# Patient Record
Sex: MALE | Race: WHITE | NOT HISPANIC OR LATINO | Employment: OTHER | URBAN - METROPOLITAN AREA
[De-identification: names, ages, dates, MRNs, and addresses within clinical notes are randomized per-mention and may not be internally consistent; named-entity substitution may affect disease eponyms.]

---

## 2017-01-20 ENCOUNTER — OFFICE VISIT (OUTPATIENT)
Dept: OPHTHALMOLOGY | Facility: CLINIC | Age: 77
End: 2017-01-20
Payer: MEDICARE

## 2017-01-20 ENCOUNTER — CLINICAL SUPPORT (OUTPATIENT)
Dept: OPHTHALMOLOGY | Facility: CLINIC | Age: 77
End: 2017-01-20
Payer: MEDICARE

## 2017-01-20 DIAGNOSIS — H57.03 SMALL PUPIL: ICD-10-CM

## 2017-01-20 DIAGNOSIS — Z86.69 H/O BELL'S PALSY: ICD-10-CM

## 2017-01-20 DIAGNOSIS — H40.013 OPEN ANGLE WITH BORDERLINE FINDINGS, LOW RISK, BILATERAL: Primary | ICD-10-CM

## 2017-01-20 DIAGNOSIS — H25.13 NUCLEAR SCLEROSIS, BILATERAL: ICD-10-CM

## 2017-01-20 DIAGNOSIS — Z83.518 FAMILY HISTORY OF MACULAR DEGENERATION: ICD-10-CM

## 2017-01-20 DIAGNOSIS — H52.203 HYPEROPIA WITH ASTIGMATISM AND PRESBYOPIA, BILATERAL: ICD-10-CM

## 2017-01-20 DIAGNOSIS — H57.819 BROW PTOSIS: ICD-10-CM

## 2017-01-20 DIAGNOSIS — H52.4 HYPEROPIA WITH ASTIGMATISM AND PRESBYOPIA, BILATERAL: ICD-10-CM

## 2017-01-20 DIAGNOSIS — H52.03 HYPEROPIA WITH ASTIGMATISM AND PRESBYOPIA, BILATERAL: ICD-10-CM

## 2017-01-20 PROCEDURE — 92250 FUNDUS PHOTOGRAPHY W/I&R: CPT | Mod: PBBFAC | Performed by: OPHTHALMOLOGY

## 2017-01-20 PROCEDURE — 99212 OFFICE O/P EST SF 10 MIN: CPT | Mod: PBBFAC | Performed by: OPHTHALMOLOGY

## 2017-01-20 PROCEDURE — 92083 EXTENDED VISUAL FIELD XM: CPT | Mod: PBBFAC | Performed by: OPHTHALMOLOGY

## 2017-01-20 PROCEDURE — 99999 PR PBB SHADOW E&M-EST. PATIENT-LVL II: CPT | Mod: PBBFAC,,, | Performed by: OPHTHALMOLOGY

## 2017-01-20 PROCEDURE — 92014 COMPRE OPH EXAM EST PT 1/>: CPT | Mod: S$PBB,,, | Performed by: OPHTHALMOLOGY

## 2017-01-20 NOTE — PROGRESS NOTES
Rel/fix good OD Poor OS coop. Good-smh    Assessment /Plan     For exam results, see Encounter Report.    There are no diagnoses linked to this encounter.

## 2017-01-20 NOTE — PROGRESS NOTES
HPI     Glaucoma    Additional comments: HVF review today           Comments   DLS: 9/25/15    1) LTG suspect  2) NS OU  3) Ptosis  4) FmHx ARMD    Pt had an episode of left sided Bell's Palsy in 8/2016. Took 3 weeks to   resolve. Pt notes that at night his right eye will water some.            Last edited by Kiana Martínez MD on 1/20/2017  8:50 AM. (History)              Assessment /Plan     For exam results, see Encounter Report.    Open angle with borderline findings, low risk, bilateral  -     Roblero Visual Field - OU - Extended - Both Eyes  -     Color Fundus Photography - OU - Both Eyes    Family history of macular degeneration    Nuclear sclerosis, bilateral    Brow ptosis    Hyperopia with astigmatism and presbyopia, bilateral    Small pupil    H/O Bell's palsy        1. Open angle with borderline glaucoma findings - Both Eyes   Glaucoma (type and duration)   LTG suspect  First HVF 2011  First photos 2011     Family history   = father-also my patient, + mom- she has passed  Glaucoma meds   none  H/O adverse rxn to glaucoma drops  none  LASERS   none  GLAUCOMA SURGERIES   none  OTHER EYE SURGERIES   none  CDR  0.7 // 0/75  Tbase  16-24    Tmax  21/24      Ttarget   ?       HVF  4 test 2011 to 2017 - full od // full os- ? Rim artifact  Gono  +3 ou  CCT  567/566  OCT  1 test 2012 to 2012 - RNFL - nl od // nl os  HRT   3 test 2011 to 2015 - MR -  Dec. IT od // dec. IN os /// CDR 0.69 od // 0.70 os  Disc photos  2011, 2012 - OIS    Ttoday  14/15  Test done today - HVF/DFE/Disc Photos    2. Nuclear sclerosis - Both Eyes   - mild BCVA 20/20 ou  BATh 20/60 od // 20/70 os (10/2013)   3. Ptosis   -patient would like to monitor for now as he is not having any trouble    4. Family history of macular degeneration   -father has adv wet ARMD - gets injections with arend   5. Hyperopia, astigmatism , presbyopia  PC +0.75+1.00x177        +1.00+1.50x180   ADD +2.50  glasses Teto   6. I use to see his father,  and also saw his mother -  - his dad  2015 @ age 89   - his mom passed at age 98  - both had glaucoma, his dad also had  wet ARMD - saw Ying     Plan    RTC 9 months- HRT / gonio   -monitor the cataracts - not visi sign yet - dilates medium only - smallish pupils   Keep F/U with Teto prn -

## 2017-02-24 ENCOUNTER — OFFICE VISIT (OUTPATIENT)
Dept: TRANSPLANT | Facility: CLINIC | Age: 77
End: 2017-02-24
Attending: INTERNAL MEDICINE
Payer: MEDICARE

## 2017-02-24 VITALS
HEIGHT: 72 IN | DIASTOLIC BLOOD PRESSURE: 79 MMHG | HEART RATE: 59 BPM | WEIGHT: 219.56 LBS | SYSTOLIC BLOOD PRESSURE: 132 MMHG | BODY MASS INDEX: 29.74 KG/M2

## 2017-02-24 DIAGNOSIS — I10 ESSENTIAL HYPERTENSION: ICD-10-CM

## 2017-02-24 DIAGNOSIS — Z79.01 CURRENT USE OF LONG TERM ANTICOAGULATION: ICD-10-CM

## 2017-02-24 DIAGNOSIS — I48.0 PAF (PAROXYSMAL ATRIAL FIBRILLATION): ICD-10-CM

## 2017-02-24 DIAGNOSIS — E78.00 PURE HYPERCHOLESTEROLEMIA: ICD-10-CM

## 2017-02-24 DIAGNOSIS — Z98.890 S/P ABLATION OF ATRIAL FLUTTER: Primary | ICD-10-CM

## 2017-02-24 DIAGNOSIS — Z86.79 S/P ABLATION OF ATRIAL FLUTTER: Primary | ICD-10-CM

## 2017-02-24 PROCEDURE — 99999 PR PBB SHADOW E&M-EST. PATIENT-LVL III: CPT | Mod: PBBFAC,,, | Performed by: INTERNAL MEDICINE

## 2017-02-24 PROCEDURE — 99213 OFFICE O/P EST LOW 20 MIN: CPT | Mod: S$PBB,,, | Performed by: INTERNAL MEDICINE

## 2017-02-24 PROCEDURE — 99213 OFFICE O/P EST LOW 20 MIN: CPT | Mod: PBBFAC | Performed by: INTERNAL MEDICINE

## 2017-02-24 NOTE — PROGRESS NOTES
Subjective:     Patient ID:  Danial Meza is a 76 y.o. male who presents for follow-up of Atrial Fibrillation (7mo visit)    HPI:  75 yo WM with AF on pradaxA HERE FOR ROUTIJE VISIT. No active CV symptomsl.  Walks couple miles a day ovef 30 min.    Review of Systems   Constitution: Negative for chills, fever, weakness, malaise/fatigue, night sweats, weight gain and weight loss.   Cardiovascular: Negative for chest pain, dyspnea on exertion, irregular heartbeat, leg swelling, near-syncope, orthopnea, palpitations, paroxysmal nocturnal dyspnea and syncope.   Respiratory: Negative for cough, sputum production and wheezing.    Hematologic/Lymphatic: Does not bruise/bleed easily.   Musculoskeletal: Negative for arthritis, joint pain and stiffness.   Gastrointestinal: Negative for hematochezia and melena.        Colonoscopy 2 weeks ago negative--done at 5 yrs due to polyps by Dr. Rodriguez at [facility]   Genitourinary: Negative for hematuria.   Neurological: Negative for brief paralysis, focal weakness and seizures.        Objective:   Physical Exam   Constitutional: He is oriented to person, place, and time. He appears well-developed and well-nourished. No distress.   /79  Pulse (!) 59  Ht 6' (1.829 m)  Wt 99.6 kg (219 lb 9.3 oz)  BMI 29.78 kg/m2   HENT:   Head: Normocephalic and atraumatic.   Mouth/Throat: No oropharyngeal exudate.   Eyes: Conjunctivae are normal. No scleral icterus.   Neck: No JVD present. No tracheal deviation present. No thyromegaly present.   Cardiovascular: Normal rate, regular rhythm and normal heart sounds.  Exam reveals no gallop.    No murmur heard.  Pulmonary/Chest: Effort normal and breath sounds normal.   Abdominal: Soft. Bowel sounds are normal. He exhibits no distension. There is no tenderness. There is no rebound and no guarding.   Musculoskeletal: He exhibits no edema or tenderness.   Neurological: He is alert and oriented to person, place, and time.   Skin: Skin is warm and dry. He is  not diaphoretic.   Psychiatric: He has a normal mood and affect. His behavior is normal. Judgment and thought content normal.      Assessment:     1. S/P ablation of atrial flutter 11/4/15    2. PAF (paroxysmal atrial fibrillation)    3. Current use of long term anticoagulation    4. Essential hypertension    5. Pure hypercholesterolemia      Plan:   BMP on pradaxa today  In 6 months obtain CBC, CMP and lipids

## 2017-02-24 NOTE — MR AVS SNAPSHOT
[facility]  [address]  [facility] [address]  Phone: [phone]                  Danial Meza   2017 4:30 PM   Office Visit    Description:  Male : 1940   Provider:  Ru Thomason Jr., MD   Department:  [facility]           Reason for Visit     Atrial Fibrillation           Diagnoses this Visit        Comments    S/P ablation of atrial flutter    -  Primary     PAF (paroxysmal atrial fibrillation)         Current use of long term anticoagulation         Essential hypertension         Pure hypercholesterolemia                To Do List           Goals (5 Years of Data)     None      Follow-Up and Disposition     Return in about 6 months (around 2017).      [facility]     [facility] -  Assistance  Registered nurses in the [facility] provide clinical advisement, health education, appointment booking, and other advisory services.  Call for this free service at [phone].             Medications           Message regarding Medications     Verify the changes and/or additions to your medication regime listed below are the same as discussed with your clinician today.  If any of these changes or additions are incorrect, please notify your healthcare provider.             Verify that the below list of medications is an accurate representation of the medications you are currently taking.  If none reported, the list may be blank. If incorrect, please contact your healthcare provider. Carry this list with you in case of emergency.           Current Medications     dabigatran etexilate (PRADAXA) 150 mg Cap Take 1 capsule (150 mg total) by mouth 2 (two) times daily.    lisinopril 10 MG tablet Take 1 tablet (10 mg total) by mouth once daily.    pindolol (VISKEN) 5 MG Tab Take 1 tablet (5 mg total) by mouth 2 (two) times daily.    VIT C/VIT E/LUTEIN/MIN/OMEGA-3 (OCUVITE ORAL) Take by mouth.    multivitamin (MULTIVITAMIN)  per tablet Take 1 tablet by mouth once daily.      valacyclovir (VALTREX) 1000 MG tablet Take 1 tablet (1,000 mg total) by mouth 3 (three) times daily.           Clinical Reference Information           Your Vitals Were     BP                   132/79           Blood Pressure          Most Recent Value    BP  132/79      Allergies as of 2/24/2017     No Known Allergies      Immunizations Administered on Date of Encounter - 2/24/2017     None      Orders Placed During Today's Visit     Future Labs/Procedures Expected by Expires    Basic metabolic panel  2/24/2017 (Approximate) 2/24/2018    CBC auto differential  8/26/2017 4/25/2018    Comprehensive metabolic panel  8/26/2017 (Approximate) 2/24/2018    Lipid panel  8/26/2017 (Approximate) 2/24/2018      [facility] Sign-Up     Activating your [facility] account is as easy as 1-2-3!     1) Visit [email or web address], select Sign Up Now, enter this activation code and your date of birth, then select Next.  [email or web address]  Expires: 4/10/2017  4:41 PM      2) Create a username and password to use when you visit [facility] in the future and select a security question in case you lose your password and select Next.    3) Enter your e-mail address and click Sign Up!    Additional Information  If you have questions, please e-mail [email or web address] or call [phone] to talk to our [facility] staff. Remember, [facility] is NOT to be used for urgent needs. For medical emergencies, dial 911.         Instructions    [employer] sells celtic sea salt and use on vegetables or salad one portion daily       Language Assistance Services     ATTENTION: Language assistance services are available, free of charge. Please call [phone].      ATENCIÓN: Si habla español, tiene a moreno disposición servicios gratuitos de asistencia lingüística. Llame al [phone].     CHÚ Ý: N?u b?n nói Ti?ng Vi?t, có các d?ch v? h? tr? ngôn ng? mi?n phí dành cho b?n. G?i s? [phone].          [facility] complies with applicable Federal civil rights laws and does not discriminate on the basis of race, color, national origin, age, disability, or sex.

## 2017-03-23 ENCOUNTER — OFFICE VISIT (OUTPATIENT)
Dept: INTERNAL MEDICINE | Facility: CLINIC | Age: 77
End: 2017-03-23
Payer: MEDICARE

## 2017-03-23 ENCOUNTER — HOSPITAL ENCOUNTER (OUTPATIENT)
Facility: HOSPITAL | Age: 77
Discharge: HOME | End: 2017-03-24
Attending: EMERGENCY MEDICINE | Admitting: EMERGENCY MEDICINE
Payer: MEDICARE

## 2017-03-23 ENCOUNTER — HOSPITAL ENCOUNTER (OUTPATIENT)
Dept: RADIOLOGY | Facility: HOSPITAL | Age: 77
Discharge: HOME | End: 2017-03-23
Attending: INTERNAL MEDICINE
Payer: MEDICARE

## 2017-03-23 VITALS
BODY MASS INDEX: 29.23 KG/M2 | TEMPERATURE: 100 F | DIASTOLIC BLOOD PRESSURE: 70 MMHG | HEIGHT: 72 IN | OXYGEN SATURATION: 98 % | SYSTOLIC BLOOD PRESSURE: 108 MMHG | WEIGHT: 215.81 LBS | HEART RATE: 76 BPM

## 2017-03-23 DIAGNOSIS — R53.1 WEAKNESS: ICD-10-CM

## 2017-03-23 DIAGNOSIS — R05.9 COUGH: ICD-10-CM

## 2017-03-23 DIAGNOSIS — R68.89 FLU-LIKE SYMPTOMS: ICD-10-CM

## 2017-03-23 DIAGNOSIS — J18.9 PNEUMONIA OF RIGHT UPPER LOBE DUE TO INFECTIOUS ORGANISM: Primary | ICD-10-CM

## 2017-03-23 DIAGNOSIS — R79.89 ELEVATED SERUM CREATININE: ICD-10-CM

## 2017-03-23 PROBLEM — N17.9 AKI (ACUTE KIDNEY INJURY): Status: ACTIVE | Noted: 2017-03-23

## 2017-03-23 LAB
FLUAV AG SPEC QL IA: NEGATIVE
FLUBV AG SPEC QL IA: NEGATIVE
LACTATE SERPL-SCNC: 1.3 MMOL/L
PROCALCITONIN SERPL IA-MCNC: 0.09 NG/ML
SPECIMEN SOURCE: NORMAL

## 2017-03-23 PROCEDURE — 99219 PR INITIAL OBSERVATION CARE,LEVL II: CPT | Mod: ,,, | Performed by: HOSPITALIST

## 2017-03-23 PROCEDURE — 99284 EMERGENCY DEPT VISIT MOD MDM: CPT | Mod: 25,27

## 2017-03-23 PROCEDURE — 83605 ASSAY OF LACTIC ACID: CPT

## 2017-03-23 PROCEDURE — 71020 XR CHEST PA AND LATERAL: CPT | Mod: 26,,, | Performed by: RADIOLOGY

## 2017-03-23 PROCEDURE — G0378 HOSPITAL OBSERVATION PER HR: HCPCS

## 2017-03-23 PROCEDURE — 99284 EMERGENCY DEPT VISIT MOD MDM: CPT | Mod: ,,, | Performed by: EMERGENCY MEDICINE

## 2017-03-23 PROCEDURE — 87040 BLOOD CULTURE FOR BACTERIA: CPT | Mod: 59

## 2017-03-23 PROCEDURE — 84145 PROCALCITONIN (PCT): CPT

## 2017-03-23 PROCEDURE — 99999 PR PBB SHADOW E&M-EST. PATIENT-LVL IV: CPT | Mod: PBBFAC,,, | Performed by: PHYSICIAN ASSISTANT

## 2017-03-23 PROCEDURE — 99213 OFFICE O/P EST LOW 20 MIN: CPT | Mod: S$PBB,,, | Performed by: PHYSICIAN ASSISTANT

## 2017-03-23 PROCEDURE — 96365 THER/PROPH/DIAG IV INF INIT: CPT

## 2017-03-23 PROCEDURE — 25000003 PHARM REV CODE 250: Performed by: HOSPITALIST

## 2017-03-23 PROCEDURE — 36415 COLL VENOUS BLD VENIPUNCTURE: CPT

## 2017-03-23 PROCEDURE — 25000003 PHARM REV CODE 250: Performed by: NURSE PRACTITIONER

## 2017-03-23 PROCEDURE — 63600175 PHARM REV CODE 636 W HCPCS: Performed by: NURSE PRACTITIONER

## 2017-03-23 RX ORDER — LISINOPRIL 10 MG/1
10 TABLET ORAL DAILY
Status: DISCONTINUED | OUTPATIENT
Start: 2017-03-24 | End: 2017-03-24 | Stop reason: HOSPADM

## 2017-03-23 RX ORDER — IBUPROFEN 200 MG
16 TABLET ORAL
Status: DISCONTINUED | OUTPATIENT
Start: 2017-03-23 | End: 2017-03-24 | Stop reason: HOSPADM

## 2017-03-23 RX ORDER — GLUCAGON 1 MG
1 KIT INJECTION
Status: DISCONTINUED | OUTPATIENT
Start: 2017-03-23 | End: 2017-03-24 | Stop reason: HOSPADM

## 2017-03-23 RX ORDER — PINDOLOL 5 MG/1
5 TABLET ORAL 2 TIMES DAILY
Status: DISCONTINUED | OUTPATIENT
Start: 2017-03-23 | End: 2017-03-24 | Stop reason: HOSPADM

## 2017-03-23 RX ORDER — BENZONATATE 100 MG/1
100 CAPSULE ORAL 3 TIMES DAILY PRN
Status: DISCONTINUED | OUTPATIENT
Start: 2017-03-23 | End: 2017-03-24 | Stop reason: HOSPADM

## 2017-03-23 RX ORDER — HEPARIN SODIUM 5000 [USP'U]/ML
5000 INJECTION, SOLUTION INTRAVENOUS; SUBCUTANEOUS EVERY 8 HOURS
Status: DISCONTINUED | OUTPATIENT
Start: 2017-03-23 | End: 2017-03-23

## 2017-03-23 RX ORDER — AZITHROMYCIN 250 MG/1
500 TABLET, FILM COATED ORAL
Status: COMPLETED | OUTPATIENT
Start: 2017-03-23 | End: 2017-03-23

## 2017-03-23 RX ORDER — IBUPROFEN 200 MG
24 TABLET ORAL
Status: DISCONTINUED | OUTPATIENT
Start: 2017-03-23 | End: 2017-03-24 | Stop reason: HOSPADM

## 2017-03-23 RX ORDER — SODIUM CHLORIDE 9 MG/ML
INJECTION, SOLUTION INTRAVENOUS CONTINUOUS
Status: DISCONTINUED | OUTPATIENT
Start: 2017-03-23 | End: 2017-03-24 | Stop reason: HOSPADM

## 2017-03-23 RX ORDER — DABIGATRAN ETEXILATE 150 MG/1
150 CAPSULE ORAL 2 TIMES DAILY
Status: DISCONTINUED | OUTPATIENT
Start: 2017-03-23 | End: 2017-03-24 | Stop reason: HOSPADM

## 2017-03-23 RX ORDER — AZITHROMYCIN 250 MG/1
500 TABLET, FILM COATED ORAL DAILY
Status: DISCONTINUED | OUTPATIENT
Start: 2017-03-24 | End: 2017-03-24 | Stop reason: HOSPADM

## 2017-03-23 RX ADMIN — PINDOLOL 5 MG: 5 TABLET ORAL at 09:03

## 2017-03-23 RX ADMIN — BENZONATATE 100 MG: 100 CAPSULE ORAL at 05:03

## 2017-03-23 RX ADMIN — AZITHROMYCIN 500 MG: 250 TABLET, FILM COATED ORAL at 02:03

## 2017-03-23 RX ADMIN — CEFTRIAXONE 1 G: 1 INJECTION, SOLUTION INTRAVENOUS at 02:03

## 2017-03-23 RX ADMIN — SODIUM CHLORIDE 1000 ML: 0.9 INJECTION, SOLUTION INTRAVENOUS at 02:03

## 2017-03-23 RX ADMIN — SODIUM CHLORIDE: 0.9 INJECTION, SOLUTION INTRAVENOUS at 09:03

## 2017-03-23 RX ADMIN — DABIGATRAN ETEXILATE MESYLATE 150 MG: 150 CAPSULE ORAL at 09:03

## 2017-03-23 NOTE — ED NOTES
Pt presented to the ED c/o fever, cough, sob, and congestion since Monday. Pt was sent from the clinic for further evaluation of possible pneumonia. Pt alert. Wife at the bedside.

## 2017-03-23 NOTE — ED PROVIDER NOTES
Encounter Date: 3/23/2017    SCRIBE #1 NOTE: I, Caio Romo, am scribing for, and in the presence of,  Leatha Sharp MD. I have scribed the following portions of the note - the APC attestation.       History     Chief Complaint   Patient presents with    Pneumonia     was told to come to ED for pneumonia by PCP     Review of patient's allergies indicates:  No Known Allergies  HPI Comments: This is a 76 y.o. Male with a past medical history significant for SVT, paroxysmal atrial fibrillation, hypertension, hyperlipidemia, glaucoma, cataracts and gout who presents to the ED today for evaluation and treatment of pneumonia and elevated creatinine.  He states that his symptoms began on Monday.  He reports a productive cough, subjective fever and chills.  Denies hemoptysis.  He was unable to take his temperature because his thermometer is broken.  He denies chest pain or shortness of breath.  Denies headache or neck stiffness.  States his urine has been dark since yesterday evening but denies hematuria or dysuria.  He denies abdominal pain, nausea, vomiting or diarrhea.  He was seen in primary care today where he had lab work and a chest x-ray done and was diagnosed with pneumonia and an elevated creatinine.  He was instructed to come to the emergency department for further evaluation and treatment.  He denies any other problems or concerns at this time.      The history is provided by the patient.     Past Medical History:   Diagnosis Date    Basal cell carcinoma 1/5/12    right nose    Cataract     Glaucoma     Gout 7/18/2014    Hyperlipemia     Hypertension     PAF (paroxysmal atrial fibrillation) 2012    SVT (supraventricular tachycardia) 1/14/2005    ablation by Marixa of left lateral free wall accessory bypass tract     Past Surgical History:   Procedure Laterality Date    skin cancer nose and neck      SVT accessory pathway ablation  1/14/2005    Dr. Calvin     Family History   Problem Relation Age of  Onset    Glaucoma Mother     Cancer Mother     Glaucoma Father     Macular degeneration Father     Stroke Father     Melanoma Father     Cancer Brother     No Known Problems Sister     No Known Problems Maternal Aunt     No Known Problems Maternal Uncle     No Known Problems Paternal Aunt     No Known Problems Paternal Uncle     No Known Problems Maternal Grandmother     No Known Problems Maternal Grandfather     No Known Problems Paternal Grandmother     No Known Problems Paternal Grandfather     Diabetes Neg Hx     Psoriasis Neg Hx     Lupus Neg Hx     Eczema Neg Hx     Acne Neg Hx     Amblyopia Neg Hx     Blindness Neg Hx     Cataracts Neg Hx     Hypertension Neg Hx     Retinal detachment Neg Hx     Strabismus Neg Hx     Thyroid disease Neg Hx      Social History   Substance Use Topics    Smoking status: Former Smoker    Smokeless tobacco: None    Alcohol use Yes      Comment: 1 time biweekly     Review of Systems   Constitutional: Positive for fever and chills.  HENT: Negative for congestion and sinus pressure.    Eyes: Negative for visual disturbance.   Respiratory: Positive for cough.  Positive for sputum.  Negative for hemoptysis.  Negative for shortness of breath.    Cardiovascular: Negative for chest pain.  Negative for edema.  Gastrointestinal: Negative for abdominal pain, diarrhea, nausea and vomiting.   Genitourinary: Negative for flank pain. Negative for dysuria. positive for dark urine.  Musculoskeletal: Negative for arthralgias and myalgias.   Skin: Negative for rash and wound.   Neurological: Negative for dizziness. Negative for weakness and numbness.  negative for headache.  Psychiatric/Behavioral: Negative for confusion.       Physical Exam   Initial Vitals   BP Pulse Resp Temp SpO2   03/23/17 0955 03/23/17 0955 03/23/17 0955 03/23/17 0955 03/23/17 0955   101/59 78 18 99.8 °F (37.7 °C) 96 %     Physical Exam   Nursing note and vitals reviewed.  Constitutional: Patient  appears well-developed and well-nourished. Not diaphoretic. No distress.   HENT:   Head: Normocephalic and atraumatic.   Eyes: Conjunctivae are normal. No scleral icterus.   Neck: Normal range of motion. Neck supple.   Cardiovascular: Normal rate, regular rhythm and normal heart sounds.   Pulmonary/Chest: Diminished breath sounds noted to the right upper lobe.  Nonproductive cough on exam.  No respiratory distress.  Abdominal: Soft. There is no tenderness.   Musculoskeletal: Normal range of motion. No edema or tenderness.   Neurological: Alert and oriented to person, place, and time. Normal strength. No sensory deficit.   Skin: Skin is warm and dry. No rash noted. No erythema. No pallor.   Psychiatric: Normal mood and affect. Thought content normal.     ED Course   Procedures  Labs Reviewed   CULTURE, BLOOD   CULTURE, BLOOD   LACTIC ACID, PLASMA             Medical Decision Making:   History:   Old Medical Records: I decided to obtain old medical records.  Clinical Tests:   Lab Tests: Reviewed and Ordered            Scribe Attestation:   Scribe #1: I performed the above scribed service and the documentation accurately describes the services I performed. I attest to the accuracy of the note.    Attending Attestation:     Physician Attestation Statement for NP/PA:   I have conducted a face to face encounter with this patient in addition to the NP/PA, due to Medical Complexity    Other NP/PA Attestation Additions:    History of Present Illness: 76 y.o. male complains of cough. He was sent from his PCP's office for suspected PNA.    Physical Exam: He is nontoxic appearing.    Medical Decision Making: I have reviewed his CXR from earlier to day which shows a RUL infiltrate. Labs are remarkable for creatinine of 1.7 whereas his creatinine 3 weeks ago was 1.3. We will send blood cultures and a lactic acid. His BP is running in the low 100s. We will give bolus of IV fluids. We will treat with Rocephin and Azithromycin and  place in observation overnight.        Physician Attestation for Scribe:  Physician Attestation Statement for Scribe #1: I, Leatha Sharp MD, reviewed documentation, as scribed by Caio Romo in my presence, and it is both accurate and complete.                 ED Course     Clinical Impression:   The primary encounter diagnosis was Pneumonia of right upper lobe due to infectious organism. A diagnosis of Elevated serum creatinine was also pertinent to this visit.          Leatha Vallecillo MD  03/26/17 0050

## 2017-03-23 NOTE — IP AVS SNAPSHOT
[facility]  [address]  [facility] [address]  Phone: [phone]           Patient Discharge Instructions     Our goal is to set you up for success. This packet includes information on your condition, medications, and your home care. It will help you to care for yourself so you don't get sicker and need to go back to the hospital.     Please ask your nurse if you have any questions.        There are many details to remember when preparing to leave the hospital. Here is what you will need to do:    1. Take your medicine. If you are prescribed medications, review your Medication List in the following pages. You may have new medications to  at the pharmacy and others that you'll need to stop taking. Review the instructions for how and when to take your medications. Talk with your doctor or nurses if you are unsure of what to do.     2. Go to your follow-up appointments. Specific follow-up information is listed in the following pages. Your may be contacted by a transition nurse or clinical provider about future appointments. Be sure we have all of the phone numbers to reach you, if needed. Please contact your provider's office if you are unable to make an appointment.     3. Watch for warning signs. Your doctor or nurse will give you detailed warning signs to watch for and when to call for assistance. These instructions may also include educational information about your condition. If you experience any of warning signs to your health, call your doctor.               [facility]  Unless otherwise directed by your provider, please contact [facility], our nurse care line that is available for 24/7 assistance.     [phone] (toll-free)    Registered nurses in the [facility] provide clinical advisement, health education, appointment booking, and other advisory services.                    ** Verify the list of medication(s) below is accurate and up  to date. Carry this with you in case of emergency. If your medications have changed, please notify your healthcare provider.             Medication List      START taking these medications        Additional Info                      benzonatate 200 MG capsule   Commonly known as:  TESSALON   Quantity:  30 capsule   Refills:  1   Dose:  200 mg    Last time this was given:  100 mg on 3/23/2017  5:03 PM   Instructions:  Take 1 capsule (200 mg total) by mouth 3 (three) times daily as needed for Cough.     Begin Date    AM    Noon    PM    Bedtime       moxifloxacin 400 mg tablet   Commonly known as:  AVELOX   Quantity:  3 tablet   Refills:  0   Dose:  400 mg    Instructions:  Take 1 tablet (400 mg total) by mouth once daily.     Begin Date    AM    Noon    PM    Bedtime         CHANGE how you take these medications        Additional Info                      valacyclovir 1000 MG tablet   Commonly known as:  VALTREX   Quantity:  10 tablet   Refills:  0   Dose:  1000 mg   What changed:    - when to take this  - reasons to take this    Instructions:  Take 1 tablet (1,000 mg total) by mouth every 12 (twelve) hours as needed (take 2 tablets every twelve hours x 1 day for cold sores as needed).     Begin Date    AM    Noon    PM    Bedtime         CONTINUE taking these medications        Additional Info                      dabigatran etexilate 150 mg Cap   Commonly known as:  PRADAXA   Quantity:  180 capsule   Refills:  3   Dose:  150 mg    Last time this was given:  150 mg on 3/24/2017  8:55 AM   Instructions:  Take 1 capsule (150 mg total) by mouth 2 (two) times daily.     Begin Date    AM    Noon    PM    Bedtime       lisinopril 10 MG tablet   Quantity:  90 tablet   Refills:  3   Dose:  10 mg    Last time this was given:  10 mg on 3/24/2017  8:55 AM   Instructions:  Take 1 tablet (10 mg total) by mouth once daily.     Begin Date    AM    Noon    PM    Bedtime       OCUVITE ORAL   Refills:  0    Instructions:  Take by  mouth.     Begin Date    AM    Noon    PM    Bedtime       ONE DAILY MULTIVITAMIN per tablet   Refills:  0   Dose:  1 tablet   Generic drug:  multivitamin    Instructions:  Take 1 tablet by mouth once daily.     Begin Date    AM    Noon    PM    Bedtime       pindolol 5 MG Tab   Commonly known as:  VISKEN   Quantity:  180 tablet   Refills:  3   Dose:  5 mg    Last time this was given:  5 mg on 3/24/2017  8:54 AM   Instructions:  Take 1 tablet (5 mg total) by mouth 2 (two) times daily.     Begin Date    AM    Noon    PM    Bedtime            Where to Get Your Medications      You can get these medications from any pharmacy     Bring a paper prescription for each of these medications     benzonatate 200 MG capsule    moxifloxacin 400 mg tablet    valacyclovir 1000 MG tablet                  Please bring to all follow up appointments:    1. A copy of your discharge instructions.  2. All medicines you are currently taking in their original bottles.  3. Identification and insurance card.    Please arrive 15 minutes ahead of scheduled appointment time.    Please call 24 hours in advance if you must reschedule your appointment and/or time.        Your Scheduled Appointments     Jun 27, 2017  9:40 AM CDT   Physical with Louie Durant MD   [facility] - Internal Medicine ([facility])    [address]  [place] [id number] [phone]              Follow-up Information     Follow up with Louie Durant MD On 6/27/2017.    Specialty:  Internal Medicine    Why:  to establish care. On wait list. Clinic will call with earlier appointment    Contact information:    [address]  [place] [address]  [phone]          Discharge Instructions     Future Orders    Activity as tolerated     Call MD for:  difficulty breathing or increased cough     Call MD for:  persistent nausea and vomiting or diarrhea     Call MD for:  temperature >100.4     Diet general     Questions:    Total calories:       Fat restriction, if any:      Protein restriction, if any:      Na restriction, if any:      Fluid restriction:      Additional restrictions:          Primary Diagnosis     Your primary diagnosis was:  Pneumonia Of Right Upper Lobe Due To Infectious Organism      Admission Information     Date & Time Provider Department CSN    3/23/2017 12:51 PM Livia Monte MD [facility] [id number]      Care Providers     Provider Role Specialty Primary office phone    Livia Monte MD Attending Provider Hospitalist [phone]    Miguel Sylvester MD Team Attending  Hospitalist [phone]    Livia Monte MD Team Attending  Hospitalist [phone]      Your Vitals Were     BP Pulse Temp Resp Height Weight    138/76 (BP Location: Right arm, Patient Position: Sitting, BP Method: Automatic) 76 97.9 °F (36.6 °C) (Oral) 16 6' (1.829 m) 96.6 kg (213 lb)    SpO2 BMI             95% 28.89 kg/m2         Recent Lab Values     No lab values to display.      Pending Labs     Order Current Status    Blood Culture #1 **CANNOT BE ORDERED STAT** Preliminary result    Blood Culture #2 **CANNOT BE ORDERED STAT** Preliminary result      Allergies as of 3/24/2017     No Known Allergies      Advance Directives     An advance directive is a document which, in the event you are no longer able to make decisions for yourself, tells your healthcare team what kind of treatment you do or do not want to receive, or who you would like to make those decisions for you.  If you do not currently have an advance directive, [facility] encourages you to create one.  For more information call:  [phone]), [phone] ([phone]),  or log on to [email or web address].        Language Assistance Services     ATTENTION: Language assistance services are available, free of charge. Please call [phone].      ATENCIÓN: Si habla español, tiene a moreno disposición servicios gratuitos de asistencia lingüística. Llame  al [phone].     [facility] Ý: N?u b?n nói Ti?ng Vi?t, có các d?ch v? h? tr? ngôn ng? mi?n phí dành cho b?n. G?i s? [phone].        Pneumonmia Discharge Instructions                Pradaxa Information           [facility] Sign-Up     Activating your [facility] account is as easy as 1-2-3!     1) Visit [email or web address], select Sign Up Now, enter this activation code and your date of birth, then select Next.  [email or web address]  Expires: 4/10/2017  5:41 PM      2) Create a username and password to use when you visit [facility] in the future and select a security question in case you lose your password and select Next.    3) Enter your e-mail address and click Sign Up!    Additional Information  If you have questions, please e-mail [email or web address] or call [phone] to talk to our [facility] staff. Remember, [facility] is NOT to be used for urgent needs. For medical emergencies, dial 911.          [facility] complies with applicable Federal civil rights laws and does not discriminate on the basis of race, color, national origin, age, disability, or sex.

## 2017-03-23 NOTE — ED NOTES
Pt identifiers Danial Meza were checked and correct  LOC: The patient is awake, alert, aware of environment with an appropriate affect. Oriented x3, speaking appropriately  APPEARANCE: Pt resting comfortably, in no acute distress, pt is clean and well groomed, clothing properly fastened  SKIN: Skin warm, dry and intact, normal skin turgor, moist mucus membranes  RESPIRATORY: Airway is open and patent, respirations are spontaneous, even and unlabored, normal effort and rate  CARDIAC: Normal rate and rhythm, no peripheral edema noted, capillary refill < 3 seconds, bilateral radial pulses 2+  ABDOMEN: Soft, non tender, non distended. Bowel sounds present x 4 quadrants.    NEUROLOGIC: PERRLA, facial expression is symmetrical, patient moving all extremities spontaneously, normal sensation in all extremities when touched with a finger.  Follows all commands appropriately  MUSCULOSKELETAL: No obvious deformities.

## 2017-03-23 NOTE — SUBJECTIVE & OBJECTIVE
Past Medical History:   Diagnosis Date    Basal cell carcinoma 1/5/12    right nose    Cataract     Glaucoma     Gout 7/18/2014    Hyperlipemia     Hypertension     PAF (paroxysmal atrial fibrillation) 2012    SVT (supraventricular tachycardia) 1/14/2005    ablation by Marixa of left lateral free wall accessory bypass tract       Past Surgical History:   Procedure Laterality Date    skin cancer nose and neck      SVT accessory pathway ablation  1/14/2005    Dr. Calvin       Review of patient's allergies indicates:  No Known Allergies    No current facility-administered medications on file prior to encounter.      Current Outpatient Prescriptions on File Prior to Encounter   Medication Sig    dabigatran etexilate (PRADAXA) 150 mg Cap Take 1 capsule (150 mg total) by mouth 2 (two) times daily.    lisinopril 10 MG tablet Take 1 tablet (10 mg total) by mouth once daily.    multivitamin (MULTIVITAMIN) per tablet Take 1 tablet by mouth once daily.      pindolol (VISKEN) 5 MG Tab Take 1 tablet (5 mg total) by mouth 2 (two) times daily.    VIT C/VIT E/LUTEIN/MIN/OMEGA-3 (OCUVITE ORAL) Take by mouth.    valacyclovir (VALTREX) 1000 MG tablet Take 1 tablet (1,000 mg total) by mouth 3 (three) times daily.     Family History     Problem Relation (Age of Onset)    Cancer Mother, Brother    Glaucoma Mother, Father    Macular degeneration Father    Melanoma Father    No Known Problems Sister, Maternal Aunt, Maternal Uncle, Paternal Aunt, Paternal Uncle, Maternal Grandmother, Maternal Grandfather, Paternal Grandmother, Paternal Grandfather    Stroke Father        Social History Main Topics    Smoking status: Never Smoker    Smokeless tobacco: Not on file    Alcohol use Yes      Comment: 1 time biweekly    Drug use: No    Sexual activity: Yes     Partners: Female     Review of Systems   Constitutional: Positive for chills and fever. Negative for fatigue.   Respiratory: Positive for cough. Negative for chest  tightness, shortness of breath and wheezing.    Cardiovascular: Negative.  Negative for chest pain, palpitations and leg swelling.   Gastrointestinal: Negative for abdominal distention, abdominal pain, constipation, diarrhea, nausea and vomiting.   Genitourinary: Negative for decreased urine volume, dysuria, flank pain, frequency and urgency.   Skin: Negative for rash.   Neurological: Negative for dizziness, weakness, light-headedness and headaches.   Psychiatric/Behavioral: Negative for agitation, behavioral problems and confusion.     Objective:     Vital Signs (Most Recent):  Temp: 98.1 °F (36.7 °C) (03/23/17 1612)  Pulse: 83 (03/23/17 1612)  Resp: 16 (03/23/17 1612)  BP: (!) 145/69 (03/23/17 1612)  SpO2: 99 % (03/23/17 1612) Vital Signs (24h Range):  Temp:  [98.1 °F (36.7 °C)-99.8 °F (37.7 °C)] 98.1 °F (36.7 °C)  Pulse:  [76-83] 83  Resp:  [16-18] 16  SpO2:  [96 %-99 %] 99 %  BP: (101-145)/(59-70) 145/69     Weight: 96.6 kg (213 lb)  Body mass index is 28.89 kg/(m^2).    Physical Exam   Constitutional: He is oriented to person, place, and time. He appears well-developed and well-nourished.   Eyes: EOM are normal. Pupils are equal, round, and reactive to light.   Cardiovascular: Normal rate, regular rhythm and normal heart sounds.  Exam reveals no gallop and no friction rub.    No murmur heard.  Pulmonary/Chest: Effort normal and breath sounds normal. No respiratory distress. He has no wheezes. He has no rales. He exhibits no tenderness.   Abdominal: Soft. Bowel sounds are normal. He exhibits no distension and no mass. There is no tenderness. There is no rebound and no guarding.   Musculoskeletal: Normal range of motion.   Neurological: He is alert and oriented to person, place, and time. He displays normal reflexes. No cranial nerve deficit. He exhibits normal muscle tone. Coordination normal.   Skin: Skin is warm and dry.        Significant Labs:   CBC:   Recent Labs  Lab 03/23/17  0832   WBC 8.40   HGB 15.7    HCT 43.5   *     CMP:   Recent Labs  Lab 03/23/17  0832      K 4.5      CO2 23   *   BUN 34*   CREATININE 1.7*   CALCIUM 9.5   PROT 7.0   ALBUMIN 4.1   BILITOT 1.2*   ALKPHOS 67   AST 39   ALT 56*   ANIONGAP 11   EGFRNONAA 38*       Significant Imaging: CXR: I have reviewed all pertinent results/findings within the past 24 hours and my personal findings are:   early RUL infiltrate

## 2017-03-23 NOTE — H&P
[facility] Hospital Medicine  History & Physical    Patient Name: Danial Meza  MRN: [id number]  Admission Date: 3/23/2017  Attending Physician: Livia Monte MD  Primary Care Provider: Bruno eBcerra MD    [facility] Medicine Team: [facility] B Livia Monte MD     Patient information was obtained from patient and ER records.     Subjective:     Principal Problem:Pneumonia of right upper lobe due to infectious organism    Chief Complaint:   Chief Complaint   Patient presents with    Pneumonia     was told to come to ED for pneumonia by PCP        HPI: 77 yo man with afib who presents with 3 days of subjective fevers, chills, weakness, and cough.  Denies any SOB, n/v/d, urinary sx, leg swelling/PND. Denies any sick contacts. Pt went to urgent care given his sx and was referred to the ED for further evaluation.     Past Medical History:   Diagnosis Date    Basal cell carcinoma 1/5/12    right nose    Cataract     Glaucoma     Gout 7/18/2014    Hyperlipemia     Hypertension     PAF (paroxysmal atrial fibrillation) 2012    SVT (supraventricular tachycardia) 1/14/2005    ablation by Marixa of left lateral free wall accessory bypass tract       Past Surgical History:   Procedure Laterality Date    skin cancer nose and neck      SVT accessory pathway ablation  1/14/2005    Dr. Calvin       Review of patient's allergies indicates:  No Known Allergies    No current facility-administered medications on file prior to encounter.      Current Outpatient Prescriptions on File Prior to Encounter   Medication Sig    dabigatran etexilate (PRADAXA) 150 mg Cap Take 1 capsule (150 mg total) by mouth 2 (two) times daily.    lisinopril 10 MG tablet Take 1 tablet (10 mg total) by mouth once daily.    multivitamin (MULTIVITAMIN) per tablet Take 1 tablet by mouth once daily.      pindolol (VISKEN) 5 MG Tab Take 1 tablet (5 mg total) by mouth 2 (two) times daily.    VIT C/VIT E/LUTEIN/MIN/OMEGA-3  (OCUVITE ORAL) Take by mouth.    valacyclovir (VALTREX) 1000 MG tablet Take 1 tablet (1,000 mg total) by mouth 3 (three) times daily.     Family History     Problem Relation (Age of Onset)    Cancer Mother, Brother    Glaucoma Mother, Father    Macular degeneration Father    Melanoma Father    No Known Problems Sister, Maternal Aunt, Maternal Uncle, Paternal Aunt, Paternal Uncle, Maternal Grandmother, Maternal Grandfather, Paternal Grandmother, Paternal Grandfather    Stroke Father        Social History Main Topics    Smoking status: Never Smoker    Smokeless tobacco: Not on file    Alcohol use Yes      Comment: 1 time biweekly    Drug use: No    Sexual activity: Yes     Partners: Female     Review of Systems   Constitutional: Positive for chills and fever. Negative for fatigue.   Respiratory: Positive for cough. Negative for chest tightness, shortness of breath and wheezing.    Cardiovascular: Negative.  Negative for chest pain, palpitations and leg swelling.   Gastrointestinal: Negative for abdominal distention, abdominal pain, constipation, diarrhea, nausea and vomiting.   Genitourinary: Negative for decreased urine volume, dysuria, flank pain, frequency and urgency.   Skin: Negative for rash.   Neurological: Negative for dizziness, weakness, light-headedness and headaches.   Psychiatric/Behavioral: Negative for agitation, behavioral problems and confusion.     Objective:     Vital Signs (Most Recent):  Temp: 98.1 °F (36.7 °C) (03/23/17 1612)  Pulse: 83 (03/23/17 1612)  Resp: 16 (03/23/17 1612)  BP: (!) 145/69 (03/23/17 1612)  SpO2: 99 % (03/23/17 1612) Vital Signs (24h Range):  Temp:  [98.1 °F (36.7 °C)-99.8 °F (37.7 °C)] 98.1 °F (36.7 °C)  Pulse:  [76-83] 83  Resp:  [16-18] 16  SpO2:  [96 %-99 %] 99 %  BP: (101-145)/(59-70) 145/69     Weight: 96.6 kg (213 lb)  Body mass index is 28.89 kg/(m^2).    Physical Exam   Constitutional: He is oriented to person, place, and time. He appears well-developed and  well-nourished.   Eyes: EOM are normal. Pupils are equal, round, and reactive to light.   Cardiovascular: Normal rate, regular rhythm and normal heart sounds.  Exam reveals no gallop and no friction rub.    No murmur heard.  Pulmonary/Chest: Effort normal and breath sounds normal. No respiratory distress. He has no wheezes. He has no rales. He exhibits no tenderness.   Abdominal: Soft. Bowel sounds are normal. He exhibits no distension and no mass. There is no tenderness. There is no rebound and no guarding.   Musculoskeletal: Normal range of motion.   Neurological: He is alert and oriented to person, place, and time. He displays normal reflexes. No cranial nerve deficit. He exhibits normal muscle tone. Coordination normal.   Skin: Skin is warm and dry.        Significant Labs:   CBC:   Recent Labs  Lab 03/23/17  0832   WBC 8.40   HGB 15.7   HCT 43.5   *     CMP:   Recent Labs  Lab 03/23/17  0832      K 4.5      CO2 23   *   BUN 34*   CREATININE 1.7*   CALCIUM 9.5   PROT 7.0   ALBUMIN 4.1   BILITOT 1.2*   ALKPHOS 67   AST 39   ALT 56*   ANIONGAP 11   EGFRNONAA 38*       Significant Imaging: CXR: I have reviewed all pertinent results/findings within the past 24 hours and my personal findings are:   early RUL infiltrate     Assessment/Plan:     * Pneumonia of right upper lobe due to infectious organism  Cont abx for now - ceftriaxone and azithromycin   - f/u procalcitonin       Hypertension  Cont lisinopril       PAF (paroxysmal atrial fibrillation)  Cont pradaxa       GT (acute kidney injury)  Cr is 1.3 at baseline - Cr 1.7 today.    - Give IVF       VTE Risk Mitigation         Ordered     dabigatran etexilate capsule 150 mg  2 times daily     Route:  Oral        03/23/17 1623     Medium Risk of VTE  Once      03/23/17 1623        Livia Monte MD  Department of Hospital Medicine   [facility]

## 2017-03-23 NOTE — MR AVS SNAPSHOT
[facility]  [address]  [facility] [address]  Phone: [phone]  Fax: [phone]                  Danial Meza   3/23/2017 8:00 AM   Office Visit    Description:  Male : 1940   Provider:  Mary Lou Byrd PA-C   Department:  [facility]           Reason for Visit     Nasal Congestion     Chills           Diagnoses this Visit        Comments    Flu-like symptoms    -  Primary     Upper respiratory tract infection, unspecified type                To Do List           Goals (5 Years of Data)     None      [facility]     [facility] -  Assistance  Registered nurses in the [facility] On Call Center provide clinical advisement, health education, appointment booking, and other advisory services.  Call for this free service at [phone].             Medications           Message regarding Medications     Verify the changes and/or additions to your medication regime listed below are the same as discussed with your clinician today.  If any of these changes or additions are incorrect, please notify your healthcare provider.             Verify that the below list of medications is an accurate representation of the medications you are currently taking.  If none reported, the list may be blank. If incorrect, please contact your healthcare provider. Carry this list with you in case of emergency.           Current Medications     dabigatran etexilate (PRADAXA) 150 mg Cap Take 1 capsule (150 mg total) by mouth 2 (two) times daily.    lisinopril 10 MG tablet Take 1 tablet (10 mg total) by mouth once daily.    multivitamin (MULTIVITAMIN) per tablet Take 1 tablet by mouth once daily.      pindolol (VISKEN) 5 MG Tab Take 1 tablet (5 mg total) by mouth 2 (two) times daily.    VIT C/VIT E/LUTEIN/MIN/OMEGA-3 (OCUVITE ORAL) Take by mouth.    valacyclovir (VALTREX) 1000 MG tablet Take 1 tablet (1,000 mg total) by mouth 3 (three) times daily.            Clinical Reference Information           Your Vitals Were     BP Pulse Temp Height Weight BMI    102/68 (BP Location: Left arm, Patient Position: Sitting, BP Method: Manual) 76 98.9 °F (37.2 °C) 6' (1.829 m) 97.9 kg (215 lb 13.3 oz) 29.27 kg/m2      Blood Pressure          Most Recent Value    BP  102/68      Allergies as of 3/23/2017     No Known Allergies      Immunizations Administered on Date of Encounter - 3/23/2017     None      Orders Placed During Today's Visit      Normal Orders This Visit    Influenza antigen Nasopharyngeal Swab     Future Labs/Procedures Expected by Expires    CBC auto differential  3/23/2017 3/23/2018    Comprehensive metabolic panel  3/23/2017 3/23/2018    X-Ray Chest PA And Lateral  3/23/2017 3/23/2018      [facility] Sign-Up     Activating your [facility] account is as easy as 1-2-3!     1) Visit [email or web address], select Sign Up Now, enter this activation code and your date of birth, then select Next.  [email or web address]  Expires: 4/10/2017  5:41 PM      2) Create a username and password to use when you visit [facility] in the future and select a security question in case you lose your password and select Next.    3) Enter your e-mail address and click Sign Up!    Additional Information  If you have questions, please e-mail [email or web address] or call [phone] to talk to our [facility] staff. Remember, [facility] is NOT to be used for urgent needs. For medical emergencies, dial 911.         Language Assistance Services     ATTENTION: Language assistance services are available, free of charge. Please call [phone].      ATENCIÓN: Si habla español, tiene a moreno disposición servicios gratuitos de asistencia lingüística. Llame al [phone].     CHÚ Ý: N?u b?n nói Ti?ng Vi?t, có các d?ch v? h? tr? ngôn ng? mi?n phí dành cho b?n. G?i s? [phone].         [facility] complies with applicable Federal civil rights laws and does not discriminate on the basis  of race, color, national origin, age, disability, or sex.

## 2017-03-23 NOTE — PROGRESS NOTES
Subjective:       Patient ID: Danial Meza is a 76 y.o. male.        Chief Complaint: Nasal Congestion and Chills    HPI Comments: Danial Meza is an established patient of Bruno Becerra MD here today for urgent care visit.    PCP is at .  Sees cardiology here.    Chills, nasal drainage, nasal congestion, cough with minimal mucus, body aches.  No shortness of breath or chest pain.  Sx started 3-4 days ago.    Took Amoxil last night at 10:30, feels like that helped.    S/p flu shot.    No N/V.  No constipation.  Two watery loose stools yesterday after eating big meal last night.      Feeling generally weak.  Not drinking or eating too much.  Urine was dark this morning.           Review of Systems   Constitutional: Positive for appetite change, chills and fever. Negative for fatigue.   HENT: Positive for congestion. Negative for sore throat.    Eyes: Negative for visual disturbance.   Respiratory: Positive for cough. Negative for chest tightness and shortness of breath.    Cardiovascular: Negative for chest pain, palpitations and leg swelling.   Gastrointestinal: Negative for abdominal pain, blood in stool, constipation, diarrhea, nausea and vomiting.   Genitourinary: Negative for dysuria, frequency, hematuria and urgency.        Dark urine   Musculoskeletal: Negative for arthralgias and back pain.   Skin: Negative for rash.   Neurological: Positive for weakness. Negative for dizziness, syncope and headaches.   Psychiatric/Behavioral: Negative for dysphoric mood and sleep disturbance. The patient is not nervous/anxious.        Objective:      Physical Exam   Constitutional: He appears well-developed and well-nourished.   HENT:   Head: Normocephalic.   Right Ear: External ear normal. A middle ear effusion is present.   Left Ear: External ear normal. A middle ear effusion is present.   Nose: Rhinorrhea present.   Mouth/Throat: Oropharynx is clear and moist.   Eyes: Pupils are equal, round, and reactive to light.  "  Cardiovascular: Normal rate, regular rhythm and normal heart sounds.  Exam reveals no gallop and no friction rub.    No murmur heard.  Pulmonary/Chest: Effort normal. No respiratory distress. He has no decreased breath sounds. He has no wheezes. He has no rhonchi. He has rales in the right upper field.   Abdominal: Soft. There is no tenderness.   Musculoskeletal: He exhibits no edema.   Neurological: He is alert.   Skin: Skin is warm and dry.   Psychiatric: He has a normal mood and affect.   Nursing note and vitals reviewed.      Assessment:       1. Pneumonia of right upper lobe due to infectious organism    2. Cough    3. Weakness    4. Elevated serum creatinine        Plan:       Danial was seen today for nasal congestion and chills.    Diagnoses and all orders for this visit:    Pneumonia of right upper lobe due to infectious organism  -     CBC auto differential; Future  -     Refer to Emergency Dept.    Cough  -     CBC auto differential; Future  -     Comprehensive metabolic panel; Future  -     X-Ray Chest PA And Lateral; Future  -     Influenza antigen Nasopharyngeal Swab    Weakness  -     Refer to Emergency Dept.    Elevated serum creatinine  -     Refer to Emergency Dept.    With right upper lobe pneumonia on CXR, acceptable WBC count, pulse ox 98%, elevated serum creatinine of 1.7 with poor PO intake last several days and generalized sense of weakness.  Will send to ED for further evaluation, IV fluids and antibiotics and possible admission.      Pt has been given instructions populated from [employer] database and has verbalized understanding of the after visit summary and information contained wherein.    Follow up with a primary care provider. May go to ER for acute shortness of breath, lightheadedness, fever, or any other emergent complaints or changes in condition.    "This note will be shared with the patient"    No future appointments.            "

## 2017-03-24 VITALS
TEMPERATURE: 98 F | SYSTOLIC BLOOD PRESSURE: 138 MMHG | HEART RATE: 76 BPM | HEIGHT: 72 IN | WEIGHT: 213 LBS | OXYGEN SATURATION: 95 % | BODY MASS INDEX: 28.85 KG/M2 | RESPIRATION RATE: 16 BRPM | DIASTOLIC BLOOD PRESSURE: 76 MMHG

## 2017-03-24 PROBLEM — N17.9 AKI (ACUTE KIDNEY INJURY): Status: RESOLVED | Noted: 2017-03-23 | Resolved: 2017-03-24

## 2017-03-24 LAB
ANION GAP SERPL CALC-SCNC: 10 MMOL/L
BASOPHILS # BLD AUTO: 0.03 K/UL
BASOPHILS NFR BLD: 0.6 %
BUN SERPL-MCNC: 23 MG/DL
CALCIUM SERPL-MCNC: 8.6 MG/DL
CHLORIDE SERPL-SCNC: 108 MMOL/L
CO2 SERPL-SCNC: 22 MMOL/L
CREAT SERPL-MCNC: 1.1 MG/DL
DIFFERENTIAL METHOD: ABNORMAL
EOSINOPHIL # BLD AUTO: 0.1 K/UL
EOSINOPHIL NFR BLD: 2.3 %
ERYTHROCYTE [DISTWIDTH] IN BLOOD BY AUTOMATED COUNT: 13.1 %
EST. GFR  (AFRICAN AMERICAN): >60 ML/MIN/1.73 M^2
EST. GFR  (NON AFRICAN AMERICAN): >60 ML/MIN/1.73 M^2
GLUCOSE SERPL-MCNC: 121 MG/DL
HCT VFR BLD AUTO: 39.2 %
HGB BLD-MCNC: 14 G/DL
LYMPHOCYTES # BLD AUTO: 1.1 K/UL
LYMPHOCYTES NFR BLD: 23.4 %
MCH RBC QN AUTO: 32.2 PG
MCHC RBC AUTO-ENTMCNC: 35.7 %
MCV RBC AUTO: 90 FL
MONOCYTES # BLD AUTO: 0.7 K/UL
MONOCYTES NFR BLD: 14 %
NEUTROPHILS # BLD AUTO: 2.8 K/UL
NEUTROPHILS NFR BLD: 59.5 %
PLATELET # BLD AUTO: 108 K/UL
PMV BLD AUTO: 11.4 FL
POTASSIUM SERPL-SCNC: 4.6 MMOL/L
RBC # BLD AUTO: 4.35 M/UL
SODIUM SERPL-SCNC: 140 MMOL/L
WBC # BLD AUTO: 4.78 K/UL

## 2017-03-24 PROCEDURE — 85025 COMPLETE CBC W/AUTO DIFF WBC: CPT

## 2017-03-24 PROCEDURE — G0378 HOSPITAL OBSERVATION PER HR: HCPCS

## 2017-03-24 PROCEDURE — 99217 PR OBSERVATION CARE DISCHARGE: CPT | Mod: ,,, | Performed by: HOSPITALIST

## 2017-03-24 PROCEDURE — 36415 COLL VENOUS BLD VENIPUNCTURE: CPT

## 2017-03-24 PROCEDURE — 63600175 PHARM REV CODE 636 W HCPCS: Performed by: HOSPITALIST

## 2017-03-24 PROCEDURE — 25000003 PHARM REV CODE 250: Performed by: HOSPITALIST

## 2017-03-24 PROCEDURE — 80048 BASIC METABOLIC PNL TOTAL CA: CPT

## 2017-03-24 RX ORDER — VALACYCLOVIR HYDROCHLORIDE 1 G/1
1000 TABLET, FILM COATED ORAL EVERY 12 HOURS PRN
Qty: 10 TABLET | Refills: 0 | Status: SHIPPED | OUTPATIENT
Start: 2017-03-24 | End: 2017-09-22

## 2017-03-24 RX ORDER — BENZONATATE 200 MG/1
200 CAPSULE ORAL 3 TIMES DAILY PRN
Qty: 30 CAPSULE | Refills: 1 | Status: SHIPPED | OUTPATIENT
Start: 2017-03-24 | End: 2017-04-03

## 2017-03-24 RX ORDER — MOXIFLOXACIN HYDROCHLORIDE 400 MG/1
400 TABLET ORAL DAILY
Qty: 3 TABLET | Refills: 0 | Status: SHIPPED | OUTPATIENT
Start: 2017-03-24 | End: 2017-06-27

## 2017-03-24 RX ADMIN — THERA TABS 1 TABLET: TAB at 08:03

## 2017-03-24 RX ADMIN — AZITHROMYCIN 500 MG: 250 TABLET, FILM COATED ORAL at 08:03

## 2017-03-24 RX ADMIN — SODIUM CHLORIDE: 0.9 INJECTION, SOLUTION INTRAVENOUS at 04:03

## 2017-03-24 RX ADMIN — PINDOLOL 5 MG: 5 TABLET ORAL at 08:03

## 2017-03-24 RX ADMIN — CEFTRIAXONE 1 G: 1 INJECTION, SOLUTION INTRAVENOUS at 08:03

## 2017-03-24 RX ADMIN — DABIGATRAN ETEXILATE MESYLATE 150 MG: 150 CAPSULE ORAL at 08:03

## 2017-03-24 RX ADMIN — LISINOPRIL 10 MG: 10 TABLET ORAL at 08:03

## 2017-03-24 NOTE — NURSING
Pt received discharge instructions and prescriptions. IV access removed. No complications, catheter intact. Pt requested wheelchair escort to vehicle. Wheelchair requested. Wife at bedside to provide ride home.     1512: Pt left floor with personal belongings via wheelchair. Pt stable and no complaints upon discharge.

## 2017-03-24 NOTE — PLAN OF CARE
03/24/17 1006   Discharge Assessment   Assessment Type Discharge Planning Assessment   Confirmed/corrected address and phone number on facesheet? Yes   Assessment information obtained from? Patient   Expected Length of Stay (days) 2   Communicated expected length of stay with patient/caregiver yes   Prior to hospitilization cognitive status: Alert/Oriented   Prior to hospitalization functional status: Independent   Current cognitive status: Alert/Oriented   Current Functional Status: Independent   Arrived From home or self-care   Lives With spouse   Able to Return to Prior Arrangements yes   Is patient able to care for self after discharge? Yes   Readmission Within The Last 30 Days no previous admission in last 30 days   Patient currently receives home health services? No   Does the patient currently use HME? No   Patient currently receives private duty nursing? No   Equipment Currently Used at Home none   Is the patient taking medications as prescribed? yes   Discharge Plan A Home with family   Discharge Plan B Home with family   Patient/Family In Agreement With Plan yes   Pt wishes to establish care with Dr. Durant PCP here. Called [facility] appointment scheduled for first available in June. Patient placed on waiting list for earlier appointment. Informed patient. No other d/c needs identified.

## 2017-03-26 PROBLEM — N17.9 AKI (ACUTE KIDNEY INJURY): Status: RESOLVED | Noted: 2017-03-26 | Resolved: 2017-03-24

## 2017-03-28 LAB
BACTERIA BLD CULT: NORMAL
BACTERIA BLD CULT: NORMAL

## 2017-03-29 ENCOUNTER — OFFICE VISIT (OUTPATIENT)
Dept: INTERNAL MEDICINE | Facility: CLINIC | Age: 77
End: 2017-03-29
Payer: MEDICARE

## 2017-03-29 VITALS
OXYGEN SATURATION: 98 % | TEMPERATURE: 98 F | HEART RATE: 54 BPM | DIASTOLIC BLOOD PRESSURE: 68 MMHG | SYSTOLIC BLOOD PRESSURE: 122 MMHG | WEIGHT: 214.94 LBS | BODY MASS INDEX: 29.11 KG/M2 | HEIGHT: 72 IN

## 2017-03-29 DIAGNOSIS — J18.9 PNEUMONIA OF RIGHT UPPER LOBE DUE TO INFECTIOUS ORGANISM: Primary | ICD-10-CM

## 2017-03-29 PROCEDURE — 99999 PR PBB SHADOW E&M-EST. PATIENT-LVL IV: CPT | Mod: PBBFAC,,, | Performed by: PHYSICIAN ASSISTANT

## 2017-03-29 PROCEDURE — 99214 OFFICE O/P EST MOD 30 MIN: CPT | Mod: PBBFAC | Performed by: PHYSICIAN ASSISTANT

## 2017-03-29 PROCEDURE — 99213 OFFICE O/P EST LOW 20 MIN: CPT | Mod: S$PBB,,, | Performed by: PHYSICIAN ASSISTANT

## 2017-03-29 NOTE — PATIENT INSTRUCTIONS
Pneumonia (Adult)  Pneumonia is an infection deep within the lungs. It is in the small air sacs (alveoli). Pneumonia may be caused by a virus or bacteria. Pneumonia caused by bacteria is usually treated with an antibiotic. Severe cases may need to be treated in the hospital. Milder cases can be treated at home. Symptoms usually start to get better during the first 2 days of treatment.    Home care  Follow these guidelines when caring for yourself at home:  · Rest at home for the first 2 to 3 days, or until you feel stronger. Dont let yourself get overly tired when you go back to your activities.  · Stay away from cigarette smoke - yours or other peoples.  · You may use acetaminophen or ibuprofen to control fever or pain, unless another medicine was prescribed. If you have chronic liver or kidney disease, talk with your health care provider before using these medicines. Also talk with your provider if youve had a stomach ulcer or GI bleeding. Dont give aspirin to anyone younger than 18 years of age who is ill with a fever. It may cause severe liver damage.  · Your appetite may be poor, so a light diet is fine.  · Drink 6 to 8 glasses of fluids every day to make sure you are getting enough fluids. Beverages can include water, sport drinks, sodas without caffeine, juices, tea, or soup. Fluids will help loosen secretions in the lung. This will make it easier for you to cough up the phlegm (sputum). If you also have heart or kidney disease, check with your health care provider before you drink extra fluids.  · Take antibiotic medicine prescribed until it is all gone, even if you are feeling better after a few days.  Follow-up care  Follow up with your health care provider in the next 2 to 3 days, or as advised. This is to be sure the medicine is helping you get better.  If you are 65 or older, you should get a pneumococcal vaccine and a yearly flu (influenza) shot. You should also get these vaccines if you have  chronic lung disease like asthma, emphysema, or COPD. Ask your provider about this.  When to seek medical advice  Call your health care provider right away if any of these occur:  · You dont get better within the first 48 hours of treatment  · Shortness of breath gets worse  · Rapid breathing (more than 25 breaths per minute)  · Coughing up blood  · Chest pain gets worse with breathing  · Fever of 102°F (38°C) or higher that doesnt get better with fever medicine  · Weakness, dizziness, or fainting that gets worse  · Thirst or dry mouth that gets worse  · Sinus pain, headache, or a stiff neck  · Chest pain not caused by coughing  Date Last Reviewed: 12/23/2014  © 1305-6874 [employer]. [address] [place] [address], [place] [address]. All rights reserved. This information is not intended as a substitute for professional medical care. Always follow your healthcare professional's instructions.

## 2017-03-29 NOTE — MR AVS SNAPSHOT
[facility]  [address]  [facility] [address]  Phone: [phone]  Fax: [phone]                  Danial Meza   3/29/2017 10:00 AM   Office Visit    Description:  Male : 1940   Provider:  Mary Lou Byrd PA-C   Department:  [facility]           Reason for Visit     Follow-up           Diagnoses this Visit        Comments    Pneumonia of right upper lobe due to infectious organism    -  Primary            To Do List           Future Appointments        Provider Department Dept Phone    3/29/2017 10:00 AM EDMOND Tamayo [facility] [phone]    2017 9:40 AM Louie Durant MD [facility] [phone]      Goals (5 Years of Data)     None      [facility]     [facility] Nurse [facility] - / Assistance  Registered nurses in the [facility] Call Center provide clinical advisement, health education, appointment booking, and other advisory services.  Call for this free service at [phone].             Medications           Message regarding Medications     Verify the changes and/or additions to your medication regime listed below are the same as discussed with your clinician today.  If any of these changes or additions are incorrect, please notify your healthcare provider.             Verify that the below list of medications is an accurate representation of the medications you are currently taking.  If none reported, the list may be blank. If incorrect, please contact your healthcare provider. Carry this list with you in case of emergency.           Current Medications     benzonatate (TESSALON) 200 MG capsule Take 1 capsule (200 mg total) by mouth 3 (three) times daily as needed for Cough.    dabigatran etexilate (PRADAXA) 150 mg Cap Take 1 capsule (150 mg total) by mouth 2 (two) times daily.    lisinopril 10 MG tablet Take 1 tablet (10 mg total) by mouth once daily.    multivitamin  (MULTIVITAMIN) per tablet Take 1 tablet by mouth once daily.      pindolol (VISKEN) 5 MG Tab Take 1 tablet (5 mg total) by mouth 2 (two) times daily.    valacyclovir (VALTREX) 1000 MG tablet Take 1 tablet (1,000 mg total) by mouth every 12 (twelve) hours as needed (take 2 tablets every twelve hours x 1 day for cold sores as needed).    VIT C/VIT E/LUTEIN/MIN/OMEGA-3 (OCUVITE ORAL) Take by mouth.    moxifloxacin (AVELOX) 400 mg tablet Take 1 tablet (400 mg total) by mouth once daily.           Clinical Reference Information           Your Vitals Were     BP Pulse Temp Height Weight SpO2    122/68 (BP Location: Left arm, Patient Position: Sitting) 54 98.1 °F (36.7 °C) 6' (1.829 m) 97.5 kg (214 lb 15.2 oz) 98%    BMI                29.15 kg/m2          Blood Pressure          Most Recent Value    BP  122/68      Allergies as of 3/29/2017     No Known Allergies      Immunizations Administered on Date of Encounter - 3/29/2017     None      Orders Placed During Today's Visit     Future Labs/Procedures Expected by Expires    CBC auto differential  3/29/2017 3/29/2018    X-Ray Chest PA And Lateral  3/29/2017 3/29/2018      [facility] Sign-Up     Activating your [facility] account is as easy as 1-2-3!     1) Visit [email or web address], select Sign Up Now, enter this activation code and your date of birth, then select Next.  [email or web address]  Expires: 4/10/2017  5:41 PM      2) Create a username and password to use when you visit [facility] in the future and select a security question in case you lose your password and select Next.    3) Enter your e-mail address and click Sign Up!    Additional Information  If you have questions, please e-mail [email or web address] or call [phone] to talk to our [facility] staff. Remember, [facility] is NOT to be used for urgent needs. For medical emergencies, dial 911.         Instructions      Pneumonia (Adult)  Pneumonia is an infection deep within the lungs. It is in the small air sacs  (alveoli). Pneumonia may be caused by a virus or bacteria. Pneumonia caused by bacteria is usually treated with an antibiotic. Severe cases may need to be treated in the hospital. Milder cases can be treated at home. Symptoms usually start to get better during the first 2 days of treatment.    Home care  Follow these guidelines when caring for yourself at home:  · Rest at home for the first 2 to 3 days, or until you feel stronger. Dont let yourself get overly tired when you go back to your activities.  · Stay away from cigarette smoke - yours or other peoples.  · You may use acetaminophen or ibuprofen to control fever or pain, unless another medicine was prescribed. If you have chronic liver or kidney disease, talk with your health care provider before using these medicines. Also talk with your provider if youve had a stomach ulcer or GI bleeding. Dont give aspirin to anyone younger than 18 years of age who is ill with a fever. It may cause severe liver damage.  · Your appetite may be poor, so a light diet is fine.  · Drink 6 to 8 glasses of fluids every day to make sure you are getting enough fluids. Beverages can include water, sport drinks, sodas without caffeine, juices, tea, or soup. Fluids will help loosen secretions in the lung. This will make it easier for you to cough up the phlegm (sputum). If you also have heart or kidney disease, check with your health care provider before you drink extra fluids.  · Take antibiotic medicine prescribed until it is all gone, even if you are feeling better after a few days.  Follow-up care  Follow up with your health care provider in the next 2 to 3 days, or as advised. This is to be sure the medicine is helping you get better.  If you are 65 or older, you should get a pneumococcal vaccine and a yearly flu (influenza) shot. You should also get these vaccines if you have chronic lung disease like asthma, emphysema, or COPD. Ask your provider about this.  When to seek  medical advice  Call your health care provider right away if any of these occur:  · You dont get better within the first 48 hours of treatment  · Shortness of breath gets worse  · Rapid breathing (more than 25 breaths per minute)  · Coughing up blood  · Chest pain gets worse with breathing  · Fever of 102°F (38°C) or higher that doesnt get better with fever medicine  · Weakness, dizziness, or fainting that gets worse  · Thirst or dry mouth that gets worse  · Sinus pain, headache, or a stiff neck  · Chest pain not caused by coughing  Date Last Reviewed: 12/23/2014  © 5063-9714 [employer]. [address] [place] [address], [place] [address]. All rights reserved. This information is not intended as a substitute for professional medical care. Always follow your healthcare professional's instructions.             Language Assistance Services     ATTENTION: Language assistance services are available, free of charge. Please call [phone].      ATENCIÓN: Si habla español, tiene a moreno disposición servicios gratuitos de asistencia lingüística. Llame al [phone].     CHÚ Ý: N?u b?n nói Ti?ng Vi?t, có các d?ch v? h? tr? ngôn ng? mi?n phí dành cho b?n. G?i s? [phone].         [facility] complies with applicable Federal civil rights laws and does not discriminate on the basis of race, color, national origin, age, disability, or sex.

## 2017-03-29 NOTE — PROGRESS NOTES
Subjective:       Patient ID: Danial Meza is a 76 y.o. male.        Chief Complaint: Follow-up    HPI Comments: Danial Meza is an established patient of Bruno Becerra MD here today for hospital f/u appointment.    Admitted into obs overnight for RUL pneumonia and elevated creatinine.  Given ceftriaxone and azithromycin while admitted, discharged on Avelox, which he has completed.      He is feeling much better.  Cough significantly improved.  No chest pain, fever, shortness of breath, weakness.  Creatinine normalized by discharge.  Platelets were mildly low.      Will be establishing care with Dr. Durant.         Review of Systems   Constitutional: Negative for appetite change, chills, fatigue and fever.   HENT: Negative for congestion and sore throat.    Eyes: Negative for visual disturbance.   Respiratory: Positive for cough (improving). Negative for chest tightness and shortness of breath.    Cardiovascular: Negative for chest pain, palpitations and leg swelling.   Gastrointestinal: Negative for abdominal pain, blood in stool, constipation, diarrhea, nausea and vomiting.   Genitourinary: Negative for dysuria, frequency, hematuria and urgency.   Musculoskeletal: Negative for arthralgias and back pain.   Skin: Negative for rash.   Neurological: Negative for dizziness, syncope, weakness and headaches.   Psychiatric/Behavioral: Negative for dysphoric mood and sleep disturbance. The patient is not nervous/anxious.        Objective:      Physical Exam   Constitutional: He appears well-developed and well-nourished.   HENT:   Head: Normocephalic.   Right Ear: External ear normal.   Left Ear: External ear normal.   Mouth/Throat: Oropharynx is clear and moist.   Eyes: Pupils are equal, round, and reactive to light.   Cardiovascular: Normal rate, regular rhythm and normal heart sounds.  Exam reveals no gallop and no friction rub.    No murmur heard.  Pulmonary/Chest: Effort normal and breath sounds normal. No  "respiratory distress.   Abdominal: Soft. There is no tenderness.   Musculoskeletal: He exhibits no edema.   Neurological: He is alert.   Skin: Skin is warm and dry.   Psychiatric: He has a normal mood and affect.   Nursing note and vitals reviewed.      Assessment:       1. Pneumonia of right upper lobe due to infectious organism        Plan:       Danial was seen today for follow-up.    Diagnoses and all orders for this visit:    Pneumonia of right upper lobe due to infectious organism  -     X-Ray Chest PA And Lateral; Future  -     CBC auto differential; Future    Repeat CXR and CBC in 4 weeks.  Keep establish care appointment with Dr. Durant.  F/u for any changing/worsening sx.    Pt has been given instructions populated from [employer] database and has verbalized understanding of the after visit summary and information contained wherein.    Follow up with a primary care provider. May go to ER for acute shortness of breath, lightheadedness, fever, or any other emergent complaints or changes in condition.    "This note will be shared with the patient"    Future Appointments  Date Time Provider Department Center   4/26/2017 8:00 AM [facility] [id number] LB LIMIT [facility] XRAY [facility] [address] [facility]   4/26/2017 8:20 AM LAB, APPOINTMENT [facility] INTMED [facility] LAB [facility] [address] [facility]   6/27/2017 9:40 AM Louie Durant MD [facility] [address] [facility]               "

## 2017-04-01 NOTE — DISCHARGE SUMMARY
[facility] Hospital Medicine  Discharge Summary      Patient Name: Danial Meza  MRN: [id number]  Admission Date: 3/23/2017  Hospital Length of Stay: 0 days  Discharge Date and Time: 3/24/2017  3:08 PM  Attending Physician: No att. providers found   Discharging Provider: Livia Monte MD  Primary Care Provider: Bruno Becerra MD    [facility] Medicine Team: [facility]  Livia Monte MD    Chief Complaint:        Chief Complaint   Patient presents with    Pneumonia       was told to come to ED for pneumonia by PCP         HPI: 77 yo man with afib who presents with 3 days of subjective fevers, chills, weakness, and cough. Denies any SOB, n/v/d, urinary sx, leg swelling/PND. Denies any sick contacts. Pt went to urgent care given his sx and was referred to the ED for further evaluation.          * No surgery found *      Indwelling Lines/Drains at time of discharge:   Lines/Drains/Airways          No matching active lines, drains, or airways        Hospital Course:     * Pneumonia of right upper lobe due to infectious organism  Pt noted with RUL infiltrate on xray. No leukocytosis on labs. Likely PNA vs. URI.  Given IVF and abx. Felt well the following day thus was discharged to f/u with PCP and complete 5 day course of abx with moxifloxacin.      GT (acute kidney injury)   Pt had a mild GT on admit with Cr of 1.7. Improved to baseline of 1.3 with IVF.     Consults:     Significant Diagnostic Studies: Radiology: X-Ray: CXR: X-Ray Chest 1 View (CXR): No results found for this visit on 03/23/17. and X-Ray Chest PA and Lateral (CXR): No results found for this visit on 03/23/17.    Pending Diagnostic Studies:     None        Final Active Diagnoses:    Diagnosis Date Noted POA    PRINCIPAL PROBLEM:  Pneumonia of right upper lobe due to infectious organism [J18.1] 03/23/2017 Yes    Hypertension [I10]  Yes    PAF (paroxysmal atrial fibrillation) [I48.0]  Yes      Problems Resolved During this  Admission:    Diagnosis Date Noted Date Resolved POA    GT (acute kidney injury) [N17.9] 03/26/2017 03/24/2017 Yes      Discharged Condition: good    Disposition: Home or Self Care    Follow Up:  Follow-up Information     Follow up with Louie Durant MD On 6/27/2017.    Specialty:  Internal Medicine    Why:  to establish care. On wait list. Clinic will call with earlier appointment    Contact information:    [address]  [place] [address]  [phone]          Patient Instructions:     Diet general     Activity as tolerated     Call MD for:  persistent nausea and vomiting or diarrhea     Call MD for:  temperature >100.4     Call MD for:  difficulty breathing or increased cough       Medications:  Reconciled Home Medications:   Discharge Medication List as of 3/24/2017  2:13 PM      START taking these medications    Details   benzonatate (TESSALON) 200 MG capsule Take 1 capsule (200 mg total) by mouth 3 (three) times daily as needed for Cough., Starting 3/24/2017, Until Mon 4/3/17, Print      moxifloxacin (AVELOX) 400 mg tablet Take 1 tablet (400 mg total) by mouth once daily., Starting 3/24/2017, Until Discontinued, Print         CONTINUE these medications which have CHANGED    Details   valacyclovir (VALTREX) 1000 MG tablet Take 1 tablet (1,000 mg total) by mouth every 12 (twelve) hours as needed (take 2 tablets every twelve hours x 1 day for cold sores as needed)., Starting 3/24/2017, Until Wed 3/29/17, Print         CONTINUE these medications which have NOT CHANGED    Details   dabigatran etexilate (PRADAXA) 150 mg Cap Take 1 capsule (150 mg total) by mouth 2 (two) times daily., Starting 7/12/2016, Until Discontinued, Normal      lisinopril 10 MG tablet Take 1 tablet (10 mg total) by mouth once daily., Starting 7/12/2016, Until Discontinued, Normal      multivitamin (MULTIVITAMIN) per tablet Take 1 tablet by mouth once daily.  , Until Discontinued, Historical Med      pindolol (VISKEN) 5 MG Tab Take 1  tablet (5 mg total) by mouth 2 (two) times daily., Starting 7/12/2016, Until Wed 7/12/17, Normal      VIT C/VIT E/LUTEIN/MIN/OMEGA-3 (OCUVITE ORAL) Take by mouth., Until Discontinued, Historical Med           Time spent on the discharge of patient: >30 minutes    Livia Monte MD  Department of Hospital Medicine  [facility]

## 2017-04-26 ENCOUNTER — HOSPITAL ENCOUNTER (OUTPATIENT)
Dept: RADIOLOGY | Facility: HOSPITAL | Age: 77
Discharge: HOME | End: 2017-04-26
Attending: INTERNAL MEDICINE
Payer: MEDICARE

## 2017-04-26 ENCOUNTER — TELEPHONE (OUTPATIENT)
Dept: INTERNAL MEDICINE | Facility: CLINIC | Age: 77
End: 2017-04-26

## 2017-04-26 DIAGNOSIS — J18.9 PNEUMONIA OF RIGHT UPPER LOBE DUE TO INFECTIOUS ORGANISM: ICD-10-CM

## 2017-04-26 PROCEDURE — 71020 XR CHEST PA AND LATERAL: CPT | Mod: TC

## 2017-04-26 PROCEDURE — 71020 XR CHEST PA AND LATERAL: CPT | Mod: 26,,, | Performed by: RADIOLOGY

## 2017-04-26 NOTE — TELEPHONE ENCOUNTER
Please call patient.  Platelet level is back to acceptable range.  CXR shows that the previous pneumonia has resolved.

## 2017-06-27 ENCOUNTER — OFFICE VISIT (OUTPATIENT)
Dept: INTERNAL MEDICINE | Facility: CLINIC | Age: 77
End: 2017-06-27
Payer: MEDICARE

## 2017-06-27 VITALS
DIASTOLIC BLOOD PRESSURE: 73 MMHG | SYSTOLIC BLOOD PRESSURE: 124 MMHG | BODY MASS INDEX: 29.83 KG/M2 | WEIGHT: 220.25 LBS | HEIGHT: 72 IN | HEART RATE: 39 BPM

## 2017-06-27 DIAGNOSIS — E78.00 PURE HYPERCHOLESTEROLEMIA: ICD-10-CM

## 2017-06-27 DIAGNOSIS — Z71.85 VACCINE COUNSELING: ICD-10-CM

## 2017-06-27 DIAGNOSIS — Z79.01 CURRENT USE OF LONG TERM ANTICOAGULATION: ICD-10-CM

## 2017-06-27 DIAGNOSIS — E66.3 OVERWEIGHT (BMI 25.0-29.9): ICD-10-CM

## 2017-06-27 DIAGNOSIS — I10 ESSENTIAL HYPERTENSION: Primary | ICD-10-CM

## 2017-06-27 DIAGNOSIS — I48.0 PAF (PAROXYSMAL ATRIAL FIBRILLATION): ICD-10-CM

## 2017-06-27 PROBLEM — J18.9 PNEUMONIA OF RIGHT UPPER LOBE DUE TO INFECTIOUS ORGANISM: Status: RESOLVED | Noted: 2017-03-23 | Resolved: 2017-06-27

## 2017-06-27 PROCEDURE — 99999 PR PBB SHADOW E&M-EST. PATIENT-LVL III: CPT | Mod: PBBFAC,,, | Performed by: INTERNAL MEDICINE

## 2017-06-27 PROCEDURE — 1126F AMNT PAIN NOTED NONE PRSNT: CPT | Mod: ,,, | Performed by: INTERNAL MEDICINE

## 2017-06-27 PROCEDURE — 99213 OFFICE O/P EST LOW 20 MIN: CPT | Mod: PBBFAC | Performed by: INTERNAL MEDICINE

## 2017-06-27 PROCEDURE — 99214 OFFICE O/P EST MOD 30 MIN: CPT | Mod: S$PBB,,, | Performed by: INTERNAL MEDICINE

## 2017-06-27 PROCEDURE — 1159F MED LIST DOCD IN RCRD: CPT | Mod: ,,, | Performed by: INTERNAL MEDICINE

## 2017-06-27 NOTE — PROGRESS NOTES
Subjective:       Patient ID: Danial Meza is a 77 y.o. male.    Chief Complaint: Establish Care    HPI    No prior visits with me. Seen in past year by Cardiology, Dermatology, Optometry, Ophthalmology. Had pneumonia and seen in hospital 03/2017.  Patient reports that his symptoms from pneumonia have resolved and he has been in his usual state of health otherwise.    Patient reports he is doing well and in his usual state of health.  Patient has not been exercising regularly, though he understands the importance of doing so.    Patient has had high blood pressure for many years.  It has remained well-controlled on his current medication regimen.  He has noted bradycardia in prior visits, discussed this with his cardiology team, plan is to continue current regimen and monitor for any symptoms of lightheadedness or weakness.  Patient denies having had any of these symptoms.    Patient reports having had an outside colonoscopy with the gastroenterologist about 18 months ago.  Plan to discuss whether or not further colonoscopy is indicated at his next visit.    Patient denies any prostate problems.  Reports that he was receiving regular prostate cancer screening with his prior internist.    Review of Systems   All other systems reviewed and are negative.      Objective:      Physical Exam   Constitutional: He is oriented to person, place, and time. No distress.    man whose Body mass index is 29.87 kg/m².    Cardiovascular: Regular rhythm and normal heart sounds.  Bradycardia present.    Pulmonary/Chest: Effort normal and breath sounds normal. He has no wheezes. He has no rales.   Neurological: He is alert and oriented to person, place, and time.   Skin: He is not diaphoretic.   Nursing note and vitals reviewed.      Vitals:    06/27/17 0946   BP: 124/73   BP Location: Right arm   Patient Position: Sitting   BP Method: Manual   Pulse: (!) 39   Weight: 99.9 kg (220 lb 3.8 oz)   Height: 6' (1.829 m)     Body  mass index is 29.87 kg/m².    RESULTS: Reviewed labs from last 6 months    Assessment:       1. Essential hypertension    2. PAF (paroxysmal atrial fibrillation)    3. Pure hypercholesterolemia    4. Current use of long term anticoagulation    5. Overweight (BMI 25.0-29.9)    6. Vaccine counseling        Plan:   Danial was seen today for establish care.    Diagnoses and all orders for this visit:    Essential hypertension:  Prior diagnosis, well controlled on current management. No changes at this time, will continue to monitor.     PAF (paroxysmal atrial fibrillation):  Prior diagnosis, well controlled on current management. No changes at this time, will continue to monitor.  Has bradycardia, however he continues to follow with cardiology regarding this, patient is asymptomatic.    Pure hypercholesterolemia:  Prior diagnosis, well controlled on current management. No changes at this time, will continue to monitor.     Current use of long term anticoagulation:  For atrial fibrillation, continue follow up with Cardiology.    Overweight (BMI 25.0-29.9):  Counseled about importance of healthy diet and exercise, informed about complications of obesity including hypertension, diabetes, and hyperlipidemia.  Encouraged exercise 2 hours per week.    Vaccine counseling:  Pt unsure of last Td, will check outside records.    Return in about 7 months (around 1/27/2018) for EPP annual exam. See if TDaP required.  Louie Durant MD  Internal Medicine    Portions of this note were completed using [employer] dictation software. Please excuse typographical or syntax errors.

## 2017-07-14 ENCOUNTER — DOCUMENTATION ONLY (OUTPATIENT)
Dept: INTERNAL MEDICINE | Facility: CLINIC | Age: 77
End: 2017-07-14

## 2017-07-14 NOTE — PROGRESS NOTES
Received outside records from Dr Becerra at [facility], chart updated as appropriate, results will be scanned into EPIC.    Louie Durant MD  Internal Medicine    Portions of this note were completed using Dragon medical dictation software. Please excuse typographical or syntax errors.

## 2017-08-01 DIAGNOSIS — I48.0 PAF (PAROXYSMAL ATRIAL FIBRILLATION): ICD-10-CM

## 2017-08-01 DIAGNOSIS — I10 ESSENTIAL HYPERTENSION: ICD-10-CM

## 2017-08-02 RX ORDER — LISINOPRIL 10 MG/1
TABLET ORAL
Qty: 90 TABLET | Refills: 3 | Status: SHIPPED | OUTPATIENT
Start: 2017-08-02 | End: 2018-07-30 | Stop reason: SDUPTHER

## 2017-08-02 RX ORDER — PINDOLOL 5 MG/1
TABLET ORAL
Qty: 180 TABLET | Refills: 3 | Status: SHIPPED | OUTPATIENT
Start: 2017-08-02 | End: 2018-07-30 | Stop reason: SDUPTHER

## 2017-09-22 ENCOUNTER — LAB VISIT (OUTPATIENT)
Dept: LAB | Facility: HOSPITAL | Age: 77
End: 2017-09-22
Attending: INTERNAL MEDICINE
Payer: MEDICARE

## 2017-09-22 ENCOUNTER — OFFICE VISIT (OUTPATIENT)
Dept: TRANSPLANT | Facility: CLINIC | Age: 77
End: 2017-09-22
Attending: INTERNAL MEDICINE
Payer: MEDICARE

## 2017-09-22 VITALS
SYSTOLIC BLOOD PRESSURE: 133 MMHG | HEIGHT: 72 IN | WEIGHT: 216.25 LBS | DIASTOLIC BLOOD PRESSURE: 69 MMHG | BODY MASS INDEX: 29.29 KG/M2 | HEART RATE: 42 BPM

## 2017-09-22 DIAGNOSIS — I10 ESSENTIAL HYPERTENSION: ICD-10-CM

## 2017-09-22 DIAGNOSIS — E78.2 MIXED HYPERLIPIDEMIA: Primary | ICD-10-CM

## 2017-09-22 DIAGNOSIS — E78.00 PURE HYPERCHOLESTEROLEMIA: ICD-10-CM

## 2017-09-22 DIAGNOSIS — Z98.890 S/P ABLATION OF ATRIAL FLUTTER: ICD-10-CM

## 2017-09-22 DIAGNOSIS — Z79.01 CURRENT USE OF LONG TERM ANTICOAGULATION: ICD-10-CM

## 2017-09-22 DIAGNOSIS — Z86.79 S/P ABLATION OF ATRIAL FLUTTER: ICD-10-CM

## 2017-09-22 DIAGNOSIS — I48.0 PAF (PAROXYSMAL ATRIAL FIBRILLATION): ICD-10-CM

## 2017-09-22 LAB
ALBUMIN SERPL BCP-MCNC: 3.7 G/DL
ALP SERPL-CCNC: 81 U/L
ALT SERPL W/O P-5'-P-CCNC: 38 U/L
ANION GAP SERPL CALC-SCNC: 9 MMOL/L
AST SERPL-CCNC: 28 U/L
BASOPHILS # BLD AUTO: 0.03 K/UL
BASOPHILS NFR BLD: 0.5 %
BILIRUB SERPL-MCNC: 0.7 MG/DL
BUN SERPL-MCNC: 26 MG/DL
CALCIUM SERPL-MCNC: 9.5 MG/DL
CHLORIDE SERPL-SCNC: 111 MMOL/L
CHOLEST SERPL-MCNC: 142 MG/DL
CHOLEST/HDLC SERPL: 5.7 {RATIO}
CO2 SERPL-SCNC: 24 MMOL/L
CREAT SERPL-MCNC: 1.2 MG/DL
DIFFERENTIAL METHOD: ABNORMAL
EOSINOPHIL # BLD AUTO: 0.2 K/UL
EOSINOPHIL NFR BLD: 2.5 %
ERYTHROCYTE [DISTWIDTH] IN BLOOD BY AUTOMATED COUNT: 13 %
EST. GFR  (AFRICAN AMERICAN): >60 ML/MIN/1.73 M^2
EST. GFR  (NON AFRICAN AMERICAN): 58 ML/MIN/1.73 M^2
GLUCOSE SERPL-MCNC: 117 MG/DL
HCT VFR BLD AUTO: 42.1 %
HDLC SERPL-MCNC: 25 MG/DL
HDLC SERPL: 17.6 %
HGB BLD-MCNC: 14.9 G/DL
LDLC SERPL CALC-MCNC: 81.6 MG/DL
LYMPHOCYTES # BLD AUTO: 1.4 K/UL
LYMPHOCYTES NFR BLD: 24.2 %
MCH RBC QN AUTO: 32.6 PG
MCHC RBC AUTO-ENTMCNC: 35.4 G/DL
MCV RBC AUTO: 92 FL
MONOCYTES # BLD AUTO: 0.4 K/UL
MONOCYTES NFR BLD: 7 %
NEUTROPHILS # BLD AUTO: 3.9 K/UL
NEUTROPHILS NFR BLD: 65.6 %
NONHDLC SERPL-MCNC: 117 MG/DL
PLATELET # BLD AUTO: 153 K/UL
PMV BLD AUTO: 10.8 FL
POTASSIUM SERPL-SCNC: 4.2 MMOL/L
PROT SERPL-MCNC: 6.5 G/DL
RBC # BLD AUTO: 4.57 M/UL
SODIUM SERPL-SCNC: 144 MMOL/L
TRIGL SERPL-MCNC: 177 MG/DL
WBC # BLD AUTO: 5.96 K/UL

## 2017-09-22 PROCEDURE — 3078F DIAST BP <80 MM HG: CPT | Mod: ,,, | Performed by: INTERNAL MEDICINE

## 2017-09-22 PROCEDURE — 80061 LIPID PANEL: CPT

## 2017-09-22 PROCEDURE — 80053 COMPREHEN METABOLIC PANEL: CPT

## 2017-09-22 PROCEDURE — 99214 OFFICE O/P EST MOD 30 MIN: CPT | Mod: S$PBB,,, | Performed by: INTERNAL MEDICINE

## 2017-09-22 PROCEDURE — 3075F SYST BP GE 130 - 139MM HG: CPT | Mod: ,,, | Performed by: INTERNAL MEDICINE

## 2017-09-22 PROCEDURE — 1159F MED LIST DOCD IN RCRD: CPT | Mod: ,,, | Performed by: INTERNAL MEDICINE

## 2017-09-22 PROCEDURE — 99999 PR PBB SHADOW E&M-EST. PATIENT-LVL III: CPT | Mod: PBBFAC,,, | Performed by: INTERNAL MEDICINE

## 2017-09-22 PROCEDURE — 36415 COLL VENOUS BLD VENIPUNCTURE: CPT

## 2017-09-22 PROCEDURE — 1126F AMNT PAIN NOTED NONE PRSNT: CPT | Mod: ,,, | Performed by: INTERNAL MEDICINE

## 2017-09-22 PROCEDURE — 99213 OFFICE O/P EST LOW 20 MIN: CPT | Mod: PBBFAC | Performed by: INTERNAL MEDICINE

## 2017-09-22 PROCEDURE — 85025 COMPLETE CBC W/AUTO DIFF WBC: CPT

## 2017-09-22 NOTE — PROGRESS NOTES
Subjective:     Patient ID:  Danial Meza is a 77 y.o. male who presents for follow-up of Atrial Fibrillation    HPI:  76 yo WM with AF on pradaxa last seen 2/24/17 returns for routine visit.  He has changed internist to Dr. Durant here instead of Dr. Becerra.  He is doing all that he wants to do and very active.  Walks 2 miles 5-6x/week over 40 min.      Review of Systems   Constitution: Negative for chills, fever, weakness, malaise/fatigue, night sweats, weight gain and weight loss.   Cardiovascular: Negative for chest pain, dyspnea on exertion, irregular heartbeat, leg swelling, near-syncope, orthopnea, palpitations, paroxysmal nocturnal dyspnea and syncope.   Respiratory: Negative for cough, sputum production and wheezing.    Hematologic/Lymphatic: Does not bruise/bleed easily.   Musculoskeletal: Negative for arthritis, joint pain and stiffness.   Gastrointestinal: Negative for hematochezia and melena.        Colonoscopy Feb 2017 was performed at 5 yrs due to polyps by Dr. Rodriguez at [facility].  Study was OK   Genitourinary: Negative for hematuria.   Neurological: Negative for brief paralysis, focal weakness and seizures.     Objective:   Physical Exam   Constitutional: He is oriented to person, place, and time. He appears well-developed and well-nourished. No distress.   /69 (BP Location: Right arm, Patient Position: Sitting, BP Method: Medium (Automatic))   Pulse (!) 42   Ht 6' (1.829 m)   Wt 98.1 kg (216 lb 4.3 oz)   BMI 29.33 kg/m²    HENT:   Head: Normocephalic and atraumatic.   Mouth/Throat: No oropharyngeal exudate.   Eyes: Conjunctivae are normal. No scleral icterus.   Neck: No JVD present. No tracheal deviation present. No thyromegaly present.   Cardiovascular: Normal rate, regular rhythm and normal heart sounds.  Exam reveals no gallop.    No murmur heard.  Pulmonary/Chest: Effort normal and breath sounds normal.   Abdominal: Soft. Bowel sounds are normal. He exhibits no distension. There is no  tenderness. There is no rebound and no guarding.   Musculoskeletal: He exhibits no edema or tenderness.   Neurological: He is alert and oriented to person, place, and time.   Skin: Skin is warm and dry. He is not diaphoretic.   Psychiatric: He has a normal mood and affect. His behavior is normal. Judgment and thought content normal.     Lab Results   Component Value Date     09/22/2017    K 4.2 09/22/2017     (H) 09/22/2017    CO2 24 09/22/2017    BUN 26 (H) 09/22/2017    CREATININE 1.2 09/22/2017     (H) 09/22/2017    AST 28 09/22/2017    ALT 38 09/22/2017    ALBUMIN 3.7 09/22/2017    PROT 6.5 09/22/2017    BILITOT 0.7 09/22/2017    WBC 5.96 09/22/2017    HGB 14.9 09/22/2017    HCT 42.1 09/22/2017     09/22/2017    CHOL 142 09/22/2017    HDL 25 (L) 09/22/2017    LDLCALC 81.6 09/22/2017    TRIG 177 (H) 09/22/2017     Assessment:     1. Mixed hyperlipidemia    2. Essential hypertension    3. PAF (paroxysmal atrial fibrillation)    4. S/P ablation of atrial flutter 11/4/15    5. Current use of long term anticoagulation      Plan:   Same treatment and f/u 6 months no tests at that time

## 2017-09-30 DIAGNOSIS — Z79.01 CURRENT USE OF LONG TERM ANTICOAGULATION: ICD-10-CM

## 2017-09-30 DIAGNOSIS — I48.0 PAF (PAROXYSMAL ATRIAL FIBRILLATION): ICD-10-CM

## 2017-10-02 RX ORDER — DABIGATRAN ETEXILATE MESYLATE 150 MG/1
CAPSULE ORAL
Qty: 180 CAPSULE | Refills: 3 | Status: SHIPPED | OUTPATIENT
Start: 2017-10-02 | End: 2018-09-30 | Stop reason: SDUPTHER

## 2017-10-09 ENCOUNTER — OFFICE VISIT (OUTPATIENT)
Dept: DERMATOLOGY | Facility: CLINIC | Age: 77
End: 2017-10-09
Payer: MEDICARE

## 2017-10-09 DIAGNOSIS — L28.1 PRURIGO NODULARIS: ICD-10-CM

## 2017-10-09 DIAGNOSIS — D48.9 NEOPLASM OF UNCERTAIN BEHAVIOR: Primary | ICD-10-CM

## 2017-10-09 DIAGNOSIS — D18.00 ANGIOMA: ICD-10-CM

## 2017-10-09 DIAGNOSIS — L82.1 SK (SEBORRHEIC KERATOSIS): ICD-10-CM

## 2017-10-09 DIAGNOSIS — Z85.828 PERSONAL HISTORY OF MALIGNANT NEOPLASM OF SKIN: ICD-10-CM

## 2017-10-09 DIAGNOSIS — D22.9 BENIGN NEVUS: ICD-10-CM

## 2017-10-09 PROCEDURE — 11100 PR BIOPSY OF SKIN LESION: CPT | Mod: S$PBB,,, | Performed by: DERMATOLOGY

## 2017-10-09 PROCEDURE — 99213 OFFICE O/P EST LOW 20 MIN: CPT | Mod: PBBFAC | Performed by: DERMATOLOGY

## 2017-10-09 PROCEDURE — 11101 PR BIOPSY, EACH ADDED LESION: CPT | Mod: S$PBB,,, | Performed by: DERMATOLOGY

## 2017-10-09 PROCEDURE — 88305 TISSUE EXAM BY PATHOLOGIST: CPT | Performed by: PATHOLOGY

## 2017-10-09 PROCEDURE — 99213 OFFICE O/P EST LOW 20 MIN: CPT | Mod: 25,S$PBB,, | Performed by: DERMATOLOGY

## 2017-10-09 PROCEDURE — 11101 PR BIOPSY, EACH ADDED LESION: CPT | Mod: PBBFAC | Performed by: DERMATOLOGY

## 2017-10-09 PROCEDURE — 11100 PR BIOPSY OF SKIN LESION: CPT | Mod: PBBFAC | Performed by: DERMATOLOGY

## 2017-10-09 PROCEDURE — 99999 PR PBB SHADOW E&M-EST. PATIENT-LVL III: CPT | Mod: PBBFAC,,, | Performed by: DERMATOLOGY

## 2017-10-09 NOTE — PATIENT INSTRUCTIONS
Shave Biopsy Wound Care    Your doctor has performed a shave biopsy today.  A band aid and vaseline ointment has been placed over the site.  This should remain in place for 24 hours.  It is recommended that you keep the area dry for the first 24 hours.  After 24 hours, you may remove the band aid and wash the area with warm soap and water and apply Vaseline jelly.  Many patients prefer to use Neosporin or Bacitracin ointment.  This is acceptable; however, know that you can develop an allergy to this medication even if you have used it safely for years.  It is important to keep the area moist.  Letting it dry out and get air slows healing time, and will worsen the scar.  Band aid is optional after first 24 hours.      If you notice increasing redness, tenderness, pain, or yellow drainage at the biopsy site, please notify your doctor.  These are signs of an infection.    If your biopsy site is bleeding, apply firm pressure for 15 minutes straight.  Repeat for another 15 minutes, if it is still bleeding.   If the surgical site continues to bleed, then please contact your doctor.      [facility] [place] [address]/ [phone] FAX/ [email or web address]
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
## 2017-10-23 ENCOUNTER — CLINICAL SUPPORT (OUTPATIENT)
Dept: OPHTHALMOLOGY | Facility: CLINIC | Age: 77
End: 2017-10-23
Payer: MEDICARE

## 2017-10-23 ENCOUNTER — OFFICE VISIT (OUTPATIENT)
Dept: OPHTHALMOLOGY | Facility: CLINIC | Age: 77
End: 2017-10-23
Payer: MEDICARE

## 2017-10-23 DIAGNOSIS — H52.203 HYPEROPIA OF BOTH EYES WITH ASTIGMATISM AND PRESBYOPIA: ICD-10-CM

## 2017-10-23 DIAGNOSIS — H57.03 SMALL PUPILS: ICD-10-CM

## 2017-10-23 DIAGNOSIS — H25.13 NUCLEAR SCLEROSIS OF BOTH EYES: ICD-10-CM

## 2017-10-23 DIAGNOSIS — H57.819 BROW PTOSIS: ICD-10-CM

## 2017-10-23 DIAGNOSIS — H52.4 HYPEROPIA OF BOTH EYES WITH ASTIGMATISM AND PRESBYOPIA: ICD-10-CM

## 2017-10-23 DIAGNOSIS — Z86.69 HISTORY OF BELL'S PALSY: ICD-10-CM

## 2017-10-23 DIAGNOSIS — H52.03 HYPEROPIA OF BOTH EYES WITH ASTIGMATISM AND PRESBYOPIA: ICD-10-CM

## 2017-10-23 DIAGNOSIS — H40.003 OPEN ANGLE WITH BORDERLINE INTRAOCULAR PRESSURE OF BOTH EYES: Primary | ICD-10-CM

## 2017-10-23 DIAGNOSIS — Z83.518 FAMILY HISTORY OF MACULAR DEGENERATION: ICD-10-CM

## 2017-10-23 PROCEDURE — 92134 CPTRZ OPH DX IMG PST SGM RTA: CPT | Mod: PBBFAC | Performed by: OPHTHALMOLOGY

## 2017-10-23 PROCEDURE — 92020 GONIOSCOPY: CPT | Mod: S$PBB,,, | Performed by: OPHTHALMOLOGY

## 2017-10-23 PROCEDURE — 99999 PR PBB SHADOW E&M-EST. PATIENT-LVL II: CPT | Mod: PBBFAC,,, | Performed by: OPHTHALMOLOGY

## 2017-10-23 PROCEDURE — 99212 OFFICE O/P EST SF 10 MIN: CPT | Mod: PBBFAC | Performed by: OPHTHALMOLOGY

## 2017-10-23 PROCEDURE — 92020 GONIOSCOPY: CPT | Mod: PBBFAC | Performed by: OPHTHALMOLOGY

## 2017-10-23 PROCEDURE — 92012 INTRM OPH EXAM EST PATIENT: CPT | Mod: S$PBB,,, | Performed by: OPHTHALMOLOGY

## 2017-10-23 NOTE — PROGRESS NOTES
HPI     DLS: 1/20/17    Pt here for HRT review;  Pt states at times his eyes will get watery. No issues with glare.  No   vision changes, no new flashes or floaters, curtain/veil.    Meds: No GTTS    1. Open angle with borderline findings, low risk, bilateral  2. Family history of macular degeneration  3. Nuclear sclerosis, bilateral  4. Brow ptosis   5. Hyperopia with astigmatism and presbyopia, bilateral  6. Small pupil   7. H/P Bell's Palsy     Last edited by Asad Montes MD on 10/23/2017  8:34 AM. (History)            Assessment /Plan     For exam results, see Encounter Report.    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 Open angle with borderline glaucoma findings - Both Eyes   Glaucoma (type and duration)   LTG suspect  First HVF 2011  First photos 2011     Family history   = father-also my patient, + mom- she has passed  Glaucoma meds   none  H/O adverse rxn to glaucoma drops  none  LASERS   none  GLAUCOMA SURGERIES   none  OTHER EYE SURGERIES   none  CDR  0.7 // 0/75  Tbase  16-24    Tmax  21/24      Ttarget   ?       HVF  4 test 2011 to 2017 - full od // full os- ? Rim artifact  Gono  +3 ou  CCT  567/566  OCT  1 test 2012 to 2012 - RNFL - nl od // nl os  HRT   4 test 2011 to 2017 - MR -  Dec. IT od, border all nasal // dec. IN os, border IT /// CDR 0.72 od // 0.70 os  Disc photos  2011, 2012 - OIS    Ttoday  14/15  Test done today - IOP check, HRT, gonio   2. Nuclear sclerosis - Both Eyes   - mild BCVA 20/20 ou  BATh 20/60 od // 20/70 os (10/2013)   3. Ptosis   -patient would like to monitor for now as he is not having any trouble    4. Family history of macular degeneration   -father has adv wet ARMD - gets injections with arend   5. Hyperopia, astigmatism , presbyopia  PC  +0.75+1.00x177        +1.00+1.50x180   ADD +2.50  glasses Teto   6. I use to see his father, and also saw his mother -  - his dad  2015 @ age 89   - his mom passed at age 98  - both had glaucoma, his dad also had  wet ARMD - saw Ying     Plan    RTC 9 MONTHS WITH hvf / dfe / oct  -monitor the cataracts - not visi sign yet - dilates medium only - smallish pupils   Keep F/U with Teto prn -

## 2017-10-26 ENCOUNTER — PROCEDURE VISIT (OUTPATIENT)
Dept: DERMATOLOGY | Facility: CLINIC | Age: 77
End: 2017-10-26
Payer: MEDICARE

## 2017-10-26 VITALS
HEART RATE: 49 BPM | DIASTOLIC BLOOD PRESSURE: 86 MMHG | BODY MASS INDEX: 29.26 KG/M2 | HEIGHT: 72 IN | WEIGHT: 216 LBS | SYSTOLIC BLOOD PRESSURE: 162 MMHG

## 2017-10-26 DIAGNOSIS — C44.311 BASAL CELL CARCINOMA OF LEFT SIDE OF NOSE: Primary | ICD-10-CM

## 2017-10-26 PROCEDURE — 13151 CMPLX RPR E/N/E/L 1.1-2.5 CM: CPT | Mod: 51,S$PBB,, | Performed by: DERMATOLOGY

## 2017-10-26 PROCEDURE — 13151 CMPLX RPR E/N/E/L 1.1-2.5 CM: CPT | Mod: PBBFAC | Performed by: DERMATOLOGY

## 2017-10-26 PROCEDURE — 99499 UNLISTED E&M SERVICE: CPT | Mod: S$PBB,,, | Performed by: DERMATOLOGY

## 2017-10-26 PROCEDURE — 17311 MOHS 1 STAGE H/N/HF/G: CPT | Mod: PBBFAC | Performed by: DERMATOLOGY

## 2017-10-26 PROCEDURE — 17311 MOHS 1 STAGE H/N/HF/G: CPT | Mod: S$PBB,,, | Performed by: DERMATOLOGY

## 2017-10-26 RX ORDER — CEPHALEXIN 500 MG/1
500 CAPSULE ORAL 3 TIMES DAILY
Qty: 21 CAPSULE | Refills: 0 | Status: SHIPPED | OUTPATIENT
Start: 2017-10-26 | End: 2017-11-02

## 2017-10-26 NOTE — PROGRESS NOTES
PROCEDURE: Mohs' Micrographic Surgery    INDICATION: Location in mask areas of face including central face, nose, eyelids, eyebrows, lips, chin, preauricular, temple, and ear. Biopsy-proven skin cancer of cosmetically and functionally important areas, including head, neck, genital, hand, foot, or areas known for having difficulty in healing, such as the lower anterior legs. Tumor with ill-defined borders.    REFERRING MD: Maylin Cerda M.D.    CASE NUMBER:     ANESTHETIC: 2.5 cc 0.5% Lidocaine with Epi 1:200,000 mixed 1:1 with 0.5% Bupivacaine    SURGICAL PREP: Hibiclens    SURGEON: Mya Givens MD    ASSISTANTS: Sandy Diaz PA-C and Randi Pérez MA    PREOPERATIVE DIAGNOSIS: basal cell carcinoma    POSTOPERATIVE DIAGNOSIS: basal cell carcinoma    PATHOLOGIC DIAGNOSIS: basal cell carcinoma- nodular    HISTOLOGY OF SPECIMENS IN FIRST STAGE:   Tumor Type: No tumor seen.    STAGES OF MOHS' SURGERY PERFORMED: 1    TUMOR-FREE PLANE ACHIEVED: Yes    HEMOSTASIS: electrocoagulation     SPECIMENS: 2     LOCATION: left nasal bridge. Patient verified location.    INITIAL LESION SIZE: 0.4 x 0.4 cm    FINAL DEFECT SIZE: 0.6 x 0.8 cm    WOUND REPAIR/DISPOSITION: The patient tolerated Mohs' Micrographic Surgery for a basal cell carcinoma very well. When the tumor was completely removed, a repair of the surgical defect was undertaken.      PROCEDURE: Complex Linear Repair    INDICATION: Status post Mohs' Micrographic Surgery for basal cell carcinoma.    CASE NUMBER:     SURGEON: Mya Givens MD    ASSISTANTS: Sandy Diaz PA-C and Randi Pérez MA    ANESTHETIC: 2.5 cc 1% Lidocaine with Epinephrine 1:100,000    SURGICAL PREP: Hibiclens    LOCATION: left nasal bridge    DEFECT SIZE: 0.6 x 0.8 cm    WOUND REPAIR/DISPOSITION:  After the patient's carcinoma had been completely removed with Mohs' Micrographic Surgery, a repair of the surgical defect was undertaken. The patient was returned to the operating  suite where the area of left nasal bridge was prepped, draped, and anesthetized in the usual sterile fashion. The wound was widely undermined in all directions. Then, electrocoagulation was used to obtain meticulous hemostasis. 5-0 Vicryl buried vertical mattress sutures were placed into the subcutaneous and dermal plane to close the wound and maryjane the cutaneous wound edge. Bilateral dog ears were identified and were removed by a standard Burow's triangle technique. The cutaneous wound edges were closed using interrupted 5-0 Prolene suture.    The patient tolerated the procedure well.    The area was cleaned and dressed appropriately and the patient was given wound care instructions, as well as appointment for follow-up evaluation. Patient was placed on Keflex 500 mg TID x 7 days.    LENGTH OF REPAIR: 2.3 cm    Vitals:    10/26/17 0732 10/26/17 0950   BP: (!) 140/69 (!) 162/86   BP Location: Left arm    Patient Position: Sitting    BP Method: Small (Automatic)    Pulse: (!) 50 (!) 49   Weight: 98 kg (216 lb)    Height: 6' (1.829 m)

## 2017-10-27 ENCOUNTER — TELEPHONE (OUTPATIENT)
Dept: INTERNAL MEDICINE | Facility: CLINIC | Age: 77
End: 2017-10-27

## 2017-10-27 NOTE — TELEPHONE ENCOUNTER
"----- Message from Lizbet Vargas sent at 10/27/2017 12:51 PM CDT -----  Contact: self/[phone]      RX request - refill or new RX.  Is this a refill or new RX:  New  RX name and strength: Aciclovir (preferible generic).  Directions:   Is this a 30 day or 90 day RX:    Pharmacy name and phone # (DON'T enter "on file" or "in chart"): [facility] [place], [place] - [address] [phone] (Phone)  [phone] (Fax)  Comments:  Thanks        "

## 2017-10-30 ENCOUNTER — TELEPHONE (OUTPATIENT)
Dept: INTERNAL MEDICINE | Facility: CLINIC | Age: 77
End: 2017-10-30

## 2017-10-30 RX ORDER — ACYCLOVIR 200 MG/1
CAPSULE ORAL
Qty: 25 CAPSULE | Refills: 1 | Status: SHIPPED | OUTPATIENT
Start: 2017-10-30 | End: 2018-04-03

## 2017-10-30 NOTE — TELEPHONE ENCOUNTER
Pt wants acyclovir for a fever blister. He has used in the past but was taken off of his profile. [facility]

## 2017-10-30 NOTE — TELEPHONE ENCOUNTER
----- Message from Shanna Posadas sent at 10/27/2017  4:24 PM CDT -----  Contact: self/[phone]  Pt called in regards to a missed call from the office. He is trying to get a Rx for Aciclovir (preferible generic) for a fever blister.      [facility]  Please advise

## 2017-10-30 NOTE — TELEPHONE ENCOUNTER
----- Message from Shanna Posadas sent at 10/27/2017  4:24 PM CDT -----  Contact: self/[phone]  Pt called in regards to a missed call from the office. He is trying to get a Rx for Aciclovir (preferible generic) for a fever blister.      [facility]  Please advise

## 2017-11-02 ENCOUNTER — PROCEDURE VISIT (OUTPATIENT)
Dept: DERMATOLOGY | Facility: CLINIC | Age: 77
End: 2017-11-02
Payer: MEDICARE

## 2017-11-02 VITALS
HEART RATE: 42 BPM | BODY MASS INDEX: 29.26 KG/M2 | DIASTOLIC BLOOD PRESSURE: 87 MMHG | WEIGHT: 216 LBS | HEIGHT: 72 IN | SYSTOLIC BLOOD PRESSURE: 143 MMHG

## 2017-11-02 DIAGNOSIS — C44.319 BASAL CELL CARCINOMA OF LEFT CHEEK: Primary | ICD-10-CM

## 2017-11-02 PROCEDURE — 17311 MOHS 1 STAGE H/N/HF/G: CPT | Mod: 79,PBBFAC | Performed by: DERMATOLOGY

## 2017-11-02 PROCEDURE — 13131 CMPLX RPR F/C/C/M/N/AX/G/H/F: CPT | Mod: 51,S$PBB,, | Performed by: DERMATOLOGY

## 2017-11-02 PROCEDURE — 99499 UNLISTED E&M SERVICE: CPT | Mod: S$PBB,,, | Performed by: DERMATOLOGY

## 2017-11-02 PROCEDURE — 17311 MOHS 1 STAGE H/N/HF/G: CPT | Mod: 79,S$PBB,, | Performed by: DERMATOLOGY

## 2017-11-02 PROCEDURE — 13131 CMPLX RPR F/C/C/M/N/AX/G/H/F: CPT | Mod: PBBFAC | Performed by: DERMATOLOGY

## 2017-11-02 NOTE — PROGRESS NOTES
PROCEDURE: Mohs' Micrographic Surgery    INDICATION: Biopsy-proven skin cancer of cosmetically and functionally important areas, including head, neck, genital, hand, foot, or areas known for having difficulty in healing, such as the lower anterior legs. Tumor with ill-defined borders.    REFERRING MD: Maylin Cerda M.D.    CASE NUMBER:     ANESTHETIC: 3 cc 0.5% Lidocaine with Epi 1:200,000 mixed 1:1 with 0.5% Bupivacaine    SURGICAL PREP: Hibiclens    SURGEON: Mya Givens MD    ASSISTANTS: Randi Pérez MA    PREOPERATIVE DIAGNOSIS: basal cell carcinoma    POSTOPERATIVE DIAGNOSIS: basal cell carcinoma    PATHOLOGIC DIAGNOSIS: basal cell carcinoma- nodular    HISTOLOGY OF SPECIMENS IN FIRST STAGE:   Tumor Type: No tumor seen.    STAGES OF MOHS' SURGERY PERFORMED: 1    TUMOR-FREE PLANE ACHIEVED: Yes    HEMOSTASIS: electrocoagulation     SPECIMENS: 2     LOCATION: left cheek. Patient verified location.    INITIAL LESION SIZE: 0.4 x 0.4 cm    FINAL DEFECT SIZE: 0.8 x 1.1 cm    WOUND REPAIR/DISPOSITION: The patient tolerated Mohs' Micrographic Surgery for a basal cell carcinoma very well. When the tumor was completely removed, a repair of the surgical defect was undertaken.      PROCEDURE: Complex Linear Repair    INDICATION: Status post Mohs' Micrographic Surgery for basal cell carcinoma.    CASE NUMBER:     SURGEON: Mya Givens MD    ASSISTANTS: Sandy Diaz PA-C and Randi Pérez MA    ANESTHETIC: 2 cc 1% Lidocaine with Epinephrine 1:100,000    SURGICAL PREP: Hibiclens    LOCATION: left cheek    DEFECT SIZE: 0.8 x 1.1 cm    WOUND REPAIR/DISPOSITION:  After the patient's carcinoma had been completely removed with Mohs' Micrographic Surgery, a repair of the surgical defect was undertaken. The patient was returned to the operating suite where the area of left cheek was prepped, draped, and anesthetized in the usual sterile fashion. The wound was widely undermined in all directions. Then,  electrocoagulation was used to obtain meticulous hemostasis. 5-0 Vicryl buried vertical mattress sutures were placed into the subcutaneous and dermal plane to close the wound and maryjane the cutaneous wound edge. Bilateral dog ears were identified and were removed by a standard Burow's triangle technique. The cutaneous wound edges were closed using interrupted 5-0 Prolene suture.    The patient tolerated the procedure well.    The area was cleaned and dressed appropriately and the patient was given wound care instructions, as well as appointment for follow-up evaluation.    LENGTH OF REPAIR: 2.2 cm    Vitals:    11/02/17 1109 11/02/17 1236   BP: 115/72 (!) 143/87   BP Location: Left arm Left arm   Patient Position: Sitting Sitting   BP Method: Medium (Automatic) Medium (Automatic)   Pulse: (!) 48 (!) 42   Weight: 98 kg (216 lb)    Height: 6' (1.829 m)

## 2017-11-02 NOTE — PROGRESS NOTES
77 y.o. male patient is here for suture removal following Mohs' surgery.    Patient reports no problems.    WOUND PE:  The L nasal bridge sutures intact. Wound healing well. Good skin edges. No signs or symptoms of infection.    IMPRESSION:  Healing operative site from Mohs' surgery, BCC L nasal bridge s/p Mohs with CLC, postop day #7.    PLAN:  Sutures removed today. Steri-strips applied.  Continue wound care.  Keep moist with Aquaphor.    RTC:  In 1 week for suture removal on L cheek.

## 2017-11-09 ENCOUNTER — OFFICE VISIT (OUTPATIENT)
Dept: DERMATOLOGY | Facility: CLINIC | Age: 77
End: 2017-11-09
Payer: MEDICARE

## 2017-11-09 DIAGNOSIS — Z09 POSTOP CHECK: Primary | ICD-10-CM

## 2017-11-09 PROCEDURE — 99024 POSTOP FOLLOW-UP VISIT: CPT | Mod: ,,, | Performed by: DERMATOLOGY

## 2017-11-09 PROCEDURE — 99999 PR PBB SHADOW E&M-EST. PATIENT-LVL II: CPT | Mod: PBBFAC,,, | Performed by: DERMATOLOGY

## 2017-11-09 PROCEDURE — 99212 OFFICE O/P EST SF 10 MIN: CPT | Mod: PBBFAC | Performed by: DERMATOLOGY

## 2017-11-09 NOTE — PROGRESS NOTES
77 y.o. male patient is here for suture removal following Mohs' surgery.    Patient reports no problems.    WOUND PE:  The left cheek sutures intact. Wound healing well. Good skin edges. No signs or symptoms of infection.    IMPRESSION:  Healing operative site from Mohs' surgery BCC, left cheek s/p Mohs with CLC, postop day # 7.    PLAN:  Sutures removed today. Steri-strips applied.  Continue wound care.  Keep moist with Aquaphor.    RTC:  In 3-6 months with Maylin Cerda M.D. for skin check or sooner if new concern arises.

## 2017-11-27 ENCOUNTER — OFFICE VISIT (OUTPATIENT)
Dept: PODIATRY | Facility: CLINIC | Age: 77
End: 2017-11-27
Payer: MEDICARE

## 2017-11-27 VITALS — BODY MASS INDEX: 29.26 KG/M2 | WEIGHT: 216.06 LBS | HEIGHT: 72 IN

## 2017-11-27 DIAGNOSIS — M72.2 PLANTAR FASCIITIS: Primary | ICD-10-CM

## 2017-11-27 PROCEDURE — 99213 OFFICE O/P EST LOW 20 MIN: CPT | Mod: PBBFAC,PO

## 2017-11-27 PROCEDURE — 99999 PR PBB SHADOW E&M-EST. PATIENT-LVL III: CPT | Mod: PBBFAC,,,

## 2017-11-27 PROCEDURE — 20550 NJX 1 TENDON SHEATH/LIGAMENT: CPT | Mod: PBBFAC,PO

## 2017-11-27 PROCEDURE — 99203 OFFICE O/P NEW LOW 30 MIN: CPT | Mod: S$PBB,25,,

## 2017-11-27 PROCEDURE — 20550 NJX 1 TENDON SHEATH/LIGAMENT: CPT | Mod: S$PBB,LT,,

## 2017-11-27 RX ORDER — DEXAMETHASONE SODIUM PHOSPHATE 4 MG/ML
4 INJECTION, SOLUTION INTRA-ARTICULAR; INTRALESIONAL; INTRAMUSCULAR; INTRAVENOUS; SOFT TISSUE
Status: COMPLETED | OUTPATIENT
Start: 2017-11-27 | End: 2017-11-27

## 2017-11-27 RX ADMIN — DEXAMETHASONE SODIUM PHOSPHATE 4 MG: 4 INJECTION, SOLUTION INTRAMUSCULAR; INTRAVENOUS at 12:11

## 2017-11-27 RX ADMIN — TRIAMCINOLONE ACETONIDE 12 MG: 10 INJECTION, SUSPENSION INTRA-ARTICULAR; INTRALESIONAL at 12:11

## 2017-11-27 NOTE — PATIENT INSTRUCTIONS
Spenco Orthotic Arch Supports                               SPENCO Orthotic Arch Supports (AKA Spenco Orthotic Insoles) are ¾ length nylon (plastic arch supports that are covered with [employer]encos classic green neoprene cushion material. SPENCO Orthotic Arch Supports are designed to support the medial and lateral arch. A SPENCO Orthotic Arch Support is ideal for people that need corrective support, but have tried other higher or harder orthotics and found them to be uncomfortable to wear. The nylon support of the SPENCO Orthotic Arch support is softer and more flexible than plastics used in other, harder orthotics and arch supports. This means that people with naturally lower arches or people with extremely flat feet with find that these arch supports are more comfortable to get used to than other higher arch supports.  May be obtained at:     [employer]mer[employer] or online

## 2017-11-27 NOTE — PROGRESS NOTES
Subjective:      Chief Complaint   Patient presents with    Heel Pain     Left heel pain        HPI: Patient presents to the clinic c/o left heel pain which is worse after a few steps after getting out of bed or resting.  Pain is described as sharp and along the plantar inner aspect of the foot.  Patient has tried arch supprts and this does not help. Shoegear:  Slip on shoes.      Review of Systems  Constitution: Negative for chills, fever, weakness and malaise/fatigue.   HEENT: Negative for headaches.   Cardiovascular: Negative for chest pain and claudication.   Respiratory: Negative for cough and shortness of breath.   Musculoskeletal: Positive for foot pain.  Negative for muscle cramps and muscle weakness.   Gastrointestinal: Negative for nausea and vomiting.   Neurological: Negative for numbness and paresthesias.   Dermatological: Negative for wound.         Objective:      Physical Exam  Constitutional:  Patient is oriented to person, place, and time. Vital signs are normal.  Appears well-developed and well-nourished.     Vascular:  Dorsalis pedis pulses are 2+ on the right side, and 2+ on the left side.   Posterior tibial pulses are 2+ on the right side, and 2+ on the left side.   + digital hair growth, no swelling, capillary fill time to all toes <3 seconds    Skin/Dermatological:  Skin is warm and intact. No rash noted. No cyanosis. no clubbing. No open wounds.      Musculoskeletal:      Normal range of motion bilateral ankle and pedal joints except ankle joint dorsiflexion limited to 5 degrees with knee extended and flexed, no swelling or deformity, no tenderness or crepitus. Achilles tendon exhibits no pain or defects.   Tenderness to the medial calcaneal tuberclesleft foot , - tinel's sign, negative heel squeeze test.  Overall, pes rectus architecture with tight plantar fascia.       Neurological:  Normal strength lower extremity muscles, normal tone.   Reflex Scores:       Patellar reflexes are 2+ on  the right side and 2+ on the left side.       Achilles reflexes are 2+ on the right side and 2+ on the left side.       No deficits in 2 point tactile discrimination, vibratory, light touch or pressure      Assessment:       1. Plantar fasciitis - Left Foot        Plan:       Plantar fasciitis - Left Foot  -     dexamethasone injection 4 mg; 1 mL (4 mg total) by Other route one time.  -     triamcinolone acetonide injection 12 mg; 1.2 mLs (12 mg total) by Other route one time.       left foot:  We discussed the etiology of the problem and the patient was given literature regarding plantar fasciitis and stretching.  With the patient's permission, an injection of 12 mg of kenalog, 4 mg of dexamethasone phosphate and 1 cc of lidocaine 1% and 1 cc of marcaine 0.5% into the left medial plantar heel.  Patient tolerated well. We also discussed proper shoe gear.  Spenco orthotic supports were also recommended.  We discussed the possibility of another injection or physical therapy or surgery should this not resolve the problem.  Return to clinic prn.

## 2017-11-30 ENCOUNTER — TELEPHONE (OUTPATIENT)
Dept: PODIATRY | Facility: CLINIC | Age: 77
End: 2017-11-30

## 2017-11-30 NOTE — TELEPHONE ENCOUNTER
Patient states that he is still having pain and burning in his Left foot and would like to know if he can have pain medication. Please advise.

## 2017-11-30 NOTE — TELEPHONE ENCOUNTER
Spoke with patient and let him know that Dr. Ny has recommended that he get another injection and to take Motrin. Patient states verbal understanding.

## 2017-12-15 ENCOUNTER — OFFICE VISIT (OUTPATIENT)
Dept: OPTOMETRY | Facility: CLINIC | Age: 77
End: 2017-12-15
Payer: MEDICARE

## 2017-12-15 DIAGNOSIS — H26.9 CORTICAL CATARACT OF LEFT EYE: ICD-10-CM

## 2017-12-15 DIAGNOSIS — H25.13 NUCLEAR SCLEROSIS, BILATERAL: Primary | ICD-10-CM

## 2017-12-15 DIAGNOSIS — H52.03 HYPEROPIA WITH ASTIGMATISM, BILATERAL: ICD-10-CM

## 2017-12-15 DIAGNOSIS — H40.003 GLAUCOMA SUSPECT OF BOTH EYES: ICD-10-CM

## 2017-12-15 DIAGNOSIS — H52.4 PRESBYOPIA OF BOTH EYES: ICD-10-CM

## 2017-12-15 DIAGNOSIS — H52.203 HYPEROPIA WITH ASTIGMATISM, BILATERAL: ICD-10-CM

## 2017-12-15 DIAGNOSIS — I10 ESSENTIAL HYPERTENSION: ICD-10-CM

## 2017-12-15 PROCEDURE — 99212 OFFICE O/P EST SF 10 MIN: CPT | Mod: PBBFAC | Performed by: OPTOMETRIST

## 2017-12-15 PROCEDURE — 99999 PR PBB SHADOW E&M-EST. PATIENT-LVL II: CPT | Mod: PBBFAC,,, | Performed by: OPTOMETRIST

## 2017-12-15 PROCEDURE — 92014 COMPRE OPH EXAM EST PT 1/>: CPT | Mod: S$PBB,,, | Performed by: OPTOMETRIST

## 2017-12-15 PROCEDURE — 92015 DETERMINE REFRACTIVE STATE: CPT | Mod: ,,, | Performed by: OPTOMETRIST

## 2017-12-15 NOTE — PROGRESS NOTES
"HPI     eye exam    Additional comments: Annual general eye examination.  States he still occasionally experiences epiphora/excessive tearing in   both eyes.  Eyes burn sometimes at night.  No apparent VA changes.            Comments   Patient's age: 77 y.o. Wm  Occupation: retired  Approximate date of last eye examination:  10/23/2017  Name of last eye doctor seen: Dr. Garcia - followed as glaucoma   suspect.  Not using drops for IOP control in either eye.  Wears glasses? yes     If yes, wears  Full-time or part-time?  Full-time  Present glasses are: Bifocal, SV Distance, SV Reading?  ST bifocal  Approximate age of present glasses:  Two years   Got new glasses following last exam, or subsequently?:  no   Any problem with VA with glasses?  No - happy with VA at distance and at   near with glasses  Wears CLs?:  no  Headaches?  no  Eye pain/discomfort?  Occasional excessive tearing in both eyes.  Eyes   burn sometimes at night.                                                                                     Flashes?  no  Floaters?  "occasionally I see a bug on the floor"  Diplopia/Double vision?  no  Patient's Ocular History:         Any eye surgeries? no         Any eye injury?  no         Any treatment for eye disease?  no  Family history of eye disease?  Father had macular degeneration, and   mother had glaucoma  Significant patient medical history:         1. Diabetes?  no       If yes, IDDM or NIDDM? n/a   2. HBP?  Yes.  Takes meds.  States well controlled              3. Other (describe):  H/o tachycardia. H/a atrial   fibrillation. Takes Pradaxia.  S/P ablation.     ! OTC eyedrops currently using:  ATs rarely   ! Prescription eye meds currently using:  no   ! Any history of allergy/adverse reaction to any eye meds used   previously?  no    ! Any history of allergy/adverse reaction to eyedrops used during prior   eye exam(s)? no    ! Any history of allergy/adverse reaction to Epinephrine or similar meds? " "  None     ! Patient okay with use of anesthetic eyedrops to check eye pressure?    yes        ! Patient okay with use of eyedrops to dilate pupils today?  yes   !  Allergies/Medications/Medical History/Family History reviewed today?    Yes       PD =   64/60  Desired reading distance =  18.5"                                                                    Last edited by Ned Irene, OD on 12/15/2017  7:53 AM. (History)            Assessment /Plan     For exam results, see Encounter Report.    1. Nuclear sclerosis, bilateral     2. Cortical cataract of left eye     3. Glaucoma suspect of both eyes     4. Essential hypertension     5. Hyperopia with astigmatism, bilateral     6. Presbyopia of both eyes                    Bilateral nuclear sclerosis of lens in both eyes, consistent with age.  Vitreous floaters in both eyes, more apparent in the right eye.  No retinal etiology.     Hyperopia with astigmatism in both eyes, and presbyopia consistent with age.  Satisfactory correctable VA in each eye, but better in the left eye.  New spectacle lens Rx issued for use as desired. Recommend full-time wear.    Followed by Dr. Garcia as glaucoma suspect, based on the finding of large optic nerve cup-to-disc ratio in both eyes. Family history of glaucoma (mother and father).   Intraocular pressure within normal range in each eye today.   C/D ratio appears stable in each eye.     Recheck refraction in one year.          "

## 2017-12-15 NOTE — PATIENT INSTRUCTIONS
Bilateral nuclear sclerosis of lens in both eyes, consistent with age.  Vitreous floaters in both eyes, more apparent in the right eye.  No retinal etiology.     Hyperopia with astigmatism in both eyes, and presbyopia consistent with age.  Satisfactory correctable VA in each eye, but better in the left eye.  New spectacle lens Rx issued for use as desired. Recommend full-time wear.    Followed by Dr. Garcia as glaucoma suspect, based on the finding of large optic nerve cup-to-disc ratio in both eyes. Family history of glaucoma (mother and father).   Intraocular pressure within normal range in each eye today.   C/D ratio appears stable in each eye.     Recheck refraction in one year.

## 2018-04-03 ENCOUNTER — LAB VISIT (OUTPATIENT)
Dept: LAB | Facility: HOSPITAL | Age: 78
End: 2018-04-03
Attending: INTERNAL MEDICINE
Payer: MEDICARE

## 2018-04-03 ENCOUNTER — OFFICE VISIT (OUTPATIENT)
Dept: INTERNAL MEDICINE | Facility: CLINIC | Age: 78
End: 2018-04-03
Payer: MEDICARE

## 2018-04-03 VITALS
TEMPERATURE: 98 F | DIASTOLIC BLOOD PRESSURE: 70 MMHG | BODY MASS INDEX: 27.53 KG/M2 | OXYGEN SATURATION: 99 % | HEIGHT: 72 IN | SYSTOLIC BLOOD PRESSURE: 120 MMHG | HEART RATE: 66 BPM | WEIGHT: 203.25 LBS

## 2018-04-03 DIAGNOSIS — I10 ESSENTIAL HYPERTENSION: ICD-10-CM

## 2018-04-03 DIAGNOSIS — R19.7 ACUTE DIARRHEA: ICD-10-CM

## 2018-04-03 DIAGNOSIS — R19.7 ACUTE DIARRHEA: Primary | ICD-10-CM

## 2018-04-03 DIAGNOSIS — I48.0 PAF (PAROXYSMAL ATRIAL FIBRILLATION): ICD-10-CM

## 2018-04-03 DIAGNOSIS — N17.9 AKI (ACUTE KIDNEY INJURY): Primary | ICD-10-CM

## 2018-04-03 LAB
ALBUMIN SERPL BCP-MCNC: 4.6 G/DL
ALP SERPL-CCNC: 97 U/L
ALT SERPL W/O P-5'-P-CCNC: 56 U/L
ANION GAP SERPL CALC-SCNC: 9 MMOL/L
AST SERPL-CCNC: 36 U/L
BILIRUB SERPL-MCNC: 1.4 MG/DL
BUN SERPL-MCNC: 57 MG/DL
CALCIUM SERPL-MCNC: 9.5 MG/DL
CHLORIDE SERPL-SCNC: 110 MMOL/L
CO2 SERPL-SCNC: 17 MMOL/L
CREAT SERPL-MCNC: 2.7 MG/DL
EST. GFR  (AFRICAN AMERICAN): 25 ML/MIN/1.73 M^2
EST. GFR  (NON AFRICAN AMERICAN): 22 ML/MIN/1.73 M^2
GLUCOSE SERPL-MCNC: 128 MG/DL
POTASSIUM SERPL-SCNC: 5 MMOL/L
PROT SERPL-MCNC: 7.3 G/DL
SODIUM SERPL-SCNC: 136 MMOL/L

## 2018-04-03 PROCEDURE — 36415 COLL VENOUS BLD VENIPUNCTURE: CPT

## 2018-04-03 PROCEDURE — 99214 OFFICE O/P EST MOD 30 MIN: CPT | Mod: S$PBB,,, | Performed by: INTERNAL MEDICINE

## 2018-04-03 PROCEDURE — 80053 COMPREHEN METABOLIC PANEL: CPT

## 2018-04-03 PROCEDURE — 99999 PR PBB SHADOW E&M-EST. PATIENT-LVL III: CPT | Mod: PBBFAC,,, | Performed by: INTERNAL MEDICINE

## 2018-04-03 PROCEDURE — 99213 OFFICE O/P EST LOW 20 MIN: CPT | Mod: PBBFAC | Performed by: INTERNAL MEDICINE

## 2018-04-03 NOTE — PROGRESS NOTES
Subjective:       Patient ID: Danial Meza is a 77 y.o. male.    Chief Complaint: Diarrhea    HPI  76 y/o man here for urgent visit for diarrhea.  Patient of Dr Durant.    Diarrhea - started 4 days ago on Saturday with 15 BM that day, +watery / liquid stool, no blood in stool (does take pradaxa). Slowed down some yesterday, but still 5-6 BM in past 10 hours. +nausea sometimes but not consistently, +vomiting x 2 episodes. No abdominal pain; having some rectal pain from his hemorrhoids.   Did have shimp etouffee (homemade then frozen) Friday night that his wife didn't have.   Has tried taking immodium.   Noted oily film along with stool.   Has been drinking gatorade, trying to keep hydrated. Not feeling lightheaded or dizzy generally; one episode of this while walking in yard.   No travel recently. No sick contacts. The shrimp etouffee is the only think he's eaten that wasn't a dish someone else ate.  No fevers.   No antibiotics recently, but was hospitalized with PNA last year.    Did have his flu shot this year.    H/o atrial fibrillation on pradaxa. No palpitations, chest pain, or dyspnea.    Review of Systems   Constitutional: Positive for appetite change. Negative for fever.   HENT: Negative.    Eyes: Negative for visual disturbance.   Respiratory: Negative for cough and shortness of breath.    Cardiovascular: Negative for chest pain and leg swelling.   Gastrointestinal: Positive for diarrhea, nausea and vomiting. Negative for abdominal distention, abdominal pain and blood in stool.   Endocrine: Negative for heat intolerance.   Genitourinary: Negative.    Musculoskeletal: Negative for gait problem and joint swelling.   Skin: Negative for rash.   Neurological: Negative for dizziness, weakness and light-headedness.   Psychiatric/Behavioral: Negative for dysphoric mood.         Past medical history, surgical history, and family medical history reviewed and updated as appropriate.    Medications and allergies  reviewed.     Objective:          Vitals:    04/03/18 0930   BP: 120/70   BP Location: Right arm   Patient Position: Sitting   Pulse: 66   Temp: 97.6 °F (36.4 °C)   TempSrc: Oral   SpO2: 99%   Weight: 92.2 kg (203 lb 4.2 oz)   Height: 6' (1.829 m)     Body mass index is 27.57 kg/m².  Physical Exam   Constitutional: He is oriented to person, place, and time. He appears well-developed and well-nourished. No distress.   HENT:   Head: Normocephalic and atraumatic.   Mouth/Throat: Oropharynx is clear and moist.   Eyes: Conjunctivae and EOM are normal.   Neck: Neck supple.   Cardiovascular: Normal rate, regular rhythm and normal heart sounds.    Pulmonary/Chest: Effort normal and breath sounds normal. No respiratory distress.   Abdominal: Soft. He exhibits no distension and no mass. There is no tenderness. There is no guarding. A hernia (ventral hernia notable on sitting up, nontender, reducible) is present.   Somewhat hyperactive bowel sounds   Musculoskeletal: He exhibits no edema.   Lymphadenopathy:     He has no cervical adenopathy.   Neurological: He is alert and oriented to person, place, and time. No cranial nerve deficit. Gait normal.   Skin: Skin is warm and dry.   Psychiatric: He has a normal mood and affect.   Vitals reviewed.      Lab Results   Component Value Date    WBC 5.96 09/22/2017    HGB 14.9 09/22/2017    HCT 42.1 09/22/2017     09/22/2017    CHOL 142 09/22/2017    TRIG 177 (H) 09/22/2017    HDL 25 (L) 09/22/2017    ALT 38 09/22/2017    AST 28 09/22/2017     09/22/2017    K 4.2 09/22/2017     (H) 09/22/2017    CREATININE 1.2 09/22/2017    BUN 26 (H) 09/22/2017    CO2 24 09/22/2017    TSH 2.673 11/04/2015    PSA 0.65 12/01/2011       Assessment:       1. Acute diarrhea        Plan:   Danial was seen today for diarrhea.    Diagnoses and all orders for this visit:    Acute diarrhea  -     Comprehensive metabolic panel; Future  -     WBC, Stool; Future  -     Clostridium difficile EIA;  Future  -     VA  L CULTURE    Likely viral gastroenteritis, possibly related to food poisoning.   Recommended keeping very well hydrated, follow bland / BRAT diet - handout given with AVS. Avoid fatty foods, dairy until recovered.  Ok to take immodium prn  Recommended taking OTC probiotic  Given weight loss and etiology not entirely clear, checking lab to eval for GT or hepatopathy as well as stool studies.    Return precautions given.    Health maintenance deferred to PCP  Recommended patient schedule annual exam with Dr Durant     Follow-up if symptoms worsen or fail to improve.    Hipolito Feng MD  Internal Medicine  [facility]  4/3/2018

## 2018-04-03 NOTE — PATIENT INSTRUCTIONS
Uncertain Causes of Diarrhea (Adult)    Diarrhea is when stools are loose and watery. This can be caused by:  · Viral infections  · Bacterial infections  · Food poisoning  · Parasites  · Irritable bowel syndrome (IBS)  · Inflammatory bowel diseases such as ulcerative colitis, Crohn's disease, and celiac disease  · Food intolerance, such as to lactose, the sugar found in milk and milk products  · Reaction to medicines like antibiotics, laxatives, cancer drugs, and antacids  Along with diarrhea, you may also have:  · Abdominal pain and cramping  · Nausea and vomiting  · Loss of bowel control  · Fever and chills  · Bloody stools  In some cases, antibiotics may help to treat diarrhea. You may have a stool sample test. This is done to see what is causing your diarrhea, and if antibiotics will help treat it. The results of a stool sample test may take up to 2 days. The healthcare provider may not give you antibiotics until he or she has the stool test results.  Diarrhea can cause dehydration. This is the loss of too much water and other fluids from the body. When this occurs, body fluid must be replaced. This can be done with oral rehydration solutions. Oral rehydration solutions are available at drugstores and grocery stores without a prescription.  Home care  Follow all instructions given by your healthcare provider. Rest at home for the next 24 hours, or until you feel better. Avoid caffeine, tobacco, and alcohol. These can make diarrhea, cramping, and pain worse.  If taking medicines:  · Dont take over-the-counter diarrhea or nausea medicines unless your healthcare provider tells you to.  · You may use acetaminophen or NSAID medicines like ibuprofen or naproxen to reduce pain and fever. Dont use these if you have chronic liver or kidney disease, or ever had a stomach ulcer or gastrointestinal bleeding. Don't use NSAID medicines if you are already taking one for another condition (like arthritis) or are on daily  aspirin therapy (such as for heart disease or after a stroke). Talk with your healthcare provider first.  · If antibiotics were prescribed, be sure you take them until they are finished. Dont stop taking them even when you feel better. Antibiotics must be taken as a full course.  To prevent the spread of illness:  · Remember that washing with soap and water and using alcohol-based  is the best way to prevent the spread of infection.  · Clean the toilet after each use.  · Wash your hands before eating.  · Wash your hands before and after preparing food. Keep in mind that people with diarrhea or vomiting should not prepare food for others.  · Wash your hands after using cutting boards, countertops, and knives that have been in contact with raw foods.  · Wash and then peel fruits and vegetables.  · Keep uncooked meats away from cooked and ready-to-eat foods.  · Use a food thermometer when cooking. Cook poultry to at least 165°F (74°C). Cook ground meat (beef, veal, pork, lamb) to at least 160°F (71°C). Cook fresh beef, veal, lamb, and pork to at least 145°F (63°C).  · Dont eat raw or undercooked eggs (poached or bhumi side up), poultry, meat, or unpasteurized milk and juices.  Food and drinks  The main goal while treating vomiting or diarrhea is to prevent dehydration. This is done by taking small amounts of liquids often.  · Keep in mind that liquids are more important than food right now.  · Drink only small amounts of liquids at a time.  · Dont force yourself to eat, especially if you are having cramping, vomiting, or diarrhea. Dont eat large amounts at a time, even if you are hungry.  · If you eat, avoid fatty, greasy, spicy, or fried foods.  · Dont eat dairy foods or drink milk if you have diarrhea. These can make diarrhea worse.  During the first 24 hours you can try:  · Oral rehydration solutions. Do not use sports drinks. They have too much sugar and not enough electrolytes.  · Soft drinks without  caffeine  · Ginger ale  · Water (plain or flavored)  · Decaf tea or coffee  · Clear broth, consommé, or bouillon  · Gelatin, popsicles, or frozen fruit juice bars  The second 24 hours, if you are feeling better, you can add:  · Hot cereal, plain toast, bread, rolls, or crackers  · Plain noodles, rice, mashed potatoes, chicken noodle soup, or rice soup  · Unsweetened canned fruit (no pineapple)  · Bananas  As you recover:  · Limit fat intake to less than 15 grams per day. Dont eat margarine, butter, oils, mayonnaise, sauces, gravies, fried foods, peanut butter, meat, poultry, or fish.  · Limit fiber. Dont eat raw or cooked vegetables, fresh fruits except bananas, or bran cereals.  · Limit caffeine and chocolate.  · Limit dairy.  · Dont use spices or seasonings except salt.  · Go back to your normal diet over time, as you feel better and your symptoms improve.  · If the symptoms come back, go back to a simple diet or clear liquids.  Follow-up care  Follow up with your healthcare provider, or as advised. If a stool sample was taken or cultures were done, call the healthcare provider for the results as instructed.  Call 911  Call 911 if you have any of these symptoms:  · Trouble breathing  · Confusion  · Extreme drowsiness or trouble walking  · Loss of consciousness  · Rapid heart rate  · Chest pain  · Stiff neck  · Seizure  When to seek medical advice  Call your healthcare provider right away if any of these occur:  · Abdominal pain that gets worse  · Constant lower right abdominal pain  · Continued vomiting and inability to keep liquids down  · Diarrhea more than 5 times a day  · Blood in vomit or stool  · Dark urine or no urine for 8 hours, dry mouth and tongue, tiredness, weakness, or dizziness  · Drowsiness  · New rash  · You dont get better in 2 to 3 days  · Fever of 100.4°F (38°C) or higher that doesnt get lower with medicine  Date Last Reviewed: 1/3/2016  © 6948-2829 [employer]. [address] [place] [address]  "[place] [address]. All rights reserved. This information is not intended as a substitute for professional medical care. Always follow your healthcare professional's instructions.        [place] Diet  Your healthcare provider may recommend a bland diet if you have an upset stomach. It consists of foods that are mild and easy to digest. It is better to eat small frequent meals rather than 3 large meals a day.    Beverages  OK: Fruit juices, non-caffeinated teas and coffee, non-carbonated silva  Avoid: Carbonated beverage, caffeinated tea and coffee, all alcoholic beverages  Bread  OK: Refined white, wheat or rye bread, letty or soda crackers, [place] toast, plain rolls, bagels  Avoid: Whole-grain bread  Cereal  OK: Refined cereals: cooked or ready to eat  Avoid: Whole-grain cereals and granola, or those containing bran, seeds or nuts  Desserts  OK: Peanut butter and all others except those to "avoid"  Avoid: Chocolate, cocoa, coconut, popcorn, nuts, seeds, jam, marmalade  Fruits  OK: Canned, cooked, frozen or fresh fruits without seeds or tough skin  Avoid: Olives, skin and seeds of fruit  Meats  OK: All fresh or preserved meat, fish and fowl  Avoid: Any that are prepared with those spices to "avoid"  Cheese and eggs  OK: Eggs, cottage cheese, cream cheese, other cheeses  Avoid: All cheeses made with those spices to "avoid"  Potatoes and pasta  OK: Potato, rice, macaroni, noodles, spaghetti  Avoid: None  Soups  OK: All soups without heavy seasoning  Avoid: Soups made with those spices to "avoid"  Vegetables  OK: Canned, cooked, fresh or frozen mildly flavored vegetables without seeds, skins or coarse fiber  Avoid: Vegetables prepared with those spices to "avoid"; skin and seeds of vegetables and those with coarse fiber  Spices  OK: Salt, lemon and lime juice, vinegar, all extracts, ana, cinnamon, thyme, mace, allspice, paprika  Avoid: Chili powder, cloves, pepper, seed spices, garlic, gravy pickles, highly " seasoned salad dressings  Date Last Reviewed: 11/20/2015  © 2851-0970 [employer]. [address] [place] [address], [place] [address]. All rights reserved. This information is not intended as a substitute for professional medical care. Always follow your healthcare professional's instructions.

## 2018-04-03 NOTE — PROGRESS NOTES
Patient with acute diarrhea that on phone call after visit to discuss results appears to be resolving.  However he does have GT likely due to dehydration / hypovolemia despite normal vital signs today.  He will be working on very aggressive oral hydration, and agrees to return for repeat labs Thursday afternoon.   Please contact patient to schedule for repeat lab around 2pm-4pm Thursday 4/5.

## 2018-04-04 ENCOUNTER — TELEPHONE (OUTPATIENT)
Dept: INTERNAL MEDICINE | Facility: CLINIC | Age: 78
End: 2018-04-04

## 2018-04-04 NOTE — TELEPHONE ENCOUNTER
Spoke with pt---pt has been scheduled for lab appointment.     Patient with acute diarrhea that on phone call after visit to discuss results appears to be resolving.   However he does have GT likely due to dehydration / hypovolemia despite normal vital signs today.   He will be working on very aggressive oral hydration, and agrees to return for repeat labs Thursday afternoon.   Please contact patient to schedule for repeat lab around 2pm-4pm Thursday 4/5.

## 2018-04-05 ENCOUNTER — LAB VISIT (OUTPATIENT)
Dept: LAB | Facility: HOSPITAL | Age: 78
End: 2018-04-05
Attending: INTERNAL MEDICINE
Payer: MEDICARE

## 2018-04-05 DIAGNOSIS — N17.9 AKI (ACUTE KIDNEY INJURY): ICD-10-CM

## 2018-04-05 DIAGNOSIS — R19.7 ACUTE DIARRHEA: ICD-10-CM

## 2018-04-05 LAB
ALBUMIN SERPL BCP-MCNC: 3.9 G/DL
ALP SERPL-CCNC: 102 U/L
ALT SERPL W/O P-5'-P-CCNC: 57 U/L
ANION GAP SERPL CALC-SCNC: 7 MMOL/L
AST SERPL-CCNC: 24 U/L
BILIRUB SERPL-MCNC: 1.1 MG/DL
BUN SERPL-MCNC: 40 MG/DL
CALCIUM SERPL-MCNC: 9 MG/DL
CHLORIDE SERPL-SCNC: 113 MMOL/L
CO2 SERPL-SCNC: 20 MMOL/L
CREAT SERPL-MCNC: 1.4 MG/DL
EST. GFR  (AFRICAN AMERICAN): 56 ML/MIN/1.73 M^2
EST. GFR  (NON AFRICAN AMERICAN): 48 ML/MIN/1.73 M^2
GLUCOSE SERPL-MCNC: 131 MG/DL
POTASSIUM SERPL-SCNC: 4.2 MMOL/L
PROT SERPL-MCNC: 6.3 G/DL
SODIUM SERPL-SCNC: 140 MMOL/L

## 2018-04-05 PROCEDURE — 36415 COLL VENOUS BLD VENIPUNCTURE: CPT

## 2018-04-05 PROCEDURE — 80053 COMPREHEN METABOLIC PANEL: CPT

## 2018-04-09 ENCOUNTER — TELEPHONE (OUTPATIENT)
Dept: INTERNAL MEDICINE | Facility: CLINIC | Age: 78
End: 2018-04-09

## 2018-04-09 ENCOUNTER — OFFICE VISIT (OUTPATIENT)
Dept: TRANSPLANT | Facility: CLINIC | Age: 78
End: 2018-04-09
Attending: INTERNAL MEDICINE
Payer: MEDICARE

## 2018-04-09 VITALS
WEIGHT: 205.94 LBS | HEART RATE: 82 BPM | DIASTOLIC BLOOD PRESSURE: 77 MMHG | SYSTOLIC BLOOD PRESSURE: 118 MMHG | HEIGHT: 72 IN | BODY MASS INDEX: 27.89 KG/M2

## 2018-04-09 DIAGNOSIS — E78.2 MIXED HYPERLIPIDEMIA: ICD-10-CM

## 2018-04-09 DIAGNOSIS — E11.65 TYPE 2 DIABETES MELLITUS WITH HYPERGLYCEMIA, WITHOUT LONG-TERM CURRENT USE OF INSULIN: ICD-10-CM

## 2018-04-09 DIAGNOSIS — Z79.01 CURRENT USE OF LONG TERM ANTICOAGULATION: ICD-10-CM

## 2018-04-09 DIAGNOSIS — I10 ESSENTIAL HYPERTENSION: ICD-10-CM

## 2018-04-09 DIAGNOSIS — I48.0 PAF (PAROXYSMAL ATRIAL FIBRILLATION): Primary | ICD-10-CM

## 2018-04-09 PROCEDURE — 99999 PR PBB SHADOW E&M-EST. PATIENT-LVL III: CPT | Mod: PBBFAC,,, | Performed by: INTERNAL MEDICINE

## 2018-04-09 PROCEDURE — 99213 OFFICE O/P EST LOW 20 MIN: CPT | Mod: PBBFAC | Performed by: INTERNAL MEDICINE

## 2018-04-09 PROCEDURE — 99214 OFFICE O/P EST MOD 30 MIN: CPT | Mod: S$PBB,,, | Performed by: INTERNAL MEDICINE

## 2018-04-09 NOTE — TELEPHONE ENCOUNTER
----- Message from Genie Bajwa sent at 4/9/2018  3:46 PM CDT -----  Contact: Patient [phone] cell  Patient had seen you on 04/03/2018. Requesting test results from 04/05/2018.    Please call and advise.    Thank You

## 2018-04-09 NOTE — PROGRESS NOTES
Subjective:     Patient ID:  Danial Meza is a 77 y.o. male who presents for follow-up of No chief complaint on file.    HPI:  78 yo WM with AF on pradaxa last seen 2/24/17 returns for routine visit.  He developed diarrhea 10 days ago lasting approx a week complicated by acute renal insufficiency.  He took pediolyte and imodium with recovery.  Last Nov 2017 developed plantar fascitis and wt brooke to 205#.  He subsequently lost 5# on diet. He was walking 45 min regularly without problems.    Review of Systems   Constitution: Positive for weight loss (10 pounds). Negative for chills, fever, weakness, malaise/fatigue, night sweats and weight gain.   Cardiovascular: Positive for palpitations (10-20 seconds and only sporadic, occurs only lying on sofa). Negative for chest pain, dyspnea on exertion, irregular heartbeat, leg swelling, near-syncope, orthopnea, paroxysmal nocturnal dyspnea and syncope.   Respiratory: Negative for cough, sputum production and wheezing.    Hematologic/Lymphatic: Does not bruise/bleed easily.   Musculoskeletal: Negative for arthritis, joint pain and stiffness.   Gastrointestinal: Positive for diarrhea and hemorrhoids. Negative for hematochezia and melena.        Colonoscopy Feb 2017 was performed at 5 yrs due to polyps by Dr. Rodriguez at [facility].  Study was OK   Genitourinary: Negative for hematuria.   Neurological: Negative for brief paralysis, focal weakness and seizures.     Objective:   Physical Exam   Constitutional: He is oriented to person, place, and time. He appears well-developed and well-nourished. No distress.   /77 (BP Location: Left arm, Patient Position: Sitting, BP Method: Medium (Automatic))   Pulse 82   Ht 6' (1.829 m)   Wt 93.4 kg (205 lb 14.6 oz)   BMI 27.93 kg/m²   Last visit wt 98.1 kg (216 lb 4.3 oz)   HENT:   Head: Normocephalic and atraumatic.   Mouth/Throat: No oropharyngeal exudate.   Eyes: Conjunctivae are normal. Right eye exhibits no discharge. Left eye  exhibits no discharge. No scleral icterus.   Neck: No JVD present. No thyromegaly present.   Cardiovascular: Normal rate, regular rhythm and normal heart sounds.  Exam reveals no gallop.    No murmur heard.  Pulmonary/Chest: Effort normal and breath sounds normal.   Abdominal: Soft. Bowel sounds are normal. He exhibits no distension. There is no tenderness. There is no rebound and no guarding.   Musculoskeletal: He exhibits no edema or tenderness.   Neurological: He is alert and oriented to person, place, and time.   Skin: Skin is warm and dry. He is not diaphoretic.   Psychiatric: He has a normal mood and affect. His behavior is normal. Judgment and thought content normal.     Lab Results   Component Value Date     04/05/2018    K 4.2 04/05/2018     (H) 04/05/2018    CO2 20 (L) 04/05/2018    BUN 40 (H) 04/05/2018    CREATININE 1.4 04/05/2018     (H) 04/05/2018    AST 24 04/05/2018    ALT 57 (H) 04/05/2018    ALBUMIN 3.9 04/05/2018    PROT 6.3 04/05/2018    BILITOT 1.1 (H) 04/05/2018    WBC 5.96 09/22/2017    HGB 14.9 09/22/2017    HCT 42.1 09/22/2017     09/22/2017    CHOL 142 09/22/2017    HDL 25 (L) 09/22/2017    LDLCALC 81.6 09/22/2017    TRIG 177 (H) 09/22/2017   Gluc up and fasting for 12 hrs  Assessment:     1. PAF (paroxysmal atrial fibrillation)    2. Essential hypertension    3. Mixed hyperlipidemia    4. Current use of long term anticoagulation    5. Type 2 diabetes mellitus with hyperglycemia, without long-term current use of insulin      Plan:   RTC 6 months with lipids, CMP

## 2018-04-09 NOTE — TELEPHONE ENCOUNTER
----- Message from Genie Bajwa sent at 4/9/2018  3:46 PM CDT -----  Contact: Patient [phone] cell  Patient had seen you on 04/03/2018. Requesting test results from 04/05/2018.    Please call and advise.    Thank You

## 2018-04-10 NOTE — TELEPHONE ENCOUNTER
Please call patient re: lab results - his kidney function improved significantly with better hydration, and his test for C.diff bacteria was negative.   Saw Dr Thomason yesterday, was noted at that visit that his symptoms have resolved.  Please call patient to let him know that I recommend he continue to keep well-hydrated and follow up with Dr Durant (next available)

## 2018-06-06 ENCOUNTER — TELEPHONE (OUTPATIENT)
Dept: DERMATOLOGY | Facility: CLINIC | Age: 78
End: 2018-06-06

## 2018-06-06 NOTE — TELEPHONE ENCOUNTER
Left message for patient to return call to reschedule appointment for 7/23/2018.  Dr. Cerda will be out of the office that day.

## 2018-07-29 DIAGNOSIS — I10 ESSENTIAL HYPERTENSION: ICD-10-CM

## 2018-07-29 DIAGNOSIS — I48.0 PAF (PAROXYSMAL ATRIAL FIBRILLATION): ICD-10-CM

## 2018-07-30 RX ORDER — PINDOLOL 5 MG/1
TABLET ORAL
Qty: 180 TABLET | Refills: 2 | Status: SHIPPED | OUTPATIENT
Start: 2018-07-30 | End: 2019-04-23 | Stop reason: SDUPTHER

## 2018-07-30 RX ORDER — LISINOPRIL 10 MG/1
TABLET ORAL
Qty: 90 TABLET | Refills: 2 | Status: SHIPPED | OUTPATIENT
Start: 2018-07-30 | End: 2019-04-23 | Stop reason: SDUPTHER

## 2018-08-13 ENCOUNTER — OFFICE VISIT (OUTPATIENT)
Dept: DERMATOLOGY | Facility: CLINIC | Age: 78
End: 2018-08-13
Payer: MEDICARE

## 2018-08-13 DIAGNOSIS — L82.1 SK (SEBORRHEIC KERATOSIS): Primary | ICD-10-CM

## 2018-08-13 DIAGNOSIS — D18.00 ANGIOMA: ICD-10-CM

## 2018-08-13 DIAGNOSIS — D22.9 BENIGN NEVUS: ICD-10-CM

## 2018-08-13 DIAGNOSIS — Z85.828 PERSONAL HISTORY OF MALIGNANT NEOPLASM OF SKIN: ICD-10-CM

## 2018-08-13 DIAGNOSIS — D23.5 DILATED PORE OF WINER OF BACK: ICD-10-CM

## 2018-08-13 PROCEDURE — 99212 OFFICE O/P EST SF 10 MIN: CPT | Mod: PBBFAC | Performed by: DERMATOLOGY

## 2018-08-13 PROCEDURE — 99999 PR PBB SHADOW E&M-EST. PATIENT-LVL II: CPT | Mod: PBBFAC,,, | Performed by: DERMATOLOGY

## 2018-08-13 PROCEDURE — 99213 OFFICE O/P EST LOW 20 MIN: CPT | Mod: S$PBB,,, | Performed by: DERMATOLOGY

## 2018-08-13 NOTE — PROGRESS NOTES
Subjective:       Patient ID:  Danial Meza is a 78 y.o. male who presents for   Chief Complaint   Patient presents with    Skin Check     UBSE     History of Present Illness: The patient presents for follow up of skin check.    The patient was last seen on: 10/17 for shave bx x 2 c/w bcc left nasal bridge and left cheek  This is a high risk patient here to check for the development of new lesions.      Other skin complaints: none          Review of Systems   Skin: Negative for itching, rash, daily sunscreen use, activity-related sunscreen use, recent sunburn and wears hat.   Hematologic/Lymphatic: Bruises/bleeds easily (on pradaxa).        Objective:    Physical Exam   Constitutional: He appears well-developed and well-nourished. No distress.   Neurological: He is alert and oriented to person, place, and time. He is not disoriented.   Psychiatric: He has a normal mood and affect.   Skin:   Areas Examined (abnormalities noted in diagram):   Scalp / Hair Palpated and Inspected  Head / Face Inspection Performed  Neck Inspection Performed  Chest / Axilla Inspection Performed  Abdomen Inspection Performed  Back Inspection Performed  RUE Inspected  LUE Inspection Performed  LLE Inspection Performed                   Diagram Legend     Erythematous scaling macule/papule c/w actinic keratosis       Vascular papule c/w angioma      Pigmented verrucoid papule/plaque c/w seborrheic keratosis      Yellow umbilicated papule c/w sebaceous hyperplasia      Irregularly shaped tan macule c/w lentigo     1-2 mm smooth white papules consistent with Milia      Movable subcutaneous cyst with punctum c/w epidermal inclusion cyst      Subcutaneous movable cyst c/w pilar cyst      Firm pink to brown papule c/w dermatofibroma      Pedunculated fleshy papule(s) c/w skin tag(s)      Evenly pigmented macule c/w junctional nevus     Mildly variegated pigmented, slightly irregular-bordered macule c/w mildly atypical nevus      Flesh colored  to evenly pigmented papule c/w intradermal nevus       Pink pearly papule/plaque c/w basal cell carcinoma      Erythematous hyperkeratotic cursted plaque c/w SCC      Surgical scar with no sign of skin cancer recurrence      Open and closed comedones      Inflammatory papules and pustules      Verrucoid papule consistent consistent with wart     Erythematous eczematous patches and plaques     Dystrophic onycholytic nail with subungual debris c/w onychomycosis     Umbilicated papule    Erythematous-base heme-crusted tan verrucoid plaque consistent with inflamed seborrheic keratosis     Erythematous Silvery Scaling Plaque c/w Psoriasis     See annotation      Assessment / Plan:        SK (seborrheic keratosis) - scratched off lesion right abdomen but recurred  These are benign inherited growths without a malignant potential. Reassurance given to patient. No treatment is necessary.       Benign nevus   - stable and chronic      Angiomas   - stable and chronic      Dilated pore of Leroy of back   - stable and chronic      Personal history of malignant neoplasm of skin  Area(s) of previous NMSC evaluated with no signs of recurrence.    Upper body skin examination performed today including at least 6 points as noted in physical examination. No lesions suspicious for malignancy noted.               Follow-up in about 1 year (around 8/13/2019).

## 2018-08-27 ENCOUNTER — CLINICAL SUPPORT (OUTPATIENT)
Dept: OPHTHALMOLOGY | Facility: CLINIC | Age: 78
End: 2018-08-27
Payer: MEDICARE

## 2018-08-27 ENCOUNTER — OFFICE VISIT (OUTPATIENT)
Dept: OPHTHALMOLOGY | Facility: CLINIC | Age: 78
End: 2018-08-27
Payer: MEDICARE

## 2018-08-27 DIAGNOSIS — H40.013 OPEN ANGLE WITH BORDERLINE FINDINGS, LOW RISK, BILATERAL: Primary | ICD-10-CM

## 2018-08-27 DIAGNOSIS — Z86.69 HISTORY OF BELL'S PALSY: ICD-10-CM

## 2018-08-27 DIAGNOSIS — H40.003 OPEN ANGLE WITH BORDERLINE INTRAOCULAR PRESSURE OF BOTH EYES: ICD-10-CM

## 2018-08-27 DIAGNOSIS — H25.13 NUCLEAR SCLEROSIS OF BOTH EYES: ICD-10-CM

## 2018-08-27 DIAGNOSIS — H57.03 SMALL PUPILS: ICD-10-CM

## 2018-08-27 DIAGNOSIS — Z83.518 FAMILY HISTORY OF MACULAR DEGENERATION: ICD-10-CM

## 2018-08-27 DIAGNOSIS — H52.03 HYPEROPIA OF BOTH EYES WITH ASTIGMATISM AND PRESBYOPIA: ICD-10-CM

## 2018-08-27 DIAGNOSIS — H52.203 HYPEROPIA OF BOTH EYES WITH ASTIGMATISM AND PRESBYOPIA: ICD-10-CM

## 2018-08-27 DIAGNOSIS — H57.819 BROW PTOSIS: ICD-10-CM

## 2018-08-27 DIAGNOSIS — H52.4 HYPEROPIA OF BOTH EYES WITH ASTIGMATISM AND PRESBYOPIA: ICD-10-CM

## 2018-08-27 PROCEDURE — 92083 EXTENDED VISUAL FIELD XM: CPT | Mod: 26,S$PBB,, | Performed by: OPHTHALMOLOGY

## 2018-08-27 PROCEDURE — 92133 CPTRZD OPH DX IMG PST SGM ON: CPT | Mod: 26,S$PBB,, | Performed by: OPHTHALMOLOGY

## 2018-08-27 PROCEDURE — 99999 PR PBB SHADOW E&M-EST. PATIENT-LVL II: CPT | Mod: PBBFAC,,, | Performed by: OPHTHALMOLOGY

## 2018-08-27 PROCEDURE — 92133 CPTRZD OPH DX IMG PST SGM ON: CPT | Mod: PBBFAC

## 2018-08-27 PROCEDURE — 92014 COMPRE OPH EXAM EST PT 1/>: CPT | Mod: S$PBB,,, | Performed by: OPHTHALMOLOGY

## 2018-08-27 PROCEDURE — 99212 OFFICE O/P EST SF 10 MIN: CPT | Mod: PBBFAC,25 | Performed by: OPHTHALMOLOGY

## 2018-08-27 PROCEDURE — 92083 EXTENDED VISUAL FIELD XM: CPT | Mod: PBBFAC

## 2018-08-27 NOTE — PROGRESS NOTES
Assessment /Plan     For exam results, see Encounter Report.    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       HVF  5 test  to  - full od // full os- ? Rim artifact  Gono  +3 ou  CCT  567/566  OCT  2 test  to  - RNFL - nl od // nl os  HRT   4 test  to  - MR -  Dec. IT od, border all nasal // dec. IN os, border IT /// CDR 0.72 od // 0.70 os  Disc photos  , ,   - OIS    Ttoday    Test done today - IOP check, HRT, gonio   2. Nuclear sclerosis - Both Eyes   - mild BCVA 20/20 ou  BATh 20/60 od // 20/70 os (10/2013)   3. Ptosis   -patient would like to monitor for now as he is not having any trouble    4. Family history of macular degeneration   -father has adv wet ARMD - gets injections with arend   5. Hyperopia, astigmatism , presbyopia  PC +0.75+1.00x177        +1.00+1.50x180   ADD +2.50  glasses Teto   6. I use to see his father, and also saw his mother -  - his dad  2015 @ age 89   - his mom passed at age 98  - both had glaucoma, his dad also had  wet ARMD - saw Arend     Plan    RTC 9- 12  MONTHS WITH HRT / AR/MR/BAT - cataract check   -monitor the cataracts - not vis sign yet - dilates medium only - smallish pupils   Keep F/U with Teto prn -

## 2018-09-30 DIAGNOSIS — Z79.01 CURRENT USE OF LONG TERM ANTICOAGULATION: ICD-10-CM

## 2018-09-30 DIAGNOSIS — I48.0 PAF (PAROXYSMAL ATRIAL FIBRILLATION): ICD-10-CM

## 2018-10-01 RX ORDER — DABIGATRAN ETEXILATE MESYLATE 150 MG/1
CAPSULE ORAL
Qty: 180 CAPSULE | Refills: 3 | Status: SHIPPED | OUTPATIENT
Start: 2018-10-01 | End: 2019-09-26 | Stop reason: SDUPTHER

## 2018-10-10 ENCOUNTER — OFFICE VISIT (OUTPATIENT)
Dept: TRANSPLANT | Facility: CLINIC | Age: 78
End: 2018-10-10
Attending: INTERNAL MEDICINE
Payer: MEDICARE

## 2018-10-10 ENCOUNTER — LAB VISIT (OUTPATIENT)
Dept: LAB | Facility: HOSPITAL | Age: 78
End: 2018-10-10
Attending: INTERNAL MEDICINE
Payer: MEDICARE

## 2018-10-10 VITALS
HEART RATE: 54 BPM | WEIGHT: 214.94 LBS | HEIGHT: 71 IN | SYSTOLIC BLOOD PRESSURE: 133 MMHG | BODY MASS INDEX: 30.09 KG/M2 | DIASTOLIC BLOOD PRESSURE: 63 MMHG

## 2018-10-10 DIAGNOSIS — E78.2 MIXED HYPERLIPIDEMIA: ICD-10-CM

## 2018-10-10 DIAGNOSIS — Z86.79 S/P ABLATION OF ATRIAL FLUTTER: ICD-10-CM

## 2018-10-10 DIAGNOSIS — E11.65 TYPE 2 DIABETES MELLITUS WITH HYPERGLYCEMIA, WITHOUT LONG-TERM CURRENT USE OF INSULIN: ICD-10-CM

## 2018-10-10 DIAGNOSIS — Z98.890 S/P ABLATION OF ATRIAL FLUTTER: ICD-10-CM

## 2018-10-10 DIAGNOSIS — R74.01 ELEVATED TRANSAMINASE LEVEL: ICD-10-CM

## 2018-10-10 DIAGNOSIS — I48.0 PAF (PAROXYSMAL ATRIAL FIBRILLATION): Primary | ICD-10-CM

## 2018-10-10 DIAGNOSIS — I10 ESSENTIAL HYPERTENSION: ICD-10-CM

## 2018-10-10 DIAGNOSIS — Z79.01 CURRENT USE OF LONG TERM ANTICOAGULATION: ICD-10-CM

## 2018-10-10 DIAGNOSIS — I48.0 PAF (PAROXYSMAL ATRIAL FIBRILLATION): ICD-10-CM

## 2018-10-10 LAB
ALBUMIN SERPL BCP-MCNC: 4 G/DL
ALP SERPL-CCNC: 77 U/L
ALT SERPL W/O P-5'-P-CCNC: 49 U/L
ANION GAP SERPL CALC-SCNC: 8 MMOL/L
AST SERPL-CCNC: 43 U/L
BILIRUB SERPL-MCNC: 0.7 MG/DL
BUN SERPL-MCNC: 26 MG/DL
CALCIUM SERPL-MCNC: 9.4 MG/DL
CHLORIDE SERPL-SCNC: 113 MMOL/L
CHOLEST SERPL-MCNC: 156 MG/DL
CHOLEST/HDLC SERPL: 5.6 {RATIO}
CO2 SERPL-SCNC: 20 MMOL/L
CREAT SERPL-MCNC: 1.1 MG/DL
EST. GFR  (AFRICAN AMERICAN): >60 ML/MIN/1.73 M^2
EST. GFR  (NON AFRICAN AMERICAN): >60 ML/MIN/1.73 M^2
ESTIMATED AVG GLUCOSE: 108 MG/DL
GLUCOSE SERPL-MCNC: 123 MG/DL
HBA1C MFR BLD HPLC: 5.4 %
HDLC SERPL-MCNC: 28 MG/DL
HDLC SERPL: 17.9 %
LDLC SERPL CALC-MCNC: 99.8 MG/DL
NONHDLC SERPL-MCNC: 128 MG/DL
POTASSIUM SERPL-SCNC: 4.6 MMOL/L
PROT SERPL-MCNC: 6.5 G/DL
SODIUM SERPL-SCNC: 141 MMOL/L
TRIGL SERPL-MCNC: 141 MG/DL

## 2018-10-10 PROCEDURE — 83036 HEMOGLOBIN GLYCOSYLATED A1C: CPT

## 2018-10-10 PROCEDURE — 99213 OFFICE O/P EST LOW 20 MIN: CPT | Mod: PBBFAC | Performed by: INTERNAL MEDICINE

## 2018-10-10 PROCEDURE — 99999 PR PBB SHADOW E&M-EST. PATIENT-LVL III: CPT | Mod: PBBFAC,,, | Performed by: INTERNAL MEDICINE

## 2018-10-10 PROCEDURE — 80053 COMPREHEN METABOLIC PANEL: CPT

## 2018-10-10 PROCEDURE — 36415 COLL VENOUS BLD VENIPUNCTURE: CPT

## 2018-10-10 PROCEDURE — 99214 OFFICE O/P EST MOD 30 MIN: CPT | Mod: S$PBB,,, | Performed by: INTERNAL MEDICINE

## 2018-10-10 PROCEDURE — 80061 LIPID PANEL: CPT

## 2018-10-10 RX ORDER — ATORVASTATIN CALCIUM 20 MG/1
20 TABLET, FILM COATED ORAL NIGHTLY
Qty: 90 TABLET | Refills: 3 | Status: SHIPPED | OUTPATIENT
Start: 2018-10-10 | End: 2019-09-26 | Stop reason: SDUPTHER

## 2018-10-10 NOTE — PROGRESS NOTES
"Subjective:     Patient ID:  Danial Meza is a 78 y.o. male who presents for follow-up of Atrial Fibrillation    HPI:  77 yo WM with AF on pradaxa last seen 4/9/18 returns for routine visit.  No tightness, pressure or heaviness in chest, neck, arms, throat, jaw or back with or without exertion.  No MCBRIDE, orthopnea, PND, pre-syncope or syncope.  Palpitations lying on sofa--see ROS--without change    Review of Systems   Constitution: Positive for weight gain (10 pounds ). Negative for chills, fever, weakness, malaise/fatigue and night sweats.   Cardiovascular: Positive for palpitations (occurs only lying on sofa lasting 10-15 seconds few x/week). Negative for chest pain, dyspnea on exertion, irregular heartbeat (not irregular but feels palp as below), leg swelling, near-syncope, orthopnea, paroxysmal nocturnal dyspnea and syncope.   Respiratory: Negative for cough, sputum production and wheezing.    Hematologic/Lymphatic: Does not bruise/bleed easily.   Musculoskeletal: Negative for arthritis, joint pain and stiffness.   Gastrointestinal: Positive for hemorrhoids. Negative for change in bowel habit, constipation, diarrhea, hematochezia and melena.        Colonoscopy Feb 2017 was performed at 5 yrs due to polyps by Dr. Rodriguez at [facility].  Study was OK   Genitourinary: Negative for hematuria.   Neurological: Negative for brief paralysis, focal weakness and seizures.     Objective:   Physical Exam   Constitutional: He is oriented to person, place, and time. He appears well-developed and well-nourished. No distress.   /63 (BP Location: Left arm, Patient Position: Sitting, BP Method: Medium (Automatic))   Pulse (!) 54   Ht 5' 11" (1.803 m)   Wt 97.5 kg (214 lb 15.2 oz)   BMI 29.98 kg/m²   Last visit wt 93.4 kg (205 lb 14.6 oz)   BMI 27.93 kg/m²   Visit before last visit wt 98.1 kg (216 lb 4.3 oz)   HENT:   Head: Normocephalic and atraumatic.   Mouth/Throat: No oropharyngeal exudate.   Eyes: Conjunctivae are normal. " Right eye exhibits no discharge. Left eye exhibits no discharge. No scleral icterus.   Neck: No JVD present. No thyromegaly present.   Cardiovascular: Normal rate, regular rhythm and normal heart sounds. Exam reveals no gallop.   No murmur heard.  Pulmonary/Chest: Effort normal and breath sounds normal.   Abdominal: Soft. Bowel sounds are normal. He exhibits no distension and no mass. There is no tenderness.   Musculoskeletal: He exhibits no edema or tenderness.   Neurological: He is alert and oriented to person, place, and time.   Skin: Skin is warm and dry. He is not diaphoretic.   Psychiatric: He has a normal mood and affect. His behavior is normal. Judgment and thought content normal.     Lab Results   Component Value Date     10/10/2018    K 4.6 10/10/2018     (H) 10/10/2018    CO2 20 (L) 10/10/2018    BUN 26 (H) 10/10/2018    CREATININE 1.1 10/10/2018     (H) 10/10/2018    HGBA1C 5.4 10/10/2018    AST 43 (H) 10/10/2018    ALT 49 (H) 10/10/2018    ALBUMIN 4.0 10/10/2018    PROT 6.5 10/10/2018    BILITOT 0.7 10/10/2018    CHOL 156 10/10/2018    HDL 28 (L) 10/10/2018    LDLCALC 99.8 10/10/2018    TRIG 141 10/10/2018   ACC RISK CALC CANNOT BE USED DUE TO AGE  Glu fasting consistently up but normal A1c so no treatment for DM and in fact one could question this dx    Assessment:     1. PAF (paroxysmal atrial fibrillation)    2. Current use of long term anticoagulation    3. Essential hypertension    4. Mixed hyperlipidemia will start statin atorvastatin 20 mg hs   5. Type 2 diabetes mellitus with hyperglycemia, without long-term current use of insulin; Glu fasting consistently up but normal A1c so no treatment for DM and in fact one could question this dx    6. S/P ablation of atrial flutter 11/4/15    7. Elevated transaminase levels not felt clinically important and OK to initiate statin and follow     Plan:   Atorvastatin 20 mg hs and check ALT and lipids in 6-8 weeks with phone review  RTC 6  months with BMP, CBC

## 2018-12-29 ENCOUNTER — HOSPITAL ENCOUNTER (INPATIENT)
Facility: HOSPITAL | Age: 78
LOS: 1 days | Discharge: HOME | DRG: 195 | End: 2018-12-30
Attending: EMERGENCY MEDICINE | Admitting: INTERNAL MEDICINE
Payer: MEDICARE

## 2018-12-29 DIAGNOSIS — D72.829 LEUKOCYTOSIS, UNSPECIFIED TYPE: ICD-10-CM

## 2018-12-29 DIAGNOSIS — Z79.01 CURRENT USE OF LONG TERM ANTICOAGULATION: ICD-10-CM

## 2018-12-29 DIAGNOSIS — K80.20 CALCULUS OF GALLBLADDER WITHOUT CHOLECYSTITIS WITHOUT OBSTRUCTION: ICD-10-CM

## 2018-12-29 DIAGNOSIS — R10.11 RUQ ABDOMINAL PAIN: ICD-10-CM

## 2018-12-29 DIAGNOSIS — I10 ESSENTIAL HYPERTENSION: ICD-10-CM

## 2018-12-29 DIAGNOSIS — E80.6 HYPERBILIRUBINEMIA: ICD-10-CM

## 2018-12-29 DIAGNOSIS — I48.0 PAF (PAROXYSMAL ATRIAL FIBRILLATION): ICD-10-CM

## 2018-12-29 DIAGNOSIS — I47.10 PAROXYSMAL SVT (SUPRAVENTRICULAR TACHYCARDIA): ICD-10-CM

## 2018-12-29 DIAGNOSIS — J18.9 PNEUMONIA OF RIGHT LUNG DUE TO INFECTIOUS ORGANISM: ICD-10-CM

## 2018-12-29 DIAGNOSIS — J18.9 PNEUMONIA OF RIGHT LUNG DUE TO INFECTIOUS ORGANISM, UNSPECIFIED PART OF LUNG: Primary | ICD-10-CM

## 2018-12-29 DIAGNOSIS — R11.2 NON-INTRACTABLE VOMITING WITH NAUSEA, UNSPECIFIED VOMITING TYPE: ICD-10-CM

## 2018-12-29 LAB
ALBUMIN SERPL BCP-MCNC: 4 G/DL
ALP SERPL-CCNC: 73 U/L
ALT SERPL W/O P-5'-P-CCNC: 33 U/L
ANION GAP SERPL CALC-SCNC: 11 MMOL/L
AST SERPL-CCNC: 24 U/L
BACTERIA #/AREA URNS AUTO: NORMAL /HPF
BASOPHILS # BLD AUTO: 0.05 K/UL
BASOPHILS NFR BLD: 0.3 %
BILIRUB SERPL-MCNC: 2.4 MG/DL
BILIRUB UR QL STRIP: NEGATIVE
BUN SERPL-MCNC: 21 MG/DL
CALCIUM SERPL-MCNC: 10 MG/DL
CHLORIDE SERPL-SCNC: 105 MMOL/L
CLARITY UR REFRACT.AUTO: CLEAR
CO2 SERPL-SCNC: 24 MMOL/L
COLOR UR AUTO: ABNORMAL
CREAT SERPL-MCNC: 1.1 MG/DL
D DIMER PPP IA.FEU-MCNC: 0.25 MG/L FEU
DIFFERENTIAL METHOD: ABNORMAL
EOSINOPHIL # BLD AUTO: 0.1 K/UL
EOSINOPHIL NFR BLD: 0.4 %
ERYTHROCYTE [DISTWIDTH] IN BLOOD BY AUTOMATED COUNT: 12.5 %
EST. GFR  (AFRICAN AMERICAN): >60 ML/MIN/1.73 M^2
EST. GFR  (NON AFRICAN AMERICAN): >60 ML/MIN/1.73 M^2
GLUCOSE SERPL-MCNC: 181 MG/DL
GLUCOSE UR QL STRIP: ABNORMAL
HCT VFR BLD AUTO: 45.3 %
HGB BLD-MCNC: 16.1 G/DL
HGB UR QL STRIP: NEGATIVE
HYALINE CASTS UR QL AUTO: 1 /LPF
IMM GRANULOCYTES # BLD AUTO: 0.1 K/UL
IMM GRANULOCYTES NFR BLD AUTO: 0.5 %
KETONES UR QL STRIP: NEGATIVE
LACTATE SERPL-SCNC: 1.9 MMOL/L
LEUKOCYTE ESTERASE UR QL STRIP: NEGATIVE
LIPASE SERPL-CCNC: 4 U/L
LYMPHOCYTES # BLD AUTO: 1.3 K/UL
LYMPHOCYTES NFR BLD: 6.7 %
MCH RBC QN AUTO: 32.3 PG
MCHC RBC AUTO-ENTMCNC: 35.5 G/DL
MCV RBC AUTO: 91 FL
MICROSCOPIC COMMENT: NORMAL
MONOCYTES # BLD AUTO: 1.6 K/UL
MONOCYTES NFR BLD: 8.4 %
NEUTROPHILS # BLD AUTO: 15.8 K/UL
NEUTROPHILS NFR BLD: 83.7 %
NITRITE UR QL STRIP: NEGATIVE
NRBC BLD-RTO: 0 /100 WBC
PH UR STRIP: 6 [PH] (ref 5–8)
PLATELET # BLD AUTO: 180 K/UL
PMV BLD AUTO: 11.2 FL
POCT GLUCOSE: 160 MG/DL (ref 70–110)
POTASSIUM SERPL-SCNC: 4 MMOL/L
PROT SERPL-MCNC: 7.1 G/DL
PROT UR QL STRIP: ABNORMAL
RBC # BLD AUTO: 4.98 M/UL
RBC #/AREA URNS AUTO: 3 /HPF (ref 0–4)
SODIUM SERPL-SCNC: 140 MMOL/L
SP GR UR STRIP: 1.03 (ref 1–1.03)
SQUAMOUS #/AREA URNS AUTO: 0 /HPF
TROPONIN I SERPL DL<=0.01 NG/ML-MCNC: 0.03 NG/ML
URN SPEC COLLECT METH UR: ABNORMAL
WBC # BLD AUTO: 18.89 K/UL
WBC #/AREA URNS AUTO: 3 /HPF (ref 0–5)

## 2018-12-29 PROCEDURE — 82962 GLUCOSE BLOOD TEST: CPT

## 2018-12-29 PROCEDURE — 93010 EKG 12-LEAD: ICD-10-PCS | Mod: ,,, | Performed by: INTERNAL MEDICINE

## 2018-12-29 PROCEDURE — 93010 ELECTROCARDIOGRAM REPORT: CPT | Mod: ,,, | Performed by: INTERNAL MEDICINE

## 2018-12-29 PROCEDURE — 85025 COMPLETE CBC W/AUTO DIFF WBC: CPT

## 2018-12-29 PROCEDURE — 25000003 PHARM REV CODE 250: Performed by: HOSPITALIST

## 2018-12-29 PROCEDURE — 25000003 PHARM REV CODE 250: Performed by: EMERGENCY MEDICINE

## 2018-12-29 PROCEDURE — 11000001 HC ACUTE MED/SURG PRIVATE ROOM

## 2018-12-29 PROCEDURE — 99291 PR CRITICAL CARE, E/M 30-74 MINUTES: ICD-10-PCS | Mod: ,,, | Performed by: EMERGENCY MEDICINE

## 2018-12-29 PROCEDURE — 99223 PR INITIAL HOSPITAL CARE,LEVL III: ICD-10-PCS | Mod: ,,, | Performed by: INTERNAL MEDICINE

## 2018-12-29 PROCEDURE — 99291 CRITICAL CARE FIRST HOUR: CPT | Mod: ,,, | Performed by: EMERGENCY MEDICINE

## 2018-12-29 PROCEDURE — 63600175 PHARM REV CODE 636 W HCPCS: Performed by: INTERNAL MEDICINE

## 2018-12-29 PROCEDURE — 25000003 PHARM REV CODE 250: Performed by: INTERNAL MEDICINE

## 2018-12-29 PROCEDURE — 80053 COMPREHEN METABOLIC PANEL: CPT

## 2018-12-29 PROCEDURE — 96365 THER/PROPH/DIAG IV INF INIT: CPT

## 2018-12-29 PROCEDURE — 96375 TX/PRO/DX INJ NEW DRUG ADDON: CPT

## 2018-12-29 PROCEDURE — 85379 FIBRIN DEGRADATION QUANT: CPT

## 2018-12-29 PROCEDURE — 63600175 PHARM REV CODE 636 W HCPCS: Performed by: EMERGENCY MEDICINE

## 2018-12-29 PROCEDURE — 83690 ASSAY OF LIPASE: CPT

## 2018-12-29 PROCEDURE — 99223 1ST HOSP IP/OBS HIGH 75: CPT | Mod: ,,, | Performed by: INTERNAL MEDICINE

## 2018-12-29 PROCEDURE — 93005 ELECTROCARDIOGRAM TRACING: CPT

## 2018-12-29 PROCEDURE — 27000221 HC OXYGEN, UP TO 24 HOURS

## 2018-12-29 PROCEDURE — 81001 URINALYSIS AUTO W/SCOPE: CPT

## 2018-12-29 PROCEDURE — 83605 ASSAY OF LACTIC ACID: CPT

## 2018-12-29 PROCEDURE — 99291 CRITICAL CARE FIRST HOUR: CPT | Mod: 25

## 2018-12-29 PROCEDURE — 94761 N-INVAS EAR/PLS OXIMETRY MLT: CPT

## 2018-12-29 PROCEDURE — 84484 ASSAY OF TROPONIN QUANT: CPT

## 2018-12-29 PROCEDURE — 96367 TX/PROPH/DG ADDL SEQ IV INF: CPT

## 2018-12-29 RX ORDER — KETOROLAC TROMETHAMINE 30 MG/ML
7.5 INJECTION, SOLUTION INTRAMUSCULAR; INTRAVENOUS EVERY 6 HOURS PRN
Status: DISCONTINUED | OUTPATIENT
Start: 2018-12-29 | End: 2018-12-30 | Stop reason: HOSPADM

## 2018-12-29 RX ORDER — ONDANSETRON 8 MG/1
8 TABLET, ORALLY DISINTEGRATING ORAL EVERY 8 HOURS PRN
Status: DISCONTINUED | OUTPATIENT
Start: 2018-12-29 | End: 2018-12-30 | Stop reason: HOSPADM

## 2018-12-29 RX ORDER — PINDOLOL 5 MG/1
5 TABLET ORAL 2 TIMES DAILY
Status: DISCONTINUED | OUTPATIENT
Start: 2018-12-29 | End: 2018-12-30 | Stop reason: HOSPADM

## 2018-12-29 RX ORDER — ACETAMINOPHEN 500 MG
500 TABLET ORAL EVERY 6 HOURS PRN
Status: DISCONTINUED | OUTPATIENT
Start: 2018-12-29 | End: 2018-12-30 | Stop reason: HOSPADM

## 2018-12-29 RX ORDER — SODIUM CHLORIDE 0.9 % (FLUSH) 0.9 %
5 SYRINGE (ML) INJECTION
Status: DISCONTINUED | OUTPATIENT
Start: 2018-12-29 | End: 2018-12-30 | Stop reason: HOSPADM

## 2018-12-29 RX ORDER — ATORVASTATIN CALCIUM 20 MG/1
20 TABLET, FILM COATED ORAL NIGHTLY
Status: DISCONTINUED | OUTPATIENT
Start: 2018-12-29 | End: 2018-12-30 | Stop reason: HOSPADM

## 2018-12-29 RX ORDER — LISINOPRIL 10 MG/1
10 TABLET ORAL DAILY
Status: DISCONTINUED | OUTPATIENT
Start: 2018-12-30 | End: 2018-12-30 | Stop reason: HOSPADM

## 2018-12-29 RX ORDER — DABIGATRAN ETEXILATE 150 MG/1
150 CAPSULE ORAL 2 TIMES DAILY
Status: DISCONTINUED | OUTPATIENT
Start: 2018-12-29 | End: 2018-12-30 | Stop reason: HOSPADM

## 2018-12-29 RX ORDER — CEFTRIAXONE 1 G/1
1 INJECTION, POWDER, FOR SOLUTION INTRAMUSCULAR; INTRAVENOUS
Status: COMPLETED | OUTPATIENT
Start: 2018-12-29 | End: 2018-12-29

## 2018-12-29 RX ORDER — KETOROLAC TROMETHAMINE 30 MG/ML
10 INJECTION, SOLUTION INTRAMUSCULAR; INTRAVENOUS
Status: COMPLETED | OUTPATIENT
Start: 2018-12-29 | End: 2018-12-29

## 2018-12-29 RX ORDER — ONDANSETRON 2 MG/ML
4 INJECTION INTRAMUSCULAR; INTRAVENOUS EVERY 12 HOURS PRN
Status: DISCONTINUED | OUTPATIENT
Start: 2018-12-29 | End: 2018-12-30 | Stop reason: HOSPADM

## 2018-12-29 RX ORDER — ACETAMINOPHEN 500 MG
1000 TABLET ORAL
Status: COMPLETED | OUTPATIENT
Start: 2018-12-29 | End: 2018-12-29

## 2018-12-29 RX ORDER — PINDOLOL 5 MG/1
5 TABLET ORAL 2 TIMES DAILY
Status: DISCONTINUED | OUTPATIENT
Start: 2018-12-29 | End: 2018-12-29

## 2018-12-29 RX ADMIN — DABIGATRAN ETEXILATE MESYLATE 150 MG: 150 CAPSULE ORAL at 11:12

## 2018-12-29 RX ADMIN — PIPERACILLIN AND TAZOBACTAM 4.5 G: 4; .5 INJECTION, POWDER, LYOPHILIZED, FOR SOLUTION INTRAVENOUS; PARENTERAL at 09:12

## 2018-12-29 RX ADMIN — KETOROLAC TROMETHAMINE 7.5 MG: 30 INJECTION, SOLUTION INTRAMUSCULAR at 11:12

## 2018-12-29 RX ADMIN — DABIGATRAN ETEXILATE MESYLATE 150 MG: 150 CAPSULE ORAL at 08:12

## 2018-12-29 RX ADMIN — PIPERACILLIN AND TAZOBACTAM 4.5 G: 4; .5 INJECTION, POWDER, LYOPHILIZED, FOR SOLUTION INTRAVENOUS; PARENTERAL at 04:12

## 2018-12-29 RX ADMIN — CEFTRIAXONE SODIUM 1 G: 1 INJECTION, POWDER, FOR SOLUTION INTRAMUSCULAR; INTRAVENOUS at 06:12

## 2018-12-29 RX ADMIN — KETOROLAC TROMETHAMINE 10 MG: 30 INJECTION, SOLUTION INTRAMUSCULAR at 07:12

## 2018-12-29 RX ADMIN — AZITHROMYCIN MONOHYDRATE 500 MG: 500 INJECTION, POWDER, LYOPHILIZED, FOR SOLUTION INTRAVENOUS at 06:12

## 2018-12-29 RX ADMIN — ATORVASTATIN CALCIUM 20 MG: 20 TABLET, FILM COATED ORAL at 08:12

## 2018-12-29 RX ADMIN — SODIUM CHLORIDE 1000 ML: 0.9 INJECTION, SOLUTION INTRAVENOUS at 11:12

## 2018-12-29 RX ADMIN — ACETAMINOPHEN 1000 MG: 500 TABLET ORAL at 04:12

## 2018-12-29 NOTE — ED TRIAGE NOTES
Pt complains of abdomen pain that started Thursday night. Pt states he vomited over 12 times. Pt states that he has some loose stools

## 2018-12-29 NOTE — ED NOTES
Patient identifiers for Danial Meza checked and correct.  LOC: The patient is awake, alert and aware of environment with an appropriate affect, the patient is oriented x 3 and speaking appropriately.  APPEARANCE: Patient is slightly distress, patient is clean and well groomed, patient's clothing are properly fastened.  SKIN: The skin is warm and dry, patient has age appropriate skin turgor and moist mucus membranes, skin intact, no breakdown or brusing noted.  MUSKULOSKELETAL: Patient moving all extremities well, no obvious swelling or deformities noted.  RESPIRATORY: Airway is open and patent, respirations are spontaneous, patient has a normal effort and rate, no accessory muscle use noted.  Clear breath sounds bilaterally.  CARDIAC: Patient has a normal rate and rhythm, no periphreal edema noted, capillary refill < 3 seconds.  ABDOMEN: Soft and  tender to palpation, no distention noted.  Normoactive bowel sounds x4.  NEUROLOGIC: PERRL, facial expression is symmetrical, bilateral hand grasp equal and even, normal sensation in all extremities when touched with a finger.  PAIN pt complains of abdomen pain 10/10

## 2018-12-29 NOTE — ED NOTES
Assumed pt care.     Patient identifiers verified and correct for Danial Meza.    LOC: The patient is awake, alert and oriented x 4. Pt is speaking appropriately, no slurred speech.  APPEARANCE: Patient resting comfortably and in no acute distress. Pt is clean and well groomed. No JVD visible. Pt reports pain level of 8/10.  SKIN: Skin is warm dry and intact, and color is consistent with ethnicity. No tenting observed and capillary refill <3 seconds. No clubbing noted to nail beds. No breakdown or brusing visible and mucus membranes moist and acyanotic.  MUSCULOSKELETAL: Full range of motion present in all extremities. Hand  equal and leg strength strong +5 bilaterally.  RESPIRATORY: Airway is open and patent. Respirations-unlabored, regular rate, equal bilaterally on inspiration and expiration. No accessory muscle use noted. Lungs clear to auscultation in all fields bilaterally anterior and posterior.   CARDIAC: Patient has regular heart rate and rhythm.  No peripheral edema noted, and patient has no c/o chest pain.  ABDOMEN: Soft and tender to RLQ with palpation with mild distention noted. Normoactive bowel sounds X4 quadrants. Pt has no complaints of abnormal bowel movements. Pt reports normal appetite.   NEUROLOGIC: Eyes open spontaneously and facial expression symmetrical. Pt behavior appropriate to situation, and pt follows commands.  Pt reports sensation present in all extremities when touched with a finger. PERRLA  : No complaints of frequency, burning, urgency or blood in the urine.

## 2018-12-29 NOTE — H&P
"[facility] Medicine  History and Physical Exam    Patient Name; Danial Meza  MRN: [id number]  Team: [facility] D Ivett Nicole MD  Admit Date: 12/29/2018  CRAIG 12/30/2018  Principal Problem:  Pneumonia of right lung due to infectious organism   Patient information was obtained from patient, spouse/SO, past medical records and ER records.   Primary care Physician: Louie Durant MD  Code status: Full Code    HPI: 78 y.o. male presented to the ER with hypertension, PAF on Pradaxa, SVT - rate controlled, dyslipidemia.  He was in his usual state of good health until the acute onset of severe RUQ pain beginning 1 day prior to admission with rapidly improving course.  Pertinent associated symptoms include nausea and "violent" vomiting. Patient developed symptoms after eating Chinese food yesterday. Work up in ED revealed cholelithiasis without evidence of cholecystitis. CXR suspicious for aspiration. WBC count and T.bili elevated.    Past Medical History: Patient has a past medical history of Basal cell carcinoma (1/5/12), Basal cell carcinoma (10/09/2017), Cataract, Glaucoma, Gout (7/18/2014), Hyperlipemia, Hypertension, Mixed hyperlipidemia, PAF (paroxysmal atrial fibrillation) (2012), SVT (supraventricular tachycardia) (1/14/2005), and Type 2 diabetes mellitus with hyperglycemia, without long-term current use of insulin (4/9/2018).    Past Surgical History: Patient has a past surgical history that includes SVT accessory pathway ablation (1/14/2005); skin cancer nose and neck; and Colonoscopy (12/2015).    Social History: Patient reports that  has never smoked. he has never used smokeless tobacco. He reports that he drinks alcohol. He reports that he does not use drugs.    Family History: family history includes Cancer in his brother and mother; Glaucoma in his father and mother; Macular degeneration in his father; Melanoma in his father; No Known Problems in his maternal aunt, maternal grandfather, maternal grandmother, " maternal uncle, paternal aunt, paternal grandfather, paternal grandmother, and paternal uncle; Stroke in his father.    Medications:   No current facility-administered medications on file prior to encounter.      Current Outpatient Medications on File Prior to Encounter   Medication Sig Dispense Refill    atorvastatin (LIPITOR) 20 MG tablet Take 1 tablet (20 mg total) by mouth every evening. 90 tablet 3    lisinopril 10 MG tablet TAKE ONE TABLET BY MOUTH ONE TIME DAILY  90 tablet 2    multivitamin (MULTIVITAMIN) per tablet Take 1 tablet by mouth once daily.        pindolol (VISKEN) 5 MG Tab TAKE ONE TABLET BY MOUTH TWICE DAILY  180 tablet 2    PRADAXA 150 mg Cap TAKE ONE CAPSULE BY MOUTH TWICE DAILY 180 capsule 3    VIT C/VIT E/LUTEIN/MIN/OMEGA-3 (OCUVITE ORAL) Take by mouth.         Allergies: Patient is allergic to nuts [tree nut].    Review of Systems   Constitutional: Negative.  Negative for fever.   HENT: Negative.    Eyes: Negative.    Respiratory: Negative.    Cardiovascular: Negative.    Gastrointestinal: Positive for abdominal pain, nausea and vomiting. Negative for diarrhea.   Genitourinary: Negative.  Negative for dysuria.   Musculoskeletal: Positive for myalgias.   Skin: Negative.    Neurological: Negative.    Psychiatric/Behavioral: Negative.        Physical Exam:  Temp:  [97.9 °F (36.6 °C)-98.8 °F (37.1 °C)]   Pulse:  []   Resp:  [14-20]   BP: (122-156)/(65-97)   SpO2:  [92 %-98 %]   Body mass index is 30.52 kg/m².     Physical Exam   Constitutional: He is well-developed, well-nourished, and in no distress.  Non-toxic appearance.   Eyes: Conjunctivae and lids are normal. Pupils are equal.   Cardiovascular: S1 normal and S2 normal.   Pulmonary/Chest: Effort normal and breath sounds normal.   Abdominal: Soft. Normal appearance and bowel sounds are normal. There is tenderness in the right upper quadrant.   Musculoskeletal: He exhibits no edema.        Right lower leg: He exhibits swelling  (extensive varicosities).   Neurological: He is alert. He is not disoriented.   Skin: He is not diaphoretic. No cyanosis.   Psychiatric: Mood and affect normal.       Intake/Output Summary (Last 24 hours) at 12/29/2018 1906  Last data filed at 12/29/2018 1712  Gross per 24 hour   Intake 660 ml   Output --   Net 660 ml       Significant Labs and Imaging:    Recent Labs   Lab 12/29/18  0447   WBC 18.89*   HGB 16.1   HCT 45.3        Recent Labs   Lab 12/29/18  0447      K 4.0      CO2 24   BUN 21   CREATININE 1.1   *   CALCIUM 10.0   ALKPHOS 73   ALT 33   AST 24   ALBUMIN 4.0   PROT 7.1   BILITOT 2.4*   LIPASE 4     Recent Labs   Lab 12/29/18  0903   POCTGLUCOSE 160*     A1C:   Recent Labs   Lab 10/10/18  0755   HGBA1C 5.4     Recent Labs   Lab 12/29/18  0447   TROPONINI 0.025     Lactic Acid:   Recent Labs   Lab 12/29/18  0456   LACTATE 1.9     Urine Studies:   Recent Labs   Lab 12/29/18  0456   COLORU [place]   APPEARANCEUA Clear   PHUR 6.0   SPECGRAV 1.030   PROTEINUA 2+*   GLUCUA 1+*   KETONESU Negative   BILIRUBINUA Negative   OCCULTUA Negative   NITRITE Negative   LEUKOCYTESUR Negative   RBCUA 3   WBCUA 3   BACTERIA Occasional   SQUAMEPITHEL 0   HYALINECASTS 1       Baselines:  Hemoglobin   Date Value Ref Range Status   12/29/2018 16.1 14.0 - 18.0 g/dL Final   09/22/2017 14.9 14.0 - 18.0 g/dL Final   04/26/2017 14.1 14.0 - 18.0 g/dL Final   03/24/2017 14.0 14.0 - 18.0 g/dL Final   03/23/2017 15.7 14.0 - 18.0 g/dL Final     Creatinine   Date Value Ref Range Status   12/29/2018 1.1 0.5 - 1.4 mg/dL Final   10/10/2018 1.1 0.5 - 1.4 mg/dL Final   04/05/2018 1.4 0.5 - 1.4 mg/dL Final   04/03/2018 2.7 (H) 0.5 - 1.4 mg/dL Final   09/22/2017 1.2 0.5 - 1.4 mg/dL Final       Radiology/Cardiac:  CXR (personally interpreted):  Infiltrate Right lower lobe.  Radiographic tests reviewed chest xray and GB US  EKG (personally interpreted): normal sinus rhythm    Inpatient Medications prescribed for  management of current Problems:   Scheduled Meds:    atorvastatin  20 mg Oral QHS    dabigatran etexilate  150 mg Oral BID    [START ON 12/30/2018] lisinopril  10 mg Oral Daily    [START ON 12/30/2018] multivitamin  1 tablet Oral Daily    pindolol  5 mg Oral BID    piperacillin-tazobactam (ZOSYN) IVPB  4.5 g Intravenous Q8H    sodium chloride 0.9%  1,000 mL Intravenous Once     Continuous Infusions:   As Needed: acetaminophen, ketorolac, ondansetron, ondansetron, sodium chloride 0.9%    Active Hospital Problems    Diagnosis  POA    *Pneumonia of right lung due to infectious organism [J18.9]  Yes    Calculus of gallbladder without cholecystitis [K80.20]  Yes    Nausea & vomiting [R11.2]  Yes    Paroxysmal SVT (supraventricular tachycardia) [I47.1]  Yes    Current use of long term anticoagulation [Z79.01]  Not Applicable    PAF (paroxysmal atrial fibrillation) [I48.0]  Yes    Essential hypertension [I10]  Yes      Resolved Hospital Problems   No resolved problems to display.       Overview:  Patient is a 78 y.o. male with hypertension, PAF on Pradaxa, SVT - rate controlled, dyslipidemia on statin admitted to hospital for suspected pneumonia or aspiration associated with acute nausea and vomiting.     Pneumonia of right lung due to infectious organism or aspiration  Treating with Zosyn. Monitor WBC.     Nausea & vomiting  Calculus of gallbladder without cholecystitis  Symptom management; appears to be spontaneously resolving. Monitor T.bili.     PAF (paroxysmal atrial fibrillation)  History of SVT  Essential hypertension  Continuing home medications.      DVT Prophylaxis:   Anticoagulants   Medication Route Frequency    dabigatran etexilate capsule 150 mg Oral BID       Discharge plan and follow up  Home or Self Care      Provider  Ivett Nicole MD  Department of Hospital Medicine   [facility]

## 2018-12-29 NOTE — ED TRIAGE NOTES
Pt presents to ed with right upper quadrant abd pain. Says the pain is increased with movement and deep inspiration. Pt states he has been vomiting since Thursday. Also complaining of generalized weakness and abd distention. Currently denying nausea. Denies fever, cough, dizziness, chills.

## 2018-12-29 NOTE — ED PROVIDER NOTES
ED Attending Sign-out Progress Note:  Patient signed out to me at shift change by Dr. Shea to f/u d-ddimer, US RUQ, and overall disposition.  Ultrasound with cholelithiasis without evidence of cholecystitis or choledocholithiasis.  Uncertain etiology of mildly elevated T bili (2.4).  D-dimer normal.  Unlikely to be pulmonary embolism causing symptoms. Patient continues to have oxygen requirement requiring 2 L to maintain normal oxygenation.  Discussed with Internal Medicine.  As this may be aspiration, they recommend Zosyn to be given; ordered.  Admit to Internal Medicine for aspiration/community acquired pneumonia.    Clinical Impression:  1. Pneumonia of right lung due to infectious organism, unspecified part of lung    2. Calculus of gallbladder without cholecystitis without obstruction    3. Hyperbilirubinemia    4. Leukocytosis, unspecified type          Shilo Campos MD  12/29/18 0967

## 2018-12-29 NOTE — ED PROVIDER NOTES
Encounter Date: 12/29/2018    SCRIBE #1 NOTE: I, Raleigh Hebert , am scribing for, and in the presence of,  Manoj Shea M.D. I have scribed the entire note.       History     Chief Complaint   Patient presents with    Abdominal Pain     RUQ pain since and 2 episodes of vomiting since yesterday morning. denies pain similar in the past.      Pt is a 78 y.o. M with PMHx of HTN, HLD, gout, DM, basal cell carcinoma (not active, removed, never on chemo, never rads) presents to the ED for evaluation of right-sided abdominal pain/rib pain. States that at around midnight on 12/28 he began vomiting and continued to vomit until around 5 am. After the onset of vomiting, he began to experience severe right-sided abd pain/rib that is exacerbated while breathing and on exertion. Denies any CP or SOB or any other complaints at this time.  No diarrhea.  Patient has a dry cough, nonproductive.  No fevers.  Pain is worse with movement, improved with lying still.           Review of patient's allergies indicates:   Allergen Reactions    Nuts [tree nut]      Past Medical History:   Diagnosis Date    Basal cell carcinoma 1/5/12    right nose    Basal cell carcinoma 10/09/2017    left nasal bridge and left cheek    Cataract     Glaucoma     Gout 7/18/2014    Hyperlipemia     Hypertension     Mixed hyperlipidemia     PAF (paroxysmal atrial fibrillation) 2012    SVT (supraventricular tachycardia) 1/14/2005    ablation by [facility] of left lateral free wall accessory bypass tract    Type 2 diabetes mellitus with hyperglycemia, without long-term current use of insulin 4/9/2018     Past Surgical History:   Procedure Laterality Date    ABLATION N/A 11/4/2015    [facility] C[facility]    COLONOSCOPY  12/2015    skin cancer nose and neck      SVT accessory pathway ablation  1/14/2005    Dr. Calvin     Family History   Problem Relation Age of Onset    Glaucoma Mother     Cancer Mother         Breast     Glaucoma Father     Macular degeneration Father     Stroke Father     Melanoma Father     Cancer Brother         Colon    No Known Problems Maternal Aunt     No Known Problems Maternal Uncle     No Known Problems Paternal Aunt     No Known Problems Paternal Uncle     No Known Problems Maternal Grandmother     No Known Problems Maternal Grandfather     No Known Problems Paternal Grandmother     No Known Problems Paternal Grandfather     Diabetes Neg Hx     Psoriasis Neg Hx     Lupus Neg Hx     Eczema Neg Hx     Acne Neg Hx     Amblyopia Neg Hx     Blindness Neg Hx     Cataracts Neg Hx     Hypertension Neg Hx     Retinal detachment Neg Hx     Strabismus Neg Hx     Thyroid disease Neg Hx      Social History     Tobacco Use    Smoking status: Never Smoker    Smokeless tobacco: Never Used   Substance Use Topics    Alcohol use: Yes     Comment: 1 time biweekly    Drug use: No     Review of Systems  Constitutional:  No Fever, No Chills,   Eyes: No Vision Changes  ENT/Mouth: No sore throat, No rhinorrhea  Cardiovascular:  No Chest Pain, No Palpitations  Respiratory:  Dry Cough, No SOB  Gastrointestinal:  No Nausea, Vomiting, No Diarrhea, abdo pain.  Genitourinary:  No  pain, No dysuria   Musculoskeletal:  No Arthralgias, No Back Pain, No Neck Pain, No recent trauma.  Skin:  No skin Lesions  Neuro:  No Weakness, No Numbness, No Paresthesias, No Dizziness, No Headache      Physical Exam     Initial Vitals [12/29/18 0323]   BP Pulse Resp Temp SpO2   (!) 142/81 100 18 98.4 °F (36.9 °C) 95 %      MAP       --         Physical Exam    Vitals reviewed.      Physical Exam:  GENERAL APPEARANCE: Well developed, well nourished, in no acute distress.  HENT: Normocephalic, atraumatic    EYES: Sclerae anicteric   NECK: Supple, no thyroid enlargement  CARDIOVASCULAR: Regular rate and rhythm without any murmurs, gallops, rubs. No chest wall tenderness along right chest.   LUNGS: Speaking in full sentences.  Breathing comfortably. Auscultation of the lungs revealed normal breath sounds b/l  ABDOMEN: Soft and nontender, no masses, no rebound or guarding. Negative Lee's.   NEUROLOGIC: Alert, interacting normally. No facial droop.   MSK: Moving all four extremities. Negative C,T,L spine tenderness.   Skin: Warm and dry. No visible rash on exposed areas of skin.    Psych: Mood and affect normal.     ED Course   Procedures  Labs Reviewed - No data to display       Imaging Results    None          Medical Decision Making:   Initial Assessment:   70-year-old male with past medical history as noted coming with several episodes of vomiting and subsequent development of right-sided chest/abdominal discomfort.  Abdomen is nonsurgical.   Differential Diagnosis:   Pneumonia, ACS, biliary pathology, pneumothorax, hematologic abnormality, metabolic abnormality.   ED Management:  Labs were done significant for elevated white count and elevated T bili.  Bedside ultrasound was positive for a gallstone and possible gallbladder wall thickening, plans for follow-up with radiology ultrasound.  Chest x-ray showed possible atelectasis versus infiltrate in the right lung.  Coverage for cap  Patient is new hypoxia, corrected with nasal cannula, looking at old notes patient has not had this level of hypoxia before.  He goes to 90 on room air, although denies shortness of breath. Patient is on Pradaxa.  Basal cell carcinoma is not active, was removed.  Low risk for PE this time, however decision was made to obtain dimer.    EKG is sinus with right bundle branch block, Q-waves?  Inferiorly.  Otherwise no significant ST changes.  Trop is negative. Is not consistent with ACS at this time.   Signed out pending result of a timer, right upper ultrasound, and likely admission given new hypoxia and infiltrate on chest x-ray.     Critical Care Time:     The high probability of sudden, clinically significant deterioration in the patient's condition  required the highest level of my preparedness to intervene urgently.    Services included the following: chart data review, reviewing nursing notes and/or old charts, documentation time, consultant collaboration regarding findings and treatment options, medication orders and management, direct patient care, vital sign assessments and ordering, interpreting and reviewing diagnostic studies/lab tests.     I spent 35 minutes on total Critical Care time, which includes only time during which I was engaged in work directly related to the patient's care, as described above, whether at the bedside or elsewhere in the Emergency Department.  It did not include time spent on separately billable procedures nor did it include the time spent by residents, students, nurses or physician assistants on this patient's care.    Critical Care was needed secondary to the following conditions:  Hypoxia.               Scribe Attestation:   Scribe #1: I performed the above scribed service and the documentation accurately describes the services I performed. I attest to the accuracy of the note.               Clinical Impression:   There were no encounter diagnoses.                             Manoj Shea MD  12/29/18 0814

## 2018-12-29 NOTE — ED NOTES
LOC: Pt is awake, alert, oriented x4. Affect is appropriate. Speech clear and appropriate.      Appearance: Pt resting comfortably in no acute distress. Pt clean and well groomed.     Skin: Skin warm and dry. Color consistent with ethnicity. Skin turgor normal. Mucus membranes moist. Skin intact. No breakdown or bruising noted.     Musculoskeletal: Pt moving upper and lower extremities without difficulty. Denies weakness.     Respiratory: Airway open and patent. Respirations spontaneous, even, easy, and unlabored. Pt has normal effort and rate. No accessory muscle use noted. Denies cough. Denies shortness of breath.     Cardiovascular: No peripheral edema noted. No complaints of chest pain. S1S2 present. Rate regular. Radial pulses 2+.     Abdominal: Abdomen soft and nontender. Mild distention noted. Denies nausea currently. States has been vomiting since Thursday. Bowels sounds active x 4 quadrants.     Neurological: Eyes open spontaneously. Behavior appropriate to situation. Follows commands appropriately. Facial expression symmetrical. Purposeful motor response. Sensation intact.

## 2018-12-30 VITALS
OXYGEN SATURATION: 97 % | HEIGHT: 72 IN | TEMPERATURE: 98 F | SYSTOLIC BLOOD PRESSURE: 117 MMHG | WEIGHT: 225.06 LBS | BODY MASS INDEX: 30.48 KG/M2 | DIASTOLIC BLOOD PRESSURE: 80 MMHG | RESPIRATION RATE: 20 BRPM | HEART RATE: 125 BPM

## 2018-12-30 PROBLEM — R11.2 NAUSEA & VOMITING: Status: RESOLVED | Noted: 2018-12-29 | Resolved: 2018-12-30

## 2018-12-30 PROBLEM — R10.11 RUQ ABDOMINAL PAIN: Status: ACTIVE | Noted: 2018-12-30

## 2018-12-30 LAB
ALBUMIN SERPL BCP-MCNC: 3.1 G/DL
ALP SERPL-CCNC: 66 U/L
ALT SERPL W/O P-5'-P-CCNC: 25 U/L
ANION GAP SERPL CALC-SCNC: 9 MMOL/L
AST SERPL-CCNC: 19 U/L
BASOPHILS # BLD AUTO: 0.05 K/UL
BASOPHILS NFR BLD: 0.4 %
BILIRUB SERPL-MCNC: 2.3 MG/DL
BUN SERPL-MCNC: 21 MG/DL
CALCIUM SERPL-MCNC: 8.8 MG/DL
CHLORIDE SERPL-SCNC: 106 MMOL/L
CO2 SERPL-SCNC: 22 MMOL/L
CREAT SERPL-MCNC: 1.1 MG/DL
DIFFERENTIAL METHOD: ABNORMAL
EOSINOPHIL # BLD AUTO: 0.1 K/UL
EOSINOPHIL NFR BLD: 0.4 %
ERYTHROCYTE [DISTWIDTH] IN BLOOD BY AUTOMATED COUNT: 12.5 %
EST. GFR  (AFRICAN AMERICAN): >60 ML/MIN/1.73 M^2
EST. GFR  (NON AFRICAN AMERICAN): >60 ML/MIN/1.73 M^2
GLUCOSE SERPL-MCNC: 135 MG/DL
HCT VFR BLD AUTO: 40.1 %
HGB BLD-MCNC: 13.9 G/DL
IMM GRANULOCYTES # BLD AUTO: 0.05 K/UL
IMM GRANULOCYTES NFR BLD AUTO: 0.4 %
INR PPP: 1.4
LYMPHOCYTES # BLD AUTO: 1.2 K/UL
LYMPHOCYTES NFR BLD: 9.8 %
MAGNESIUM SERPL-MCNC: 1.8 MG/DL
MCH RBC QN AUTO: 32.9 PG
MCHC RBC AUTO-ENTMCNC: 34.7 G/DL
MCV RBC AUTO: 95 FL
MONOCYTES # BLD AUTO: 0.6 K/UL
MONOCYTES NFR BLD: 5.1 %
NEUTROPHILS # BLD AUTO: 10.3 K/UL
NEUTROPHILS NFR BLD: 83.9 %
NRBC BLD-RTO: 0 /100 WBC
PHOSPHATE SERPL-MCNC: 1.8 MG/DL
PLATELET # BLD AUTO: 115 K/UL
PMV BLD AUTO: 11.1 FL
POTASSIUM SERPL-SCNC: 3.5 MMOL/L
PROT SERPL-MCNC: 6 G/DL
PROTHROMBIN TIME: 13.7 SEC
RBC # BLD AUTO: 4.23 M/UL
SODIUM SERPL-SCNC: 137 MMOL/L
TROPONIN I SERPL DL<=0.01 NG/ML-MCNC: 0.03 NG/ML
TROPONIN I SERPL DL<=0.01 NG/ML-MCNC: 0.04 NG/ML
WBC # BLD AUTO: 12.27 K/UL

## 2018-12-30 PROCEDURE — 93005 ELECTROCARDIOGRAM TRACING: CPT

## 2018-12-30 PROCEDURE — 99239 PR HOSPITAL DISCHARGE DAY,>30 MIN: ICD-10-PCS | Mod: ,,, | Performed by: INTERNAL MEDICINE

## 2018-12-30 PROCEDURE — 63600175 PHARM REV CODE 636 W HCPCS: Performed by: INTERNAL MEDICINE

## 2018-12-30 PROCEDURE — 93010 EKG 12-LEAD: ICD-10-PCS | Mod: ,,, | Performed by: INTERNAL MEDICINE

## 2018-12-30 PROCEDURE — 80053 COMPREHEN METABOLIC PANEL: CPT

## 2018-12-30 PROCEDURE — 25000003 PHARM REV CODE 250: Performed by: INTERNAL MEDICINE

## 2018-12-30 PROCEDURE — 84100 ASSAY OF PHOSPHORUS: CPT

## 2018-12-30 PROCEDURE — 83735 ASSAY OF MAGNESIUM: CPT

## 2018-12-30 PROCEDURE — 85610 PROTHROMBIN TIME: CPT

## 2018-12-30 PROCEDURE — 99239 HOSP IP/OBS DSCHRG MGMT >30: CPT | Mod: ,,, | Performed by: INTERNAL MEDICINE

## 2018-12-30 PROCEDURE — 85025 COMPLETE CBC W/AUTO DIFF WBC: CPT

## 2018-12-30 PROCEDURE — 25000003 PHARM REV CODE 250: Performed by: PHYSICIAN ASSISTANT

## 2018-12-30 PROCEDURE — 93010 ELECTROCARDIOGRAM REPORT: CPT | Mod: ,,, | Performed by: INTERNAL MEDICINE

## 2018-12-30 PROCEDURE — 25000003 PHARM REV CODE 250

## 2018-12-30 PROCEDURE — 36415 COLL VENOUS BLD VENIPUNCTURE: CPT

## 2018-12-30 PROCEDURE — 84484 ASSAY OF TROPONIN QUANT: CPT

## 2018-12-30 RX ORDER — METOPROLOL TARTRATE 25 MG/1
25 TABLET, FILM COATED ORAL ONCE
Status: COMPLETED | OUTPATIENT
Start: 2018-12-30 | End: 2018-12-30

## 2018-12-30 RX ORDER — AMOXICILLIN AND CLAVULANATE POTASSIUM 875; 125 MG/1; MG/1
1 TABLET, FILM COATED ORAL 2 TIMES DAILY
Qty: 10 TABLET | Refills: 0 | Status: SHIPPED | OUTPATIENT
Start: 2018-12-30 | End: 2019-01-04

## 2018-12-30 RX ORDER — IBUPROFEN 400 MG/1
200 TABLET ORAL EVERY 4 HOURS PRN
COMMUNITY
Start: 2018-12-30 | End: 2019-01-02

## 2018-12-30 RX ORDER — METOPROLOL TARTRATE 1 MG/ML
INJECTION, SOLUTION INTRAVENOUS
Status: COMPLETED
Start: 2018-12-30 | End: 2018-12-30

## 2018-12-30 RX ORDER — METOPROLOL TARTRATE 1 MG/ML
5 INJECTION, SOLUTION INTRAVENOUS EVERY 5 MIN PRN
Status: COMPLETED | OUTPATIENT
Start: 2018-12-30 | End: 2018-12-30

## 2018-12-30 RX ORDER — METOPROLOL TARTRATE 1 MG/ML
5 INJECTION, SOLUTION INTRAVENOUS ONCE
Status: COMPLETED | OUTPATIENT
Start: 2018-12-30 | End: 2018-12-30

## 2018-12-30 RX ADMIN — METOPROLOL TARTRATE 5 MG: 1 INJECTION, SOLUTION INTRAVENOUS at 03:12

## 2018-12-30 RX ADMIN — LISINOPRIL 10 MG: 10 TABLET ORAL at 09:12

## 2018-12-30 RX ADMIN — THERA TABS 1 TABLET: TAB at 09:12

## 2018-12-30 RX ADMIN — METOPROLOL TARTRATE 5 MG: 1 INJECTION, SOLUTION INTRAVENOUS at 01:12

## 2018-12-30 RX ADMIN — METOPROLOL TARTRATE 25 MG: 25 TABLET ORAL at 09:12

## 2018-12-30 RX ADMIN — METOPROLOL TARTRATE 5 MG: 1 INJECTION, SOLUTION INTRAVENOUS at 12:12

## 2018-12-30 RX ADMIN — KETOROLAC TROMETHAMINE 7.5 MG: 30 INJECTION, SOLUTION INTRAMUSCULAR at 08:12

## 2018-12-30 RX ADMIN — DABIGATRAN ETEXILATE MESYLATE 150 MG: 150 CAPSULE ORAL at 09:12

## 2018-12-30 RX ADMIN — METOPROLOL TARTRATE 25 MG: 25 TABLET ORAL at 03:12

## 2018-12-30 RX ADMIN — PIPERACILLIN AND TAZOBACTAM 4.5 G: 4; .5 INJECTION, POWDER, LYOPHILIZED, FOR SOLUTION INTRAVENOUS; PARENTERAL at 09:12

## 2018-12-30 RX ADMIN — Medication 500 ML: at 01:12

## 2018-12-30 RX ADMIN — Medication 500 ML: at 02:12

## 2018-12-30 RX ADMIN — PIPERACILLIN AND TAZOBACTAM 4.5 G: 4; .5 INJECTION, POWDER, LYOPHILIZED, FOR SOLUTION INTRAVENOUS; PARENTERAL at 01:12

## 2018-12-30 NOTE — PLAN OF CARE
Notified by RN at 0:36 that patient's heart rate was elevated to 130s and appeared to be AFib on monitor.  ECG ordered.  VS otherwise normal.  ECG showed A Fib with RVR.  Lopressor IV 5mg given x3 with improvement to 110s-130s.  Also ordered NS 500cc x2 and labs.    Notified that patient's HR was still fluctuating from 110s to 150s, primarily sustaining in 120-130s.  Patient notes history of multiple ablations.  Discussed with Dr. Foss.  Will give Lopressor 5mg IV and metoprolol tartrate 25mg PO.  Consider EP consult in morning.    Huma Jim, [facility], PA-C  [facility] Medicine Department  Staff:  Dr. Foss

## 2018-12-30 NOTE — PLAN OF CARE
Problem: Adult Inpatient Plan of Care  Goal: Plan of Care Review  Outcome: Ongoing (interventions implemented as appropriate)  Patient AAOx4. Patients VSS. Patient denies pain. Patient on cardiac monitoring per MD order. Patient free from falls or injury during shift. Patient repositioned every two hours. Patient in bed, bed in lowest position, call light in reach, bed alarm set, and personal items at bedside. Will continue to monitor.

## 2018-12-30 NOTE — NURSING
0034: Patient converted to AFIB w/ RVR. HR 130s-140s. Patient /77. Patient asymptomatic. SAM HOLCOMB paged. Spoke with MD Jim. EKG, Lab work, and medications ordered.     0052: metoprolol 5mg IV push given. HR remains 120-140.  /79. Patient remains asymptomatic     0107: metoprolol 5mg IV push given. HR remains 120-140.  /82. Patient remains asymptomatic    0111:  mL bolus given. /78 . Patient remains asymptomatic.     0126: metoprolol 5mg IV push given. HR remains 115-130s. / 85. Patient remains asymptomatic     0206: MD Jim paged and informed of patients status. MD placed order for  mL bolus and to continue monitoring patient. MD currently ok with HR fluctuating as long as HR is not maintaining HR greater than 130.     Will continue to monitor patient.

## 2018-12-30 NOTE — NURSING
IV removed. Patient gathered belongings. Pharmacy verified. Patient given discharge instructions. Patient verbalized understanding. Patient ambulated off unit free of complications.

## 2018-12-31 ENCOUNTER — TELEPHONE (OUTPATIENT)
Dept: INTERNAL MEDICINE | Facility: CLINIC | Age: 78
End: 2018-12-31

## 2018-12-31 NOTE — TELEPHONE ENCOUNTER
Last visit with me over 1 year ago.  Patient discharged  from hospital yesterday with diagnosis of pneumonia.  After review the patient appears to have also had cholecystitis.  No severe abnormality, will plan on seeing the patient at scheduled appointment with me in 2 weeks.

## 2019-01-15 ENCOUNTER — LAB VISIT (OUTPATIENT)
Dept: LAB | Facility: HOSPITAL | Age: 79
End: 2019-01-15
Attending: INTERNAL MEDICINE
Payer: MEDICARE

## 2019-01-15 ENCOUNTER — OFFICE VISIT (OUTPATIENT)
Dept: INTERNAL MEDICINE | Facility: CLINIC | Age: 79
End: 2019-01-15
Payer: MEDICARE

## 2019-01-15 ENCOUNTER — DOCUMENTATION ONLY (OUTPATIENT)
Dept: INTERNAL MEDICINE | Facility: CLINIC | Age: 79
End: 2019-01-15

## 2019-01-15 VITALS
SYSTOLIC BLOOD PRESSURE: 158 MMHG | BODY MASS INDEX: 29.53 KG/M2 | DIASTOLIC BLOOD PRESSURE: 78 MMHG | HEIGHT: 72 IN | WEIGHT: 218 LBS | HEART RATE: 48 BPM

## 2019-01-15 DIAGNOSIS — R23.9 SKIN CHANGE: ICD-10-CM

## 2019-01-15 DIAGNOSIS — Z23 NEED FOR TDAP VACCINATION: ICD-10-CM

## 2019-01-15 DIAGNOSIS — R73.01 IMPAIRED FASTING GLUCOSE: ICD-10-CM

## 2019-01-15 DIAGNOSIS — E78.2 MIXED HYPERLIPIDEMIA: Primary | ICD-10-CM

## 2019-01-15 DIAGNOSIS — M1A.9XX0 CHRONIC GOUT WITHOUT TOPHUS, UNSPECIFIED CAUSE, UNSPECIFIED SITE: ICD-10-CM

## 2019-01-15 DIAGNOSIS — Z12.11 COLON CANCER SCREENING: ICD-10-CM

## 2019-01-15 DIAGNOSIS — I10 ESSENTIAL HYPERTENSION: ICD-10-CM

## 2019-01-15 DIAGNOSIS — N40.0 ENLARGED PROSTATE: ICD-10-CM

## 2019-01-15 DIAGNOSIS — D69.6 THROMBOCYTOPENIA: ICD-10-CM

## 2019-01-15 PROBLEM — J18.9 PNEUMONIA OF RIGHT LUNG DUE TO INFECTIOUS ORGANISM: Status: RESOLVED | Noted: 2018-12-29 | Resolved: 2019-01-15

## 2019-01-15 LAB
ALBUMIN SERPL BCP-MCNC: 4 G/DL
ALP SERPL-CCNC: 89 U/L
ALT SERPL W/O P-5'-P-CCNC: 23 U/L
ANION GAP SERPL CALC-SCNC: 8 MMOL/L
AST SERPL-CCNC: 22 U/L
BILIRUB SERPL-MCNC: 0.7 MG/DL
BUN SERPL-MCNC: 13 MG/DL
CALCIUM SERPL-MCNC: 9.7 MG/DL
CHLORIDE SERPL-SCNC: 111 MMOL/L
CO2 SERPL-SCNC: 25 MMOL/L
COMPLEXED PSA SERPL-MCNC: 1.6 NG/ML
CREAT SERPL-MCNC: 1 MG/DL
ERYTHROCYTE [DISTWIDTH] IN BLOOD BY AUTOMATED COUNT: 12.8 %
EST. GFR  (AFRICAN AMERICAN): >60 ML/MIN/1.73 M^2
EST. GFR  (NON AFRICAN AMERICAN): >60 ML/MIN/1.73 M^2
ESTIMATED AVG GLUCOSE: 117 MG/DL
GLUCOSE SERPL-MCNC: 94 MG/DL
HBA1C MFR BLD HPLC: 5.7 %
HCT VFR BLD AUTO: 44.2 %
HGB BLD-MCNC: 14.5 G/DL
MAGNESIUM SERPL-MCNC: 2.2 MG/DL
MCH RBC QN AUTO: 31.5 PG
MCHC RBC AUTO-ENTMCNC: 32.8 G/DL
MCV RBC AUTO: 96 FL
PLATELET # BLD AUTO: 236 K/UL
PMV BLD AUTO: 10.6 FL
POTASSIUM SERPL-SCNC: 5 MMOL/L
PROT SERPL-MCNC: 6.8 G/DL
RBC # BLD AUTO: 4.61 M/UL
SODIUM SERPL-SCNC: 144 MMOL/L
URATE SERPL-MCNC: 7 MG/DL
WBC # BLD AUTO: 7.79 K/UL

## 2019-01-15 PROCEDURE — 83735 ASSAY OF MAGNESIUM: CPT

## 2019-01-15 PROCEDURE — 99999 PR PBB SHADOW E&M-EST. PATIENT-LVL IV: ICD-10-PCS | Mod: PBBFAC,,, | Performed by: INTERNAL MEDICINE

## 2019-01-15 PROCEDURE — 85027 COMPLETE CBC AUTOMATED: CPT

## 2019-01-15 PROCEDURE — 83036 HEMOGLOBIN GLYCOSYLATED A1C: CPT

## 2019-01-15 PROCEDURE — 99214 OFFICE O/P EST MOD 30 MIN: CPT | Mod: PBBFAC,25 | Performed by: INTERNAL MEDICINE

## 2019-01-15 PROCEDURE — 99999 PR PBB SHADOW E&M-EST. PATIENT-LVL IV: CPT | Mod: PBBFAC,,, | Performed by: INTERNAL MEDICINE

## 2019-01-15 PROCEDURE — 99215 PR OFFICE/OUTPT VISIT, EST, LEVL V, 40-54 MIN: ICD-10-PCS | Mod: S$PBB,,, | Performed by: INTERNAL MEDICINE

## 2019-01-15 PROCEDURE — 84550 ASSAY OF BLOOD/URIC ACID: CPT

## 2019-01-15 PROCEDURE — 84153 ASSAY OF PSA TOTAL: CPT

## 2019-01-15 PROCEDURE — 99215 OFFICE O/P EST HI 40 MIN: CPT | Mod: S$PBB,,, | Performed by: INTERNAL MEDICINE

## 2019-01-15 PROCEDURE — 90471 IMMUNIZATION ADMIN: CPT | Mod: PBBFAC

## 2019-01-15 PROCEDURE — 80053 COMPREHEN METABOLIC PANEL: CPT

## 2019-01-15 PROCEDURE — 36415 COLL VENOUS BLD VENIPUNCTURE: CPT

## 2019-01-15 NOTE — PROGRESS NOTES
Subjective:       Patient ID: Danial Meza is a 78 y.o. male.    Chief Complaint: Annual Exam    HPI    Last visit with me June 2017.  Over the last year seen by internal medicine, Cardiology, dermatology, and Ophthalmology.  Patient was admitted to the hospital for pneumonia on December 29th, discharged December 31st.    Dx with gallstones but not too bothersome, no plan for surgery.  His right lower quadrant abdominal pain has resolved, patient thinks this was muscle strain due to severe vomiting.    Patient denies any change in urine symptoms or bowel movements.    He denies any symptoms of fatigue or orthostasis with his low heart rate.  He has not had any recent episodes of rapid heart rate since being discharged from the hospital.    Review of Systems   All other systems reviewed and are negative.      Objective:      Physical Exam   Constitutional: He is oriented to person, place, and time. No distress.   HENT:   Head: Atraumatic.   Right Ear: Tympanic membrane normal. No tenderness.   Left Ear: Tympanic membrane normal. No tenderness.   Mouth/Throat: Oropharynx is clear and moist. No oropharyngeal exudate.   Eyes: Pupils are equal, round, and reactive to light. Right eye exhibits no discharge. Left eye exhibits no discharge.   Neck: Normal range of motion. No thyromegaly present.   Cardiovascular: Regular rhythm and normal heart sounds. Bradycardia present.   Pulmonary/Chest: Effort normal and breath sounds normal. No stridor. He has no wheezes. He has no rales.   Abdominal: Soft. There is no tenderness. There is no guarding.   Genitourinary: Rectal exam shows external hemorrhoid. Prostate is enlarged (R side, no nodules). Prostate is not tender.   Musculoskeletal: He exhibits no edema or tenderness.   Lymphadenopathy:     He has no cervical adenopathy.   Neurological: He is alert and oriented to person, place, and time.   Skin: Skin is warm and dry. No rash noted.   varicose veins in bilateral lower  extremities, R>L. Red macule with scale in RLE medial aspect near calf.   Psychiatric: He has a normal mood and affect. His behavior is normal.   Nursing note and vitals reviewed.      Vitals:    01/15/19 0851 01/15/19 0919   BP: (!) 158/70 (!) 158/78   BP Location: Right arm Right arm   Patient Position: Sitting Sitting   BP Method: Large (Manual) Large (Manual)   Pulse: (!) 43 (!) 48   Weight: 98.9 kg (218 lb)    Height: 6' (1.829 m)      Body mass index is 29.57 kg/m².    I have personally checked the blood pressure and pulse and documented my findings.     RESULTS: Reviewed labs from last 12 months    Assessment:       1. Mixed hyperlipidemia    2. Impaired fasting glucose    3. Essential hypertension    4. Chronic gout without tophus, unspecified cause, unspecified site    5. Thrombocytopenia    6. Enlarged prostate    7. Skin change    8. Colon cancer screening    9. Need for Tdap vaccination        Plan:   Danial was seen today for annual exam.    Diagnoses and all orders for this visit:    Mixed hyperlipidemia:  Prior diagnosis, stable, well controlled on current management. No changes at this time, will continue to monitor.     Impaired fasting glucose:  Prior dx, fluctuates, fasting glucose generally below cut off of 126 mg/dL. Repeat fasting glucose today with A1c, continue to monitor.  -     Comprehensive metabolic panel; Future  -     Hemoglobin A1c; Future    Essential hypertension:  Prior dx, not well controlled today. Pt will recheck at visit with Cardiology and decide on plan from there.  -     CBC Without Differential; Future  -     Magnesium; Future    Chronic gout without tophus, unspecified cause, unspecified site:  Prior dx, has been asymptomatic off of allopurinol, continue to monitor uric acid.  -     Uric acid; Future    Thrombocytopenia:  New problem, recent dx, possibly reactive. Recheck on labs today.    Enlarged prostate:  New dx, R sided, evaluate with PSA test, if elevated refer to  Urology.  -     Prostate Specific Antigen, Diagnostic; Future    Skin change:  New problem, scaly macule that won't heal on RLE, pt will follow up with Dermatology.    Colon cancer screening:  Pt elects to continue screening despite age >75 because of FH.  -     Fecal Immunochemical Test (iFOBT); Future    Need for Tdap vaccination    Other orders  -     (In Office Administered) Tdap Vaccine    Follow-up in about 6 months (around 7/15/2019) for follow up visit.  Louie Durant MD  Internal Medicine    Portions of this note were completed using medical dictation software. Please excuse typographical or syntax errors that were missed on review.

## 2019-01-16 ENCOUNTER — OFFICE VISIT (OUTPATIENT)
Dept: TRANSPLANT | Facility: CLINIC | Age: 79
End: 2019-01-16
Attending: INTERNAL MEDICINE
Payer: MEDICARE

## 2019-01-16 VITALS
HEART RATE: 64 BPM | BODY MASS INDEX: 29.68 KG/M2 | HEIGHT: 72 IN | SYSTOLIC BLOOD PRESSURE: 145 MMHG | DIASTOLIC BLOOD PRESSURE: 84 MMHG | WEIGHT: 219.13 LBS

## 2019-01-16 DIAGNOSIS — Z86.79 S/P ABLATION OF ATRIAL FLUTTER: ICD-10-CM

## 2019-01-16 DIAGNOSIS — J18.9 COMMUNITY ACQUIRED PNEUMONIA OF RIGHT LOWER LOBE OF LUNG: ICD-10-CM

## 2019-01-16 DIAGNOSIS — Z79.01 CURRENT USE OF LONG TERM ANTICOAGULATION: ICD-10-CM

## 2019-01-16 DIAGNOSIS — R79.89 TROPONIN LEVEL ELEVATED: Primary | ICD-10-CM

## 2019-01-16 DIAGNOSIS — E78.2 MIXED HYPERLIPIDEMIA: ICD-10-CM

## 2019-01-16 DIAGNOSIS — I10 ESSENTIAL HYPERTENSION: ICD-10-CM

## 2019-01-16 DIAGNOSIS — Z98.890 S/P ABLATION OF ATRIAL FLUTTER: ICD-10-CM

## 2019-01-16 DIAGNOSIS — I48.0 PAF (PAROXYSMAL ATRIAL FIBRILLATION): ICD-10-CM

## 2019-01-16 PROCEDURE — 99999 PR PBB SHADOW E&M-EST. PATIENT-LVL III: CPT | Mod: PBBFAC,,, | Performed by: INTERNAL MEDICINE

## 2019-01-16 PROCEDURE — 99214 OFFICE O/P EST MOD 30 MIN: CPT | Mod: S$PBB,,, | Performed by: INTERNAL MEDICINE

## 2019-01-16 PROCEDURE — 99214 PR OFFICE/OUTPT VISIT, EST, LEVL IV, 30-39 MIN: ICD-10-PCS | Mod: S$PBB,,, | Performed by: INTERNAL MEDICINE

## 2019-01-16 PROCEDURE — 99213 OFFICE O/P EST LOW 20 MIN: CPT | Mod: PBBFAC | Performed by: INTERNAL MEDICINE

## 2019-01-16 PROCEDURE — 99999 PR PBB SHADOW E&M-EST. PATIENT-LVL III: ICD-10-PCS | Mod: PBBFAC,,, | Performed by: INTERNAL MEDICINE

## 2019-01-16 NOTE — PROGRESS NOTES
Subjective:     Patient ID:  Danial Meza is a 78 y.o. male who presents for evaluation of Atrial Fibrillation and demand (type 2) NSTEMI in Dec 2018.    HPI:  78 yo WM referred by Dr. Durant for f/u of +troponin noted when hospitalized with uncontrolled AF, very high WBC, treated for pneumonia, vomiting such that he thinks he had no pindolol for couple of days PTA.  He converted spontaneously back to sinus but no EKG in sinus.  While in AF with RVR had significant ST depression and this coupled with +troponin is consistent with demand MI.  No angina type CP and no hx of CAD.  He has known tachy-xu syndrome, prior AFl ablation and PAF.    He reports that he did not feel any AF/palpitations since last visit except for ED last month with acute illness.  No tightness, pressure or heaviness in chest, neck, arms, throat, jaw or back with or without exertion.  No MCBRIDE, orthopnea, PND, palpitations, pre-syncope or syncope.    Review of Systems   Constitution: Positive for weight gain (see physical section for trend here though overall stable past couple of years). Negative for chills, fever, weakness, malaise/fatigue, night sweats and weight loss.   Cardiovascular: Negative for chest pain, dyspnea on exertion, irregular heartbeat, leg swelling, near-syncope, orthopnea, palpitations, paroxysmal nocturnal dyspnea and syncope.   Respiratory: Negative for cough, sputum production and wheezing.    Hematologic/Lymphatic: Does not bruise/bleed easily.   Musculoskeletal: Negative for arthritis, joint pain and stiffness.   Gastrointestinal: Negative for hematochezia and melena.   Genitourinary: Negative for hematuria.   Neurological: Negative for brief paralysis, focal weakness and seizures.     Objective:   Physical Exam   Constitutional: He is oriented to person, place, and time. He appears well-developed and well-nourished. No distress.   BP (!) 145/84 (BP Location: Left arm, Patient Position: Sitting, BP Method: Large  (Automatic))   Pulse 64   Ht 6' (1.829 m)   Wt 99.4 kg (219 lb 2.2 oz)   BMI 29.72 kg/m²   Last visit wt 97.5 kg (214 lb 15.2 oz)    Visit before last visit wt 93.4 kg (205 lb 14.6 oz)     2 visits before last visit wt 98.1 kg (216 lb 4.3 oz)   HENT:   Head: Normocephalic and atraumatic.   Mouth/Throat: No oropharyngeal exudate.   Eyes: Conjunctivae are normal. Right eye exhibits no discharge. Left eye exhibits no discharge. No scleral icterus.   Neck: No JVD present. No thyromegaly present.   Cardiovascular: Normal rate, regular rhythm and normal heart sounds. Exam reveals no gallop.   No murmur heard.  Pulmonary/Chest: Effort normal and breath sounds normal.   Abdominal: Soft. Bowel sounds are normal. He exhibits no distension and no mass. There is no tenderness.   Musculoskeletal: He exhibits no edema or tenderness.   Neurological: He is alert and oriented to person, place, and time.   Skin: Skin is warm and dry. He is not diaphoretic.   Psychiatric: He has a normal mood and affect. His behavior is normal. Judgment and thought content normal.      Lab Results   Component Value Date     01/15/2019    K 5.0 01/15/2019    MG 2.2 01/15/2019     (H) 01/15/2019    CO2 25 01/15/2019    BUN 13 01/15/2019    CREATININE 1.0 01/15/2019    GLU 94 01/15/2019    HGBA1C 5.7 (H) 01/15/2019    AST 22 01/15/2019    ALT 23 01/15/2019    ALBUMIN 4.0 01/15/2019    PROT 6.8 01/15/2019    BILITOT 0.7 01/15/2019    WBC 7.79 01/15/2019    HGB 14.5 01/15/2019    HCT 44.2 01/15/2019     01/15/2019    CHOL 156 10/10/2018    HDL 28 (L) 10/10/2018    LDLCALC 99.8 10/10/2018    TRIG 141 10/10/2018     EKG's during Dec reviewed with AF, RVR and significant ST depression  Troponin elevated Dec 2018 hosp stay  CXR Dec 2018 with vague haziness RLL consistent with pneumonia  Assessment:     1. Troponin level elevated Dec 2018 with uncontrolled AF rate and pneumonia   2. Community acquired pneumonia of right lower lobe of lung  Dec 2018   3. PAF (paroxysmal atrial fibrillation) with known tachy-xu syndrome though xu asymptomatic   4. Current use of long term anticoagulation with DOAC   5. Essential hypertension    6. Mixed hyperlipidemia    7. S/P ablation of atrial flutter 11/4/15 Dr. KOFFI Rowland     Plan:   F/U CXR  Pharm nucl stress test as added safety  Will phone review both of these  If all OK, RTC 6 months  Discussed tachy-xu and that if placed on anti-arrhythmic I suspect he would need pacemaker.  If he needs treatment see Dr. Rowland

## 2019-01-21 ENCOUNTER — TELEPHONE (OUTPATIENT)
Dept: INTERNAL MEDICINE | Facility: CLINIC | Age: 79
End: 2019-01-21

## 2019-01-21 ENCOUNTER — LAB VISIT (OUTPATIENT)
Dept: LAB | Facility: HOSPITAL | Age: 79
End: 2019-01-21
Attending: INTERNAL MEDICINE
Payer: MEDICARE

## 2019-01-21 DIAGNOSIS — Z12.11 COLON CANCER SCREENING: ICD-10-CM

## 2019-01-21 LAB — HEMOCCULT STL QL IA: NEGATIVE

## 2019-01-21 PROCEDURE — 82274 ASSAY TEST FOR BLOOD FECAL: CPT

## 2019-02-07 ENCOUNTER — HOSPITAL ENCOUNTER (OUTPATIENT)
Dept: RADIOLOGY | Facility: HOSPITAL | Age: 79
Discharge: HOME | End: 2019-02-07
Attending: INTERNAL MEDICINE
Payer: MEDICARE

## 2019-02-07 ENCOUNTER — CLINICAL SUPPORT (OUTPATIENT)
Dept: CARDIOLOGY | Facility: CLINIC | Age: 79
End: 2019-02-07
Attending: INTERNAL MEDICINE
Payer: MEDICARE

## 2019-02-07 DIAGNOSIS — J18.9 COMMUNITY ACQUIRED PNEUMONIA OF RIGHT LOWER LOBE OF LUNG: ICD-10-CM

## 2019-02-07 DIAGNOSIS — E78.2 MIXED HYPERLIPIDEMIA: ICD-10-CM

## 2019-02-07 DIAGNOSIS — I48.0 AF (PAROXYSMAL ATRIAL FIBRILLATION): ICD-10-CM

## 2019-02-07 DIAGNOSIS — I10 HYPERTENSION, ESSENTIAL: ICD-10-CM

## 2019-02-07 LAB
CV STRESS BASE HR: 70 BPM
DIASTOLIC BLOOD PRESSURE: 78 MMHG
END DIASTOLIC INDEX-HIGH: 171 ML/M2
END SYSTOLIC INDEX-HIGH: 70 ML/M2
NUC REST DIASTOLIC VOLUME INDEX: 136
NUC REST EJECTION FRACTION: 60
NUC REST SYSTOLIC VOLUME INDEX: 55
NUC STRESS DIASTOLIC VOLUME INDEX: 113
NUC STRESS EJECTION FRACTION: 58 %
NUC STRESS SYSTOLIC VOLUME INDEX: 48
OHS CV CPX 85 PERCENT MAX PREDICTED HEART RATE MALE: 121
OHS CV CPX MAX PREDICTED HEART RATE: 142
OHS CV CPX PATIENT IS FEMALE: 0
OHS CV CPX PATIENT IS MALE: 1
OHS CV CPX PEAK DIASTOLIC BLOOD PRESSURE: 80 MMHG
OHS CV CPX PEAK HEAR RATE: 71 BPM
OHS CV CPX PEAK RATE PRESSURE PRODUCT: NORMAL
OHS CV CPX PEAK SYSTOLIC BLOOD PRESSURE: 175 MMHG
OHS CV CPX PERCENT MAX PREDICTED HEART RATE ACHIEVED: 50
OHS CV CPX RATE PRESSURE PRODUCT PRESENTING: NORMAL
RETIRED EF AND QEF - SEE NOTES: 51 %
SYSTOLIC BLOOD PRESSURE: 162 MMHG

## 2019-02-07 PROCEDURE — 78452 STRESS TEST WITH MYOCARDIAL PERFUSION (CUPID ONLY): ICD-10-PCS | Mod: 26,,, | Performed by: INTERNAL MEDICINE

## 2019-02-07 PROCEDURE — 93018 CV STRESS TEST I&R ONLY: CPT | Mod: S$PBB,,, | Performed by: INTERNAL MEDICINE

## 2019-02-07 PROCEDURE — 71046 X-RAY EXAM CHEST 2 VIEWS: CPT | Mod: 26,,, | Performed by: RADIOLOGY

## 2019-02-07 PROCEDURE — 78452 HT MUSCLE IMAGE SPECT MULT: CPT | Mod: 26,,, | Performed by: INTERNAL MEDICINE

## 2019-02-07 PROCEDURE — 93016 STRESS TEST WITH MYOCARDIAL PERFUSION (CUPID ONLY): ICD-10-PCS | Mod: S$PBB,,, | Performed by: INTERNAL MEDICINE

## 2019-02-07 PROCEDURE — 93018 STRESS TEST WITH MYOCARDIAL PERFUSION (CUPID ONLY): ICD-10-PCS | Mod: S$PBB,,, | Performed by: INTERNAL MEDICINE

## 2019-02-07 PROCEDURE — 71046 X-RAY EXAM CHEST 2 VIEWS: CPT | Mod: TC,FY

## 2019-02-07 PROCEDURE — 93017 CV STRESS TEST TRACING ONLY: CPT | Mod: PBBFAC | Performed by: INTERNAL MEDICINE

## 2019-02-07 PROCEDURE — 71046 XR CHEST PA AND LATERAL: ICD-10-PCS | Mod: 26,,, | Performed by: RADIOLOGY

## 2019-02-07 PROCEDURE — 93016 CV STRESS TEST SUPVJ ONLY: CPT | Mod: S$PBB,,, | Performed by: INTERNAL MEDICINE

## 2019-02-07 PROCEDURE — A9502 TC99M TETROFOSMIN: HCPCS | Mod: PBBFAC | Performed by: INTERNAL MEDICINE

## 2019-02-07 RX ORDER — REGADENOSON 0.08 MG/ML
0.4 INJECTION, SOLUTION INTRAVENOUS
Status: COMPLETED | OUTPATIENT
Start: 2019-02-07 | End: 2019-02-07

## 2019-02-07 RX ADMIN — REGADENOSON 0.4 MG: 0.08 INJECTION, SOLUTION INTRAVENOUS at 09:02

## 2019-02-19 ENCOUNTER — TELEPHONE (OUTPATIENT)
Dept: INTERNAL MEDICINE | Facility: CLINIC | Age: 79
End: 2019-02-19

## 2019-02-19 NOTE — TELEPHONE ENCOUNTER
----- Message from Shanna Posadas sent at 2/19/2019  3:21 PM CST -----  Contact: self/[phone]  Pt called in regards to a missed call from the LPN about lab results.      Please advise

## 2019-02-19 NOTE — TELEPHONE ENCOUNTER
I did not call him. He was asking to speak to Sommer about a pt named Fracisco Cohen. He states that Sommer knows about it.

## 2019-02-20 ENCOUNTER — TELEPHONE (OUTPATIENT)
Dept: INTERNAL MEDICINE | Facility: CLINIC | Age: 79
End: 2019-02-20

## 2019-02-20 NOTE — TELEPHONE ENCOUNTER
----- Message from Mary Leary sent at 2/20/2019  2:45 PM CST -----  Contact: Patient [phone]  Pt states that he is calling to speak with Sommer. He states that he received a call on 02/19/19 and he is returning her call. Please call back and advise.      Thanks

## 2019-04-08 ENCOUNTER — OFFICE VISIT (OUTPATIENT)
Dept: URGENT CARE | Facility: CLINIC | Age: 79
End: 2019-04-08
Payer: MEDICARE

## 2019-04-08 VITALS
SYSTOLIC BLOOD PRESSURE: 169 MMHG | HEART RATE: 57 BPM | TEMPERATURE: 97 F | WEIGHT: 219 LBS | OXYGEN SATURATION: 96 % | HEIGHT: 72 IN | RESPIRATION RATE: 18 BRPM | DIASTOLIC BLOOD PRESSURE: 80 MMHG | BODY MASS INDEX: 29.66 KG/M2

## 2019-04-08 DIAGNOSIS — L03.115 CELLULITIS OF RIGHT LEG: Primary | ICD-10-CM

## 2019-04-08 PROCEDURE — 96372 THER/PROPH/DIAG INJ SC/IM: CPT | Mod: S$GLB,,, | Performed by: FAMILY MEDICINE

## 2019-04-08 PROCEDURE — 96372 PR INJECTION,THERAP/PROPH/DIAG2ST, IM OR SUBCUT: ICD-10-PCS | Mod: S$GLB,,, | Performed by: FAMILY MEDICINE

## 2019-04-08 PROCEDURE — 99214 OFFICE O/P EST MOD 30 MIN: CPT | Mod: 25,S$GLB,, | Performed by: FAMILY MEDICINE

## 2019-04-08 PROCEDURE — 99214 PR OFFICE/OUTPT VISIT, EST, LEVL IV, 30-39 MIN: ICD-10-PCS | Mod: 25,S$GLB,, | Performed by: FAMILY MEDICINE

## 2019-04-08 RX ORDER — SULFAMETHOXAZOLE AND TRIMETHOPRIM 800; 160 MG/1; MG/1
1 TABLET ORAL 2 TIMES DAILY
Qty: 20 TABLET | Refills: 0 | Status: SHIPPED | OUTPATIENT
Start: 2019-04-08 | End: 2019-06-03 | Stop reason: ALTCHOICE

## 2019-04-08 RX ORDER — MUPIROCIN 20 MG/G
OINTMENT TOPICAL
Qty: 22 G | Refills: 1 | Status: SHIPPED | OUTPATIENT
Start: 2019-04-08 | End: 2020-07-14

## 2019-04-08 RX ORDER — CEFTRIAXONE 1 G/1
1 INJECTION, POWDER, FOR SOLUTION INTRAMUSCULAR; INTRAVENOUS
Status: COMPLETED | OUTPATIENT
Start: 2019-04-08 | End: 2019-04-08

## 2019-04-08 RX ADMIN — CEFTRIAXONE 1 G: 1 INJECTION, POWDER, FOR SOLUTION INTRAMUSCULAR; INTRAVENOUS at 01:04

## 2019-04-08 NOTE — PROGRESS NOTES
Subjective:       Patient ID: Danial Meza is a 78 y.o. male.    Vitals:  height is 6' (1.829 m) and weight is 99.3 kg (219 lb). His tympanic temperature is 97 °F (36.1 °C). His blood pressure is 169/80 (abnormal) and his pulse is 57 (abnormal). His respiration is 18 and oxygen saturation is 96%.     Chief Complaint: Abrasion    This is a 78 y.o. male who presents today with a chief complaint of   Sore on rt leg red and swollen     Other   This is a new problem. The current episode started in the past 7 days. The problem occurs constantly. The problem has been gradually worsening. Pertinent negatives include no abdominal pain, fatigue, joint swelling or vertigo. Nothing aggravates the symptoms. Treatments tried: topical antibiotics. The treatment provided no relief.       Constitution: Negative for fatigue.   HENT: Negative for facial swelling and facial trauma.    Neck: Negative for neck stiffness.   Cardiovascular: Negative for chest trauma.   Eyes: Negative for eye trauma, double vision and blurred vision.   Gastrointestinal: Negative for abdominal trauma, abdominal pain and rectal bleeding.   Genitourinary: Negative for hematuria, genital trauma and pelvic pain.   Musculoskeletal: Negative for pain, trauma, joint swelling, abnormal ROM of joint and pain with walking.   Skin: Positive for color change, abrasion, puncture wound and erythema (2X2cm area of erythema right calf region with surrounding swelling and mild tenderness). Negative for wound and laceration.   Neurological: Negative for dizziness, history of vertigo, light-headedness, coordination disturbances, altered mental status and loss of consciousness.   Hematologic/Lymphatic: Negative for history of bleeding disorder.   Psychiatric/Behavioral: Negative for altered mental status.       Objective:      Physical Exam   Constitutional: He appears well-developed and well-nourished.   HENT:   Head: Normocephalic and atraumatic.   Cardiovascular: Normal  rate and regular rhythm.   Pulmonary/Chest: Effort normal and breath sounds normal.   Skin: Skin is warm. There is erythema (2X2cm area of erythema right calf region with surrounding swelling and mild tenderness).   Nursing note and vitals reviewed.      Assessment:       1. Cellulitis of right leg        Plan:         Cellulitis of right leg  -     cefTRIAXone injection 1 g  -     sulfamethoxazole-trimethoprim 800-160mg (BACTRIM DS) 800-160 mg Tab; Take 1 tablet by mouth 2 (two) times daily.  Dispense: 20 tablet; Refill: 0  -     mupirocin (BACTROBAN) 2 % ointment; Apply to affected area 3 times daily  Dispense: 22 g; Refill: 1    elevation, warmth. RTC if no improvement after 2 days

## 2019-04-08 NOTE — PATIENT INSTRUCTIONS
Cellulitis  Cellulitis is an infection of the deep layers of skin. A break in the skin, such as a cut or scratch, can let bacteria under the skin. If the bacteria get to deep layers of the skin, it can be serious. If not treated, cellulitis can get into the bloodstream and lymph nodes. The infection can then spread throughout the body. This causes serious illness.  Cellulitis causes the affected skin to become red, swollen, warm, and sore. The reddened areas have a visible border. An open sore may leak fluid (pus). You may have a fever, chills, and pain.  Cellulitis is treated with antibiotics taken for 7 to 10 days. An open sore may be cleaned and covered with cool wet gauze. Symptoms should get better 1 to 2 days after treatment is started. Make sure to take all the antibiotics for the full number of days until they are gone. Keep taking the medicine even if your symptoms go away.  Home care  Follow these tips:  · Limit the use of the part of your body with cellulitis.   · If the infection is on your leg, keep your leg raised while sitting. This will help to reduce swelling.  · Take all of the antibiotic medicine exactly as directed until it is gone. Do not miss any doses, especially during the first 7 days. Dont stop taking the medicine when your symptoms get better.  · Keep the affected area clean and dry.  · Wash your hands with soap and warm water before and after touching your skin. Anyone else who touches your skin should also wash his or her hands. Don't share towels.  Follow-up care  Follow up with your healthcare provider, or as advised. If your infection does not go away on the first antibiotic, your healthcare provider will prescribe a different one.  When to seek medical advice  Call your healthcare provider right away if any of these occur:  · Red areas that spread  · Swelling or pain that gets worse  · Fluid leaking from the skin (pus)  · Fever higher of 100.4º F (38.0º C) or higher after 2 days  on antibiotics  Date Last Reviewed: 9/1/2016  © 0427-2059 [employer]. [address] [place] [address], [place] [address]. All rights reserved. This information is not intended as a substitute for professional medical care. Always follow your healthcare professional's instructions.

## 2019-04-09 ENCOUNTER — OFFICE VISIT (OUTPATIENT)
Dept: OPTOMETRY | Facility: CLINIC | Age: 79
End: 2019-04-09
Payer: MEDICARE

## 2019-04-09 DIAGNOSIS — H52.03 HYPEROPIA WITH ASTIGMATISM, BILATERAL: ICD-10-CM

## 2019-04-09 DIAGNOSIS — H26.9 CORTICAL CATARACT OF LEFT EYE: ICD-10-CM

## 2019-04-09 DIAGNOSIS — H52.4 PRESBYOPIA OF BOTH EYES: ICD-10-CM

## 2019-04-09 DIAGNOSIS — H40.003 GLAUCOMA SUSPECT OF BOTH EYES: ICD-10-CM

## 2019-04-09 DIAGNOSIS — H52.203 HYPEROPIA WITH ASTIGMATISM, BILATERAL: ICD-10-CM

## 2019-04-09 DIAGNOSIS — H25.13 NUCLEAR SCLEROSIS, BILATERAL: Primary | ICD-10-CM

## 2019-04-09 PROCEDURE — 92015 DETERMINE REFRACTIVE STATE: CPT | Mod: ,,, | Performed by: OPTOMETRIST

## 2019-04-09 PROCEDURE — 92015 PR REFRACTION: ICD-10-PCS | Mod: ,,, | Performed by: OPTOMETRIST

## 2019-04-09 PROCEDURE — 99999 PR PBB SHADOW E&M-EST. PATIENT-LVL III: CPT | Mod: PBBFAC,,, | Performed by: OPTOMETRIST

## 2019-04-09 PROCEDURE — 92014 PR EYE EXAM, EST PATIENT,COMPREHESV: ICD-10-PCS | Mod: S$PBB,,, | Performed by: OPTOMETRIST

## 2019-04-09 PROCEDURE — 99999 PR PBB SHADOW E&M-EST. PATIENT-LVL III: ICD-10-PCS | Mod: PBBFAC,,, | Performed by: OPTOMETRIST

## 2019-04-09 PROCEDURE — 92014 COMPRE OPH EXAM EST PT 1/>: CPT | Mod: S$PBB,,, | Performed by: OPTOMETRIST

## 2019-04-09 PROCEDURE — 99213 OFFICE O/P EST LOW 20 MIN: CPT | Mod: PBBFAC | Performed by: OPTOMETRIST

## 2019-04-09 NOTE — PATIENT INSTRUCTIONS
Bilateral nuclear sclerosis of lens in both eyes, consistent with age.\  Early cortical cataract in both eyes.  No need for cataract surgery in either eye.  Monitor.      Hyperopia with astigmatism in both eyes, and presbyopia consistent with age.  Good best-correctable VA in each eye.    New spectacle lens Rx issued for use as desired. Recommend full-time wear.     Followed by Dr. Garcia as glaucoma suspect, based on the finding of large optic nerve cup-to-disc ratio in both eyes. Family history of glaucoma (mother and father).   Intraocular pressure within normal range in each eye today.   C/D ratio appears stable in each eye.   Continue to follow up with Dr. Garcia as she recommends.      Recheck refraction in one year.

## 2019-04-09 NOTE — PROGRESS NOTES
"HPI     Concerns About Ocular Health      Additional comments: General eye examination and refraction.  Reports bilateral epiphora "late in the day", but otherwise, no acute   ocular/vision problems               Comments     Patient's age: 78 y.o. WM  Occupation: retired  Approximate date of last eye examination:  08/27/2018  Name of last eye doctor seen: Dr. 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 yes     If yes, wears  Full-time or part-time?  Full-time  Present glasses are: Bifocal, SV Distance, SV Reading?  ST bifocal  Approximate age of present glasses:  1 yr   Got new glasses following last exam, or subsequently?:  Yes    Any problem with VA with glasses?  No, some tearing OU late in the day,   but not otherwise.   Wears CLs?:  no  Headaches?  no  Eye pain/discomfort?  Occasional excessive tearing in both eyes.                                                                                   Flashes?  no  Floaters? Yes, occasionally   Diplopia/Double vision?  no  Patient's Ocular History:         Any eye surgeries? no         Any eye injury?  no         Any treatment for eye disease?  no  Family history of eye disease?  Father had macular degeneration, and   mother had glaucoma  Significant patient medical history:         1. Diabetes?  no       If yes, IDDM or NIDDM? n/a   2. HBP?  Yes, takes meds  States well-controlled               3. Other (describe):  H/o tachycardia. H/a atrial   fibrillation. Takes Pradaxia.  S/P ablation.     ! OTC eyedrops currently using:  No    ! Prescription eye meds currently using:  no   ! Any history of allergy/adverse reaction to any eye meds used   previously?  no    ! Any history of allergy/adverse reaction to eyedrops used during prior   eye exam(s)? no    ! Any history of allergy/adverse reaction to Epinephrine or similar meds?   None     ! Patient okay with use of anesthetic eyedrops to check eye pressure?    yes   " "     ! Patient okay with use of eyedrops to dilate pupils today?  yes   !  Allergies/Medications/Medical History/Family History reviewed today?    Yes       PD =   64/60  Desired reading distance =  18.5"                                                                        Last edited by Ned Irene, OD on 4/9/2019  9:57 AM. (History)            Assessment /Plan     For exam results, see Encounter Report.    1. Nuclear sclerosis, bilateral     2. Cortical cataract of left eye     3. Glaucoma suspect of both eyes     4. Hyperopia with astigmatism, bilateral     5. Presbyopia of both eyes                      Bilateral nuclear sclerosis of lens in both eyes, consistent with age.\  Early cortical cataract in both eyes.  No need for cataract surgery in either eye.  Monitor.      Hyperopia with astigmatism in both eyes, and presbyopia consistent with age.  Good best-correctable VA in each eye.    New spectacle lens Rx issued for use as desired. Recommend full-time wear.     Followed by Dr. Garcia as glaucoma suspect, based on the finding of large optic nerve cup-to-disc ratio in both eyes. Family history of glaucoma (mother and father).   Intraocular pressure within normal range in each eye today.   C/D ratio appears stable in each eye.   Continue to follow up with Dr. Garcia as she recommends.      Recheck refraction in one year.           "

## 2019-04-10 ENCOUNTER — OFFICE VISIT (OUTPATIENT)
Dept: URGENT CARE | Facility: CLINIC | Age: 79
End: 2019-04-10
Payer: MEDICARE

## 2019-04-10 VITALS
SYSTOLIC BLOOD PRESSURE: 158 MMHG | HEART RATE: 45 BPM | TEMPERATURE: 97 F | OXYGEN SATURATION: 97 % | WEIGHT: 219 LBS | RESPIRATION RATE: 18 BRPM | HEIGHT: 72 IN | BODY MASS INDEX: 29.66 KG/M2 | DIASTOLIC BLOOD PRESSURE: 80 MMHG

## 2019-04-10 DIAGNOSIS — L03.115 CELLULITIS OF RIGHT LOWER EXTREMITY: Primary | ICD-10-CM

## 2019-04-10 PROCEDURE — 99214 PR OFFICE/OUTPT VISIT, EST, LEVL IV, 30-39 MIN: ICD-10-PCS | Mod: 25,S$GLB,, | Performed by: NURSE PRACTITIONER

## 2019-04-10 PROCEDURE — 10060 I&D ABSCESS SIMPLE/SINGLE: CPT | Mod: S$GLB,,, | Performed by: NURSE PRACTITIONER

## 2019-04-10 PROCEDURE — 10060 INCISION & DRAINAGE: ICD-10-PCS | Mod: S$GLB,,, | Performed by: NURSE PRACTITIONER

## 2019-04-10 PROCEDURE — 99214 OFFICE O/P EST MOD 30 MIN: CPT | Mod: 25,S$GLB,, | Performed by: NURSE PRACTITIONER

## 2019-04-10 RX ORDER — DOXYCYCLINE 100 MG/1
100 CAPSULE ORAL 2 TIMES DAILY
Qty: 20 CAPSULE | Refills: 0 | Status: SHIPPED | OUTPATIENT
Start: 2019-04-10 | End: 2019-04-20

## 2019-04-10 NOTE — PROGRESS NOTES
Subjective:       Patient ID: Danial Meza is a 78 y.o. male.    Vitals:  height is 6' (1.829 m) and weight is 99.3 kg (219 lb). His oral temperature is 97.2 °F (36.2 °C). His blood pressure is 158/80 (abnormal) and his pulse is 45 (abnormal). His respiration is 18 and oxygen saturation is 97%.     Chief Complaint: Leg Pain (Right Lower Leg)    This is a 78 y.o. male who presents today with a chief complaint of right lower leg rash.  Patient was seen here on Monday and diagnosed with Cellulitis.   Patient was given a prescription for Bactrim and Bactroban, which he has been using.  Pt states the area of redness is worsening.  He states there is an area of blackness in the center which was not there on Monday.      Leg Pain    The incident occurred 5 to 7 days ago (Thursday). The incident occurred at home. There was no injury mechanism. The pain is present in the right leg. The pain is at a severity of 0/10. It is unknown if a foreign body is present. Nothing aggravates the symptoms. Treatments tried: Bactrim DS, Bactroban.       Constitution: Negative for chills and fever.   HENT: Negative for facial swelling and sore throat.    Neck: Negative for painful lymph nodes.   Eyes: Negative for eye itching and eyelid swelling.   Respiratory: Negative for cough.    Musculoskeletal: Negative for joint pain and joint swelling.   Skin: Positive for rash. Negative for color change, pale, wound, abrasion, laceration, lesion, skin thickening/induration, puncture wound, erythema, abscess, avulsion and hives.   Allergic/Immunologic: Negative for environmental allergies, immunocompromised state and hives.   Hematologic/Lymphatic: Negative for swollen lymph nodes.       Objective:      Physical Exam   Constitutional: He is oriented to person, place, and time. He appears well-developed and well-nourished. He is cooperative.  Non-toxic appearance. He does not appear ill. No distress.   HENT:   Head: Normocephalic and atraumatic.    Right Ear: Hearing, tympanic membrane, external ear and ear canal normal.   Left Ear: Hearing, tympanic membrane, external ear and ear canal normal.   Nose: Nose normal. No mucosal edema, rhinorrhea or nasal deformity. No epistaxis. Right sinus exhibits no maxillary sinus tenderness and no frontal sinus tenderness. Left sinus exhibits no maxillary sinus tenderness and no frontal sinus tenderness.   Mouth/Throat: Uvula is midline, oropharynx is clear and moist and mucous membranes are normal. No trismus in the jaw. Normal dentition. No uvula swelling. No posterior oropharyngeal erythema.   Eyes: Conjunctivae and lids are normal. Right eye exhibits no discharge. Left eye exhibits no discharge. No scleral icterus.   Sclera clear bilat   Neck: Trachea normal, normal range of motion, full passive range of motion without pain and phonation normal. Neck supple.   Cardiovascular: Normal rate, regular rhythm, normal heart sounds, intact distal pulses and normal pulses.   Pulmonary/Chest: Effort normal and breath sounds normal. No respiratory distress.   Abdominal: Soft. Normal appearance and bowel sounds are normal. He exhibits no distension, no pulsatile midline mass and no mass. There is no tenderness.   Musculoskeletal: Normal range of motion. He exhibits no edema or deformity.   Neurological: He is alert and oriented to person, place, and time. He exhibits normal muscle tone. Coordination normal.   Skin: Skin is warm, dry and intact. He is not diaphoretic. No erythema. No pallor.        Psychiatric: He has a normal mood and affect. His speech is normal and behavior is normal. Judgment and thought content normal. Cognition and memory are normal.   Nursing note and vitals reviewed.          Assessment:       1. Cellulitis of right lower extremity        Plan:         Cellulitis of right lower extremity  -     doxycycline (VIBRAMYCIN) 100 MG Cap; Take 1 capsule (100 mg total) by mouth 2 (two) times daily. for 10 days   Dispense: 20 capsule; Refill: 0  -     Incision & Drainage      Patient Instructions     Patient to continue to take Bactrim.  Patient to also start taking doxy twice a day for the next 10 days.    Patient to look for increased signs of infection such as fever, increased swelling, or increased tenderness.    Patient to follow up with primary as soon as possible if symptoms persist or worsen.        Discharge Instructions for Cellulitis  You have been diagnosed with cellulitis. This is an infection in the deepest layer of the skin. In some cases, the infection also affects the muscle. Cellulitis is caused by bacteria. The bacteria can enter the body through broken skin. This can happen with a cut, scratch, animal bite, or an insect bite that has been scratched. You may have been treated in the hospital with antibiotics and fluids. You will likely be given a prescription for antibiotics to take at home. This sheet will help you take care of yourself at home.  Home care  When you are home:  · Take the prescribed antibiotic medicine you are given as directed until it is gone. Take it even if you feel better. It treats the infection and stops it from returning. Not taking all the medicine can make future infections hard to treat.  · Keep the infected area clean.  · When possible, raise the infected area above the level of your heart. This helps keep swelling down.  · Talk with your healthcare provider if you are in pain. Ask what kind of over-the-counter medicine you can take for pain.  · Apply clean bandages as advised.  · Take your temperature once a day for a week.  · Wash your hands often to prevent spreading the infection.  In the future, wash your hands before and after you touch cuts, scratches, or bandages. This will help prevent infection.   When to call your healthcare provider  Call your healthcare provider immediately if you have any of the following:  · Difficulty or pain when moving the joints above or  below the infected area  · Discharge or pus draining from the area  · Fever of 100.4°F (38°C) or higher, or as directed by your healthcare provider  · Pain that gets worse in or around the infected   · Redness that gets worse in or around the infected area, particularly if the area of redness expands to a wider area  · Shaking chills  · Swelling of the infected area  · Vomiting   Date Last Reviewed: 8/1/2016  © 0553-8562 [employer]. [address] [place], [place] [id number]. All rights reserved. This information is not intended as a substitute for professional medical care. Always follow your healthcare professional's instructions.

## 2019-04-10 NOTE — PROCEDURES
"Incision & Drainage  Date/Time: 4/10/2019 10:17 AM  Performed by: Vicki Parson NP  Authorized by: Vicki Parson NP     Time out: Immediately prior to procedure a "time out" was called to verify the correct patient, procedure, equipment, support staff and site/side marked as required.      Type:  Abscess  Body area:  Lower extremity  Location details:  Right leg  Drainage:  Purulent  Drainage amount:  Scant  Wound treatment:  Expression of material  Patient tolerance:  Patient tolerated the procedure well with no immediate complications      "

## 2019-04-10 NOTE — PATIENT INSTRUCTIONS
Patient to continue to take Bactrim.  Patient to also start taking doxy twice a day for the next 10 days.    Patient to look for increased signs of infection such as fever, increased swelling, or increased tenderness.    Patient to follow up with primary as soon as possible if symptoms persist or worsen.        Discharge Instructions for Cellulitis  You have been diagnosed with cellulitis. This is an infection in the deepest layer of the skin. In some cases, the infection also affects the muscle. Cellulitis is caused by bacteria. The bacteria can enter the body through broken skin. This can happen with a cut, scratch, animal bite, or an insect bite that has been scratched. You may have been treated in the hospital with antibiotics and fluids. You will likely be given a prescription for antibiotics to take at home. This sheet will help you take care of yourself at home.  Home care  When you are home:  · Take the prescribed antibiotic medicine you are given as directed until it is gone. Take it even if you feel better. It treats the infection and stops it from returning. Not taking all the medicine can make future infections hard to treat.  · Keep the infected area clean.  · When possible, raise the infected area above the level of your heart. This helps keep swelling down.  · Talk with your healthcare provider if you are in pain. Ask what kind of over-the-counter medicine you can take for pain.  · Apply clean bandages as advised.  · Take your temperature once a day for a week.  · Wash your hands often to prevent spreading the infection.  In the future, wash your hands before and after you touch cuts, scratches, or bandages. This will help prevent infection.   When to call your healthcare provider  Call your healthcare provider immediately if you have any of the following:  · Difficulty or pain when moving the joints above or below the infected area  · Discharge or pus draining from the area  · Fever of 100.4°F (38°C)  or higher, or as directed by your healthcare provider  · Pain that gets worse in or around the infected   · Redness that gets worse in or around the infected area, particularly if the area of redness expands to a wider area  · Shaking chills  · Swelling of the infected area  · Vomiting   Date Last Reviewed: 8/1/2016  © 1538-9652 [employer]. [address] [place] [address], [place] [address]. All rights reserved. This information is not intended as a substitute for professional medical care. Always follow your healthcare professional's instructions.

## 2019-04-23 DIAGNOSIS — I48.0 PAF (PAROXYSMAL ATRIAL FIBRILLATION): ICD-10-CM

## 2019-04-23 DIAGNOSIS — I10 ESSENTIAL HYPERTENSION: ICD-10-CM

## 2019-04-23 RX ORDER — LISINOPRIL 10 MG/1
TABLET ORAL
Qty: 90 TABLET | Refills: 0 | Status: SHIPPED | OUTPATIENT
Start: 2019-04-23 | End: 2019-07-30 | Stop reason: SDUPTHER

## 2019-04-23 RX ORDER — PINDOLOL 5 MG/1
TABLET ORAL
Qty: 180 TABLET | Refills: 0 | Status: SHIPPED | OUTPATIENT
Start: 2019-04-23 | End: 2019-07-30 | Stop reason: SDUPTHER

## 2019-06-03 ENCOUNTER — OFFICE VISIT (OUTPATIENT)
Dept: OPHTHALMOLOGY | Facility: CLINIC | Age: 79
End: 2019-06-03
Payer: MEDICARE

## 2019-06-03 DIAGNOSIS — H25.13 NUCLEAR SCLEROSIS OF BOTH EYES: ICD-10-CM

## 2019-06-03 DIAGNOSIS — H52.203 HYPEROPIA OF BOTH EYES WITH ASTIGMATISM AND PRESBYOPIA: ICD-10-CM

## 2019-06-03 DIAGNOSIS — Z83.518 FAMILY HISTORY OF MACULAR DEGENERATION: ICD-10-CM

## 2019-06-03 DIAGNOSIS — H57.819 BROW PTOSIS: ICD-10-CM

## 2019-06-03 DIAGNOSIS — H52.03 HYPEROPIA OF BOTH EYES WITH ASTIGMATISM AND PRESBYOPIA: ICD-10-CM

## 2019-06-03 DIAGNOSIS — H52.4 HYPEROPIA OF BOTH EYES WITH ASTIGMATISM AND PRESBYOPIA: ICD-10-CM

## 2019-06-03 DIAGNOSIS — H40.013 OPEN ANGLE WITH BORDERLINE FINDINGS, LOW RISK, BILATERAL: Primary | ICD-10-CM

## 2019-06-03 DIAGNOSIS — H57.03 SMALL PUPILS: ICD-10-CM

## 2019-06-03 PROCEDURE — 99999 PR PBB SHADOW E&M-EST. PATIENT-LVL II: ICD-10-PCS | Mod: PBBFAC,,, | Performed by: OPHTHALMOLOGY

## 2019-06-03 PROCEDURE — 92133 HEIDELBERG RETINA TOMOGRAPHY (HRT) - OU - BOTH EYES: ICD-10-PCS | Mod: 26,S$PBB,, | Performed by: OPHTHALMOLOGY

## 2019-06-03 PROCEDURE — 92133 CPTRZD OPH DX IMG PST SGM ON: CPT | Mod: 26,S$PBB,, | Performed by: OPHTHALMOLOGY

## 2019-06-03 PROCEDURE — 92133 CPTRZD OPH DX IMG PST SGM ON: CPT | Mod: PBBFAC | Performed by: OPHTHALMOLOGY

## 2019-06-03 PROCEDURE — 92012 INTRM OPH EXAM EST PATIENT: CPT | Mod: S$PBB,,, | Performed by: OPHTHALMOLOGY

## 2019-06-03 PROCEDURE — 99212 OFFICE O/P EST SF 10 MIN: CPT | Mod: PBBFAC | Performed by: OPHTHALMOLOGY

## 2019-06-03 PROCEDURE — 92012 PR EYE EXAM, EST PATIENT,INTERMED: ICD-10-PCS | Mod: S$PBB,,, | Performed by: OPHTHALMOLOGY

## 2019-06-03 PROCEDURE — 99999 PR PBB SHADOW E&M-EST. PATIENT-LVL II: CPT | Mod: PBBFAC,,, | Performed by: OPHTHALMOLOGY

## 2019-06-03 NOTE — PROGRESS NOTES
HPI     Glaucoma      Additional comments: HRT review today and pt states no complaints              Comments     DLS: 18    1) OAG OU  2) NS OU  3) Ptosis  4) FmHx ARMD  5) Hyperopia with Astigmatism/ Presbyopia            Last edited by Genny Paulson MA on 6/3/2019  8:19 AM. (History)            Assessment /Plan     For exam results, see Encounter Report.    Open angle with borderline findings, low risk, bilateral  -     [facility] (HRT) - OU - Both Eyes    Family history of macular degeneration    Nuclear sclerosis of both eyes    Brow ptosis    Hyperopia of both eyes with astigmatism and presbyopia    Small pupils        1. Open angle with borderline glaucoma findings - Both Eyes   Glaucoma (type and duration)   LTG suspect  First HVF   First photos      Family history   = father-also my patient, + mom- she has passed  Glaucoma meds   none  H/O adverse rxn to glaucoma drops  none  LASERS   none  GLAUCOMA SURGERIES   none  OTHER EYE SURGERIES   none  CDR  0.7 // 0/75  Tbase  16-24    Tmax        Ttarget   ?       HVF  5 test  to  - full od // full os- ? Rim artifact  Gono  +3 ou  CCT  567/566  OCT  2 test  to  - RNFL - nl od // nl os  HRT   5 test  to  - MR -  Dec. IT od,  // dec. IN os, border IT /// CDR 0.72 od // 0.70 os  Disc photos  , , 2017  - OIS    Ttoday  16/15  Test done today - IOP check, HRT, gonio   2. Nuclear sclerosis - Both Eyes   - mild BCVA 20/20 ou  BATh 20/60 od // 20/70 os (10/2013)   3. Ptosis   -patient would like to monitor for now as he is not having any trouble    4. Family history of macular degeneration   -father has adv wet ARMD - gets injections with arend   5. Hyperopia, astigmatism , presbyopia  PC +0.75+1.00x177        +1.00+1.50x180   ADD +2.50  glasses Teto   6. I use to see his father, and also saw his mother -  - his dad  2015 @ age 89   - his mom passed at age 98  - both had glaucoma, his dad  also had  wet ARMD - saw Arend     Plan    RTC 9- 12  MONTHS WITH HVF/DFE/Fundus / AR/MR/BAT - cataract check   -monitor the cataracts - not vis sign yet - dilates medium only - smallish pupils   Keep F/U with Teto prn -

## 2019-07-30 DIAGNOSIS — I48.0 PAF (PAROXYSMAL ATRIAL FIBRILLATION): ICD-10-CM

## 2019-07-30 DIAGNOSIS — I10 ESSENTIAL HYPERTENSION: ICD-10-CM

## 2019-07-30 RX ORDER — LISINOPRIL 10 MG/1
TABLET ORAL
Qty: 90 TABLET | Refills: 0 | Status: SHIPPED | OUTPATIENT
Start: 2019-07-30 | End: 2019-10-24 | Stop reason: SDUPTHER

## 2019-07-30 RX ORDER — PINDOLOL 5 MG/1
TABLET ORAL
Qty: 180 TABLET | Refills: 0 | Status: SHIPPED | OUTPATIENT
Start: 2019-07-30 | End: 2019-10-24 | Stop reason: SDUPTHER

## 2019-08-05 ENCOUNTER — TELEPHONE (OUTPATIENT)
Dept: INTERNAL MEDICINE | Facility: CLINIC | Age: 79
End: 2019-08-05

## 2019-08-05 DIAGNOSIS — M1A.9XX0 CHRONIC GOUT WITHOUT TOPHUS, UNSPECIFIED CAUSE, UNSPECIFIED SITE: Primary | ICD-10-CM

## 2019-08-05 NOTE — TELEPHONE ENCOUNTER
----- Message from Placido Rothman sent at 8/5/2019  7:44 AM CDT -----  Contact: self/[phone]  Pt is calling to speak with someone in the office in regards to getting his gout medication sent to his local pharmacy. Pt states medication is colcrys .6MG. Pt states that  does not prescribe medication but is familiar with the situation. Pt needs medication sent to [id number] - [facility], [place] - [address]. Please call and advise.        Thank You

## 2019-08-06 RX ORDER — COLCHICINE 0.6 MG/1
TABLET ORAL
Qty: 30 TABLET | Refills: 11 | Status: SHIPPED | OUTPATIENT
Start: 2019-08-06 | End: 2019-10-30

## 2019-08-13 ENCOUNTER — OFFICE VISIT (OUTPATIENT)
Dept: DERMATOLOGY | Facility: CLINIC | Age: 79
End: 2019-08-13
Payer: MEDICARE

## 2019-08-13 DIAGNOSIS — Z85.828 PERSONAL HISTORY OF MALIGNANT NEOPLASM OF SKIN: ICD-10-CM

## 2019-08-13 DIAGNOSIS — D18.00 ANGIOMA: ICD-10-CM

## 2019-08-13 DIAGNOSIS — L81.0 POST-INFLAMMATORY HYPERPIGMENTATION: ICD-10-CM

## 2019-08-13 DIAGNOSIS — L82.1 SK (SEBORRHEIC KERATOSIS): Primary | ICD-10-CM

## 2019-08-13 DIAGNOSIS — D22.9 BENIGN NEVUS: ICD-10-CM

## 2019-08-13 PROCEDURE — 99212 OFFICE O/P EST SF 10 MIN: CPT | Mod: PBBFAC | Performed by: DERMATOLOGY

## 2019-08-13 PROCEDURE — 99213 OFFICE O/P EST LOW 20 MIN: CPT | Mod: S$PBB,,, | Performed by: DERMATOLOGY

## 2019-08-13 PROCEDURE — 99999 PR PBB SHADOW E&M-EST. PATIENT-LVL II: ICD-10-PCS | Mod: PBBFAC,,, | Performed by: DERMATOLOGY

## 2019-08-13 PROCEDURE — 99999 PR PBB SHADOW E&M-EST. PATIENT-LVL II: CPT | Mod: PBBFAC,,, | Performed by: DERMATOLOGY

## 2019-08-13 PROCEDURE — 99213 PR OFFICE/OUTPT VISIT, EST, LEVL III, 20-29 MIN: ICD-10-PCS | Mod: S$PBB,,, | Performed by: DERMATOLOGY

## 2019-08-13 NOTE — PROGRESS NOTES
Subjective:       Patient ID:  Danial Meza is a 79 y.o. male who presents for   Chief Complaint   Patient presents with    Skin Check     UBSE     History of Present Illness: The patient presents for follow up of skin check.    The patient was last seen on: 8/2018 for skin check  This is a high risk patient here to check for the development of new lesions.      Other skin complaints: injury to right calf 2 months ago - healed (slowly) but left dark colette        Review of Systems   Skin: Negative for itching, rash, daily sunscreen use, activity-related sunscreen use, recent sunburn and wears hat.   Hematologic/Lymphatic: Bruises/bleeds easily (on pradaxa).        Objective:    Physical Exam   Constitutional: He appears well-developed and well-nourished. No distress.   Neurological: He is alert and oriented to person, place, and time. He is not disoriented.   Psychiatric: He has a normal mood and affect.   Skin:   Areas Examined (abnormalities noted in diagram):   Scalp / Hair Palpated and Inspected  Head / Face Inspection Performed  Neck Inspection Performed  Chest / Axilla Inspection Performed  Abdomen Inspection Performed  Back Inspection Performed  RUE Inspected  LUE Inspection Performed  LLE Inspection Performed                   Diagram Legend     Erythematous scaling macule/papule c/w actinic keratosis       Vascular papule c/w angioma      Pigmented verrucoid papule/plaque c/w seborrheic keratosis      Yellow umbilicated papule c/w sebaceous hyperplasia      Irregularly shaped tan macule c/w lentigo     1-2 mm smooth white papules consistent with Milia      Movable subcutaneous cyst with punctum c/w epidermal inclusion cyst      Subcutaneous movable cyst c/w pilar cyst      Firm pink to brown papule c/w dermatofibroma      Pedunculated fleshy papule(s) c/w skin tag(s)      Evenly pigmented macule c/w junctional nevus     Mildly variegated pigmented, slightly irregular-bordered macule c/w mildly atypical  nevus      Flesh colored to evenly pigmented papule c/w intradermal nevus       Pink pearly papule/plaque c/w basal cell carcinoma      Erythematous hyperkeratotic cursted plaque c/w SCC      Surgical scar with no sign of skin cancer recurrence      Open and closed comedones      Inflammatory papules and pustules      Verrucoid papule consistent consistent with wart     Erythematous eczematous patches and plaques     Dystrophic onycholytic nail with subungual debris c/w onychomycosis     Umbilicated papule    Erythematous-base heme-crusted tan verrucoid plaque consistent with inflamed seborrheic keratosis     Erythematous Silvery Scaling Plaque c/w Psoriasis     See annotation      Assessment / Plan:        SK (seborrheic keratosis)  These are benign inherited growths without a malignant potential. Reassurance given to patient. No treatment is necessary.       Angioma   - stable and chronic      Benign nevus   - stable and chronic      Post-inflammatory hyperpigmentation - right calf  Reassurance given to patient. No treatment is necessary.     Personal history of malignant neoplasm of skin  Area(s) of previous NMSC evaluated with no signs of recurrence.    Upper body skin examination performed today including at least 6 points as noted in physical examination. No lesions suspicious for malignancy noted.               Follow up in about 1 year (around 8/13/2020).

## 2019-09-26 DIAGNOSIS — E78.2 MIXED HYPERLIPIDEMIA: ICD-10-CM

## 2019-09-26 DIAGNOSIS — Z79.01 CURRENT USE OF LONG TERM ANTICOAGULATION: ICD-10-CM

## 2019-09-26 DIAGNOSIS — I48.0 PAF (PAROXYSMAL ATRIAL FIBRILLATION): ICD-10-CM

## 2019-09-26 DIAGNOSIS — E11.65 TYPE 2 DIABETES MELLITUS WITH HYPERGLYCEMIA, WITHOUT LONG-TERM CURRENT USE OF INSULIN: ICD-10-CM

## 2019-09-27 RX ORDER — DABIGATRAN ETEXILATE MESYLATE 150 MG/1
CAPSULE ORAL
Qty: 180 CAPSULE | Refills: 3 | Status: SHIPPED | OUTPATIENT
Start: 2019-09-27 | End: 2019-12-31

## 2019-09-27 RX ORDER — ATORVASTATIN CALCIUM 20 MG/1
TABLET, FILM COATED ORAL
Qty: 90 TABLET | Refills: 3 | Status: SHIPPED | OUTPATIENT
Start: 2019-09-27 | End: 2020-09-12

## 2019-10-24 DIAGNOSIS — I10 ESSENTIAL HYPERTENSION: ICD-10-CM

## 2019-10-24 DIAGNOSIS — I48.0 PAF (PAROXYSMAL ATRIAL FIBRILLATION): ICD-10-CM

## 2019-10-24 RX ORDER — LISINOPRIL 10 MG/1
TABLET ORAL
Qty: 90 TABLET | Refills: 0 | Status: SHIPPED | OUTPATIENT
Start: 2019-10-24 | End: 2020-01-29

## 2019-10-24 RX ORDER — PINDOLOL 5 MG/1
TABLET ORAL
Qty: 180 TABLET | Refills: 0 | Status: SHIPPED | OUTPATIENT
Start: 2019-10-24 | End: 2020-01-29

## 2019-10-26 ENCOUNTER — HOSPITAL ENCOUNTER (INPATIENT)
Facility: HOSPITAL | Age: 79
LOS: 1 days | Discharge: HOME | DRG: 554 | End: 2019-10-27
Attending: EMERGENCY MEDICINE | Admitting: HOSPITALIST
Payer: MEDICARE

## 2019-10-26 DIAGNOSIS — R07.9 CHEST PAIN: ICD-10-CM

## 2019-10-26 DIAGNOSIS — Z86.79 S/P ABLATION OF ATRIAL FLUTTER: ICD-10-CM

## 2019-10-26 DIAGNOSIS — H40.019 OPEN ANGLE WITH BORDERLINE FINDINGS, LOW RISK: ICD-10-CM

## 2019-10-26 DIAGNOSIS — M1A.9XX0 CHRONIC GOUT WITHOUT TOPHUS, UNSPECIFIED CAUSE, UNSPECIFIED SITE: ICD-10-CM

## 2019-10-26 DIAGNOSIS — M86.9 OSTEOMYELITIS OF RIGHT FOOT, UNSPECIFIED TYPE: ICD-10-CM

## 2019-10-26 DIAGNOSIS — H57.819 BROW PTOSIS: ICD-10-CM

## 2019-10-26 DIAGNOSIS — I47.10 PAROXYSMAL SVT (SUPRAVENTRICULAR TACHYCARDIA): ICD-10-CM

## 2019-10-26 DIAGNOSIS — Z98.890 S/P ABLATION OF ATRIAL FLUTTER: ICD-10-CM

## 2019-10-26 DIAGNOSIS — J18.9 COMMUNITY ACQUIRED PNEUMONIA OF LEFT LOWER LOBE OF LUNG: ICD-10-CM

## 2019-10-26 DIAGNOSIS — H57.03 SMALL PUPILS: ICD-10-CM

## 2019-10-26 DIAGNOSIS — Z86.69 HISTORY OF BELL'S PALSY: ICD-10-CM

## 2019-10-26 DIAGNOSIS — E78.2 MIXED HYPERLIPIDEMIA: ICD-10-CM

## 2019-10-26 DIAGNOSIS — I45.10 RIGHT BUNDLE BRANCH BLOCK (RBBB): ICD-10-CM

## 2019-10-26 DIAGNOSIS — M10.9 GOUTY ARTHRITIS OF TOE OF RIGHT FOOT: ICD-10-CM

## 2019-10-26 DIAGNOSIS — I87.2 VENOUS INSUFFICIENCY OF RIGHT LEG: ICD-10-CM

## 2019-10-26 DIAGNOSIS — R73.01 IMPAIRED FASTING GLUCOSE: ICD-10-CM

## 2019-10-26 DIAGNOSIS — Z79.01 CURRENT USE OF LONG TERM ANTICOAGULATION: ICD-10-CM

## 2019-10-26 DIAGNOSIS — H40.003 OPEN ANGLE WITH BORDERLINE INTRAOCULAR PRESSURE OF BOTH EYES: ICD-10-CM

## 2019-10-26 DIAGNOSIS — R00.1 SINUS BRADYCARDIA: ICD-10-CM

## 2019-10-26 DIAGNOSIS — M19.071 OSTEOARTHRITIS OF RIGHT FOOT, UNSPECIFIED OSTEOARTHRITIS TYPE: ICD-10-CM

## 2019-10-26 DIAGNOSIS — R10.11 RUQ ABDOMINAL PAIN: ICD-10-CM

## 2019-10-26 DIAGNOSIS — R74.01 ELEVATED TRANSAMINASE LEVEL: ICD-10-CM

## 2019-10-26 DIAGNOSIS — Z83.518 FAMILY HISTORY OF MACULAR DEGENERATION: ICD-10-CM

## 2019-10-26 DIAGNOSIS — M10.071 ACUTE IDIOPATHIC GOUT INVOLVING TOE OF RIGHT FOOT: Primary | ICD-10-CM

## 2019-10-26 DIAGNOSIS — I48.0 PAF (PAROXYSMAL ATRIAL FIBRILLATION): ICD-10-CM

## 2019-10-26 DIAGNOSIS — I10 ESSENTIAL HYPERTENSION: ICD-10-CM

## 2019-10-26 LAB
ALBUMIN SERPL BCP-MCNC: 3.9 G/DL (ref 3.5–5.2)
ALP SERPL-CCNC: 84 U/L (ref 55–135)
ALT SERPL W/O P-5'-P-CCNC: 28 U/L (ref 10–44)
ANION GAP SERPL CALC-SCNC: 9 MMOL/L (ref 8–16)
AST SERPL-CCNC: 21 U/L (ref 10–40)
BASOPHILS # BLD AUTO: 0.05 K/UL (ref 0–0.2)
BASOPHILS NFR BLD: 0.8 % (ref 0–1.9)
BILIRUB SERPL-MCNC: 0.7 MG/DL (ref 0.1–1)
BUN SERPL-MCNC: 22 MG/DL (ref 8–23)
CALCIUM SERPL-MCNC: 9 MG/DL (ref 8.7–10.5)
CHLORIDE SERPL-SCNC: 114 MMOL/L (ref 95–110)
CO2 SERPL-SCNC: 24 MMOL/L (ref 23–29)
CREAT SERPL-MCNC: 1.2 MG/DL (ref 0.5–1.4)
CRP SERPL-MCNC: 8.1 MG/L (ref 0–8.2)
DIFFERENTIAL METHOD: ABNORMAL
EOSINOPHIL # BLD AUTO: 0.2 K/UL (ref 0–0.5)
EOSINOPHIL NFR BLD: 3 % (ref 0–8)
ERYTHROCYTE [DISTWIDTH] IN BLOOD BY AUTOMATED COUNT: 12.4 % (ref 11.5–14.5)
ERYTHROCYTE [SEDIMENTATION RATE] IN BLOOD BY WESTERGREN METHOD: 8 MM/HR (ref 0–23)
EST. GFR  (AFRICAN AMERICAN): >60 ML/MIN/1.73 M^2
EST. GFR  (NON AFRICAN AMERICAN): 57.2 ML/MIN/1.73 M^2
GLUCOSE SERPL-MCNC: 95 MG/DL (ref 70–110)
HCT VFR BLD AUTO: 39.4 % (ref 40–54)
HGB BLD-MCNC: 13.5 G/DL (ref 14–18)
IMM GRANULOCYTES # BLD AUTO: 0.02 K/UL (ref 0–0.04)
IMM GRANULOCYTES NFR BLD AUTO: 0.3 % (ref 0–0.5)
LYMPHOCYTES # BLD AUTO: 1.3 K/UL (ref 1–4.8)
LYMPHOCYTES NFR BLD: 19.4 % (ref 18–48)
MCH RBC QN AUTO: 33.5 PG (ref 27–31)
MCHC RBC AUTO-ENTMCNC: 34.3 G/DL (ref 32–36)
MCV RBC AUTO: 98 FL (ref 82–98)
MONOCYTES # BLD AUTO: 0.5 K/UL (ref 0.3–1)
MONOCYTES NFR BLD: 7.5 % (ref 4–15)
NEUTROPHILS # BLD AUTO: 4.5 K/UL (ref 1.8–7.7)
NEUTROPHILS NFR BLD: 69 % (ref 38–73)
NRBC BLD-RTO: 0 /100 WBC
PLATELET # BLD AUTO: 161 K/UL (ref 150–350)
PMV BLD AUTO: 10.7 FL (ref 9.2–12.9)
POTASSIUM SERPL-SCNC: 4.4 MMOL/L (ref 3.5–5.1)
PROT SERPL-MCNC: 6.4 G/DL (ref 6–8.4)
RBC # BLD AUTO: 4.03 M/UL (ref 4.6–6.2)
SODIUM SERPL-SCNC: 147 MMOL/L (ref 136–145)
URATE SERPL-MCNC: 7.1 MG/DL (ref 3.4–7)
WBC # BLD AUTO: 6.44 K/UL (ref 3.9–12.7)

## 2019-10-26 PROCEDURE — 25000003 PHARM REV CODE 250: Performed by: HOSPITALIST

## 2019-10-26 PROCEDURE — 96375 TX/PRO/DX INJ NEW DRUG ADDON: CPT

## 2019-10-26 PROCEDURE — 99223 1ST HOSP IP/OBS HIGH 75: CPT | Mod: AI,,, | Performed by: HOSPITALIST

## 2019-10-26 PROCEDURE — 99223 PR INITIAL HOSPITAL CARE,LEVL III: ICD-10-PCS | Mod: AI,,, | Performed by: HOSPITALIST

## 2019-10-26 PROCEDURE — 84550 ASSAY OF BLOOD/URIC ACID: CPT

## 2019-10-26 PROCEDURE — 28001 PR INCIS/DRAINAGE BURSA OF FOOT: ICD-10-PCS | Mod: S$PBB,GC,, | Performed by: PODIATRIST

## 2019-10-26 PROCEDURE — 36415 COLL VENOUS BLD VENIPUNCTURE: CPT

## 2019-10-26 PROCEDURE — 99285 EMERGENCY DEPT VISIT HI MDM: CPT | Mod: ,,, | Performed by: EMERGENCY MEDICINE

## 2019-10-26 PROCEDURE — 88112 CYTOPATH CELL ENHANCE TECH: CPT | Performed by: PATHOLOGY

## 2019-10-26 PROCEDURE — 99223 1ST HOSP IP/OBS HIGH 75: CPT | Mod: 25,S$PBB,GC, | Performed by: PODIATRIST

## 2019-10-26 PROCEDURE — S0077 INJECTION, CLINDAMYCIN PHOSP: HCPCS | Performed by: PHYSICIAN ASSISTANT

## 2019-10-26 PROCEDURE — 28001 DRAINAGE OF BURSA OF FOOT: CPT | Mod: S$PBB,GC,, | Performed by: PODIATRIST

## 2019-10-26 PROCEDURE — 80053 COMPREHEN METABOLIC PANEL: CPT

## 2019-10-26 PROCEDURE — 99285 PR EMERGENCY DEPT VISIT,LEVEL V: ICD-10-PCS | Mod: ,,, | Performed by: EMERGENCY MEDICINE

## 2019-10-26 PROCEDURE — 87040 BLOOD CULTURE FOR BACTERIA: CPT | Mod: 59

## 2019-10-26 PROCEDURE — 85025 COMPLETE CBC W/AUTO DIFF WBC: CPT

## 2019-10-26 PROCEDURE — 96365 THER/PROPH/DIAG IV INF INIT: CPT

## 2019-10-26 PROCEDURE — 88112 CYTOLOGY SPECIMEN- MEDICAL CYTOLOGY (FLUID/WASH/BRUSH): ICD-10-PCS | Mod: 26,,, | Performed by: PATHOLOGY

## 2019-10-26 PROCEDURE — 86140 C-REACTIVE PROTEIN: CPT

## 2019-10-26 PROCEDURE — 85652 RBC SED RATE AUTOMATED: CPT

## 2019-10-26 PROCEDURE — 28001 DRAINAGE OF BURSA OF FOOT: CPT | Mod: PBBFAC,GC | Performed by: PODIATRIST

## 2019-10-26 PROCEDURE — 11000001 HC ACUTE MED/SURG PRIVATE ROOM

## 2019-10-26 PROCEDURE — 88112 CYTOPATH CELL ENHANCE TECH: CPT | Mod: 26,,, | Performed by: PATHOLOGY

## 2019-10-26 PROCEDURE — 25000003 PHARM REV CODE 250: Performed by: PHYSICIAN ASSISTANT

## 2019-10-26 PROCEDURE — 87070 CULTURE OTHR SPECIMN AEROBIC: CPT

## 2019-10-26 PROCEDURE — 87077 CULTURE AEROBIC IDENTIFY: CPT | Mod: 59

## 2019-10-26 PROCEDURE — 63600175 PHARM REV CODE 636 W HCPCS: Performed by: PHYSICIAN ASSISTANT

## 2019-10-26 PROCEDURE — 99285 EMERGENCY DEPT VISIT HI MDM: CPT | Mod: 25

## 2019-10-26 PROCEDURE — 87186 SC STD MICRODIL/AGAR DIL: CPT | Mod: 59

## 2019-10-26 PROCEDURE — 99223 PR INITIAL HOSPITAL CARE,LEVL III: ICD-10-PCS | Mod: 25,S$PBB,GC, | Performed by: PODIATRIST

## 2019-10-26 RX ORDER — IBUPROFEN 200 MG
16 TABLET ORAL
Status: DISCONTINUED | OUTPATIENT
Start: 2019-10-26 | End: 2019-10-27 | Stop reason: HOSPADM

## 2019-10-26 RX ORDER — POLYETHYLENE GLYCOL 3350 17 G/17G
17 POWDER, FOR SOLUTION ORAL DAILY
Status: DISCONTINUED | OUTPATIENT
Start: 2019-10-26 | End: 2019-10-27 | Stop reason: HOSPADM

## 2019-10-26 RX ORDER — OXYCODONE HYDROCHLORIDE 5 MG/1
5 TABLET ORAL EVERY 6 HOURS PRN
Status: DISCONTINUED | OUTPATIENT
Start: 2019-10-26 | End: 2019-10-27 | Stop reason: HOSPADM

## 2019-10-26 RX ORDER — LISINOPRIL 10 MG/1
10 TABLET ORAL DAILY
Status: DISCONTINUED | OUTPATIENT
Start: 2019-10-26 | End: 2019-10-27 | Stop reason: HOSPADM

## 2019-10-26 RX ORDER — ONDANSETRON 8 MG/1
8 TABLET, ORALLY DISINTEGRATING ORAL EVERY 8 HOURS PRN
Status: DISCONTINUED | OUTPATIENT
Start: 2019-10-26 | End: 2019-10-27 | Stop reason: HOSPADM

## 2019-10-26 RX ORDER — ACETAMINOPHEN 325 MG/1
650 TABLET ORAL EVERY 4 HOURS PRN
Status: DISCONTINUED | OUTPATIENT
Start: 2019-10-26 | End: 2019-10-27 | Stop reason: HOSPADM

## 2019-10-26 RX ORDER — GLUCAGON 1 MG
1 KIT INJECTION
Status: DISCONTINUED | OUTPATIENT
Start: 2019-10-26 | End: 2019-10-27 | Stop reason: HOSPADM

## 2019-10-26 RX ORDER — INSULIN ASPART 100 [IU]/ML
0-5 INJECTION, SOLUTION INTRAVENOUS; SUBCUTANEOUS
Status: DISCONTINUED | OUTPATIENT
Start: 2019-10-26 | End: 2019-10-27 | Stop reason: HOSPADM

## 2019-10-26 RX ORDER — RAMELTEON 8 MG/1
8 TABLET ORAL NIGHTLY PRN
Status: DISCONTINUED | OUTPATIENT
Start: 2019-10-26 | End: 2019-10-27 | Stop reason: HOSPADM

## 2019-10-26 RX ORDER — ATORVASTATIN CALCIUM 20 MG/1
20 TABLET, FILM COATED ORAL NIGHTLY
Status: DISCONTINUED | OUTPATIENT
Start: 2019-10-26 | End: 2019-10-27 | Stop reason: HOSPADM

## 2019-10-26 RX ORDER — ONDANSETRON 2 MG/ML
4 INJECTION INTRAMUSCULAR; INTRAVENOUS EVERY 8 HOURS PRN
Status: DISCONTINUED | OUTPATIENT
Start: 2019-10-26 | End: 2019-10-27 | Stop reason: HOSPADM

## 2019-10-26 RX ORDER — SODIUM CHLORIDE 0.9 % (FLUSH) 0.9 %
10 SYRINGE (ML) INJECTION
Status: DISCONTINUED | OUTPATIENT
Start: 2019-10-26 | End: 2019-10-27 | Stop reason: HOSPADM

## 2019-10-26 RX ORDER — DABIGATRAN ETEXILATE 150 MG/1
150 CAPSULE ORAL 2 TIMES DAILY
Status: DISCONTINUED | OUTPATIENT
Start: 2019-10-26 | End: 2019-10-27 | Stop reason: HOSPADM

## 2019-10-26 RX ORDER — CLINDAMYCIN PHOSPHATE 600 MG/50ML
600 INJECTION, SOLUTION INTRAVENOUS
Status: COMPLETED | OUTPATIENT
Start: 2019-10-26 | End: 2019-10-26

## 2019-10-26 RX ORDER — IBUPROFEN 200 MG
24 TABLET ORAL
Status: DISCONTINUED | OUTPATIENT
Start: 2019-10-26 | End: 2019-10-27 | Stop reason: HOSPADM

## 2019-10-26 RX ORDER — PINDOLOL 5 MG/1
5 TABLET ORAL 2 TIMES DAILY
Status: DISCONTINUED | OUTPATIENT
Start: 2019-10-26 | End: 2019-10-27 | Stop reason: HOSPADM

## 2019-10-26 RX ORDER — IPRATROPIUM BROMIDE AND ALBUTEROL SULFATE 2.5; .5 MG/3ML; MG/3ML
3 SOLUTION RESPIRATORY (INHALATION) EVERY 6 HOURS PRN
Status: DISCONTINUED | OUTPATIENT
Start: 2019-10-26 | End: 2019-10-27 | Stop reason: HOSPADM

## 2019-10-26 RX ORDER — AMOXICILLIN 250 MG
1 CAPSULE ORAL 2 TIMES DAILY
Status: DISCONTINUED | OUTPATIENT
Start: 2019-10-26 | End: 2019-10-27 | Stop reason: HOSPADM

## 2019-10-26 RX ADMIN — LISINOPRIL 10 MG: 10 TABLET ORAL at 03:10

## 2019-10-26 RX ADMIN — VANCOMYCIN HYDROCHLORIDE 1500 MG: 1.5 INJECTION, POWDER, LYOPHILIZED, FOR SOLUTION INTRAVENOUS at 12:10

## 2019-10-26 RX ADMIN — CLINDAMYCIN IN 5 PERCENT DEXTROSE 600 MG: 12 INJECTION, SOLUTION INTRAVENOUS at 11:10

## 2019-10-26 NOTE — ED PROVIDER NOTES
Encounter Date: 10/26/2019       History     Chief Complaint   Patient presents with    Wound Check     swelling and bleeding from rt 4th toe. Onset 1 week swelling.  Denies trauma Denies fever/chills. or diabetes     10:51 AM  Patient is a 79-year-old male with a history of HTN, HLD, AFib/flutter, on Pradaxa, unknown diabetes status? (although listed in problem list.  Last A1c 5.79 months ago.  Not on diabetic meds), presents to the ED with right toe pain and swelling.  States that 1.5 weeks ago, he noted pain and swelling to the area.  Denies any injury or trauma.  States that he has been having intermittent symptoms since.  Woke up this morning with his sock saturated with blood which prompted him to come into the emergency department.  Denies any pain at rest and only with palpation of the area.  He has not been applying any cream to the area.  Denies any change in his varicose veins in his RLE. Denies any fever, chills, diaphoresis.  Was treated for cellulitis earlier this year with oral Bactrim, but the area involved was located to his right posterior calf.         Review of patient's allergies indicates:   Allergen Reactions    Macadamia nut oil Anaphylaxis     Pt states only had a reaction with macadamia nuts.      Past Medical History:   Diagnosis Date    Basal cell carcinoma 1/5/12    right nose    Basal cell carcinoma 10/09/2017    left nasal bridge and left cheek    Cataract     Glaucoma     Gout 7/18/2014    Hyperlipemia     Hypertension     Mixed hyperlipidemia     Osteoarthritis of right foot 10/26/2019    PAF (paroxysmal atrial fibrillation) 2012    SVT (supraventricular tachycardia) 1/14/2005    ablation by [facility] of left lateral free wall accessory bypass tract    Type 2 diabetes mellitus with hyperglycemia, without long-term current use of insulin 4/9/2018     Past Surgical History:   Procedure Laterality Date    COLONOSCOPY  12/2015    skin cancer nose and neck      SVT  accessory pathway ablation  1/14/2005    Dr. Calvin     Family History   Problem Relation Age of Onset    Glaucoma Mother     Cancer Mother         Breast    Glaucoma Father     Macular degeneration Father     Stroke Father     Melanoma Father     Cancer Brother         Colon    No Known Problems Maternal Aunt     No Known Problems Maternal Uncle     No Known Problems Paternal Aunt     No Known Problems Paternal Uncle     No Known Problems Maternal Grandmother     No Known Problems Maternal Grandfather     No Known Problems Paternal Grandmother     No Known Problems Paternal Grandfather     Diabetes Neg Hx     Psoriasis Neg Hx     Lupus Neg Hx     Eczema Neg Hx     Acne Neg Hx     Amblyopia Neg Hx     Blindness Neg Hx     Cataracts Neg Hx     Hypertension Neg Hx     Retinal detachment Neg Hx     Strabismus Neg Hx     Thyroid disease Neg Hx      Social History     Tobacco Use    Smoking status: Never Smoker    Smokeless tobacco: Never Used   Substance Use Topics    Alcohol use: Yes     Comment: 1 time biweekly    Drug use: No     Review of Systems   Constitutional: Negative for chills, diaphoresis and fever.   HENT: Negative for sore throat.    Respiratory: Negative for shortness of breath.    Cardiovascular: Negative for chest pain.   Gastrointestinal: Negative for nausea.   Genitourinary: Negative for dysuria.   Musculoskeletal: Positive for arthralgias and joint swelling. Negative for back pain.   Skin: Positive for wound. Negative for rash.   Neurological: Negative for weakness.   Hematological: Does not bruise/bleed easily.       Physical Exam     Initial Vitals [10/26/19 1033]   BP Pulse Resp Temp SpO2   127/89 (!) 50 18 98.7 °F (37.1 °C) 98 %      MAP       --         Physical Exam    Vitals reviewed.  Constitutional: He appears well-developed and well-nourished. He is not diaphoretic. No distress.   HENT:   Head: Normocephalic and atraumatic.   Nose: Nose normal.   Eyes:  Conjunctivae and EOM are normal.   Neck: Normal range of motion.   Cardiovascular:   Pulses:       Dorsalis pedis pulses are 2+ on the right side.   Varicose veins noted to RLE.   Pulmonary/Chest: No respiratory distress.   Abdominal: He exhibits no distension. There is no tenderness.   Musculoskeletal:        Right foot: There is decreased range of motion, tenderness, bony tenderness and swelling. There is normal capillary refill.   Edema, erythema, clear drainage with scant bleeding and white plaques, and tenderness noted to R 4th toe. Normal cap refill.    Neurological: He is alert and oriented to person, place, and time. He has normal strength. No sensory deficit.   Skin: Skin is warm and dry. No erythema.   Psychiatric: He has a normal mood and affect. Thought content normal.                  ED Course   Procedures  Labs Reviewed   CBC W/ AUTO DIFFERENTIAL - Abnormal; Notable for the following components:       Result Value    RBC 4.03 (*)     Hemoglobin 13.5 (*)     Hematocrit 39.4 (*)     Mean Corpuscular Hemoglobin 33.5 (*)     All other components within normal limits   COMPREHENSIVE METABOLIC PANEL - Abnormal; Notable for the following components:    Sodium 147 (*)     Chloride 114 (*)     eGFR if non  57.2 (*)     All other components within normal limits   SEDIMENTATION RATE   C-REACTIVE PROTEIN          Imaging Results          X-Ray Toe 2 or More Views Right (Final result)  Result time 10/26/19 11:34:06    Final result by Yordy Linares Jr., MD (10/26/19 11:34:06)                 Impression:      Soft tissue abnormality and irregularity involving the mid and distal phalanges of the 4th toe consistent with the clinical history of osteomyelitis.      Electronically signed by: Yordy Linares MD  Date:    10/26/2019  Time:    11:34             Narrative:    EXAMINATION:  XR TOE 2 OR MORE VIEWS RIGHT    CLINICAL HISTORY:  right 4th toe infection;    TECHNIQUE:  Right toes three  views.    COMPARISON:  None    FINDINGS:  There is flexion deformity of the toes.  There is narrowing of multiple IP joints.  There are some erosive changes involving the distal and middle phalanges of the 4th toe.  Associated soft tissue abnormality.  Toes are superimposed on the lateral projection.                                 Medical Decision Making:   History:   Old Medical Records: I decided to obtain old medical records.  Old Records Summarized: records from previous admission(s) and records from clinic visits.       <> Summary of Records: Seen in urgent care for cellulitis of right lower leg (posterior calf) ealier this year.  Given ceftriaxone in the ED and discharged on Bactrim.  Initial Assessment:   Patient is a 79-year-old male with a history of HTN, HLD, AFib/flutter, on Pradaxa, unknown diabetes status? (although listed in problem list.  Last A1c 5.79 months ago.  Not on diabetic meds), presents to the ED with right toe pain and swelling for 1.5 weeks.  Differential Diagnosis:   Includes but is not limited to cellulitis, abscess, such as paronychia, osteomyelitis, onychomycosis.  Clinical Tests:   Lab Tests: Reviewed and Ordered  Radiological Study: Ordered and Reviewed  ED Management:  On exam, patient afebrile.  Other vital signs stable.  NAD and nontoxic appearing.  Right 4th toe with significant erythema, edema, clear drainage with underlying white plaques, and slightly loose toenail.  Distal pulses intact with normal cap refills to right 4th toe.  Will initiate workup to rule out life-threatening or emergent causes, give IV clindamycin, and reassess.    CBC with no leukocytosis or left shift.  H/H at 13.5/39.  CMP without kidney injury.  No transaminitis.  Normal sed rate and CRP at 8 and 8.1, respectively.  X-ray shows irregularity involving the mid and distal phalanges of the 4th right toe consistent with osteomyelitis.    11:51 AM. Although no leukocytosis or elevations in inflammatory  markers, exam and imaging concerning for osteo. Possible overlying abscess, however will defer procedure, if needed, to podiatry. Results discussed with patient.  Discussed the possibility of osteomyelitis and the need for admission and further investigation. Will give IV vanc. Patient is agreeable to plan.    Case discussed with Hospital Medicine  who will admit to Dr. Marks's service.  Other:   I have discussed this case with another health care provider.    I have reviewed patient's chart and discussed this case with my supervising MD.     Lala Hairston PA-C  Emergent Department  [facility]  Spectralink #[id number] or #[id number]                        Clinical Impression:       ICD-10-CM ICD-9-CM   1. Osteoarthritis of right foot, unspecified osteoarthritis type M19.071 715.97         Disposition:   Disposition: Admitted  Condition: Stable                        Lala Hairston PA-C  10/26/19 [id number]

## 2019-10-26 NOTE — HPI
Danial Meza is a 79 y.o. male with PMHx of HTN, PAF on Pradaxa dyslipidemia, who [place] [facility] [place] a chief complaint of right 4th toe pain and swelling x 2 weeks. Pain is only present with palpation of the 4th digit.  Patient denies any injury or trauma to his right foot.   This morning, patient woke up with his sock saturated in blood which prompted him to seek further medical attention in the emergency department. Patient has a history of gouty attacks in his right big toe, with last attack 3-4 months ago. Patient does not take his antigout medications regularly and endorses to eating seafood every week and has consumed seafood 3x this week. No history of prior foot ulcers or amputations. Denies numbness and tingling in bilateral lower extremities.  Patient denies any fever, chills, malaise, diaphoresis, nausea, vomiting,     On initial presentation in the ED,  patient was noted to be afebrile, hemodynamically stable.  No leukocytosis, WBC is 6.4.  X-ray of right lower extremity shows soft tissue abnormality and irregularity involving the mid and distal phalanges of the 4th toe concerning for osteomyelitis vs gouty arthritic changes. ESR, CRP WNL

## 2019-10-26 NOTE — ED NOTES
LOC: The patient is awake, alert and aware of environment with an appropriate affect, the patient is oriented x 3 and speaking appropriately.  APPEARANCE: Patient resting comfortably and in no acute distress, patient is clean and well groomed, patient's clothing is properly fastened.  SKIN: The skin is warm and dry, color consistent with ethnicity, patient has normal skin turgor and moist mucus membranes  RESPIRATORY: Airway is open and patent, respirations are spontaneous, patient has a normal effort and rate, no accessory muscle use noted  ABDOMEN: Soft and non tender to palpation, no distention noted  NEUROLOGIC:  facial expression is symmetrical, patient moving all extremities spontaneously, normal sensation in all extremities when touched with a finger.  Follows all commands appropriately.    Patient states for the last two weeks his right 4th toe has been swollen and slightly discolored, patient states this morning when he woke up he noticed his slipper was full of blood so he decided to come get it checked out, patients toe is hot, red and swollen, slightly discolored on both sides of the toe, patient has small pustules noted to cuticle line, patient denies any fevers or chills, no acute distress noted, patient has it wrapped and area still draining serosanguinous fluid, will continue to monitor

## 2019-10-26 NOTE — H&P
Hospital Medicine  History and Physical Exam     [place] [facility] [place] Ana María Marks MD  Admit Date: 10/26/2019  [facility]   Principal Problem:  Right toe pain, swelling  Patient information was obtained from patient and ER records.   Primary Care Physician: Louie Durant MD  Code status: Full Code    HPI: Danial Meza is a 79 y.o. male with PMH of hypertension, PAF on Pradaxa, SVT s/p ablation of left lateral free wall accessory bypass tract, dyslipidemia, who [place] [facility] [place] with a chief complaint of right toe pain and swelling. Patient reports his symptoms started about a week and half ago when E developed pain and swelling to the area.  Patient denies any injury or trauma to his right lower extremity.  Patient reports his pain is sharp/throbbing in quality, 6/10 in intensity.  His symptoms have been intermittent since onset.  This morning, patient woke up with his sock saturated in blood which prompted him to seek further medical attention in the emergency department.  While in the ED, his pain is only present with palpation to the area.  Patient denies any fever, chills, malaise, diaphoresis, nausea, vomiting, diarrhea, urinary urgency, frequency, dysuria.  Patient reports he was treated for cellulitis of the right posterior calf earlier this year with oral Bactrim which resolved after taking his antibiotics.    While in the ED, patient is noted to be afebrile, hemodynamically stable, saturating 98% on room air.  No leukocytosis, WBC is 6.4.  X-ray of right lower extremity shows soft tissue abnormality and irregularity involving the mid and distal phalanges of the 4th toe consistent with osteomyelitis.    Past Medical History: Patient has a past medical history of Basal cell carcinoma (1/5/12), Basal cell carcinoma (10/09/2017), Cataract, Glaucoma, Gout (7/18/2014), Hyperlipemia, Hypertension, Mixed hyperlipidemia, Osteoarthritis of right foot (10/26/2019), PAF (paroxysmal atrial fibrillation) (2012), SVT  (supraventricular tachycardia) (1/14/2005), and Type 2 diabetes mellitus with hyperglycemia, without long-term current use of insulin (4/9/2018).    Past Surgical History: Patient has a past surgical history that includes SVT accessory pathway ablation (1/14/2005); skin cancer nose and neck; and Colonoscopy (12/2015).    Social History: Patient reports that he has never smoked. He has never used smokeless tobacco. He reports that he drinks alcohol. He reports that he does not use drugs.    Family History: family history includes Cancer in his brother and mother; Glaucoma in his father and mother; Macular degeneration in his father; Melanoma in his father; No Known Problems in his maternal aunt, maternal grandfather, maternal grandmother, maternal uncle, paternal aunt, paternal grandfather, paternal grandmother, and paternal uncle; Stroke in his father.    Medications: reviewed     Allergies: Patient is allergic to macadamia nut oil.    Review of Systems: (BOLDED POSITIVE) (REMAINDER NEGATIVE)     General: fever, chills, night sweats, weakness, fatigue    Eyes/Head: vision changes, headache, dizziness   ENT: tinnitus, epistaxis, dysphagia  Respiratory: SOB, cough, wheezing, sputum, hemoptysis   CVS: chest pain, edema, syncope, palpitations, MCBRIDE  GI:nausea, vomiting, diarrhea, constipation, abdominal pain  : dysuria, hematuria, nocturia, incontinence  Neurological: headace, weakness, slurred speech, numbness/tingling   Heme/Lymph: anemia, easy bruising, swollen glands    MSK:arthralgia, gout, back pain, stiffness     Psychiatric: insomnia, stress, depression, anxiety, SI, HI         Physical Exam:     Temp:  [98.2 °F (36.8 °C)-98.7 °F (37.1 °C)]   Pulse:  [50-54]   Resp:  [18-19]   BP: (127-139)/(66-89)   SpO2:  [98 %]   Body mass index is 29.16 kg/m².   No intake or output data in the 24 hours ending 10/26/19 1315       General appearance: NAD, cooperative, alert, appears stated age    Head: Normocephalic, without  obvious abnormality, atraumatic     Eyes: conjunctivae/corneas clear. PERRLA, EOM's intact     Throat: Lips, mucosa, and tongue moist and without lesion     Neck: supple, trachea midline, no adenopathy     Lungs: clear to auscultation bilaterally, no wheezes, no crackles, no rales, no rhonchi   Heart: regular rate and rhythm, S1, S2 normal, no murmur, click, rub or gallop    Abdomen: Soft, non-tender, non-distended, normoactive bowel sounds. No masses, no organomegaly     Extremities: atraumatic, no deformities, no cyanosis or edema     Neurologic: Alert and oriented X 3, Normal symmetric reflexes. CN II-XII intact. No focal neurological deficits.     Musculoskeletal: Normal ROM, normal strength     Skin: No rash, tears, or ecchymosis, capillary refill brisk       Labs:  Recent Results (from the past 24 hour(s))   CBC auto differential    Collection Time: 10/26/19 11:00 AM   Result Value Ref Range    WBC 6.44 3.90 - 12.70 K/uL    RBC 4.03 (L) 4.60 - 6.20 M/uL    Hemoglobin 13.5 (L) 14.0 - 18.0 g/dL    Hematocrit 39.4 (L) 40.0 - 54.0 %    Mean Corpuscular Volume 98 82 - 98 fL    Mean Corpuscular Hemoglobin 33.5 (H) 27.0 - 31.0 pg    Mean Corpuscular Hemoglobin Conc 34.3 32.0 - 36.0 g/dL    RDW 12.4 11.5 - 14.5 %    Platelets 161 150 - 350 K/uL    MPV 10.7 9.2 - 12.9 fL    Immature Granulocytes 0.3 0.0 - 0.5 %    Gran # (ANC) 4.5 1.8 - 7.7 K/uL    Immature Grans (Abs) 0.02 0.00 - 0.04 K/uL    Lymph # 1.3 1.0 - 4.8 K/uL    Mono # 0.5 0.3 - 1.0 K/uL    Eos # 0.2 0.0 - 0.5 K/uL    Baso # 0.05 0.00 - 0.20 K/uL    nRBC 0 0 /100 WBC    Gran% 69.0 38.0 - 73.0 %    Lymph% 19.4 18.0 - 48.0 %    Mono% 7.5 4.0 - 15.0 %    Eosinophil% 3.0 0.0 - 8.0 %    Basophil% 0.8 0.0 - 1.9 %    Differential Method Automated    Comprehensive metabolic panel    Collection Time: 10/26/19 11:00 AM   Result Value Ref Range    Sodium 147 (H) 136 - 145 mmol/L    Potassium 4.4 3.5 - 5.1 mmol/L    Chloride 114 (H) 95 - 110 mmol/L    CO2 24 23 - 29  mmol/L    Glucose 95 70 - 110 mg/dL    BUN, Bld 22 8 - 23 mg/dL    Creatinine 1.2 0.5 - 1.4 mg/dL    Calcium 9.0 8.7 - 10.5 mg/dL    Total Protein 6.4 6.0 - 8.4 g/dL    Albumin 3.9 3.5 - 5.2 g/dL    Total Bilirubin 0.7 0.1 - 1.0 mg/dL    Alkaline Phosphatase 84 55 - 135 U/L    AST 21 10 - 40 U/L    ALT 28 10 - 44 U/L    Anion Gap 9 8 - 16 mmol/L    eGFR if African American >60.0 >60 mL/min/1.73 m^2    eGFR if non  57.2 (A) >60 mL/min/1.73 m^2   Sedimentation rate    Collection Time: 10/26/19 11:00 AM   Result Value Ref Range    Sed Rate 8 0 - 23 mm/Hr   C-reactive protein    Collection Time: 10/26/19 11:00 AM   Result Value Ref Range    CRP 8.1 0.0 - 8.2 mg/L       Hemoglobin A1C   Date Value Ref Range Status   01/15/2019 5.7 (H) 4.0 - 5.6 % Final     Comment:     ADA Screening Guidelines:  5.7-6.4%  Consistent with prediabetes  >or=6.5%  Consistent with diabetes  High levels of fetal hemoglobin interfere with the HbA1C  assay. Heterozygous hemoglobin variants (HbS, HgC, etc)do  not significantly interfere with this assay.   However, presence of multiple variants may affect accuracy.     10/10/2018 5.4 4.0 - 5.6 % Final     Comment:     ADA Screening Guidelines:  5.7-6.4%  Consistent with prediabetes  >or=6.5%  Consistent with diabetes  High levels of fetal hemoglobin interfere with the HbA1C  assay. Heterozygous hemoglobin variants (HbS, HgC, etc)do  not significantly interfere with this assay.   However, presence of multiple variants may affect accuracy.         No results for input(s): POCTGLUCOSE in the last 168 hours.    Active Hospital Problems    Diagnosis  POA    Osteoarthritis of right foot [M19.071]  Yes      Resolved Hospital Problems   No resolved problems to display.       Assessment and Plan:    Right lower extremity osteomyelitis:  -Will obtain CBC, BMP, ESR  -will send out for blood culture  -patient was treated with vancomycin, clindamycin in the ED, will hold further antibiotic  treatments as patient is afebrile, hemodynamically stable, does not have leukocytosis.  -consult Podiatry to obtain bone cultures and resume rest Bactrim antibiotics once cultures have been obtained  -monitor for hemodynamic instability  -pain control with tylenol prn, oxycodone 5mg q6hrs PRN  -bowel regimen with Miralax and pericolace while pt is receiving opioid medications    Hypertension:  -stable  -Continue PTA  lisinopril, pindolol  -monitor vitals q4h  -SBP goal of <160 in hospital    Diabetes Mellitus:  -Last HbA1c:  5.7  -SSI with accuchecks qACHS  -BG goal: Preprandial blood glucose target <140 mg/dL, Random glucoses <180 mg/dL  -ADA diet    Paroxysmal Atrial Fibrillation:   -Pt is currently rate controlled with HR <110  -[id number] score >2, continue Pradaxa for anticoagulation  -Rate control with pindolol  -Maintain K > 4, Mg > 2    Hyperlipidemia:  -continue PTA atorvastatin 20 mg     DVT PPx:  Pradaxa    Ana María Marks MD  Hospital Medicine Staff  [phone] pager

## 2019-10-27 VITALS
SYSTOLIC BLOOD PRESSURE: 152 MMHG | DIASTOLIC BLOOD PRESSURE: 79 MMHG | WEIGHT: 217 LBS | BODY MASS INDEX: 29.39 KG/M2 | HEIGHT: 72 IN | RESPIRATION RATE: 18 BRPM | OXYGEN SATURATION: 94 % | TEMPERATURE: 97 F | HEART RATE: 55 BPM

## 2019-10-27 LAB
ANION GAP SERPL CALC-SCNC: 7 MMOL/L (ref 8–16)
BASOPHILS # BLD AUTO: 0.05 K/UL (ref 0–0.2)
BASOPHILS NFR BLD: 1.1 % (ref 0–1.9)
BUN SERPL-MCNC: 18 MG/DL (ref 8–23)
CALCIUM SERPL-MCNC: 9.2 MG/DL (ref 8.7–10.5)
CHLORIDE SERPL-SCNC: 113 MMOL/L (ref 95–110)
CO2 SERPL-SCNC: 26 MMOL/L (ref 23–29)
CREAT SERPL-MCNC: 1 MG/DL (ref 0.5–1.4)
DIFFERENTIAL METHOD: ABNORMAL
EOSINOPHIL # BLD AUTO: 0.2 K/UL (ref 0–0.5)
EOSINOPHIL NFR BLD: 3.2 % (ref 0–8)
ERYTHROCYTE [DISTWIDTH] IN BLOOD BY AUTOMATED COUNT: 12.4 % (ref 11.5–14.5)
EST. GFR  (AFRICAN AMERICAN): >60 ML/MIN/1.73 M^2
EST. GFR  (NON AFRICAN AMERICAN): >60 ML/MIN/1.73 M^2
GLUCOSE SERPL-MCNC: 142 MG/DL (ref 70–110)
HCT VFR BLD AUTO: 38.3 % (ref 40–54)
HGB BLD-MCNC: 12.5 G/DL (ref 14–18)
IMM GRANULOCYTES # BLD AUTO: 0.01 K/UL (ref 0–0.04)
IMM GRANULOCYTES NFR BLD AUTO: 0.2 % (ref 0–0.5)
LYMPHOCYTES # BLD AUTO: 1.4 K/UL (ref 1–4.8)
LYMPHOCYTES NFR BLD: 29.3 % (ref 18–48)
MAGNESIUM SERPL-MCNC: 1.9 MG/DL (ref 1.6–2.6)
MCH RBC QN AUTO: 32.2 PG (ref 27–31)
MCHC RBC AUTO-ENTMCNC: 32.6 G/DL (ref 32–36)
MCV RBC AUTO: 99 FL (ref 82–98)
MONOCYTES # BLD AUTO: 0.3 K/UL (ref 0.3–1)
MONOCYTES NFR BLD: 6.6 % (ref 4–15)
NEUTROPHILS # BLD AUTO: 2.8 K/UL (ref 1.8–7.7)
NEUTROPHILS NFR BLD: 59.6 % (ref 38–73)
NRBC BLD-RTO: 0 /100 WBC
PHOSPHATE SERPL-MCNC: 2.8 MG/DL (ref 2.7–4.5)
PLATELET # BLD AUTO: 134 K/UL (ref 150–350)
PMV BLD AUTO: 10.4 FL (ref 9.2–12.9)
POCT GLUCOSE: 112 MG/DL (ref 70–110)
POCT GLUCOSE: 163 MG/DL (ref 70–110)
POTASSIUM SERPL-SCNC: 4.2 MMOL/L (ref 3.5–5.1)
RBC # BLD AUTO: 3.88 M/UL (ref 4.6–6.2)
SODIUM SERPL-SCNC: 146 MMOL/L (ref 136–145)
WBC # BLD AUTO: 4.71 K/UL (ref 3.9–12.7)

## 2019-10-27 PROCEDURE — 84100 ASSAY OF PHOSPHORUS: CPT

## 2019-10-27 PROCEDURE — 99239 HOSP IP/OBS DSCHRG MGMT >30: CPT | Mod: ,,, | Performed by: HOSPITALIST

## 2019-10-27 PROCEDURE — 83735 ASSAY OF MAGNESIUM: CPT

## 2019-10-27 PROCEDURE — 97161 PT EVAL LOW COMPLEX 20 MIN: CPT

## 2019-10-27 PROCEDURE — 25000003 PHARM REV CODE 250: Performed by: HOSPITALIST

## 2019-10-27 PROCEDURE — 80048 BASIC METABOLIC PNL TOTAL CA: CPT

## 2019-10-27 PROCEDURE — 85025 COMPLETE CBC W/AUTO DIFF WBC: CPT

## 2019-10-27 PROCEDURE — 97165 OT EVAL LOW COMPLEX 30 MIN: CPT

## 2019-10-27 PROCEDURE — 36415 COLL VENOUS BLD VENIPUNCTURE: CPT

## 2019-10-27 PROCEDURE — 99239 PR HOSPITAL DISCHARGE DAY,>30 MIN: ICD-10-PCS | Mod: ,,, | Performed by: HOSPITALIST

## 2019-10-27 RX ORDER — COLCHICINE 0.6 MG/1
1.2 TABLET, FILM COATED ORAL DAILY
Status: DISCONTINUED | OUTPATIENT
Start: 2019-10-27 | End: 2019-10-27

## 2019-10-27 RX ORDER — AMOXICILLIN AND CLAVULANATE POTASSIUM 875; 125 MG/1; MG/1
1 TABLET, FILM COATED ORAL 2 TIMES DAILY
Qty: 14 TABLET | Refills: 0 | Status: SHIPPED | OUTPATIENT
Start: 2019-10-27 | End: 2019-11-03

## 2019-10-27 RX ORDER — CLINDAMYCIN PHOSPHATE 600 MG/50ML
600 INJECTION, SOLUTION INTRAVENOUS
Status: DISCONTINUED | OUTPATIENT
Start: 2019-10-27 | End: 2019-10-27

## 2019-10-27 RX ORDER — COLCHICINE 0.6 MG/1
1.2 TABLET, FILM COATED ORAL DAILY
Status: DISCONTINUED | OUTPATIENT
Start: 2019-10-27 | End: 2019-10-27 | Stop reason: HOSPADM

## 2019-10-27 RX ORDER — CLINDAMYCIN HYDROCHLORIDE 300 MG/1
300 CAPSULE ORAL EVERY 8 HOURS
Qty: 42 CAPSULE | Refills: 0 | Status: CANCELLED | OUTPATIENT
Start: 2019-10-27 | End: 2019-11-10

## 2019-10-27 RX ADMIN — PINDOLOL 5 MG: 5 TABLET ORAL at 09:10

## 2019-10-27 RX ADMIN — LISINOPRIL 10 MG: 10 TABLET ORAL at 09:10

## 2019-10-27 RX ADMIN — DABIGATRAN ETEXILATE MESYLATE 150 MG: 150 CAPSULE ORAL at 09:10

## 2019-10-27 RX ADMIN — THERA TABS 1 TABLET: TAB at 09:10

## 2019-10-27 RX ADMIN — COLCHICINE 1.2 MG: 0.6 TABLET, FILM COATED ORAL at 01:10

## 2019-10-27 NOTE — SUBJECTIVE & OBJECTIVE
Scheduled Meds:   atorvastatin  20 mg Oral QHS    dabigatran etexilate  150 mg Oral BID    lisinopril  10 mg Oral Daily    multivitamin  1 tablet Oral Daily    pindolol  5 mg Oral BID    polyethylene glycol  17 g Oral Daily    senna-docusate 8.6-50 mg  1 tablet Oral BID     Continuous Infusions:  PRN Meds:acetaminophen, albuterol-ipratropium, Dextrose 10% Bolus, Dextrose 10% Bolus, glucagon (human recombinant), glucose, glucose, insulin aspart U-100, ondansetron, ondansetron, oxyCODONE, ramelteon, sodium chloride 0.9%    Review of patient's allergies indicates:   Allergen Reactions    Macadamia nut oil Anaphylaxis     Pt states only had a reaction with macadamia nuts.         Past Medical History:   Diagnosis Date    Basal cell carcinoma 1/5/12    right nose    Basal cell carcinoma 10/09/2017    left nasal bridge and left cheek    Cataract     Glaucoma     Gout 7/18/2014    Hyperlipemia     Hypertension     Mixed hyperlipidemia     Osteoarthritis of right foot 10/26/2019    PAF (paroxysmal atrial fibrillation) 2012    SVT (supraventricular tachycardia) 1/14/2005    ablation by Marixa of left lateral free wall accessory bypass tract    Type 2 diabetes mellitus with hyperglycemia, without long-term current use of insulin 4/9/2018     Past Surgical History:   Procedure Laterality Date    COLONOSCOPY  12/2015    skin cancer nose and neck      SVT accessory pathway ablation  1/14/2005    Dr. Calvin       Family History     Problem Relation (Age of Onset)    Cancer Mother, Brother    Glaucoma Mother, Father    Macular degeneration Father    Melanoma Father    No Known Problems Maternal Aunt, Maternal Uncle, Paternal Aunt, Paternal Uncle, Maternal Grandmother, Maternal Grandfather, Paternal Grandmother, Paternal Grandfather    Stroke Father        Tobacco Use    Smoking status: Never Smoker    Smokeless tobacco: Never Used   Substance and Sexual Activity    Alcohol use: Yes     Comment: 1 time  biweekly    Drug use: No    Sexual activity: Yes     Partners: Female     Review of Systems   Constitutional: Negative for chills, diaphoresis, fatigue and fever.   Respiratory: Negative for chest tightness and shortness of breath.    Cardiovascular: Positive for leg swelling.   Gastrointestinal: Negative for diarrhea, nausea and vomiting.   Musculoskeletal: Positive for arthralgias, joint swelling and myalgias. Negative for gait problem.   Skin: Positive for wound.   Neurological: Negative for weakness and numbness.   Hematological: Bruises/bleeds easily.   Psychiatric/Behavioral: Negative for behavioral problems and confusion.     Objective:     Vital Signs (Most Recent):  Temp: 97.4 °F (36.3 °C) (10/26/19 1947)  Pulse: (!) 56 (10/26/19 1947)  Resp: 18 (10/26/19 1947)  BP: 127/64 (10/26/19 1947)  SpO2: 96 % (10/26/19 1947) Vital Signs (24h Range):  Temp:  [97.4 °F (36.3 °C)-98.7 °F (37.1 °C)] 97.4 °F (36.3 °C)  Pulse:  [50-62] 56  Resp:  [18-19] 18  SpO2:  [96 %-98 %] 96 %  BP: (127-180)/(64-89) 127/64     Weight: 98.4 kg (217 lb)  Body mass index is 29.43 kg/m².    Foot Exam    General  Orientation: alert and oriented to person, place, and time       Right Foot/Ankle     Inspection and Palpation  Tenderness: (Right 4th toe tenderness to palpation at the level of the DIPJ/PIPJ)  Swelling: (Moderate swelling noted to right 4th toe)  Skin Exam: drainage, maceration, ulcer and erythema;     Neurovascular  Dorsalis pedis: 2+  Posterior tibial: 1+  Saphenous nerve sensation: normal  Tibial nerve sensation: normal  Superficial peroneal nerve sensation: normal  Deep peroneal nerve sensation: normal  Sural nerve sensation: normal      Left Foot/Ankle      Inspection and Palpation  Tenderness: none   Swelling: none   Skin Exam: skin intact;     Neurovascular  Dorsalis pedis: 2+  Posterior tibial: 1+  Saphenous nerve sensation: normal  Tibial nerve sensation: normal  Superficial peroneal nerve sensation: normal  Deep  peroneal nerve sensation: normal  Sural nerve sensation: normal            Laboratory:  A1C: No results for input(s): HGBA1C in the last 4320 hours.  CBC:   Recent Labs   Lab 10/26/19  1100   WBC 6.44   RBC 4.03*   HGB 13.5*   HCT 39.4*      MCV 98   MCH 33.5*   MCHC 34.3     CMP:   Recent Labs   Lab 10/26/19  1100   GLU 95   CALCIUM 9.0   ALBUMIN 3.9   PROT 6.4   *   K 4.4   CO2 24   *   BUN 22   CREATININE 1.2   ALKPHOS 84   ALT 28   AST 21   BILITOT 0.7     CRP:   Recent Labs   Lab 10/26/19  1100   CRP 8.1     ESR:   Recent Labs   Lab 10/26/19  1100   SEDRATE 8     Wound Cultures: No results for input(s): LABAERO in the last 4320 hours.  Microbiology Results (last 7 days)     Procedure Component Value Units Date/Time    Aerobic culture [[id number]] Collected:  10/26/19 1500    Order Status:  Sent Specimen:  Joint Fluid from Toe, Right Foot Updated:  10/26/19 1602    Blood culture (site 2) [[id number]] Collected:  10/26/19 1421    Order Status:  Sent Specimen:  Blood Updated:  10/26/19 1424    Blood culture (site 1) [[id number]] Collected:  10/26/19 1345    Order Status:  Sent Specimen:  Blood from Peripheral, Antecubital, Right Updated:  10/26/19 1354        Specimen (12h ago, onward)    None          Diagnostic Results:  I have reviewed all pertinent imaging results/findings within the past 24 hours.   Imaging Results          X-Ray Toe 2 or More Views Right (Final result)  Result time 10/26/19 11:34:06    Final result by Yordy Linares Jr., MD (10/26/19 11:34:06)                 Impression:      Soft tissue abnormality and irregularity involving the mid and distal phalanges of the 4th toe consistent with the clinical history of osteomyelitis.      Electronically signed by: Yordy Linares MD  Date:    10/26/2019  Time:    11:34             Narrative:    EXAMINATION:  XR TOE 2 OR MORE VIEWS RIGHT    CLINICAL HISTORY:  right 4th toe infection;    TECHNIQUE:  Right toes three  views.    COMPARISON:  None    FINDINGS:  There is flexion deformity of the toes.  There is narrowing of multiple IP joints.  There are some erosive changes involving the distal and middle phalanges of the 4th toe.  Associated soft tissue abnormality.  Toes are superimposed on the lateral projection.                                    Clinical Findings:  Right 4th digit appears erythematous and edematous with an open wound with gouty tophi crystals draining at the dorsal level of the DIP joint. Tenderness to palpation along the DIP joint. No purulent drainage noted, No malodor, no tracking, no undermining of wound.   Post I&D      Pre I&D    10/26/2019  Procedure: Incision and drainage of right 4th toe  Diagnosis: Gouty arthritis  Supervising provider: Dr. Radhika Pineda DPM   Performing provider: Dr. Nhung Ball DPM PGY1    Procedure in detail: Verbal and written consent was obtained for bedside I&D of right 4th toe. Right 4th toe was prepped in a sterile manner with betadine.    Attention was directed to the right 4th toe in which a local infiltrative digital block was performed using 10 mL of a 1:1 mixture of 1% lidocaine plain and .5% Marcaine plain. An incision was made dorsally at the level of the DIPJ. Gouty tophi was expressed with manual compression of the surrounding tissues. Deep cultures as well as pathology were taken of the gouty tophi crystals. All gouty tophi were debrided as much as possible and the wound was copiously flushed with sterile saline.    Hemostasis was achieved with pressure. The wound was dressed with betadine soaked 2x2s,  kerlix, and coban. Patient tolerated the procedure well

## 2019-10-27 NOTE — CONSULTS
[facility]  Podiatry  Consult Note    Patient Name: Danial Meza  MRN: [id number]  Admission Date: 10/26/2019  Hospital Length of Stay: 0 days  Attending Physician: Ana María Marks MD  Primary Care Provider: Louie Durant MD     Inpatient consult to Podiatry  Consult performed by: Nhung Ball MD  Consult ordered by: Ana María Marks MD        Subjective:     History of Present Illness:  Danial Meza is a 79 y.o. male with PMHx of HTN, PAF on Pradaxa dyslipidemia, who presents to [facility] today with a chief complaint of right  4th toe pain and swelling x 2 weeks. Pain is only present with palpation of the 4th digit.  Patient denies any injury or trauma to his right foot.   This morning, patient woke up with his sock saturated in blood which prompted him to seek further medical attention in the emergency department. Patient has a history of gouty attacks in his right big toe, with last attack 3-4 months ago. Patient does not take his antigout medications regularly and endorses to eating seafood every week and has consumed seafood 3x this week. No history of prior foot ulcers or amputations. Denies numbness and tingling in bilateral lower extremities.  Patient denies any fever, chills, malaise, diaphoresis, nausea, vomiting,     On initial presentation in the ED,  patient was noted to be afebrile, hemodynamically stable.  No leukocytosis, WBC is 6.4.  X-ray of right lower extremity shows soft tissue abnormality and irregularity involving the mid and distal phalanges of the 4th toe concerning for osteomyelitis vs gouty arthritic changes. ESR, CRP WNL    Scheduled Meds:   atorvastatin  20 mg Oral QHS    dabigatran etexilate  150 mg Oral BID    lisinopril  10 mg Oral Daily    multivitamin  1 tablet Oral Daily    pindolol  5 mg Oral BID    polyethylene glycol  17 g Oral Daily    senna-docusate 8.6-50 mg  1 tablet Oral BID     Continuous Infusions:  PRN Meds:acetaminophen, albuterol-ipratropium,  Dextrose 10% Bolus, Dextrose 10% Bolus, glucagon (human recombinant), glucose, glucose, insulin aspart U-100, ondansetron, ondansetron, oxyCODONE, ramelteon, sodium chloride 0.9%    Review of patient's allergies indicates:   Allergen Reactions    Macadamia nut oil Anaphylaxis     Pt states only had a reaction with macadamia nuts.         Past Medical History:   Diagnosis Date    Basal cell carcinoma 1/5/12    right nose    Basal cell carcinoma 10/09/2017    left nasal bridge and left cheek    Cataract     Glaucoma     Gout 7/18/2014    Hyperlipemia     Hypertension     Mixed hyperlipidemia     Osteoarthritis of right foot 10/26/2019    PAF (paroxysmal atrial fibrillation) 2012    SVT (supraventricular tachycardia) 1/14/2005    ablation by Marixa of left lateral free wall accessory bypass tract    Type 2 diabetes mellitus with hyperglycemia, without long-term current use of insulin 4/9/2018     Past Surgical History:   Procedure Laterality Date    COLONOSCOPY  12/2015    skin cancer nose and neck      SVT accessory pathway ablation  1/14/2005    Dr. Calvin       Family History     Problem Relation (Age of Onset)    Cancer Mother, Brother    Glaucoma Mother, Father    Macular degeneration Father    Melanoma Father    No Known Problems Maternal Aunt, Maternal Uncle, Paternal Aunt, Paternal Uncle, Maternal Grandmother, Maternal Grandfather, Paternal Grandmother, Paternal Grandfather    Stroke Father        Tobacco Use    Smoking status: Never Smoker    Smokeless tobacco: Never Used   Substance and Sexual Activity    Alcohol use: Yes     Comment: 1 time biweekly    Drug use: No    Sexual activity: Yes     Partners: Female     Review of Systems   Constitutional: Negative for chills, diaphoresis, fatigue and fever.   Respiratory: Negative for chest tightness and shortness of breath.    Cardiovascular: Positive for leg swelling.   Gastrointestinal: Negative for diarrhea, nausea and vomiting.    Musculoskeletal: Positive for arthralgias, joint swelling and myalgias. Negative for gait problem.   Skin: Positive for wound.   Neurological: Negative for weakness and numbness.   Hematological: Bruises/bleeds easily.   Psychiatric/Behavioral: Negative for behavioral problems and confusion.     Objective:     Vital Signs (Most Recent):  Temp: 97.4 °F (36.3 °C) (10/26/19 1947)  Pulse: (!) 56 (10/26/19 1947)  Resp: 18 (10/26/19 1947)  BP: 127/64 (10/26/19 1947)  SpO2: 96 % (10/26/19 1947) Vital Signs (24h Range):  Temp:  [97.4 °F (36.3 °C)-98.7 °F (37.1 °C)] 97.4 °F (36.3 °C)  Pulse:  [50-62] 56  Resp:  [18-19] 18  SpO2:  [96 %-98 %] 96 %  BP: (127-180)/(64-89) 127/64     Weight: 98.4 kg (217 lb)  Body mass index is 29.43 kg/m².    Foot Exam    General  Orientation: alert and oriented to person, place, and time       Right Foot/Ankle     Inspection and Palpation  Tenderness: (Right 4th toe tenderness to palpation at the level of the DIPJ/PIPJ)  Swelling: (Moderate swelling noted to right 4th toe)  Skin Exam: drainage, maceration, ulcer and erythema;     Neurovascular  Dorsalis pedis: 2+  Posterior tibial: 1+  Saphenous nerve sensation: normal  Tibial nerve sensation: normal  Superficial peroneal nerve sensation: normal  Deep peroneal nerve sensation: normal  Sural nerve sensation: normal      Left Foot/Ankle      Inspection and Palpation  Tenderness: none   Swelling: none   Skin Exam: skin intact;     Neurovascular  Dorsalis pedis: 2+  Posterior tibial: 1+  Saphenous nerve sensation: normal  Tibial nerve sensation: normal  Superficial peroneal nerve sensation: normal  Deep peroneal nerve sensation: normal  Sural nerve sensation: normal            Laboratory:  A1C: No results for input(s): HGBA1C in the last 4320 hours.  CBC:   Recent Labs   Lab 10/26/19  1100   WBC 6.44   RBC 4.03*   HGB 13.5*   HCT 39.4*      MCV 98   MCH 33.5*   MCHC 34.3     CMP:   Recent Labs   Lab 10/26/19  1100   GLU 95   CALCIUM 9.0    ALBUMIN 3.9   PROT 6.4   *   K 4.4   CO2 24   *   BUN 22   CREATININE 1.2   ALKPHOS 84   ALT 28   AST 21   BILITOT 0.7     CRP:   Recent Labs   Lab 10/26/19  1100   CRP 8.1     ESR:   Recent Labs   Lab 10/26/19  1100   SEDRATE 8     Wound Cultures: No results for input(s): LABAERO in the last 4320 hours.  Microbiology Results (last 7 days)     Procedure Component Value Units Date/Time    Aerobic culture [[id number]] Collected:  10/26/19 1500    Order Status:  Sent Specimen:  Joint Fluid from Toe, Right Foot Updated:  10/26/19 1602    Blood culture (site 2) [[id number]] Collected:  10/26/19 1421    Order Status:  Sent Specimen:  Blood Updated:  10/26/19 1424    Blood culture (site 1) [[id number]] Collected:  10/26/19 1345    Order Status:  Sent Specimen:  Blood from Peripheral, Antecubital, Right Updated:  10/26/19 1354        Specimen (12h ago, onward)    None          Diagnostic Results:  I have reviewed all pertinent imaging results/findings within the past 24 hours.   Imaging Results          X-Ray Toe 2 or More Views Right (Final result)  Result time 10/26/19 11:34:06    Final result by Yordy Linares Jr., MD (10/26/19 11:34:06)                 Impression:      Soft tissue abnormality and irregularity involving the mid and distal phalanges of the 4th toe consistent with the clinical history of osteomyelitis.      Electronically signed by: Yordy Linares MD  Date:    10/26/2019  Time:    11:34             Narrative:    EXAMINATION:  XR TOE 2 OR MORE VIEWS RIGHT    CLINICAL HISTORY:  right 4th toe infection;    TECHNIQUE:  Right toes three views.    COMPARISON:  None    FINDINGS:  There is flexion deformity of the toes.  There is narrowing of multiple IP joints.  There are some erosive changes involving the distal and middle phalanges of the 4th toe.  Associated soft tissue abnormality.  Toes are superimposed on the lateral projection.                                    Clinical Findings:  Right 4th  digit appears erythematous and edematous with an open wound with gouty tophi crystals draining at the dorsal level of the DIP joint. Tenderness to palpation along the DIP joint. No purulent drainage noted, No malodor, no tracking, no undermining of wound.   Post I&D      Pre I&D    10/26/2019  Procedure: Incision and drainage of right 4th toe  Diagnosis: Gouty arthritis  Supervising provider: Dr. Radhika Pineda DPM   Performing provider: Dr. Nhung Ball DPM PGY1    Procedure in detail: Verbal and written consent was obtained for bedside I&D of right 4th toe. Right 4th toe was prepped in a sterile manner with betadine.    Attention was directed to the right 4th toe in which a local infiltrative digital block was performed using 10 mL of a 1:1 mixture of 1% lidocaine plain and .5% Marcaine plain. An incision was made dorsally at the level of the DIPJ. Gouty tophi was expressed with manual compression of the surrounding tissues. Deep cultures as well as pathology were taken of the gouty tophi crystals. All gouty tophi were debrided as much as possible and the wound was copiously flushed with sterile saline.    Hemostasis was achieved with pressure. The wound was dressed with betadine soaked 2x2s,  kerlix, and coban. Patient tolerated the procedure well        Assessment/Plan:     Gouty arthritis of toe of right foot  Danial Meza is a 79 y.o. male with a chief complaint of right 4th toe pain and swelling x 2 weeks. Right 4th toe pain and swelling likely 2/2 to acute gouty arthritis based on clinical presentation with history of gout in right foot, high uric acid level (7.1) and presence of gouty tophi crystals at level of DIPJ.     On initial presentation in the ED,  patient was noted to be afebrile, hemodynamically stable.  No leukocytosis, WBC is 6.4.  X-ray of right lower extremity shows soft tissue abnormality and irregularity involving the mid and distal phalanges of the 4th toe concerning for osteomyelitis vs  gouty arthritic changes. ESR, CRP WNL.     Plan:  -Bedside I&D was performed today (10/26/2019) on the right 4th toe and moderate amount of gouty tophi crystals was observed.   -Pending deep cultures of right 4th digit wound/gouty tophi and pathology  -Resume antibiotics for skin/soft tissue infection  -Recommend patient resume colchicine medication  -No surgical intervention necessary at this time from podiatry standpoint.  -Continue local wound care. Orders for daily wound care placed.  -Weightbearing as tolerated in post op surgical shoe right foot  -Podiatry will continue to monitor    DC Instructions:  Patient is to follow up with podiatry within 10 days of discharge.  Call [phone]  to make an appointment.  Home health/facility to do dressing changes every MWF as follows:  Rinse with saline, pat dry, apply iodosorb to right 4th toe wound, dress with 2x2 gauze, fabian, coban.        Paroxysmal SVT (supraventricular tachycardia)  Managed per hospital medicine/cardiology    Sinus bradycardia  Managed per hospital medicine/cardiology    PAF (paroxysmal atrial fibrillation)  Managed per hospital medicine    Mixed hyperlipidemia  Managed per hospital medicine    Essential hypertension  Managed per hospital medicine        Thank you for your consult. I will follow-up with patient. Please contact us if you have any additional questions.    Nhung Ball MD  Podiatry  [facility]

## 2019-10-27 NOTE — DISCHARGE INSTRUCTIONS
Wound care: Rinse with saline, pat dry, apply iodosorb to right 4th toe wound, dress with 2x2 gauze, fabian, coban.      Future Appointments   Date Time Provider Department Center   10/28/2019 10:00 AM Ru Thomason Jr., MD [facility]     Our goal in the emergency department is to always give you outstanding care and exceptional service. You may receive a survey by mail or e-mail in the next week regarding your experience in our ED. We would greatly appreciate your completing and returning the survey. Your feedback provides us with a way to recognize our staff who give very good care and it helps us learn how to improve when your experience was below our aspiration of excellence.

## 2019-10-27 NOTE — PT/OT/SLP EVAL
"Occupational Therapy   Evaluation and Discharge Note    Name: Danial Meza  MRN: [id number]  Admitting Diagnosis:  Gouty arthritis of toe of right foot      Recommendations:     Discharge Recommendations: home  Discharge Equipment Recommendations:  none  Barriers to discharge:  None    Assessment:     Danial Meza is a 79 y.o. male with a medical diagnosis of Gouty arthritis of toe of right foot. At this time, patient is functioning at their prior level of function and does not require further acute OT services.     Plan:     During this hospitalization, patient does not require further acute OT services.  Please re-consult if situation changes.    · Plan of Care Reviewed with: patient    Subjective     Chief Complaint: pain in R 4th toe when walking  Patient/Family Comments/goals: to return home  "I could cut the grass right now."    Occupational Profile:  Living Environment: Pt lives with his wife in [facility] with 0 [place].  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  Patient found HOB elevated with peripheral IV upon OT entry to room.    General Precautions: Standard, fall   Orthopedic Precautions: RLE weight bearing as tolerated   Braces: N/A     Occupational Performance:    Bed Mobility:    · Patient completed Rolling/Turning to Right with independence  · Patient completed Scooting/Bridging with independence  · Patient completed Supine to Sit with independence    Functional " Mobility/Transfers:  · Patient completed Sit <> Stand Transfer with independence  with  no assistive device   · Functional Mobility: Pt ambulated from EOB to down the dumont and back with independence with no AD.    Activities of Daily Living:  · Lower Body Dressing: setup to don B slippers    Cognitive/Visual Perceptual:  Cognitive/Psychosocial Skills:     -       Oriented to: Person, Place, Time and Situation   -       Follows Commands/attention:Follows two-step commands and Follows multistep  commands  -       Communication: clear/fluent  -       Memory: No Deficits noted  -       Safety awareness/insight to disability: intact   -       Mood/Affect/Coping skills/emotional control: Appropriate to situation    Physical Exam:  Upper Extremity Range of Motion:     -       Right Upper Extremity: WNL  -       Left Upper Extremity: WNL  Upper Extremity Strength:    -       Right Upper Extremity: WNL  -       Left Upper Extremity: WNL   Strength:    -       Right Upper Extremity: WNL  -       Left Upper Extremity: WNL    AMPAC 6 Click ADL:  AMPAC Total Score: 24    Treatment & Education:  Pt edu on role of OT, plan of care, benefit of participating in out of bed activity, and safety when performing functional transfers and ambulation activity.  - White board updated  Education:    Patient left sitting EOB with call button in reach    GOALS:   Multidisciplinary Problems     Occupational Therapy Goals     Not on file                History:     Past Medical History:   Diagnosis Date    Basal cell carcinoma 1/5/12    right nose    Basal cell carcinoma 10/09/2017    left nasal bridge and left cheek    Cataract     Glaucoma     Gout 7/18/2014    Hyperlipemia     Hypertension     Mixed hyperlipidemia     Osteoarthritis of right foot 10/26/2019    PAF (paroxysmal atrial fibrillation) 2012    SVT (supraventricular tachycardia) 1/14/2005    ablation by Marixa of left lateral free wall accessory bypass tract     Type 2 diabetes mellitus with hyperglycemia, without long-term current use of insulin 4/9/2018       Past Surgical History:   Procedure Laterality Date    COLONOSCOPY  12/2015    skin cancer nose and neck      SVT accessory pathway ablation  1/14/2005    Dr. Calvin       Time Tracking:     OT Date of Treatment: 10/27/19  OT Start Time: 1035  OT Stop Time: 1045  OT Total Time (min): 10 min    Billable Minutes:Evaluation 10    CHINTAN Uriostegui  10/27/2019

## 2019-10-27 NOTE — PLAN OF CARE
Patient AAOX4, call light and belongings in reach sidreails up 2, scds on and operating, ambulates independently to bathroom.  Denies pain this shift, and tolerating diet with no complaints of N/V.  Right foot bandage c/d/I with no drainage noted.  Patient remains free from falls ands safety maintained this shift.

## 2019-10-27 NOTE — DISCHARGE SUMMARY
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       HPI: Danial Meza is a 79 y.o. male with PMH of hypertension, PAF on Pradaxa, SVT s/p ablation of left lateral free wall accessory bypass tract, dyslipidemia, who [facility] today with a chief complaint of right toe pain and swelling. Patient reports his symptoms started about a week and half ago when E developed pain and swelling to the area.  Patient denies any injury or trauma to his right lower extremity.  Patient reports his pain is sharp/throbbing in quality, 6/10 in intensity.  His symptoms have been intermittent since onset.  This morning, patient woke up with his sock saturated in blood which prompted him to seek further medical attention in the emergency department.  While in the ED, his pain is only present with palpation to the area.  Patient denies any fever, chills, malaise, diaphoresis, nausea, vomiting, diarrhea, urinary urgency, frequency, dysuria.  Patient reports he was treated for cellulitis of the right posterior calf earlier this year with oral Bactrim  which resolved after taking his antibiotics. While in the ED, patient is noted to be afebrile, hemodynamically stable, saturating 98% on room air.  No leukocytosis, WBC is 6.4.  X-ray of right lower extremity shows soft tissue abnormality and irregularity involving the mid and distal phalanges of the 4th toe consistent with osteomyelitis.       Hospital Course:   Right lower extremity osteomyelitis:  -ESR, CRP WNL, no leukocytosis  -blood cultures with no growth to date  -patient was treated with vancomycin, clindamycin in the ED, will hold further antibiotic treatments as patient is afebrile, hemodynamically stable, does not have leukocytosis.  -patient was seen by Podiatry Podiatry and bone cultures were obtained, I&D was performed  -per Podiatry, this is likely inflammation from gouty tophi as crystals were observed.  Unlikely to be osteomyelitis at this time  -will discharge patient on 7 day course of Augmentin per Podiatry recommendations since underwent an I&D  -patient was seen by PT, continue weight-bearing as tolerated  -continue wound care at home  -will need to follow up with Podiatry, PCP outpatient     Hypertension:  -stable  -Continue PTA  lisinopril, pindolol     Diabetes Mellitus:  -Last HbA1c:  5.7  -ADA diet     Paroxysmal Atrial Fibrillation:   -Pt is currently rate controlled with HR <110  -[id number] score >2, continue Pradaxa for anticoagulation  -Rate control with pindolol     Hyperlipidemia:  -continue PTA atorvastatin 20 mg     Labs:  Recent Labs   Lab 10/26/19  1100 10/27/19  0455   WBC 6.44 4.71   HGB 13.5* 12.5*   HCT 39.4* 38.3*    134*     Recent Labs   Lab 10/26/19  1100 10/27/19  0455   * 146*   K 4.4 4.2   * 113*   CO2 24 26   BUN 22 18   CREATININE 1.2 1.0   GLU 95 142*   CALCIUM 9.0 9.2   MG  --  1.9   PHOS  --  2.8     Recent Labs   Lab 10/26/19  1100   ALKPHOS 84   ALT 28   AST 21   ALBUMIN 3.9   PROT 6.4   BILITOT 0.7      Recent Labs   Lab 10/27/19  0707    POCTGLUCOSE 112*     No results for input(s): CPK, CPKMB, MB, TROPONINI in the last 72 hours.    ROS (Positive in Bold, otherwise negative)  Constitutional: fever, chills, night sweats  CV: chest pain, edema, palpitations  Resp: SOB, cough, sputum production  GI: changes in appetite, NVDC, pain, melena, hematochezia, GERD, hematemesis  : Dysuria, hematuria, urinary urgency, frequency  MSK: arthralgia/myalgia, joint swelling  Neuro/Psych: anxiety, depression    PEx   Temp:  [97.4 °F (36.3 °C)-98.2 °F (36.8 °C)]   Pulse:  [54-71]   Resp:  [18-19]   BP: (127-180)/(62-84)   SpO2:  [94 %-98 %]      General: no distress   Lungs: clear to ausculation anteriorly and posteriorly   Heart: regular rate and rhythm   Abdomen: normal bowel sounds, soft, no tenderness   Extremities: no edema. No clubbing or cyanosis       Procedures:  Right 4th toe I&D, bone culture, x-ray    Consultants:  Podiatry    Current Discharge Medication List      START taking these medications    Details   amoxicillin-clavulanate 875-125mg (AUGMENTIN) 875-125 mg per tablet Take 1 tablet by mouth 2 (two) times daily. for 7 days  Qty: 14 tablet, Refills: 0         CONTINUE these medications which have NOT CHANGED    Details   atorvastatin (LIPITOR) 20 MG tablet TAKE ONE TABLET BY MOUTH EVERY DAY IN THE EVENING  Qty: 90 tablet, Refills: 3    Associated Diagnoses: Mixed hyperlipidemia; Type 2 diabetes mellitus with hyperglycemia, without long-term current use of insulin      colchicine (COLCRYS) 0.6 mg tablet Take 2 pills at first sign of gout flare, followed by 1 pill 1 hour later. Then 1 pill two times a day until flare resolves.  Qty: 30 tablet, Refills: 11    Associated Diagnoses: Chronic gout without tophus, unspecified cause, unspecified site      lisinopril 10 MG tablet TAKE ONE TABLET BY MOUTH EVERY DAY.  Qty: 90 tablet, Refills: 0    Associated Diagnoses: Essential hypertension      multivitamin (MULTIVITAMIN) per tablet Take 1 tablet by mouth  once daily.        mupirocin (BACTROBAN) 2 % ointment Apply to affected area 3 times daily  Qty: 22 g, Refills: 1    Associated Diagnoses: Cellulitis of right leg      pindolol (VISKEN) 5 MG Tab TAKE ONE TABLET BY MOUTH TWICE DAILY  Qty: 180 tablet, Refills: 0    Associated Diagnoses: PAF (paroxysmal atrial fibrillation); Essential hypertension      PRADAXA 150 mg Cap TAKE ONE CAPSULE BY MOUTH TWICE DAILY  Qty: 180 capsule, Refills: 3    Associated Diagnoses: PAF (paroxysmal atrial fibrillation); Current use of long term anticoagulation      VIT C/VIT E/LUTEIN/MIN/OMEGA-3 (OCUVITE ORAL) Take 1 tablet by mouth once daily.              The relevant and important risks, side effects, and benefits of their medications were reviewed with patient during hospitalization and at discharge. The patient was given the opportunity to discuss and ask questions about their medications, including target symptoms, potential risks, side effects and benefits of their medications, as well as their expected prognosis if non-medication treatment options were chosen.  The patient expresses understanding of all these options and information and voluntarily consents to treatment.    Discharge Diet:cardiac diet with Normal Fluid intake of 1500 - 2000 mL per day    Activity: activity as tolerated    Discharge Condition: Stable    Disposition: Home or Self Care    Tests pending at the time of discharge:  Bone culture     Time spent  on the discharge of the patient including review of hospital course with the patient. reviewing discharge medications and arranging follow-up care: 38 mins    Discharge examination Patient was seen and examined on the date of discharge and determined to be suitable for discharge.    Discharge plan and follow up:  It is critical that you make your follow-up appointment(s). If you are discharged on the weekend or after business hours, or if we are unable to schedule these appointments for you for any reason, you or a  family member need to call during the next business day to schedule your appointment(s).    -Follow up with you PCP, Louie Durant MD within 1-2 weeks as arranged with SW and treatment team prior to discharge  -Take all medications as prescribed and listed above.    - Please return to ED or call your physician if you have:         1. Fevers > 101.5 unresponsive to tylenol.       2. Abdominal pain and/or distention       3. Intractable nausea, vomiting or diarrhea       4. Inability to tolerate adequate oral intake of food       5. Neurologic changes, chest pain or shortness of breath         Future Appointments   Date Time Provider Department Center   10/28/2019 10:00 AM Ru Thomason Jr., MD [facility]     Follow-up with PCP, Podiatry    Ana María Marks MD  Hospital Medicine Staff  [phone] pager

## 2019-10-27 NOTE — ASSESSMENT & PLAN NOTE
Danial Meza is a 79 y.o. male with a chief complaint of right 4th toe pain and swelling x 2 weeks. Right 4th toe pain and swelling likely 2/2 to acute gouty arthritis based on clinical presentation with history of gout in right foot, high uric acid level (7.1) and presence of gouty tophi crystals at level of DIPJ.     On initial presentation in the ED,  patient was noted to be afebrile, hemodynamically stable.  No leukocytosis, WBC is 6.4.  X-ray of right lower extremity shows soft tissue abnormality and irregularity involving the mid and distal phalanges of the 4th toe concerning for osteomyelitis vs gouty arthritic changes. ESR, CRP WNL.     Plan:  -Bedside I&D was performed today (10/26/2019) on the right 4th toe and moderate amount of gouty tophi crystals was observed.   -Pending deep cultures of right 4th digit wound/gouty tophi and pathology  -Resume antibiotics  -Recommend patient resume colchicine medication  -Continue local wound care. Orders for daily wound care placed.  -Weightbearing as tolerated in post op surgical shoe right foot  -Podiatry will continue to monitor

## 2019-10-27 NOTE — PT/OT/SLP EVAL
Physical Therapy Evaluation and Discharge Note    Patient Name:  Danial Meza   MRN:  [id number]    Recommendations:     Discharge Recommendations:  home   Discharge Equipment Recommendations: none   Barriers to discharge: None    Assessment:     Danial Meza is a 79 y.o. 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 Patient is independent with all ADLs and functional mobility at this time.  Patient demonstrates good safety with bed mobility, transfers and ambulation.  Patient reports minimal pain and demonstrates good bed mobility, transfers and ambulation.  Patient demonstrates good safety and is without further acute care PT needs at this time.    Recent Surgery: * No surgery found *      Plan:     During this hospitalization, patient does not require further acute PT services.  Please re-consult if situation changes.      Subjective     Chief Complaint: I just want to get home  Pain/Comfort:  · Pain Rating 1: 0/10  · Location - Side 1: Right  · Location 1: fourth toe  · Pain Addressed 1: Cessation of Activity, Distraction  · Pain Rating Post-Intervention 1: (did not rate but reported some mild soreness when up)    Patients cultural, spiritual, Anglican conflicts given the current situation: no    Living Environment:  Patient lives with spouse in a single story home with no steps to enter.  Patient independent with all ADLs and mobility.  Patient reports still driving and independence with yardwork.   Equipment available at home: none.   Upon discharge, patient will have assistance from spouse.    Objective:     Communicated with RN prior to session.  Patient found supine with (without lines)  upon PT entry to room.    General Precautions: Standard, fall   Orthopedic Precautions:RLE weight bearing as tolerated   Braces: N/A     Exams:  · RLE ROM: WFL  · RLE Strength: WFL  · LLE ROM: WFL  · LLE Strength: WFL    Functional Mobility:  · Bed Mobility:     · Supine to Sit:  independence  · Transfers:     · Sit to Stand:  independence with no AD  · Gait: Patient ambulated 175 ft with none and independence.    Therapeutic Activities and Exercises:   Patient with safe ambulation without functional limitations.  Patient demonstrates good safety with all mobility.      Patient Education:    Patient educated on role of PT by explanation.  Patient was receptive to education and verbalizes understanding.   AM-PAC 6 CLICK MOBILITY  Total Score:24     Patient left supine with all lines intact.    GOALS:   Multidisciplinary Problems     Physical Therapy Goals     Not on file          Multidisciplinary Problems (Resolved)        Problem: Physical Therapy Goal    Goal Priority Disciplines Outcome Goal Variances Interventions   Physical Therapy Goal   (Resolved)     PT, PT/OT Met                     History:     Past Medical History:   Diagnosis Date    Basal cell carcinoma 1/5/12    right nose    Basal cell carcinoma 10/09/2017    left nasal bridge and left cheek    Cataract     Glaucoma     Gout 7/18/2014    Hyperlipemia     Hypertension     Mixed hyperlipidemia     Osteoarthritis of right foot 10/26/2019    PAF (paroxysmal atrial fibrillation) 2012    SVT (supraventricular tachycardia) 1/14/2005    ablation by [facility]a of left lateral free wall accessory bypass tract    Type 2 diabetes mellitus with hyperglycemia, without long-term current use of insulin 4/9/2018       Past Surgical History:   Procedure Laterality Date    COLONOSCOPY  12/2015    skin cancer nose and neck      SVT accessory pathway ablation  1/14/2005    Dr. Calvin       Time Tracking:     PT Received On: 10/27/19  PT Start Time: 1037     PT Stop Time: 1047  PT Total Time (min): 10 min     Billable Minutes: Evaluation 10 min       Oscar Miner, PT  10/27/2019

## 2019-10-28 ENCOUNTER — OFFICE VISIT (OUTPATIENT)
Dept: TRANSPLANT | Facility: CLINIC | Age: 79
End: 2019-10-28
Attending: INTERNAL MEDICINE
Payer: MEDICARE

## 2019-10-28 VITALS
BODY MASS INDEX: 30.01 KG/M2 | DIASTOLIC BLOOD PRESSURE: 68 MMHG | SYSTOLIC BLOOD PRESSURE: 145 MMHG | HEART RATE: 42 BPM | HEIGHT: 72 IN | WEIGHT: 221.56 LBS

## 2019-10-28 DIAGNOSIS — R73.01 IMPAIRED FASTING GLUCOSE: ICD-10-CM

## 2019-10-28 DIAGNOSIS — I10 ESSENTIAL HYPERTENSION: ICD-10-CM

## 2019-10-28 DIAGNOSIS — E78.2 MIXED HYPERLIPIDEMIA: ICD-10-CM

## 2019-10-28 DIAGNOSIS — D64.9 ANEMIA, UNSPECIFIED TYPE: ICD-10-CM

## 2019-10-28 DIAGNOSIS — Z79.01 CURRENT USE OF LONG TERM ANTICOAGULATION: ICD-10-CM

## 2019-10-28 DIAGNOSIS — I48.0 PAF (PAROXYSMAL ATRIAL FIBRILLATION): Primary | ICD-10-CM

## 2019-10-28 PROCEDURE — 99214 PR OFFICE/OUTPT VISIT, EST, LEVL IV, 30-39 MIN: ICD-10-PCS | Mod: S$PBB,,, | Performed by: INTERNAL MEDICINE

## 2019-10-28 PROCEDURE — 99214 OFFICE O/P EST MOD 30 MIN: CPT | Mod: S$PBB,,, | Performed by: INTERNAL MEDICINE

## 2019-10-28 PROCEDURE — 99999 PR PBB SHADOW E&M-EST. PATIENT-LVL III: ICD-10-PCS | Mod: PBBFAC,,, | Performed by: INTERNAL MEDICINE

## 2019-10-28 PROCEDURE — 99213 OFFICE O/P EST LOW 20 MIN: CPT | Mod: PBBFAC | Performed by: INTERNAL MEDICINE

## 2019-10-28 PROCEDURE — 99999 PR PBB SHADOW E&M-EST. PATIENT-LVL III: CPT | Mod: PBBFAC,,, | Performed by: INTERNAL MEDICINE

## 2019-10-28 NOTE — PATIENT INSTRUCTIONS
No rice, no pasta, no bread, no potatoes and no man-made sweets.  You can consume all of the salads and vegetables that you want.  Limit meat to 4-6 ounces per day.  Do not skip meals.  Suggest oatmeal or eggs for breakfast and do not eat breakfast cereals    Observe BP twice daily for 2 weeks and let me know reading if consistently above 130/80

## 2019-10-28 NOTE — PROGRESS NOTES
Subjective:     Patient ID:  Danial Meza is a 79 y.o. male who presents for follow-up of Hypertension; Hyperlipidemia; and Atrial Fibrillation    HPI:   78 yo WM with hypertension, lipid disorder, tachy-xu syndrome, prior AFl ablation and PAF on chronic no active presented to the emergency room over the weekend with pain, bleeding and an open wound involving the right 4th toe.  He was evaluated and felt to have suffered from acute gout as the cause as opposed to an infectious process with urate crystals noted by podiatry.  He was treated with colchicine and asked to follow up with his primary care physician which will occur next week.  He has no prior history of gout.  Uric acid was slightly elevated.  At the time of his presentation he also had elevated glucose and has a prior history of glucose intolerance.  On February 7, 2019 he underwent a pharmacologic nuclear stress test that was negative for ischemia.  He has no known coronary artery disease.    No tightness, pressure or heaviness in chest, neck, arms, throat, jaw or back with or without exertion.  No MCBRIDE, orthopnea, PND, pre-syncope or syncope.  He does note palpitations generally while he is lying supine at night lasting 20-30 seconds.  No associated symptoms.  He thinks this occurs on average once per month.  He does not not it with exertion.    Review of Systems   Constitution: Negative for chills, fever, malaise/fatigue, night sweats, weight gain and weight loss.   Cardiovascular: Positive for palpitations. Negative for chest pain, dyspnea on exertion, irregular heartbeat, leg swelling, near-syncope, orthopnea, paroxysmal nocturnal dyspnea and syncope.   Respiratory: Negative for cough, sputum production and wheezing.    Endocrine:        Elevated glucose no prior diagnosis of diabetes but does have known glucose intolerance   Hematologic/Lymphatic: Does not bruise/bleed easily.        Anemia noted during recent hospitalization   Musculoskeletal:  Positive for gout. Negative for arthritis, joint pain and stiffness.   Gastrointestinal: Negative for hematochezia and melena.        Last colonoscopy approximately 5 years ago normal   Genitourinary: Negative for hematuria.   Neurological: Negative for brief paralysis, focal weakness, seizures and weakness.     Objective:   Physical Exam   Constitutional: He is oriented to person, place, and time. He appears well-developed and well-nourished. No distress.   BP (!) 145/68 (BP Location: Left arm, Patient Position: Sitting, BP Method: Large (Automatic))   Pulse (!) 42 THIS WAS BY THE AUTOMATIC CUFF APICAL HEART RATE 60  Ht 6' (1.829 m)   Wt 100.5 kg (221 lb 9 oz)   BMI 30.05 kg/m²   Last visit wt 99.4 kg (219 lb 2.2 oz)     On my examination manual blood pressure cuff right arm 172/82 left arm 174/88.   HENT:   Head: Normocephalic and atraumatic.   Mouth/Throat: No oropharyngeal exudate.   Eyes: Conjunctivae are normal. Right eye exhibits no discharge. Left eye exhibits no discharge. No scleral icterus.   I was unable to visualize the fundi adequately.  He reports seeing to ophthalmologist in February and was not told of any eye ground changes   Neck: No JVD present. No thyromegaly present.   Cardiovascular: Normal rate, regular rhythm and normal heart sounds. Exam reveals no gallop.   No murmur heard.  Apical heart rate 60   Pulmonary/Chest: Effort normal and breath sounds normal.   Abdominal: Soft. Bowel sounds are normal. He exhibits no distension and no mass. There is no tenderness.   Musculoskeletal: He exhibits no edema or tenderness.   He is wearing open toe shoes bilaterally   Neurological: He is alert and oriented to person, place, and time.   Skin: Skin is warm and dry. He is not diaphoretic.   Psychiatric: He has a normal mood and affect. His behavior is normal. Judgment and thought content normal.      H/H felt to 12.5/38.3 during recent hospital stay; glucose as high as 142 approximately 5:00 a.m. on  October 27th but was 95 at 11:00 a.m. on October 26th.  It is not clear if he was on an infusion including D5 W at the 5:00 a.m. draw.  Uric acid elevated 7.1  Assessment:     1. PAF (paroxysmal atrial fibrillation)    2. Current use of long term anticoagulation    3. Impaired fasting glucose    4. Essential hypertension    5. Anemia, unspecified type    6. Mixed hyperlipidemia      Plan:   No rice, no pasta, no bread, no potatoes and no man-made sweets.  You can consume all of the salads and vegetables that you want.  Limit meat to 4-6 ounces per day.  Do not skip meals.  Suggest oatmeal or eggs for breakfast and do not eat breakfast cereals    Observe BP twice daily for 2 weeks and let me know reading if consistently above 130/80    CBCs reviewed downward trend H/H noted so will repeat today along with iron stores since he is on a DOAC.  Obtain lipids, A1c today  These labs coupled with those obtained in the hospital should prove helpful for his upcoming primary care visit.    ADDENDUM OCTOBER 28, 2019 6:32 P.M.  Lab Results   Component Value Date     (H) 10/27/2019    K 4.2 10/27/2019    MG 1.9 10/27/2019     (H) 10/27/2019    CO2 26 10/27/2019    BUN 18 10/27/2019    CREATININE 1.0 10/27/2019     (H) 10/27/2019    HGBA1C 5.6 10/28/2019    AST 21 10/26/2019    ALT 26 10/28/2019    ALBUMIN 3.9 10/26/2019    PROT 6.4 10/26/2019    BILITOT 0.7 10/26/2019    WBC 5.92 10/28/2019    HGB 14.3 10/28/2019    HCT 42.5 10/28/2019     10/28/2019    CHOL 77 (L) 10/28/2019    HDL 23 (L) 10/28/2019    LDLCALC 37.0 (L) 10/28/2019    TRIG 85 10/28/2019     Lab Results   Component Value Date    IRON 86 10/28/2019    TIBC 324 10/28/2019    FERRITIN 93 10/28/2019     RECOMMENDATIONS:  H/H and iron stores now normal; continue DOAC  A1c 5.6%, no evidence for diabetes the with the observed glucose of 144 would continue to label as glucose intolerance.  In view of glucose intolerance and very low HDL I would  continue statin at present dose as the prior lipids obtained 2016, 2017, 2018 demonstrated LDLs in the 80s and 90s.  This value is likely artificially low from his recent acute illness.  Would repeat lipids in 6-12 months.    I reviewed over the telephone with him tonight.  He reports his blood pressure was checked twice at home 135/85 both times with resting heart rate of 58 beats per minute.

## 2019-10-30 ENCOUNTER — OFFICE VISIT (OUTPATIENT)
Dept: INTERNAL MEDICINE | Facility: CLINIC | Age: 79
End: 2019-10-30
Payer: MEDICARE

## 2019-10-30 VITALS
SYSTOLIC BLOOD PRESSURE: 136 MMHG | WEIGHT: 221.31 LBS | OXYGEN SATURATION: 98 % | DIASTOLIC BLOOD PRESSURE: 70 MMHG | BODY MASS INDEX: 29.97 KG/M2 | HEART RATE: 48 BPM | HEIGHT: 72 IN

## 2019-10-30 DIAGNOSIS — M10.9 GOUTY ARTHRITIS OF TOE OF RIGHT FOOT: ICD-10-CM

## 2019-10-30 DIAGNOSIS — Z09 HOSPITAL DISCHARGE FOLLOW-UP: Primary | ICD-10-CM

## 2019-10-30 LAB
BACTERIA SPEC AEROBE CULT: ABNORMAL
BACTERIA SPEC AEROBE CULT: ABNORMAL

## 2019-10-30 PROCEDURE — 99214 OFFICE O/P EST MOD 30 MIN: CPT | Mod: S$PBB,,, | Performed by: INTERNAL MEDICINE

## 2019-10-30 PROCEDURE — 99999 PR PBB SHADOW E&M-EST. PATIENT-LVL III: ICD-10-PCS | Mod: PBBFAC,,, | Performed by: INTERNAL MEDICINE

## 2019-10-30 PROCEDURE — 99213 OFFICE O/P EST LOW 20 MIN: CPT | Mod: PBBFAC | Performed by: INTERNAL MEDICINE

## 2019-10-30 PROCEDURE — 99999 PR PBB SHADOW E&M-EST. PATIENT-LVL III: CPT | Mod: PBBFAC,,, | Performed by: INTERNAL MEDICINE

## 2019-10-30 PROCEDURE — 99496 TRANSJ CARE MGMT HIGH F2F 7D: CPT | Mod: PBBFAC | Performed by: INTERNAL MEDICINE

## 2019-10-30 PROCEDURE — 99214 PR OFFICE/OUTPT VISIT, EST, LEVL IV, 30-39 MIN: ICD-10-PCS | Mod: S$PBB,,, | Performed by: INTERNAL MEDICINE

## 2019-10-30 RX ORDER — COLCHICINE 0.6 MG/1
0.6 TABLET ORAL DAILY
Qty: 60 TABLET | Refills: 1 | Status: SHIPPED | OUTPATIENT
Start: 2019-10-30 | End: 2019-12-04

## 2019-10-30 NOTE — PROGRESS NOTES
Subjective:       Patient ID: Danial Meza is a 79 y.o. male.    Chief Complaint: Follow-up (1 week ER follow up)    HPI    Last visit with me January 2019.  Since then seen by Cardiology, Optometry, Ophthalmology, dermatology.  Was admitted to the hospital on October 26th for acute gout.  Patient was discharged on October 27th.  Had a concern for osteomyelitis of the right foot, but Podiatry thought more likely gout.  At the time of discharge a bone culture was pending.  Patient was supposed to have a Podiatry follow-up, but no appointments are scheduled at this time.    Has had gout for 30 years, but never in the 4th digit. On Friday took two colchicine, not much help. Took ibuprofen as well.      Mo ago was walking outside and had puncture of skin. Went to urgent care, got shot and then antibiotics. Had bad irritation and inflammation in the area. Back of R leg. Took weeks to heal.     Review of Systems   All other systems reviewed and are negative.      Objective:      Physical Exam   Constitutional: He is oriented to person, place, and time. No distress.   Neurological: He is alert and oriented to person, place, and time.   Skin: Skin is warm and dry.   4th digit R foot with some skin irritation   Nursing note and vitals reviewed.            Vitals:    10/30/19 1527   BP: 136/70   BP Location: Right arm   Patient Position: Sitting   BP Method: Medium (Manual)   Pulse: (!) 48   SpO2: 98%   Weight: 100.4 kg (221 lb 5.5 oz)   Height: 6' (1.829 m)     Body mass index is 30.02 kg/m².    RESULTS: Reviewed labs from last 3 months    Assessment:       1. Hospital discharge follow-up    2. Gouty arthritis of toe of right foot        Plan:   Danial was seen today for follow-up.    Diagnoses and all orders for this visit:    Hospital discharge follow-up:  Transitional Care Note    Family and/or Caretaker present at visit?  No.  Diagnostic tests reviewed/disposition: I have reviewed all completed as well as pending  diagnostic tests at the time of discharge.  Disease/illness education: Discussed possibly needing allopurinol vs long-term antibiotics depending on final decision regarding etiology of the patient's toe symptoms.  Home health/community services discussion/referrals: Patient does not have home health established from hospital visit.  They do not need home health.  If needed, we will set up home health for the patient.   Establishment or re-establishment of referral orders for community resources: No other necessary community resources.   Discussion with other health care providers: I will discuss w/ Podiatry to determine next steps in management..       Gouty arthritis of toe of right foot  -     colchicine (COLCRYS) 0.6 mg tablet; Take 1 tablet (0.6 mg total) by mouth once daily.    Follow up in about 6 months (around 4/30/2020) for follow up visit.  Louie Durant MD  Internal Medicine    Portions of this note were completed using medical dictation software. Please excuse typographical or syntax errors that were missed on review.

## 2019-10-30 NOTE — Clinical Note
Dwight Pineda,You were consulted on Mr Meza when he was in the hospital for toe bleeding. Question of osteomyelitis vs gout. His bone culture showed Proteus and Enterobacter. He doesn't have follow up with Podiatry; should he see you? And/or go to Infectious Disease? Please let me know your thoughts; thanks! --Louie

## 2019-10-31 ENCOUNTER — PATIENT OUTREACH (OUTPATIENT)
Dept: ADMINISTRATIVE | Facility: OTHER | Age: 79
End: 2019-10-31

## 2019-10-31 ENCOUNTER — TELEPHONE (OUTPATIENT)
Dept: PODIATRY | Facility: CLINIC | Age: 79
End: 2019-10-31

## 2019-10-31 ENCOUNTER — TELEPHONE (OUTPATIENT)
Dept: INTERNAL MEDICINE | Facility: CLINIC | Age: 79
End: 2019-10-31

## 2019-10-31 LAB
BACTERIA BLD CULT: NORMAL
BACTERIA BLD CULT: NORMAL

## 2019-10-31 NOTE — TELEPHONE ENCOUNTER
----- Message from Riay Barragan DPM sent at 10/31/2019  8:37 AM CDT -----  Please schedule pt w/ any [facility] provider   ----- Message -----  From: Radhika Pineda DPM  Sent: 10/30/2019  10:04 PM CDT  To: MARTHA Dumont I was trying to get this message to [facility] staff to aid in scheduling this patient for hospital follow up

## 2019-10-31 NOTE — TELEPHONE ENCOUNTER
Please notify the patient that the culture results were not from bone biopsy but instead from a routine culture. Podiatry does not think this is due to any bone infection. He can complete the course of Augmentin antibiotics. Podiatry recommended he follow up with a provider on [address]; they are supposed to contact him in the next few days to set up an appointment. If he hasn't heard from their team please contact the office on Monday.

## 2019-12-03 ENCOUNTER — TELEPHONE (OUTPATIENT)
Dept: INTERNAL MEDICINE | Facility: CLINIC | Age: 79
End: 2019-12-03

## 2019-12-03 DIAGNOSIS — M1A.9XX0 CHRONIC GOUT WITHOUT TOPHUS, UNSPECIFIED CAUSE, UNSPECIFIED SITE: Primary | ICD-10-CM

## 2019-12-03 NOTE — TELEPHONE ENCOUNTER
----- Message from Anum Hurtado sent at 12/3/2019 10:18 AM CST -----  Contact: patient   cell [phone]  or [phone]  Patient would like a callback to discuss gout condition.    Please call and advise  Thank you

## 2019-12-03 NOTE — TELEPHONE ENCOUNTER
Pt states that he was seen recently for gout. He was taking cholchine 0/6mg twice a day and not once a day as written on the rx? He is also taking Ibuprofen. He was not given a prescription for allopurinol. He states that the toes continues to be red and swollen. He wants to know what will stop the swelling and cease the redness?  He denies fever or warmth to the touch. He has no pain. Any suggestions about medication changes and or diet recommendations to stop the swelling and redness.

## 2019-12-04 RX ORDER — COLCHICINE 0.6 MG/1
0.6 TABLET ORAL 2 TIMES DAILY
Qty: 60 TABLET | Refills: 0 | Status: SHIPPED | OUTPATIENT
Start: 2019-12-04 | End: 2019-12-04 | Stop reason: SDUPTHER

## 2019-12-04 RX ORDER — COLCHICINE 0.6 MG/1
0.6 TABLET ORAL 2 TIMES DAILY
Qty: 180 TABLET | Refills: 0 | Status: SHIPPED | OUTPATIENT
Start: 2019-12-04 | End: 2022-10-18 | Stop reason: SDUPTHER

## 2019-12-04 NOTE — TELEPHONE ENCOUNTER
I spoke to the pt. Dr Durant can you send in a 90 day supply to [facility] instead of a 1 month supply. It costs the same amount and he is still out of pocket about 50 dollars.

## 2019-12-04 NOTE — TELEPHONE ENCOUNTER
I do not think this is gout; there may be another problem going on. He should see Podiatry as recommended; I have placed a new referral. Please schedule this appointment with the patient or provide the number for the scheduling desk. In the meantime I will refill the colchicine to be used two times a day.

## 2019-12-26 DIAGNOSIS — Z79.01 CURRENT USE OF LONG TERM ANTICOAGULATION: ICD-10-CM

## 2019-12-26 DIAGNOSIS — I48.0 PAF (PAROXYSMAL ATRIAL FIBRILLATION): ICD-10-CM

## 2019-12-31 RX ORDER — DABIGATRAN ETEXILATE MESYLATE 150 MG/1
CAPSULE ORAL
Qty: 180 CAPSULE | Refills: 3 | Status: SHIPPED | OUTPATIENT
Start: 2019-12-31 | End: 2021-03-02

## 2020-01-29 DIAGNOSIS — I10 ESSENTIAL HYPERTENSION: ICD-10-CM

## 2020-01-29 DIAGNOSIS — I48.0 PAF (PAROXYSMAL ATRIAL FIBRILLATION): ICD-10-CM

## 2020-01-29 RX ORDER — LISINOPRIL 10 MG/1
TABLET ORAL
Qty: 90 TABLET | Refills: 0 | Status: SHIPPED | OUTPATIENT
Start: 2020-01-29 | End: 2020-03-12

## 2020-01-29 RX ORDER — PINDOLOL 5 MG/1
TABLET ORAL
Qty: 180 TABLET | Refills: 0 | Status: SHIPPED | OUTPATIENT
Start: 2020-01-29 | End: 2020-03-12

## 2020-03-12 DIAGNOSIS — I48.0 PAF (PAROXYSMAL ATRIAL FIBRILLATION): ICD-10-CM

## 2020-03-12 DIAGNOSIS — I10 ESSENTIAL HYPERTENSION: ICD-10-CM

## 2020-03-12 RX ORDER — PINDOLOL 5 MG/1
TABLET ORAL
Qty: 180 TABLET | Refills: 0 | Status: SHIPPED | OUTPATIENT
Start: 2020-03-12 | End: 2020-05-19

## 2020-03-12 RX ORDER — LISINOPRIL 10 MG/1
TABLET ORAL
Qty: 90 TABLET | Refills: 0 | Status: SHIPPED | OUTPATIENT
Start: 2020-03-12 | End: 2020-06-25

## 2020-05-19 DIAGNOSIS — I48.0 PAF (PAROXYSMAL ATRIAL FIBRILLATION): ICD-10-CM

## 2020-05-19 DIAGNOSIS — I10 ESSENTIAL HYPERTENSION: ICD-10-CM

## 2020-05-19 RX ORDER — PINDOLOL 5 MG/1
TABLET ORAL
Qty: 180 TABLET | Refills: 0 | Status: SHIPPED | OUTPATIENT
Start: 2020-05-19 | End: 2020-11-27 | Stop reason: SDUPTHER

## 2020-06-08 ENCOUNTER — PATIENT OUTREACH (OUTPATIENT)
Dept: ADMINISTRATIVE | Facility: OTHER | Age: 80
End: 2020-06-08

## 2020-06-10 ENCOUNTER — OFFICE VISIT (OUTPATIENT)
Dept: PODIATRY | Facility: CLINIC | Age: 80
End: 2020-06-10
Payer: MEDICARE

## 2020-06-10 VITALS
HEART RATE: 72 BPM | BODY MASS INDEX: 30.44 KG/M2 | DIASTOLIC BLOOD PRESSURE: 72 MMHG | WEIGHT: 224.44 LBS | SYSTOLIC BLOOD PRESSURE: 129 MMHG

## 2020-06-10 DIAGNOSIS — M10.041 IDIOPATHIC GOUT OF RIGHT HAND, UNSPECIFIED CHRONICITY: ICD-10-CM

## 2020-06-10 DIAGNOSIS — M20.41 HAMMER TOE OF RIGHT FOOT: Primary | ICD-10-CM

## 2020-06-10 PROCEDURE — 99214 PR OFFICE/OUTPT VISIT, EST, LEVL IV, 30-39 MIN: ICD-10-PCS | Mod: S$PBB,,, | Performed by: PODIATRIST

## 2020-06-10 PROCEDURE — 99213 OFFICE O/P EST LOW 20 MIN: CPT | Mod: PBBFAC | Performed by: PODIATRIST

## 2020-06-10 PROCEDURE — 99999 PR PBB SHADOW E&M-EST. PATIENT-LVL III: CPT | Mod: PBBFAC,,, | Performed by: PODIATRIST

## 2020-06-10 PROCEDURE — 99999 PR PBB SHADOW E&M-EST. PATIENT-LVL III: ICD-10-PCS | Mod: PBBFAC,,, | Performed by: PODIATRIST

## 2020-06-10 PROCEDURE — 99214 OFFICE O/P EST MOD 30 MIN: CPT | Mod: S$PBB,,, | Performed by: PODIATRIST

## 2020-06-17 NOTE — PROGRESS NOTES
Subjective:      Patient ID: Danial Meza is a 80 y.o. male.    Chief Complaint: Foot Problem (right foot second toe red having problem wearing dress shoes )    Danial is a 80 y.o. male who presents to the clinic for evaluation and treatment of high risk feet. Danial has a past medical history of Basal cell carcinoma (1/5/12), Basal cell carcinoma (10/09/2017), Cataract, Glaucoma, Gout (7/18/2014), Hyperlipemia, Hypertension, Mixed hyperlipidemia, Osteoarthritis of right foot (10/26/2019), PAF (paroxysmal atrial fibrillation) (2012), SVT (supraventricular tachycardia) (1/14/2005), and Type 2 diabetes mellitus with hyperglycemia, without long-term current use of insulin (4/9/2018). The patient's chief complaint painful right 2nd toe.  This patient has documented high risk feet requiring routine maintenance secondary to peripheral neuropathy.    PCP: Louie Durant MD    Date Last Seen by PCP:   Chief Complaint   Patient presents with    Foot Problem     right foot second toe red having problem wearing dress shoes          Current shoe gear:  Affected Foot: Slip-on shoes     Unaffected Foot: Slip-on shoes    Last encounter in this department: Visit date not found    Hemoglobin A1C   Date Value Ref Range Status   10/28/2019 5.6 4.0 - 5.6 % Final     Comment:     ADA Screening Guidelines:  5.7-6.4%  Consistent with prediabetes  >or=6.5%  Consistent with diabetes  High levels of fetal hemoglobin interfere with the HbA1C  assay. Heterozygous hemoglobin variants (HbS, HgC, etc)do  not significantly interfere with this assay.   However, presence of multiple variants may affect accuracy.     01/15/2019 5.7 (H) 4.0 - 5.6 % Final     Comment:     ADA Screening Guidelines:  5.7-6.4%  Consistent with prediabetes  >or=6.5%  Consistent with diabetes  High levels of fetal hemoglobin interfere with the HbA1C  assay. Heterozygous hemoglobin variants (HbS, HgC, etc)do  not significantly interfere with this assay.   However, presence of  multiple variants may affect accuracy.     10/10/2018 5.4 4.0 - 5.6 % Final     Comment:     ADA Screening Guidelines:  5.7-6.4%  Consistent with prediabetes  >or=6.5%  Consistent with diabetes  High levels of fetal hemoglobin interfere with the HbA1C  assay. Heterozygous hemoglobin variants (HbS, HgC, etc)do  not significantly interfere with this assay.   However, presence of multiple variants may affect accuracy.         Review of Systems   Constitution: Negative for chills, decreased appetite, fever and night sweats.   HENT: Negative for congestion, ear discharge, nosebleeds and tinnitus.    Eyes: Negative for double vision, pain and visual disturbance.   Cardiovascular: Negative for chest pain, claudication, cyanosis and palpitations.   Respiratory: Negative for cough, hemoptysis, shortness of breath and wheezing.    Endocrine: Negative for cold intolerance and heat intolerance.   Hematologic/Lymphatic: Negative for adenopathy and bleeding problem.   Skin: Positive for color change, dry skin, nail changes and unusual hair distribution.   Musculoskeletal: Positive for arthritis, joint pain and stiffness. Negative for myalgias.   Gastrointestinal: Negative for abdominal pain, dysphagia, nausea and vomiting.   Genitourinary: Negative for dysuria, flank pain, hematuria and pelvic pain.   Neurological: Positive for disturbances in coordination, numbness, paresthesias and sensory change.   Psychiatric/Behavioral: Negative for altered mental status, hallucinations and suicidal ideas. The patient does not have insomnia.    Allergic/Immunologic: Negative for environmental allergies and persistent infections.           Objective:      Physical Exam  Vitals signs reviewed.   Constitutional:       Appearance: He is well-developed.   HENT:      Head: Normocephalic and atraumatic.   Cardiovascular:      Pulses:           Dorsalis pedis pulses are 1+ on the right side and 1+ on the left side.        Posterior tibial pulses are  1+ on the right side and 1+ on the left side.   Pulmonary:      Effort: Pulmonary effort is normal.   Musculoskeletal: Normal range of motion.      Comments: Anterior, lateral, and posterior muscle groups bilateral lower extremities show strength 4 over 5 symmetrically. Inspection and palpation of the joints and bones reveal no crepitus or joint effusion. No tenderness upon palpation. Mild plantar flexor contractures noted to digits 2 through 5 bilaterally.  Angle and base of gait are normal.  Bilateral 2nd digit noted to be contracted at the proximal interphalangeal joint with rigidity right greater than left.   Feet:      Right foot:      Skin integrity: Callus and dry skin present.      Left foot:      Skin integrity: Callus and dry skin present.   Skin:     General: Skin is warm and dry.      Capillary Refill: Capillary refill takes 2 to 3 seconds.      Coloration: Skin is pale.      Comments: Skin bilateral lower extremities noted to be thin, dry, and atrophic.  Toenails thickened, discolored, with subungual fungal debris and tenderness noted.  Hyperkeratotic lesions noted to both feet plantarly with tenderness.   Neurological:      Mental Status: He is alert and oriented to person, place, and time.      Sensory: Sensory deficit present.      Motor: Atrophy present.      Deep Tendon Reflexes: Reflexes abnormal.      Reflex Scores:       Patellar reflexes are 1+ on the right side and 1+ on the left side.       Achilles reflexes are 1+ on the right side and 1+ on the left side.     Comments: Sharp, dull, light touch, and vibratory sensation are diminished bilaterally. Proprioceptive sensation is intact to both lower extremities. [place] Nathan monofilament exam shows loss of protective sensation to plantar toes 1 through 5 bilaterally. Deep tendon reflexes to the patellar tendons is 1 over 4 bilaterally symmetrical. Deep tendon reflexes to the Achilles tendon is 0 over 4 bilaterally symmetrical. No ankle clonus  or Babinski reflex noted bilaterally. Coordination is fair to both lower extremities.  Patient admits to intermittent burning and tingling in the feet.   Psychiatric:         Behavior: Behavior normal.               Assessment:       Encounter Diagnoses   Name Primary?    Hammer toe of right foot Yes    Idiopathic gout of right hand, unspecified chronicity          Plan:       Danial was seen today for foot problem.    Diagnoses and all orders for this visit:    Hammer toe of right foot    Idiopathic gout of right hand, unspecified chronicity      I counseled the patient on his conditions, their implications and medical management.    Conservative and surgical options discussed in detail.  Begin padding to the area.  May consider an office surgery verses outpatient hammertoe correction.  The nature of the condition, options for management, as well as potential risks and complications were discussed in detail with patient. Patient was amenable to my recommendations and left my office fully informed and will follow up as instructed or sooner if necessary.        .

## 2020-06-26 DIAGNOSIS — I10 ESSENTIAL HYPERTENSION: Primary | ICD-10-CM

## 2020-07-14 ENCOUNTER — OFFICE VISIT (OUTPATIENT)
Dept: TRANSPLANT | Facility: CLINIC | Age: 80
End: 2020-07-14
Attending: INTERNAL MEDICINE
Payer: MEDICARE

## 2020-07-14 VITALS
WEIGHT: 219.81 LBS | SYSTOLIC BLOOD PRESSURE: 130 MMHG | HEART RATE: 47 BPM | HEIGHT: 72 IN | DIASTOLIC BLOOD PRESSURE: 78 MMHG | BODY MASS INDEX: 29.77 KG/M2

## 2020-07-14 DIAGNOSIS — E78.2 MIXED HYPERLIPIDEMIA: ICD-10-CM

## 2020-07-14 DIAGNOSIS — M10.9 GOUTY ARTHRITIS OF TOE OF RIGHT FOOT: ICD-10-CM

## 2020-07-14 DIAGNOSIS — I10 ESSENTIAL HYPERTENSION: Primary | ICD-10-CM

## 2020-07-14 DIAGNOSIS — Z79.01 CURRENT USE OF LONG TERM ANTICOAGULATION: ICD-10-CM

## 2020-07-14 DIAGNOSIS — I48.0 PAF (PAROXYSMAL ATRIAL FIBRILLATION): ICD-10-CM

## 2020-07-14 DIAGNOSIS — N28.9 RENAL INSUFFICIENCY: ICD-10-CM

## 2020-07-14 PROCEDURE — 99999 PR PBB SHADOW E&M-EST. PATIENT-LVL III: ICD-10-PCS | Mod: PBBFAC,,, | Performed by: INTERNAL MEDICINE

## 2020-07-14 PROCEDURE — 99214 OFFICE O/P EST MOD 30 MIN: CPT | Mod: S$PBB,,, | Performed by: INTERNAL MEDICINE

## 2020-07-14 PROCEDURE — 99213 OFFICE O/P EST LOW 20 MIN: CPT | Mod: PBBFAC | Performed by: INTERNAL MEDICINE

## 2020-07-14 PROCEDURE — 99214 PR OFFICE/OUTPT VISIT, EST, LEVL IV, 30-39 MIN: ICD-10-PCS | Mod: S$PBB,,, | Performed by: INTERNAL MEDICINE

## 2020-07-14 PROCEDURE — 99999 PR PBB SHADOW E&M-EST. PATIENT-LVL III: CPT | Mod: PBBFAC,,, | Performed by: INTERNAL MEDICINE

## 2020-07-14 NOTE — PROGRESS NOTES
Subjective:     Patient ID:  Danial Meza is a 80 y.o. male who presents for follow-up of Hypertension, Hyperlipidemia, and Atrial Fibrillation (paroxysmal AF-Fl)    HPI:   81 yo WM with hypertension, lipid disorder, tachy-xu syndrome, prior AFl ablation and PAF on chronic anti-coagulation with DOAC comes today for routine visit. On February 7, 2019 he underwent a pharmacologic nuclear stress test that was negative for ischemia.  He has no known coronary artery disease.  No tightness, pressure or heaviness in chest, neck, arms, throat, jaw or back with or without exertion.  No MCBRIDE, orthopnea, PND, pre-syncope or syncope.  Rare palpitations and none lately.  He reports resting HR sometimes in 40's but no symptoms.  Home /61 with P 57 today but has not been taking regularly at home.    Review of Systems   Constitution: Negative for chills, fever, malaise/fatigue, night sweats, weight gain and weight loss (wt 215-220# as rule).   Cardiovascular: Positive for palpitations. Negative for chest pain, dyspnea on exertion, irregular heartbeat, leg swelling, near-syncope, orthopnea, paroxysmal nocturnal dyspnea and syncope.   Respiratory: Negative for cough, sputum production and wheezing.    Hematologic/Lymphatic: Does not bruise/bleed easily.   Musculoskeletal: Positive for gout (last attack 6 months ago). Negative for arthritis, joint pain and stiffness.   Gastrointestinal: Negative for hematochezia and melena.   Genitourinary: Negative for hematuria.   Neurological: Negative for brief paralysis, focal weakness, seizures and weakness.     Objective:   Physical Exam   Constitutional: He is oriented to person, place, and time. He appears well-developed and well-nourished.   /78 (BP Location: Left arm, Patient Position: Sitting, BP Method: Medium (Manual))   Pulse (!) 47 Apical HR 60 on exam; not sure why auto cuff consistently gets lower readings unless intermittently having extrasystoles that are not  present on my auscultation  Ht 6' (1.829 m)   Wt 99.7 kg (219 lb 12.8 oz)   BMI 29.81 kg/m²   Last visit wt 222  WD, WN WM in NAD   HENT:   Head: Normocephalic and atraumatic.   Eyes: Conjunctivae are normal. Right eye exhibits no discharge. Left eye exhibits no discharge. No scleral icterus.   Neck: No JVD present. No thyromegaly present.   Cardiovascular: Normal rate, regular rhythm and normal heart sounds. Exam reveals no gallop.   No murmur heard.  Apical HR 60 on exam; not sure why auto cuff consistently gets lower readings unless intermittently having extrasystoles that are not present on my auscultation   Pulmonary/Chest: Effort normal and breath sounds normal.   Abdominal: Soft. Bowel sounds are normal. He exhibits no distension and no mass. There is no abdominal tenderness. There is no rebound and no guarding.   Musculoskeletal:         General: No tenderness or edema.   Neurological: He is alert and oriented to person, place, and time.   Skin: Skin is warm and dry.      Lab Results   Component Value Date    BNP 39 07/14/2020     07/14/2020    K 4.1 07/14/2020    MG 1.9 07/14/2020     07/14/2020    CO2 26 07/14/2020    BUN 21 07/14/2020    CREATININE 1.3 07/14/2020     07/14/2020    HGBA1C 5.6 10/28/2019    AST 22 07/14/2020    ALT 29 07/14/2020    ALBUMIN 4.3 07/14/2020    PROT 6.7 07/14/2020    BILITOT 0.9 07/14/2020    WBC 5.92 10/28/2019    HGB 14.3 10/28/2019    HCT 42.5 10/28/2019     10/28/2019    INR 1.4 (H) 12/30/2018    CHOL 77 (L) 10/28/2019    HDL 23 (L) 10/28/2019    LDLCALC 37.0 (L) 10/28/2019    TRIG 85 10/28/2019     Assessment:     1. Essential hypertension    2. Mixed hyperlipidemia    3. PAF (paroxysmal atrial fibrillation)    4. Current use of long term anticoagulation    5. Gouty arthritis of toe of right foot    6. Renal insufficiency      Plan:   Cr trend noted so will check his uric acid serum and urine levels as he may need treatment  RTC 6 months with  lipids, CMP and CBC      Addendum 7/17/2020 7:30 AM:   Serum UA 8.1 but urine spot UA 47.8 is normal  I spoke with patient and will forward to Dr. Louie Durant for his review and treatment if he feels indicated.

## 2020-07-15 DIAGNOSIS — Z71.89 COMPLEX CARE COORDINATION: ICD-10-CM

## 2020-07-17 ENCOUNTER — TELEPHONE (OUTPATIENT)
Dept: INTERNAL MEDICINE | Facility: CLINIC | Age: 80
End: 2020-07-17

## 2020-07-17 NOTE — TELEPHONE ENCOUNTER
Pt informed. He states that he cannot remember having a flare up of gout in the last 6 months. He states that his uric acid level have always been borderline for many yeard. He mentions that the last time he was in the hospital is when he was diagnosed with gout. He also recently saw a podiatrist. He states that the toe in question is much larger then the other toes. He denies pain, redness or swelling.

## 2020-07-17 NOTE — TELEPHONE ENCOUNTER
Please call the patient to notify that I spoke with his Cardiologist. His uric acid levels were elevated, which increases the risk of gout flares. Please ask patient if he has been having gout flares; if so when was the last one, and how many he's had over the last 6 months.

## 2020-11-19 ENCOUNTER — PATIENT OUTREACH (OUTPATIENT)
Dept: ADMINISTRATIVE | Facility: OTHER | Age: 80
End: 2020-11-19

## 2020-11-23 ENCOUNTER — OFFICE VISIT (OUTPATIENT)
Dept: DERMATOLOGY | Facility: CLINIC | Age: 80
End: 2020-11-23
Payer: MEDICARE

## 2020-11-23 DIAGNOSIS — Z85.828 PERSONAL HISTORY OF MALIGNANT NEOPLASM OF SKIN: ICD-10-CM

## 2020-11-23 DIAGNOSIS — L82.1 SK (SEBORRHEIC KERATOSIS): ICD-10-CM

## 2020-11-23 DIAGNOSIS — L57.0 AK (ACTINIC KERATOSIS): Primary | ICD-10-CM

## 2020-11-23 PROCEDURE — 99999 PR PBB SHADOW E&M-EST. PATIENT-LVL II: ICD-10-PCS | Mod: PBBFAC,,, | Performed by: DERMATOLOGY

## 2020-11-23 PROCEDURE — 17003 DESTRUCTION, PREMALIGNANT LESIONS; SECOND THROUGH 14 LESIONS: ICD-10-PCS | Mod: S$PBB,,, | Performed by: DERMATOLOGY

## 2020-11-23 PROCEDURE — 17003 DESTRUCT PREMALG LES 2-14: CPT | Mod: S$PBB,,, | Performed by: DERMATOLOGY

## 2020-11-23 PROCEDURE — 99213 PR OFFICE/OUTPT VISIT, EST, LEVL III, 20-29 MIN: ICD-10-PCS | Mod: 25,S$PBB,, | Performed by: DERMATOLOGY

## 2020-11-23 PROCEDURE — 17000 DESTRUCT PREMALG LESION: CPT | Mod: PBBFAC | Performed by: DERMATOLOGY

## 2020-11-23 PROCEDURE — 99212 OFFICE O/P EST SF 10 MIN: CPT | Mod: PBBFAC | Performed by: DERMATOLOGY

## 2020-11-23 PROCEDURE — 17000 PR DESTRUCTION(LASER SURGERY,CRYOSURGERY,CHEMOSURGERY),PREMALIGNANT LESIONS,FIRST LESION: ICD-10-PCS | Mod: S$PBB,,, | Performed by: DERMATOLOGY

## 2020-11-23 PROCEDURE — 99213 OFFICE O/P EST LOW 20 MIN: CPT | Mod: 25,S$PBB,, | Performed by: DERMATOLOGY

## 2020-11-23 PROCEDURE — 99999 PR PBB SHADOW E&M-EST. PATIENT-LVL II: CPT | Mod: PBBFAC,,, | Performed by: DERMATOLOGY

## 2020-11-23 PROCEDURE — 17000 DESTRUCT PREMALG LESION: CPT | Mod: S$PBB,,, | Performed by: DERMATOLOGY

## 2020-11-23 PROCEDURE — 17003 DESTRUCT PREMALG LES 2-14: CPT | Mod: PBBFAC | Performed by: DERMATOLOGY

## 2020-11-23 NOTE — PROGRESS NOTES
Subjective:       Patient ID:  Danial Meza is a 80 y.o. male who presents for   Chief Complaint   Patient presents with    Skin Check     UBSE     History of Present Illness: The patient presents for follow up of skin check.    The patient was last seen on: 08/13/2019 for UBSE  This is a high risk patient here to check for the development of new lesions.      No new concerns today.       Review of Systems   Skin: Negative for daily sunscreen use, activity-related sunscreen use, recent sunburn and wears hat.   Hematologic/Lymphatic: Bruises/bleeds easily (on pradaxa).        Objective:    Physical Exam   Constitutional: He appears well-developed and well-nourished. No distress.   Neurological: He is alert and oriented to person, place, and time. He is not disoriented.   Psychiatric: He has a normal mood and affect.   Skin:   Areas Examined (abnormalities noted in diagram):   Scalp / Hair Palpated and Inspected  Head / Face Inspection Performed  Neck Inspection Performed  Chest / Axilla Inspection Performed  Back Inspection Performed  RUE Inspected  LUE Inspection Performed                   Diagram Legend     Erythematous scaling macule/papule c/w actinic keratosis       Vascular papule c/w angioma      Pigmented verrucoid papule/plaque c/w seborrheic keratosis      Yellow umbilicated papule c/w sebaceous hyperplasia      Irregularly shaped tan macule c/w lentigo     1-2 mm smooth white papules consistent with Milia      Movable subcutaneous cyst with punctum c/w epidermal inclusion cyst      Subcutaneous movable cyst c/w pilar cyst      Firm pink to brown papule c/w dermatofibroma      Pedunculated fleshy papule(s) c/w skin tag(s)      Evenly pigmented macule c/w junctional nevus     Mildly variegated pigmented, slightly irregular-bordered macule c/w mildly atypical nevus      Flesh colored to evenly pigmented papule c/w intradermal nevus       Pink pearly papule/plaque c/w basal cell carcinoma       Erythematous hyperkeratotic cursted plaque c/w SCC      Surgical scar with no sign of skin cancer recurrence      Open and closed comedones      Inflammatory papules and pustules      Verrucoid papule consistent consistent with wart     Erythematous eczematous patches and plaques     Dystrophic onycholytic nail with subungual debris c/w onychomycosis     Umbilicated papule    Erythematous-base heme-crusted tan verrucoid plaque consistent with inflamed seborrheic keratosis     Erythematous Silvery Scaling Plaque c/w Psoriasis     See annotation      Assessment / Plan:        AK (actinic keratosis)  Cryosurgery Procedure Note    Verbal consent from the patient is obtained including, but not limited to, risk of hypopigmentation/hyperpigmentation, scar, recurrence of lesion. The patient is aware of the precancerous quality and need for treatment of these lesions. Liquid nitrogen cryosurgery is applied to the 2 actinic keratoses, as detailed in the physical exam, to produce a freeze injury. The patient is aware that blisters may form and is instructed on wound care with gentle cleansing and use of vaseline ointment to keep moist until healed. The patient is supplied a handout on cryosurgery and is instructed to call if lesions do not completely resolve.    Wear hat always    SK (seborrheic keratosis)  These are benign inherited growths without a malignant potential. Reassurance given to patient. No treatment is necessary.     Personal history of malignant neoplasm of skin  Area(s) of previous NMSC evaluated with no signs of recurrence.    Upper body skin examination performed today including at least 6 points as noted in physical examination. No lesions suspicious for malignancy noted.             Follow up in about 1 year (around 11/23/2021).

## 2020-11-23 NOTE — PATIENT INSTRUCTIONS

## 2020-12-28 ENCOUNTER — OFFICE VISIT (OUTPATIENT)
Dept: OPTOMETRY | Facility: CLINIC | Age: 80
End: 2020-12-28
Payer: MEDICARE

## 2020-12-28 DIAGNOSIS — H52.03 HYPEROPIA WITH ASTIGMATISM, BILATERAL: ICD-10-CM

## 2020-12-28 DIAGNOSIS — H25.13 NUCLEAR SCLEROSIS, BILATERAL: Primary | ICD-10-CM

## 2020-12-28 DIAGNOSIS — H26.9 CORTICAL CATARACT OF LEFT EYE: ICD-10-CM

## 2020-12-28 DIAGNOSIS — H52.203 HYPEROPIA WITH ASTIGMATISM, BILATERAL: ICD-10-CM

## 2020-12-28 DIAGNOSIS — H52.4 PRESBYOPIA OF BOTH EYES: ICD-10-CM

## 2020-12-28 DIAGNOSIS — H40.003 GLAUCOMA SUSPECT OF BOTH EYES: ICD-10-CM

## 2020-12-28 PROCEDURE — 99999 PR PBB SHADOW E&M-EST. PATIENT-LVL III: CPT | Mod: PBBFAC,,, | Performed by: OPTOMETRIST

## 2020-12-28 PROCEDURE — 92014 PR EYE EXAM, EST PATIENT,COMPREHESV: ICD-10-PCS | Mod: S$PBB,,, | Performed by: OPTOMETRIST

## 2020-12-28 PROCEDURE — 92014 COMPRE OPH EXAM EST PT 1/>: CPT | Mod: S$PBB,,, | Performed by: OPTOMETRIST

## 2020-12-28 PROCEDURE — 99213 OFFICE O/P EST LOW 20 MIN: CPT | Mod: PBBFAC | Performed by: OPTOMETRIST

## 2020-12-28 PROCEDURE — 99999 PR PBB SHADOW E&M-EST. PATIENT-LVL III: ICD-10-PCS | Mod: PBBFAC,,, | Performed by: OPTOMETRIST

## 2020-12-28 PROCEDURE — 92015 DETERMINE REFRACTIVE STATE: CPT | Mod: ,,, | Performed by: OPTOMETRIST

## 2020-12-28 PROCEDURE — 92015 PR REFRACTION: ICD-10-PCS | Mod: ,,, | Performed by: OPTOMETRIST

## 2021-01-11 ENCOUNTER — OFFICE VISIT (OUTPATIENT)
Dept: TRANSPLANT | Facility: CLINIC | Age: 81
End: 2021-01-11
Attending: INTERNAL MEDICINE
Payer: MEDICARE

## 2021-01-11 ENCOUNTER — LAB VISIT (OUTPATIENT)
Dept: LAB | Facility: HOSPITAL | Age: 81
End: 2021-01-11
Attending: INTERNAL MEDICINE
Payer: MEDICARE

## 2021-01-11 VITALS
DIASTOLIC BLOOD PRESSURE: 72 MMHG | WEIGHT: 218.25 LBS | HEIGHT: 72 IN | SYSTOLIC BLOOD PRESSURE: 126 MMHG | HEART RATE: 46 BPM | BODY MASS INDEX: 29.56 KG/M2

## 2021-01-11 DIAGNOSIS — I10 ESSENTIAL HYPERTENSION: Primary | ICD-10-CM

## 2021-01-11 DIAGNOSIS — Z86.79 S/P ABLATION OF ATRIAL FLUTTER: ICD-10-CM

## 2021-01-11 DIAGNOSIS — Z98.890 S/P ABLATION OF ATRIAL FLUTTER: ICD-10-CM

## 2021-01-11 DIAGNOSIS — Z79.01 CURRENT USE OF LONG TERM ANTICOAGULATION: ICD-10-CM

## 2021-01-11 DIAGNOSIS — I48.0 PAF (PAROXYSMAL ATRIAL FIBRILLATION): ICD-10-CM

## 2021-01-11 DIAGNOSIS — E78.2 MIXED HYPERLIPIDEMIA: ICD-10-CM

## 2021-01-11 DIAGNOSIS — N28.9 RENAL INSUFFICIENCY: ICD-10-CM

## 2021-01-11 DIAGNOSIS — I10 ESSENTIAL HYPERTENSION: ICD-10-CM

## 2021-01-11 LAB
ALBUMIN SERPL BCP-MCNC: 4.2 G/DL (ref 3.5–5.2)
ALP SERPL-CCNC: 78 U/L (ref 55–135)
ALT SERPL W/O P-5'-P-CCNC: 38 U/L (ref 10–44)
ANION GAP SERPL CALC-SCNC: 10 MMOL/L (ref 8–16)
AST SERPL-CCNC: 25 U/L (ref 10–40)
BASOPHILS # BLD AUTO: 0.07 K/UL (ref 0–0.2)
BASOPHILS NFR BLD: 1 % (ref 0–1.9)
BILIRUB SERPL-MCNC: 1.2 MG/DL (ref 0.1–1)
BUN SERPL-MCNC: 24 MG/DL (ref 8–23)
CALCIUM SERPL-MCNC: 9.8 MG/DL (ref 8.7–10.5)
CHLORIDE SERPL-SCNC: 109 MMOL/L (ref 95–110)
CHOLEST SERPL-MCNC: 74 MG/DL (ref 120–199)
CHOLEST/HDLC SERPL: 3.4 {RATIO} (ref 2–5)
CO2 SERPL-SCNC: 26 MMOL/L (ref 23–29)
CREAT SERPL-MCNC: 1.3 MG/DL (ref 0.5–1.4)
DIFFERENTIAL METHOD: ABNORMAL
EOSINOPHIL # BLD AUTO: 0.2 K/UL (ref 0–0.5)
EOSINOPHIL NFR BLD: 3.2 % (ref 0–8)
ERYTHROCYTE [DISTWIDTH] IN BLOOD BY AUTOMATED COUNT: 12.8 % (ref 11.5–14.5)
EST. GFR  (AFRICAN AMERICAN): 59.6 ML/MIN/1.73 M^2
EST. GFR  (NON AFRICAN AMERICAN): 51.5 ML/MIN/1.73 M^2
GLUCOSE SERPL-MCNC: 124 MG/DL (ref 70–110)
HCT VFR BLD AUTO: 44.1 % (ref 40–54)
HDLC SERPL-MCNC: 22 MG/DL (ref 40–75)
HDLC SERPL: 29.7 % (ref 20–50)
HGB BLD-MCNC: 14.6 G/DL (ref 14–18)
IMM GRANULOCYTES # BLD AUTO: 0.01 K/UL (ref 0–0.04)
IMM GRANULOCYTES NFR BLD AUTO: 0.1 % (ref 0–0.5)
LDLC SERPL CALC-MCNC: 30.8 MG/DL (ref 63–159)
LYMPHOCYTES # BLD AUTO: 1.7 K/UL (ref 1–4.8)
LYMPHOCYTES NFR BLD: 24.9 % (ref 18–48)
MCH RBC QN AUTO: 32 PG (ref 27–31)
MCHC RBC AUTO-ENTMCNC: 33.1 G/DL (ref 32–36)
MCV RBC AUTO: 97 FL (ref 82–98)
MONOCYTES # BLD AUTO: 0.5 K/UL (ref 0.3–1)
MONOCYTES NFR BLD: 7.2 % (ref 4–15)
NEUTROPHILS # BLD AUTO: 4.4 K/UL (ref 1.8–7.7)
NEUTROPHILS NFR BLD: 63.6 % (ref 38–73)
NONHDLC SERPL-MCNC: 52 MG/DL
NRBC BLD-RTO: 0 /100 WBC
PLATELET # BLD AUTO: 176 K/UL (ref 150–350)
PMV BLD AUTO: 11.1 FL (ref 9.2–12.9)
POTASSIUM SERPL-SCNC: 4.5 MMOL/L (ref 3.5–5.1)
PROT SERPL-MCNC: 6.7 G/DL (ref 6–8.4)
RBC # BLD AUTO: 4.56 M/UL (ref 4.6–6.2)
SODIUM SERPL-SCNC: 145 MMOL/L (ref 136–145)
TRIGL SERPL-MCNC: 106 MG/DL (ref 30–150)
WBC # BLD AUTO: 6.94 K/UL (ref 3.9–12.7)

## 2021-01-11 PROCEDURE — 85025 COMPLETE CBC W/AUTO DIFF WBC: CPT

## 2021-01-11 PROCEDURE — 80061 LIPID PANEL: CPT

## 2021-01-11 PROCEDURE — 36415 COLL VENOUS BLD VENIPUNCTURE: CPT

## 2021-01-11 PROCEDURE — 99214 OFFICE O/P EST MOD 30 MIN: CPT | Mod: S$PBB,,, | Performed by: INTERNAL MEDICINE

## 2021-01-11 PROCEDURE — 99999 PR PBB SHADOW E&M-EST. PATIENT-LVL III: ICD-10-PCS | Mod: PBBFAC,,, | Performed by: INTERNAL MEDICINE

## 2021-01-11 PROCEDURE — 99999 PR PBB SHADOW E&M-EST. PATIENT-LVL III: CPT | Mod: PBBFAC,,, | Performed by: INTERNAL MEDICINE

## 2021-01-11 PROCEDURE — 99213 OFFICE O/P EST LOW 20 MIN: CPT | Mod: PBBFAC | Performed by: INTERNAL MEDICINE

## 2021-01-11 PROCEDURE — 80053 COMPREHEN METABOLIC PANEL: CPT

## 2021-01-11 PROCEDURE — 99214 PR OFFICE/OUTPT VISIT, EST, LEVL IV, 30-39 MIN: ICD-10-PCS | Mod: S$PBB,,, | Performed by: INTERNAL MEDICINE

## 2021-02-03 ENCOUNTER — PES CALL (OUTPATIENT)
Dept: ADMINISTRATIVE | Facility: CLINIC | Age: 81
End: 2021-02-03

## 2021-02-04 ENCOUNTER — IMMUNIZATION (OUTPATIENT)
Dept: PHARMACY | Facility: CLINIC | Age: 81
End: 2021-02-04
Payer: MEDICARE

## 2021-02-04 DIAGNOSIS — Z23 NEED FOR VACCINATION: Primary | ICD-10-CM

## 2021-02-11 ENCOUNTER — CLINICAL SUPPORT (OUTPATIENT)
Dept: OPHTHALMOLOGY | Facility: CLINIC | Age: 81
End: 2021-02-11
Payer: MEDICARE

## 2021-02-11 ENCOUNTER — OFFICE VISIT (OUTPATIENT)
Dept: OPHTHALMOLOGY | Facility: CLINIC | Age: 81
End: 2021-02-11
Payer: MEDICARE

## 2021-02-11 DIAGNOSIS — H52.203 HYPEROPIA OF BOTH EYES WITH ASTIGMATISM AND PRESBYOPIA: ICD-10-CM

## 2021-02-11 DIAGNOSIS — H40.022 OPEN ANGLE WITH BORDERLINE FINDINGS AND HIGH GLAUCOMA RISK IN LEFT EYE: ICD-10-CM

## 2021-02-11 DIAGNOSIS — H25.13 NUCLEAR SCLEROSIS OF BOTH EYES: ICD-10-CM

## 2021-02-11 DIAGNOSIS — H52.03 HYPEROPIA OF BOTH EYES WITH ASTIGMATISM AND PRESBYOPIA: ICD-10-CM

## 2021-02-11 DIAGNOSIS — H57.03 SMALL PUPILS: ICD-10-CM

## 2021-02-11 DIAGNOSIS — H52.4 HYPEROPIA OF BOTH EYES WITH ASTIGMATISM AND PRESBYOPIA: ICD-10-CM

## 2021-02-11 DIAGNOSIS — H57.819 BROW PTOSIS: ICD-10-CM

## 2021-02-11 DIAGNOSIS — H40.1111 PRIMARY OPEN-ANGLE GLAUCOMA, RIGHT EYE, MILD STAGE: Primary | ICD-10-CM

## 2021-02-11 DIAGNOSIS — Z83.518 FAMILY HISTORY OF MACULAR DEGENERATION: ICD-10-CM

## 2021-02-11 PROCEDURE — 92250 COLOR FUNDUS PHOTOGRAPHY - OU - BOTH EYES: ICD-10-PCS | Mod: 26,S$PBB,, | Performed by: OPHTHALMOLOGY

## 2021-02-11 PROCEDURE — 92083 EXTENDED VISUAL FIELD XM: CPT | Mod: PBBFAC | Performed by: OPHTHALMOLOGY

## 2021-02-11 PROCEDURE — 99999 PR PBB SHADOW E&M-EST. PATIENT-LVL II: CPT | Mod: PBBFAC,,, | Performed by: OPHTHALMOLOGY

## 2021-02-11 PROCEDURE — 92250 FUNDUS PHOTOGRAPHY W/I&R: CPT | Mod: PBBFAC | Performed by: OPHTHALMOLOGY

## 2021-02-11 PROCEDURE — 92014 PR EYE EXAM, EST PATIENT,COMPREHESV: ICD-10-PCS | Mod: S$PBB,,, | Performed by: OPHTHALMOLOGY

## 2021-02-11 PROCEDURE — 92083 HUMPHREY VISUAL FIELD - OU - BOTH EYES: ICD-10-PCS | Mod: 26,S$PBB,, | Performed by: OPHTHALMOLOGY

## 2021-02-11 PROCEDURE — 92014 COMPRE OPH EXAM EST PT 1/>: CPT | Mod: S$PBB,,, | Performed by: OPHTHALMOLOGY

## 2021-02-11 PROCEDURE — 99212 OFFICE O/P EST SF 10 MIN: CPT | Mod: PBBFAC | Performed by: OPHTHALMOLOGY

## 2021-02-11 PROCEDURE — 99999 PR PBB SHADOW E&M-EST. PATIENT-LVL II: ICD-10-PCS | Mod: PBBFAC,,, | Performed by: OPHTHALMOLOGY

## 2021-02-11 RX ORDER — BIMATOPROST 0.1 MG/ML
1 SOLUTION/ DROPS OPHTHALMIC NIGHTLY
Qty: 3 BOTTLE | Refills: 3 | Status: SHIPPED | OUTPATIENT
Start: 2021-02-11 | End: 2021-05-28

## 2021-03-04 ENCOUNTER — IMMUNIZATION (OUTPATIENT)
Dept: PHARMACY | Facility: CLINIC | Age: 81
End: 2021-03-04
Payer: MEDICARE

## 2021-03-04 DIAGNOSIS — Z23 NEED FOR VACCINATION: Primary | ICD-10-CM

## 2021-05-17 ENCOUNTER — OFFICE VISIT (OUTPATIENT)
Dept: OPHTHALMOLOGY | Facility: CLINIC | Age: 81
End: 2021-05-17
Payer: MEDICARE

## 2021-05-17 DIAGNOSIS — H40.1111 PRIMARY OPEN-ANGLE GLAUCOMA, RIGHT EYE, MILD STAGE: Primary | ICD-10-CM

## 2021-05-17 DIAGNOSIS — H52.203 HYPEROPIA OF BOTH EYES WITH ASTIGMATISM AND PRESBYOPIA: ICD-10-CM

## 2021-05-17 DIAGNOSIS — H57.03 SMALL PUPILS: ICD-10-CM

## 2021-05-17 DIAGNOSIS — H57.819 BROW PTOSIS: ICD-10-CM

## 2021-05-17 DIAGNOSIS — H25.13 NUCLEAR SCLEROSIS OF BOTH EYES: ICD-10-CM

## 2021-05-17 DIAGNOSIS — H40.022 OPEN ANGLE WITH BORDERLINE FINDINGS AND HIGH GLAUCOMA RISK IN LEFT EYE: ICD-10-CM

## 2021-05-17 DIAGNOSIS — Z83.518 FAMILY HISTORY OF MACULAR DEGENERATION: ICD-10-CM

## 2021-05-17 DIAGNOSIS — H52.4 HYPEROPIA OF BOTH EYES WITH ASTIGMATISM AND PRESBYOPIA: ICD-10-CM

## 2021-05-17 DIAGNOSIS — H52.03 HYPEROPIA OF BOTH EYES WITH ASTIGMATISM AND PRESBYOPIA: ICD-10-CM

## 2021-05-17 PROCEDURE — 99999 PR PBB SHADOW E&M-EST. PATIENT-LVL II: ICD-10-PCS | Mod: PBBFAC,,, | Performed by: OPHTHALMOLOGY

## 2021-05-17 PROCEDURE — 92012 INTRM OPH EXAM EST PATIENT: CPT | Mod: S$PBB,,, | Performed by: OPHTHALMOLOGY

## 2021-05-17 PROCEDURE — 99212 OFFICE O/P EST SF 10 MIN: CPT | Mod: PBBFAC | Performed by: OPHTHALMOLOGY

## 2021-05-17 PROCEDURE — 99999 PR PBB SHADOW E&M-EST. PATIENT-LVL II: CPT | Mod: PBBFAC,,, | Performed by: OPHTHALMOLOGY

## 2021-05-17 PROCEDURE — 92012 PR EYE EXAM, EST PATIENT,INTERMED: ICD-10-PCS | Mod: S$PBB,,, | Performed by: OPHTHALMOLOGY

## 2021-09-17 ENCOUNTER — OFFICE VISIT (OUTPATIENT)
Dept: OPHTHALMOLOGY | Facility: CLINIC | Age: 81
End: 2021-09-17
Payer: MEDICARE

## 2021-09-17 DIAGNOSIS — H40.1111 PRIMARY OPEN-ANGLE GLAUCOMA, RIGHT EYE, MILD STAGE: Primary | ICD-10-CM

## 2021-09-17 DIAGNOSIS — H52.4 HYPEROPIA OF BOTH EYES WITH ASTIGMATISM AND PRESBYOPIA: ICD-10-CM

## 2021-09-17 DIAGNOSIS — Z83.518 FAMILY HISTORY OF MACULAR DEGENERATION: ICD-10-CM

## 2021-09-17 DIAGNOSIS — H52.203 HYPEROPIA OF BOTH EYES WITH ASTIGMATISM AND PRESBYOPIA: ICD-10-CM

## 2021-09-17 DIAGNOSIS — H25.13 NUCLEAR SCLEROSIS OF BOTH EYES: ICD-10-CM

## 2021-09-17 DIAGNOSIS — H57.03 SMALL PUPILS: ICD-10-CM

## 2021-09-17 DIAGNOSIS — H40.022 OPEN ANGLE WITH BORDERLINE FINDINGS AND HIGH GLAUCOMA RISK IN LEFT EYE: ICD-10-CM

## 2021-09-17 DIAGNOSIS — H57.819 BROW PTOSIS: ICD-10-CM

## 2021-09-17 DIAGNOSIS — H52.03 HYPEROPIA OF BOTH EYES WITH ASTIGMATISM AND PRESBYOPIA: ICD-10-CM

## 2021-09-17 PROCEDURE — 92012 INTRM OPH EXAM EST PATIENT: CPT | Mod: S$PBB,,, | Performed by: OPHTHALMOLOGY

## 2021-09-17 PROCEDURE — 92133 CPTRZD OPH DX IMG PST SGM ON: CPT | Mod: PBBFAC | Performed by: OPHTHALMOLOGY

## 2021-09-17 PROCEDURE — 99212 OFFICE O/P EST SF 10 MIN: CPT | Mod: PBBFAC | Performed by: OPHTHALMOLOGY

## 2021-09-17 PROCEDURE — 92012 PR EYE EXAM, EST PATIENT,INTERMED: ICD-10-PCS | Mod: S$PBB,,, | Performed by: OPHTHALMOLOGY

## 2021-09-17 PROCEDURE — 92133 POSTERIOR SEGMENT OCT OPTIC NERVE(OCULAR COHERENCE TOMOGRAPHY) - OU - BOTH EYES: ICD-10-PCS | Mod: 26,S$PBB,, | Performed by: OPHTHALMOLOGY

## 2021-09-17 PROCEDURE — 99999 PR PBB SHADOW E&M-EST. PATIENT-LVL II: CPT | Mod: PBBFAC,,, | Performed by: OPHTHALMOLOGY

## 2021-09-17 PROCEDURE — 99999 PR PBB SHADOW E&M-EST. PATIENT-LVL II: ICD-10-PCS | Mod: PBBFAC,,, | Performed by: OPHTHALMOLOGY

## 2021-09-23 ENCOUNTER — OFFICE VISIT (OUTPATIENT)
Dept: CARDIOLOGY | Facility: CLINIC | Age: 81
End: 2021-09-23
Payer: MEDICARE

## 2021-09-23 VITALS
WEIGHT: 223.31 LBS | SYSTOLIC BLOOD PRESSURE: 137 MMHG | DIASTOLIC BLOOD PRESSURE: 81 MMHG | BODY MASS INDEX: 30.25 KG/M2 | HEIGHT: 72 IN | HEART RATE: 72 BPM

## 2021-09-23 DIAGNOSIS — I10 ESSENTIAL HYPERTENSION: Primary | Chronic | ICD-10-CM

## 2021-09-23 DIAGNOSIS — I47.10 SVT (SUPRAVENTRICULAR TACHYCARDIA): ICD-10-CM

## 2021-09-23 DIAGNOSIS — I87.2 VENOUS INSUFFICIENCY OF RIGHT LEG: ICD-10-CM

## 2021-09-23 DIAGNOSIS — I48.0 PAF (PAROXYSMAL ATRIAL FIBRILLATION): Chronic | ICD-10-CM

## 2021-09-23 DIAGNOSIS — E66.09 CLASS 1 OBESITY DUE TO EXCESS CALORIES WITHOUT SERIOUS COMORBIDITY WITH BODY MASS INDEX (BMI) OF 30.0 TO 30.9 IN ADULT: ICD-10-CM

## 2021-09-23 DIAGNOSIS — E78.2 MIXED HYPERLIPIDEMIA: Chronic | ICD-10-CM

## 2021-09-23 DIAGNOSIS — N18.31 STAGE 3A CHRONIC KIDNEY DISEASE: ICD-10-CM

## 2021-09-23 DIAGNOSIS — Z86.79 S/P ABLATION OF ATRIAL FLUTTER: Chronic | ICD-10-CM

## 2021-09-23 DIAGNOSIS — Z98.890 S/P ABLATION OF ATRIAL FLUTTER: Chronic | ICD-10-CM

## 2021-09-23 DIAGNOSIS — I47.10 PAROXYSMAL SVT (SUPRAVENTRICULAR TACHYCARDIA): Chronic | ICD-10-CM

## 2021-09-23 DIAGNOSIS — I45.10 RIGHT BUNDLE BRANCH BLOCK (RBBB): Chronic | ICD-10-CM

## 2021-09-23 DIAGNOSIS — R00.1 SINUS BRADYCARDIA: Chronic | ICD-10-CM

## 2021-09-23 PROCEDURE — 99999 PR PBB SHADOW E&M-EST. PATIENT-LVL III: ICD-10-PCS | Mod: PBBFAC,,, | Performed by: INTERNAL MEDICINE

## 2021-09-23 PROCEDURE — 93010 EKG 12-LEAD: ICD-10-PCS | Mod: S$PBB,,, | Performed by: INTERNAL MEDICINE

## 2021-09-23 PROCEDURE — 99214 OFFICE O/P EST MOD 30 MIN: CPT | Mod: S$PBB,,, | Performed by: INTERNAL MEDICINE

## 2021-09-23 PROCEDURE — 93005 ELECTROCARDIOGRAM TRACING: CPT | Mod: PBBFAC,PO | Performed by: INTERNAL MEDICINE

## 2021-09-23 PROCEDURE — 99999 PR PBB SHADOW E&M-EST. PATIENT-LVL III: CPT | Mod: PBBFAC,,, | Performed by: INTERNAL MEDICINE

## 2021-09-23 PROCEDURE — 93010 ELECTROCARDIOGRAM REPORT: CPT | Mod: S$PBB,,, | Performed by: INTERNAL MEDICINE

## 2021-09-23 PROCEDURE — 99213 OFFICE O/P EST LOW 20 MIN: CPT | Mod: PBBFAC,PO | Performed by: INTERNAL MEDICINE

## 2021-09-23 PROCEDURE — 99214 PR OFFICE/OUTPT VISIT, EST, LEVL IV, 30-39 MIN: ICD-10-PCS | Mod: S$PBB,,, | Performed by: INTERNAL MEDICINE

## 2021-09-24 DIAGNOSIS — E78.2 MIXED HYPERLIPIDEMIA: ICD-10-CM

## 2021-09-24 DIAGNOSIS — E11.65 TYPE 2 DIABETES MELLITUS WITH HYPERGLYCEMIA, WITHOUT LONG-TERM CURRENT USE OF INSULIN: ICD-10-CM

## 2021-09-24 RX ORDER — ATORVASTATIN CALCIUM 20 MG/1
20 TABLET, FILM COATED ORAL NIGHTLY
Qty: 90 TABLET | Refills: 3 | Status: SHIPPED | OUTPATIENT
Start: 2021-09-24 | End: 2021-12-15 | Stop reason: SDUPTHER

## 2021-10-11 ENCOUNTER — OFFICE VISIT (OUTPATIENT)
Dept: DERMATOLOGY | Facility: CLINIC | Age: 81
End: 2021-10-11
Payer: MEDICARE

## 2021-10-11 DIAGNOSIS — D48.5 NEOPLASM OF UNCERTAIN BEHAVIOR OF SKIN: Primary | ICD-10-CM

## 2021-10-11 DIAGNOSIS — Z85.828 PERSONAL HISTORY OF MALIGNANT NEOPLASM OF SKIN: ICD-10-CM

## 2021-10-11 DIAGNOSIS — L57.0 AK (ACTINIC KERATOSIS): ICD-10-CM

## 2021-10-11 DIAGNOSIS — L82.1 SK (SEBORRHEIC KERATOSIS): ICD-10-CM

## 2021-10-11 PROCEDURE — 11102 PR TANGENTIAL BIOPSY, SKIN, SINGLE LESION: ICD-10-PCS | Mod: S$PBB,59,, | Performed by: DERMATOLOGY

## 2021-10-11 PROCEDURE — 17000 DESTRUCT PREMALG LESION: CPT | Mod: PBBFAC | Performed by: DERMATOLOGY

## 2021-10-11 PROCEDURE — 88305 TISSUE EXAM BY PATHOLOGIST: ICD-10-PCS | Mod: 26,,, | Performed by: DERMATOLOGY

## 2021-10-11 PROCEDURE — 11102 TANGNTL BX SKIN SINGLE LES: CPT | Mod: PBBFAC | Performed by: DERMATOLOGY

## 2021-10-11 PROCEDURE — 11102 TANGNTL BX SKIN SINGLE LES: CPT | Mod: S$PBB,59,, | Performed by: DERMATOLOGY

## 2021-10-11 PROCEDURE — 99212 PR OFFICE/OUTPT VISIT, EST, LEVL II, 10-19 MIN: ICD-10-PCS | Mod: 25,S$PBB,, | Performed by: DERMATOLOGY

## 2021-10-11 PROCEDURE — 99212 OFFICE O/P EST SF 10 MIN: CPT | Mod: 25,S$PBB,, | Performed by: DERMATOLOGY

## 2021-10-11 PROCEDURE — 99999 PR PBB SHADOW E&M-EST. PATIENT-LVL III: ICD-10-PCS | Mod: PBBFAC,,, | Performed by: DERMATOLOGY

## 2021-10-11 PROCEDURE — 88305 TISSUE EXAM BY PATHOLOGIST: CPT | Mod: 26,,, | Performed by: DERMATOLOGY

## 2021-10-11 PROCEDURE — 99213 OFFICE O/P EST LOW 20 MIN: CPT | Mod: PBBFAC | Performed by: DERMATOLOGY

## 2021-10-11 PROCEDURE — 17000 DESTRUCT PREMALG LESION: CPT | Mod: S$PBB,,, | Performed by: DERMATOLOGY

## 2021-10-11 PROCEDURE — 17000 PR DESTRUCTION(LASER SURGERY,CRYOSURGERY,CHEMOSURGERY),PREMALIGNANT LESIONS,FIRST LESION: ICD-10-PCS | Mod: S$PBB,,, | Performed by: DERMATOLOGY

## 2021-10-11 PROCEDURE — 99999 PR PBB SHADOW E&M-EST. PATIENT-LVL III: CPT | Mod: PBBFAC,,, | Performed by: DERMATOLOGY

## 2021-10-11 PROCEDURE — 88305 TISSUE EXAM BY PATHOLOGIST: CPT | Performed by: DERMATOLOGY

## 2021-10-14 LAB
FINAL PATHOLOGIC DIAGNOSIS: NORMAL
GROSS: NORMAL
Lab: NORMAL
MICROSCOPIC EXAM: NORMAL

## 2021-10-20 ENCOUNTER — TELEPHONE (OUTPATIENT)
Dept: DERMATOLOGY | Facility: CLINIC | Age: 81
End: 2021-10-20

## 2021-10-22 ENCOUNTER — OFFICE VISIT (OUTPATIENT)
Dept: DERMATOLOGY | Facility: CLINIC | Age: 81
End: 2021-10-22
Payer: MEDICARE

## 2021-10-22 DIAGNOSIS — C44.619 BASAL CELL CARCINOMA (BCC) OF LEFT FOREARM: Primary | ICD-10-CM

## 2021-10-22 PROCEDURE — 99211 OFF/OP EST MAY X REQ PHY/QHP: CPT | Mod: PBBFAC | Performed by: DERMATOLOGY

## 2021-10-22 PROCEDURE — 17261 PR DESTR MALIG TRUNK,EXTREM 0.6-1 CM: ICD-10-PCS | Mod: S$PBB,,, | Performed by: DERMATOLOGY

## 2021-10-22 PROCEDURE — 99499 NO LOS: ICD-10-PCS | Mod: S$PBB,,, | Performed by: DERMATOLOGY

## 2021-10-22 PROCEDURE — 99499 UNLISTED E&M SERVICE: CPT | Mod: S$PBB,,, | Performed by: DERMATOLOGY

## 2021-10-22 PROCEDURE — 17261 DSTRJ MAL LES T/A/L .6-1.0CM: CPT | Mod: S$PBB,,, | Performed by: DERMATOLOGY

## 2021-10-22 PROCEDURE — 17261 DSTRJ MAL LES T/A/L .6-1.0CM: CPT | Mod: PBBFAC | Performed by: DERMATOLOGY

## 2021-10-22 PROCEDURE — 99999 PR PBB SHADOW E&M-EST. PATIENT-LVL I: CPT | Mod: PBBFAC,,, | Performed by: DERMATOLOGY

## 2021-10-22 PROCEDURE — 99999 PR PBB SHADOW E&M-EST. PATIENT-LVL I: ICD-10-PCS | Mod: PBBFAC,,, | Performed by: DERMATOLOGY

## 2021-10-28 ENCOUNTER — OFFICE VISIT (OUTPATIENT)
Dept: INTERNAL MEDICINE | Facility: CLINIC | Age: 81
End: 2021-10-28
Payer: MEDICARE

## 2021-10-28 ENCOUNTER — LAB VISIT (OUTPATIENT)
Dept: LAB | Facility: HOSPITAL | Age: 81
End: 2021-10-28
Payer: MEDICARE

## 2021-10-28 ENCOUNTER — IMMUNIZATION (OUTPATIENT)
Dept: PHARMACY | Facility: CLINIC | Age: 81
End: 2021-10-28
Payer: MEDICARE

## 2021-10-28 VITALS
SYSTOLIC BLOOD PRESSURE: 132 MMHG | HEIGHT: 72 IN | WEIGHT: 223.13 LBS | HEART RATE: 56 BPM | OXYGEN SATURATION: 97 % | BODY MASS INDEX: 30.22 KG/M2 | DIASTOLIC BLOOD PRESSURE: 82 MMHG

## 2021-10-28 DIAGNOSIS — M1A.9XX1 TOPHACEOUS GOUT: ICD-10-CM

## 2021-10-28 DIAGNOSIS — Z79.01 CURRENT USE OF LONG TERM ANTICOAGULATION: ICD-10-CM

## 2021-10-28 DIAGNOSIS — E66.09 CLASS 1 OBESITY DUE TO EXCESS CALORIES WITHOUT SERIOUS COMORBIDITY WITH BODY MASS INDEX (BMI) OF 30.0 TO 30.9 IN ADULT: ICD-10-CM

## 2021-10-28 DIAGNOSIS — Z12.5 PROSTATE CANCER SCREENING: ICD-10-CM

## 2021-10-28 DIAGNOSIS — Z00.00 LABORATORY EXAMINATION ORDERED AS PART OF A ROUTINE GENERAL MEDICAL EXAMINATION: ICD-10-CM

## 2021-10-28 DIAGNOSIS — Z86.79 S/P ABLATION OF ATRIAL FLUTTER: ICD-10-CM

## 2021-10-28 DIAGNOSIS — Z00.00 ENCOUNTER FOR ANNUAL PHYSICAL EXAM: Primary | ICD-10-CM

## 2021-10-28 DIAGNOSIS — Z98.890 S/P ABLATION OF ATRIAL FLUTTER: ICD-10-CM

## 2021-10-28 DIAGNOSIS — I10 ESSENTIAL HYPERTENSION: ICD-10-CM

## 2021-10-28 DIAGNOSIS — Z23 NEED FOR VACCINATION: Primary | ICD-10-CM

## 2021-10-28 DIAGNOSIS — I48.0 PAF (PAROXYSMAL ATRIAL FIBRILLATION): ICD-10-CM

## 2021-10-28 DIAGNOSIS — I47.10 SVT (SUPRAVENTRICULAR TACHYCARDIA): ICD-10-CM

## 2021-10-28 DIAGNOSIS — N18.31 STAGE 3A CHRONIC KIDNEY DISEASE: ICD-10-CM

## 2021-10-28 DIAGNOSIS — E78.2 MIXED HYPERLIPIDEMIA: ICD-10-CM

## 2021-10-28 LAB
ALBUMIN SERPL BCP-MCNC: 4.1 G/DL (ref 3.5–5.2)
ALP SERPL-CCNC: 87 U/L (ref 55–135)
ALT SERPL W/O P-5'-P-CCNC: 55 U/L (ref 10–44)
ANION GAP SERPL CALC-SCNC: 7 MMOL/L (ref 8–16)
AST SERPL-CCNC: 36 U/L (ref 10–40)
BASOPHILS # BLD AUTO: 0.06 K/UL (ref 0–0.2)
BASOPHILS NFR BLD: 0.9 % (ref 0–1.9)
BILIRUB SERPL-MCNC: 1.2 MG/DL (ref 0.1–1)
BUN SERPL-MCNC: 16 MG/DL (ref 8–23)
CALCIUM SERPL-MCNC: 10.2 MG/DL (ref 8.7–10.5)
CHLORIDE SERPL-SCNC: 111 MMOL/L (ref 95–110)
CO2 SERPL-SCNC: 27 MMOL/L (ref 23–29)
COMPLEXED PSA SERPL-MCNC: 1.3 NG/ML (ref 0–4)
CREAT SERPL-MCNC: 1.1 MG/DL (ref 0.5–1.4)
DIFFERENTIAL METHOD: ABNORMAL
EOSINOPHIL # BLD AUTO: 0.2 K/UL (ref 0–0.5)
EOSINOPHIL NFR BLD: 3.3 % (ref 0–8)
ERYTHROCYTE [DISTWIDTH] IN BLOOD BY AUTOMATED COUNT: 12.5 % (ref 11.5–14.5)
EST. GFR  (AFRICAN AMERICAN): >60 ML/MIN/1.73 M^2
EST. GFR  (NON AFRICAN AMERICAN): >60 ML/MIN/1.73 M^2
GLUCOSE SERPL-MCNC: 121 MG/DL (ref 70–110)
HCT VFR BLD AUTO: 44.1 % (ref 40–54)
HGB BLD-MCNC: 14.7 G/DL (ref 14–18)
IMM GRANULOCYTES # BLD AUTO: 0.02 K/UL (ref 0–0.04)
IMM GRANULOCYTES NFR BLD AUTO: 0.3 % (ref 0–0.5)
LYMPHOCYTES # BLD AUTO: 1.6 K/UL (ref 1–4.8)
LYMPHOCYTES NFR BLD: 25.7 % (ref 18–48)
MCH RBC QN AUTO: 32.5 PG (ref 27–31)
MCHC RBC AUTO-ENTMCNC: 33.3 G/DL (ref 32–36)
MCV RBC AUTO: 97 FL (ref 82–98)
MONOCYTES # BLD AUTO: 0.4 K/UL (ref 0.3–1)
MONOCYTES NFR BLD: 6.1 % (ref 4–15)
NEUTROPHILS # BLD AUTO: 4.1 K/UL (ref 1.8–7.7)
NEUTROPHILS NFR BLD: 63.7 % (ref 38–73)
NRBC BLD-RTO: 0 /100 WBC
PLATELET # BLD AUTO: 163 K/UL (ref 150–450)
PMV BLD AUTO: 11.3 FL (ref 9.2–12.9)
POTASSIUM SERPL-SCNC: 5 MMOL/L (ref 3.5–5.1)
PROT SERPL-MCNC: 6.5 G/DL (ref 6–8.4)
RBC # BLD AUTO: 4.53 M/UL (ref 4.6–6.2)
SODIUM SERPL-SCNC: 145 MMOL/L (ref 136–145)
TSH SERPL DL<=0.005 MIU/L-ACNC: 1.76 UIU/ML (ref 0.4–4)
URATE SERPL-MCNC: 7.2 MG/DL (ref 3.4–7)
WBC # BLD AUTO: 6.37 K/UL (ref 3.9–12.7)

## 2021-10-28 PROCEDURE — 84443 ASSAY THYROID STIM HORMONE: CPT | Performed by: INTERNAL MEDICINE

## 2021-10-28 PROCEDURE — 99213 OFFICE O/P EST LOW 20 MIN: CPT | Mod: PBBFAC | Performed by: INTERNAL MEDICINE

## 2021-10-28 PROCEDURE — 99999 PR PBB SHADOW E&M-EST. PATIENT-LVL III: ICD-10-PCS | Mod: PBBFAC,,, | Performed by: INTERNAL MEDICINE

## 2021-10-28 PROCEDURE — 99999 PR PBB SHADOW E&M-EST. PATIENT-LVL III: CPT | Mod: PBBFAC,,, | Performed by: INTERNAL MEDICINE

## 2021-10-28 PROCEDURE — 99214 OFFICE O/P EST MOD 30 MIN: CPT | Mod: S$GLB,,, | Performed by: INTERNAL MEDICINE

## 2021-10-28 PROCEDURE — 80053 COMPREHEN METABOLIC PANEL: CPT | Performed by: INTERNAL MEDICINE

## 2021-10-28 PROCEDURE — 36415 COLL VENOUS BLD VENIPUNCTURE: CPT | Performed by: INTERNAL MEDICINE

## 2021-10-28 PROCEDURE — 85025 COMPLETE CBC W/AUTO DIFF WBC: CPT | Performed by: INTERNAL MEDICINE

## 2021-10-28 PROCEDURE — 99214 PR OFFICE/OUTPT VISIT, EST, LEVL IV, 30-39 MIN: ICD-10-PCS | Mod: S$GLB,,, | Performed by: INTERNAL MEDICINE

## 2021-10-28 PROCEDURE — 84550 ASSAY OF BLOOD/URIC ACID: CPT | Performed by: INTERNAL MEDICINE

## 2021-10-28 PROCEDURE — 84153 ASSAY OF PSA TOTAL: CPT | Performed by: INTERNAL MEDICINE

## 2021-10-28 NOTE — PROGRESS NOTES
Subjective:       Patient ID: Danial Meza is a 81 y.o. male.    Chief Complaint: Annual Exam      Danial Meza is a 81 y.o. year old male    81 y.o. with pmhx SVT, aflutter s/p ablation, paroxysmal atrial fibrillation on pradaxa, HTN, HLD, tophaceous gout presents for establishment of care. Denies any current issues.       Review of Systems   Constitutional: Negative for activity change, appetite change, chills, fatigue, fever and unexpected weight change.   HENT: Negative for congestion, rhinorrhea and sore throat.    Eyes: Negative for visual disturbance.   Respiratory: Negative for shortness of breath.    Cardiovascular: Negative for chest pain.   Gastrointestinal: Negative for abdominal pain, diarrhea, nausea and vomiting.   Genitourinary: Negative for difficulty urinating and dysuria.   Musculoskeletal: Negative for arthralgias, back pain and myalgias.   Skin: Negative for color change and rash.   Neurological: Negative for dizziness, weakness and headaches.         Past Medical History:   Diagnosis Date    Basal cell carcinoma 1/5/12    right nose    Basal cell carcinoma 10/09/2017    left nasal bridge and left cheek    Cataract     Glaucoma     Gout 7/18/2014    Mixed hyperlipidemia     Osteoarthritis of right foot 10/26/2019    SVT (supraventricular tachycardia) 1/14/2005    ablation by Marixa of left lateral free wall accessory bypass tract    Type 2 diabetes mellitus with hyperglycemia, without long-term current use of insulin 4/9/2018        Prior to Admission medications    Medication Sig Start Date End Date Taking? Authorizing Provider   atorvastatin (LIPITOR) 20 MG tablet Take 1 tablet (20 mg total) by mouth every evening. 9/24/21   Jose Rafael Sorto Jr., MD   colchicine (COLCRYS) 0.6 mg tablet Take 1 tablet (0.6 mg total) by mouth 2 (two) times daily. Until symptoms of gout flare resolve  Patient taking differently: Take 0.6 mg by mouth as needed. Until symptoms of gout flare resolve  12/4/19 9/23/21  Louie Durant MD   lisinopriL 10 MG tablet TAKE ONE TABLET BY MOUTH EVERY DAY. 9/20/21   Vasile Razo MD   LUMIGAN 0.01 % Drop INSTILL ONE DROP IN BOTH EYES IN THE EVENING  5/28/21   Huma Garcia MD   multivitamin (MULTIVITAMIN) per tablet Take 1 tablet by mouth once daily.      Historical Provider   pindoloL (VISKEN) 5 MG Tab TAKE ONE TABLET BY MOUTH TWICE DAILY 8/11/21   Vasile Razo MD   PRADAXA 150 mg Cap TAKE ONE CAPSULE BY MOUTH TWICE DAILY  3/2/21   Ru Thomason Jr., MD   VIT C/VIT E/LUTEIN/MIN/OMEGA-3 (OCUVITE ORAL) Take 1 tablet by mouth once daily.     Historical Provider   pindoloL (VISKEN) 5 MG Tab TAKE ONE TABLET BY MOUTH TWICE DAILY 5/19/20   Vasile Razo MD        Past medical history, surgical history, and family medical history reviewed and updated as appropriate.    Medications and allergies reviewed.     Objective:          Vitals:    10/28/21 0944   BP: 132/82   BP Location: Left arm   Patient Position: Sitting   BP Method: Medium (Manual)   Pulse: (!) 56   SpO2: 97%   Weight: 101.2 kg (223 lb 1.7 oz)   Height: 6' (1.829 m)     Body mass index is 30.26 kg/m².  Physical Exam  Constitutional:       General: He is not in acute distress.     Appearance: He is well-developed and well-nourished.   HENT:      Head: Normocephalic and atraumatic.      Nose: Nose normal.      Mouth/Throat:      Mouth: Oropharynx is clear and moist.   Eyes:      General: No scleral icterus.     Extraocular Movements: Extraocular movements intact and EOM normal.   Neck:      Thyroid: No thyromegaly.      Vascular: No JVD.      Trachea: No tracheal deviation.   Cardiovascular:      Rate and Rhythm: Normal rate and regular rhythm.      Heart sounds: Normal heart sounds. No murmur heard.  No friction rub. No gallop.    Pulmonary:      Effort: Pulmonary effort is normal. No respiratory distress.      Breath sounds: Normal breath sounds. No wheezing or rales.   Abdominal:       General: Bowel sounds are normal. There is no distension.      Palpations: Abdomen is soft. There is no mass.      Tenderness: There is no abdominal tenderness.   Musculoskeletal:         General: No tenderness or edema. Normal range of motion.      Cervical back: Normal range of motion and neck supple.   Lymphadenopathy:      Cervical: No cervical adenopathy.   Skin:     General: Skin is warm and dry.      Findings: No rash.   Neurological:      Mental Status: He is alert and oriented to person, place, and time.      Cranial Nerves: No cranial nerve deficit.      Deep Tendon Reflexes: Reflexes normal.   Psychiatric:         Mood and Affect: Mood and affect normal.         Behavior: Behavior normal.         Lab Results   Component Value Date    WBC 6.94 01/11/2021    HGB 14.6 01/11/2021    HCT 44.1 01/11/2021     01/11/2021    CHOL 74 (L) 01/11/2021    TRIG 106 01/11/2021    HDL 22 (L) 01/11/2021    ALT 38 01/11/2021    AST 25 01/11/2021     01/11/2021    K 4.5 01/11/2021     01/11/2021    CREATININE 1.3 01/11/2021    BUN 24 (H) 01/11/2021    CO2 26 01/11/2021    TSH 2.673 11/04/2015    PSA 0.65 12/01/2011    INR 1.4 (H) 12/30/2018    HGBA1C 5.6 10/28/2019       Assessment:       1. Encounter for annual physical exam    2. Tophaceous gout    3. PAF (paroxysmal atrial fibrillation)    4. S/P ablation of atrial flutter 11/4/15    5. SVT (supraventricular tachycardia)    6. Mixed hyperlipidemia    7. Essential hypertension    8. Class 1 obesity due to excess calories without serious comorbidity with body mass index (BMI) of 30.0 to 30.9 in adult    9. Stage 3a chronic kidney disease    10. Current use of long term anticoagulation    11. Laboratory examination ordered as part of a routine general medical examination          Plan:     Danial was seen today for annual exam.    Diagnoses and all orders for this visit:    Encounter for annual physical exam    Tophaceous gout  -     Uric Acid;  Future    PAF (paroxysmal atrial fibrillation)    S/P ablation of atrial flutter 11/4/15    SVT (supraventricular tachycardia)    Mixed hyperlipidemia    Essential hypertension  -     CBC Auto Differential; Future  -     Comprehensive Metabolic Panel; Future  -     TSH; Future    Class 1 obesity due to excess calories without serious comorbidity with body mass index (BMI) of 30.0 to 30.9 in adult    Stage 3a chronic kidney disease    Current use of long term anticoagulation    Laboratory examination ordered as part of a routine general medical examination  -     PSA, Screening; Future  -     CBC Auto Differential; Future  -     Comprehensive Metabolic Panel; Future  -     TSH; Future  -     Uric Acid; Future    Prostate cancer screening  -     PSA, Screening; Future    establishment of care - benign exam, obtain routine labs  afib - on long term anticoagulation, rate is controlled, no changes to medication at this time  HTN - controlled, no changes to medications at this time  CKD 3a - stable, will monitor with labs  Hx of gout - recheck uric acid levels, diet controlled, has colcrys prn  HLD - controlled, recheck lipid panel    Health maintenance reviewed with patient.     Follow up in about 6 months (around 4/28/2022).    Ruben Arriaza MD  Internal Medicine / Primary Care  [facility]  10/28/2021

## 2021-10-28 NOTE — PATIENT INSTRUCTIONS
Blood work today    Schedule covid-19 booster shot     Patient will get flu shot at [facility] - wait two weeks    Return to clinic in 6 months or sooner if needed

## 2021-11-30 DIAGNOSIS — I48.0 PAF (PAROXYSMAL ATRIAL FIBRILLATION): ICD-10-CM

## 2021-11-30 DIAGNOSIS — I48.0 PAF (PAROXYSMAL ATRIAL FIBRILLATION): Primary | ICD-10-CM

## 2021-12-15 DIAGNOSIS — I10 ESSENTIAL HYPERTENSION: ICD-10-CM

## 2021-12-15 DIAGNOSIS — E11.65 TYPE 2 DIABETES MELLITUS WITH HYPERGLYCEMIA, WITHOUT LONG-TERM CURRENT USE OF INSULIN: ICD-10-CM

## 2021-12-15 DIAGNOSIS — I48.0 PAF (PAROXYSMAL ATRIAL FIBRILLATION): ICD-10-CM

## 2021-12-15 DIAGNOSIS — E78.2 MIXED HYPERLIPIDEMIA: ICD-10-CM

## 2021-12-15 RX ORDER — LISINOPRIL 10 MG/1
10 TABLET ORAL DAILY
Qty: 90 TABLET | Refills: 3 | Status: SHIPPED | OUTPATIENT
Start: 2021-12-15 | End: 2022-10-03 | Stop reason: SDUPTHER

## 2021-12-15 RX ORDER — ATORVASTATIN CALCIUM 20 MG/1
20 TABLET, FILM COATED ORAL NIGHTLY
Qty: 90 TABLET | Refills: 3 | Status: SHIPPED | OUTPATIENT
Start: 2021-12-15 | End: 2022-11-30

## 2022-01-12 NOTE — PROGRESS NOTES
HPI     DLS: 9/17/2021    C/O: Pt states vision is doing good no changes since last exam.       1) POAG OD   2) Glaucoma Suspect OS   3) NS OU   4) Ptosis   5) FmHx ARMD   6) Hyperopia with Astigmatism/ Presbyopia     Meds: Luimgan QHS OU     Last edited by Huma aGrcia MD on 1/14/2022  9:38 AM. (History)            Assessment /Plan     For exam results, see Encounter Report.    Primary open-angle glaucoma, right eye, mild stage    Open angle with borderline findings and high glaucoma risk in left eye    Nuclear sclerosis of both eyes    Brow ptosis    Small pupils    Family history of macular degeneration    Hyperopia of both eyes with astigmatism and presbyopia            1. Open angle with borderline glaucoma findings - Both Eyes (POAG od // suspect os )   Glaucoma (type and duration)   LTG suspect  First HVF 2011  First photos 2011  Glaucoma drops started 2/11/2021 (monitored off gtts 2011 to 2021 - then got new INS od)      Family history   = father-also my patient, + mom- she has passed  Glaucoma meds   lumigan ou - started 2/11/2021  H/O adverse rxn to glaucoma drops  none  LASERS   none  GLAUCOMA SURGERIES   none  OTHER EYE SURGERIES   none  CDR   0.8 with thin sup rim // 0/70-0.75 - rim appears intact   Tbase  16-24    Tmax  21/24      Ttarget   ?       HVF  5 test 2011 to 2018 - full od // full os-            1 test 2021 to 2021 - New INS od // still full os   Gono  +3 ou  CCT  567/566  OCT  3 test 2012 to 2021 - RNFL - bord TS od (+prog 2018 to 2021) // nl os  HRT   5 test 2011 to 2019 - MR -  Dec. IT od,  // dec. IN os, border IT /// CDR 0.72 od // 0.70 os  Disc photos  2011, 2012, 2017  - OIS // 2021 - harmony     Ttoday  14/16  Test done today - IOP check, // gonio   2. Nuclear sclerosis - Both Eyes   - mild BCVA 20/20 ou  BATh 20/60 od // 20/70 os (10/2013)   3. Ptosis   -patient would like to monitor for now as he is not having any trouble    4. Family history of macular degeneration    -father has adv wet ARMD - gets injections with arend   5. Hyperopia, astigmatism , presbyopia  PC +0.75+1.00x177        +1.00+1.50x180   ADD +2.50  glasses Teto   6. I use to see his father, and also saw his mother -  - his dad  2015 @ age 89   - his mom passed at age 98  - both had glaucoma, his dad also had  wet ARMD - saw Arend     Plan    New INS od - now has POAG od - mild   OCT od - bord TS now - (+progfrom )   Bord glaucoma os - high risk    Doing ok with  lumigan  (( did not switch to latanoprost )) - so far insurance is still covering it     Cont to monitor cataracts     - not vis sign yet - dilates medium only - smallish pupils     Photo file updated 2021    F/U 4 months with HVF / DFE / OCT     Keep F/U with Teto prn - can see him one more time before he retires for glasses ect

## 2022-01-14 ENCOUNTER — OFFICE VISIT (OUTPATIENT)
Dept: OPHTHALMOLOGY | Facility: CLINIC | Age: 82
End: 2022-01-14
Payer: MEDICARE

## 2022-01-14 DIAGNOSIS — H57.819 BROW PTOSIS: ICD-10-CM

## 2022-01-14 DIAGNOSIS — H52.03 HYPEROPIA OF BOTH EYES WITH ASTIGMATISM AND PRESBYOPIA: ICD-10-CM

## 2022-01-14 DIAGNOSIS — H57.03 SMALL PUPILS: ICD-10-CM

## 2022-01-14 DIAGNOSIS — Z83.518 FAMILY HISTORY OF MACULAR DEGENERATION: ICD-10-CM

## 2022-01-14 DIAGNOSIS — H40.1111 PRIMARY OPEN-ANGLE GLAUCOMA, RIGHT EYE, MILD STAGE: Primary | ICD-10-CM

## 2022-01-14 DIAGNOSIS — H52.4 HYPEROPIA OF BOTH EYES WITH ASTIGMATISM AND PRESBYOPIA: ICD-10-CM

## 2022-01-14 DIAGNOSIS — H40.022 OPEN ANGLE WITH BORDERLINE FINDINGS AND HIGH GLAUCOMA RISK IN LEFT EYE: ICD-10-CM

## 2022-01-14 DIAGNOSIS — H25.13 NUCLEAR SCLEROSIS OF BOTH EYES: ICD-10-CM

## 2022-01-14 DIAGNOSIS — H52.203 HYPEROPIA OF BOTH EYES WITH ASTIGMATISM AND PRESBYOPIA: ICD-10-CM

## 2022-01-14 PROCEDURE — 99999 PR PBB SHADOW E&M-EST. PATIENT-LVL II: ICD-10-PCS | Mod: PBBFAC,,, | Performed by: OPHTHALMOLOGY

## 2022-01-14 PROCEDURE — 92020 GONIOSCOPY: CPT | Mod: PBBFAC | Performed by: OPHTHALMOLOGY

## 2022-01-14 PROCEDURE — 92020 PR SPECIAL EYE EVAL,GONIOSCOPY: ICD-10-PCS | Mod: S$PBB,,, | Performed by: OPHTHALMOLOGY

## 2022-01-14 PROCEDURE — 99999 PR PBB SHADOW E&M-EST. PATIENT-LVL II: CPT | Mod: PBBFAC,,, | Performed by: OPHTHALMOLOGY

## 2022-01-14 PROCEDURE — 92012 PR EYE EXAM, EST PATIENT,INTERMED: ICD-10-PCS | Mod: S$PBB,,, | Performed by: OPHTHALMOLOGY

## 2022-01-14 PROCEDURE — 92012 INTRM OPH EXAM EST PATIENT: CPT | Mod: S$PBB,,, | Performed by: OPHTHALMOLOGY

## 2022-01-14 PROCEDURE — 92020 GONIOSCOPY: CPT | Mod: S$PBB,,, | Performed by: OPHTHALMOLOGY

## 2022-01-27 ENCOUNTER — HOSPITAL ENCOUNTER (OUTPATIENT)
Dept: RADIOLOGY | Facility: HOSPITAL | Age: 82
Discharge: HOME | End: 2022-01-27
Attending: ORTHOPAEDIC SURGERY
Payer: MEDICARE

## 2022-01-27 DIAGNOSIS — M79.641 RIGHT HAND PAIN: Primary | ICD-10-CM

## 2022-01-27 DIAGNOSIS — M79.641 RIGHT HAND PAIN: ICD-10-CM

## 2022-01-27 PROCEDURE — 73130 X-RAY EXAM OF HAND: CPT | Mod: TC,RT

## 2022-01-27 PROCEDURE — 73130 X-RAY EXAM OF HAND: CPT | Mod: 26,RT,, | Performed by: RADIOLOGY

## 2022-01-27 PROCEDURE — 73130 XR HAND COMPLETE 3 VIEW RIGHT: ICD-10-PCS | Mod: 26,RT,, | Performed by: RADIOLOGY

## 2022-02-15 DIAGNOSIS — I47.10 SVT (SUPRAVENTRICULAR TACHYCARDIA): ICD-10-CM

## 2022-02-15 DIAGNOSIS — I47.10 SVT (SUPRAVENTRICULAR TACHYCARDIA): Primary | ICD-10-CM

## 2022-02-15 RX ORDER — METOPROLOL SUCCINATE 50 MG/1
50 TABLET, EXTENDED RELEASE ORAL DAILY
Qty: 30 TABLET | Refills: 11 | Status: SHIPPED | OUTPATIENT
Start: 2022-02-15 | End: 2022-02-15 | Stop reason: SDUPTHER

## 2022-02-15 RX ORDER — METOPROLOL SUCCINATE 50 MG/1
50 TABLET, EXTENDED RELEASE ORAL DAILY
Qty: 90 TABLET | Refills: 3 | Status: SHIPPED | OUTPATIENT
Start: 2022-02-15 | End: 2022-12-08

## 2022-02-15 NOTE — TELEPHONE ENCOUNTER
----- Message from Jose Rafael Sorto Jr., MD sent at 2/15/2022 11:09 AM CST -----  Regarding: RE: medication  Okay to change pindolol to metoprolol succinate 50 mg once daily  ----- Message -----  From: Koki Galvan MA  Sent: 2/15/2022   9:18 AM CST  To: Jose Rafael Sorto Jr., MD  Subject: FW: medication                                   Insurance has changed and Pindolol is TIER 4; requesting a change to Metoprolol if its ok  ----- Message -----  From: Viola Leigh  Sent: 2/15/2022   8:19 AM CST  To: Noreen Mantilla Staff  Subject: medication                                       Pls call pt at [phone].  He wants to see about changing his     pindoloL (VISKEN) 5 MG Tab to Metoprolol if possible.    Thank you

## 2022-02-21 ENCOUNTER — HOSPITAL ENCOUNTER (OUTPATIENT)
Dept: RADIOLOGY | Facility: HOSPITAL | Age: 82
Discharge: HOME | End: 2022-02-21
Attending: ORTHOPAEDIC SURGERY
Payer: MEDICARE

## 2022-02-21 ENCOUNTER — OFFICE VISIT (OUTPATIENT)
Dept: ORTHOPEDICS | Facility: CLINIC | Age: 82
End: 2022-02-21
Payer: MEDICARE

## 2022-02-21 VITALS — WEIGHT: 221.31 LBS | BODY MASS INDEX: 29.97 KG/M2 | HEIGHT: 72 IN

## 2022-02-21 DIAGNOSIS — M79.641 RIGHT HAND PAIN: Primary | ICD-10-CM

## 2022-02-21 DIAGNOSIS — M54.12 CERVICAL RADICULOPATHY: Primary | ICD-10-CM

## 2022-02-21 DIAGNOSIS — M19.041 PRIMARY OSTEOARTHRITIS OF RIGHT HAND: ICD-10-CM

## 2022-02-21 DIAGNOSIS — M18.11 PRIMARY OSTEOARTHRITIS OF FIRST CARPOMETACARPAL JOINT OF RIGHT HAND: ICD-10-CM

## 2022-02-21 DIAGNOSIS — M65.331 TRIGGER MIDDLE FINGER OF RIGHT HAND: ICD-10-CM

## 2022-02-21 DIAGNOSIS — M79.641 RIGHT HAND PAIN: ICD-10-CM

## 2022-02-21 PROCEDURE — 3288F FALL RISK ASSESSMENT DOCD: CPT | Mod: CPTII,S$GLB,, | Performed by: ORTHOPAEDIC SURGERY

## 2022-02-21 PROCEDURE — 20550 TENDON SHEATH: ICD-10-PCS | Mod: F7,S$GLB,, | Performed by: ORTHOPAEDIC SURGERY

## 2022-02-21 PROCEDURE — 20550 NJX 1 TENDON SHEATH/LIGAMENT: CPT | Mod: F7,S$GLB,, | Performed by: ORTHOPAEDIC SURGERY

## 2022-02-21 PROCEDURE — 3288F PR FALLS RISK ASSESSMENT DOCUMENTED: ICD-10-PCS | Mod: CPTII,S$GLB,, | Performed by: ORTHOPAEDIC SURGERY

## 2022-02-21 PROCEDURE — 99999 PR PBB SHADOW E&M-EST. PATIENT-LVL III: CPT | Mod: PBBFAC,,, | Performed by: ORTHOPAEDIC SURGERY

## 2022-02-21 PROCEDURE — 99204 PR OFFICE/OUTPT VISIT, NEW, LEVL IV, 45-59 MIN: ICD-10-PCS | Mod: 25,S$GLB,, | Performed by: ORTHOPAEDIC SURGERY

## 2022-02-21 PROCEDURE — 99204 OFFICE O/P NEW MOD 45 MIN: CPT | Mod: 25,S$GLB,, | Performed by: ORTHOPAEDIC SURGERY

## 2022-02-21 PROCEDURE — 1101F PR PT FALLS ASSESS DOC 0-1 FALLS W/OUT INJ PAST YR: ICD-10-PCS | Mod: CPTII,S$GLB,, | Performed by: ORTHOPAEDIC SURGERY

## 2022-02-21 PROCEDURE — 73130 X-RAY EXAM OF HAND: CPT | Mod: 26,HCNC,RT, | Performed by: RADIOLOGY

## 2022-02-21 PROCEDURE — 1126F AMNT PAIN NOTED NONE PRSNT: CPT | Mod: CPTII,S$GLB,, | Performed by: ORTHOPAEDIC SURGERY

## 2022-02-21 PROCEDURE — 1126F PR PAIN SEVERITY QUANTIFIED, NO PAIN PRESENT: ICD-10-PCS | Mod: CPTII,S$GLB,, | Performed by: ORTHOPAEDIC SURGERY

## 2022-02-21 PROCEDURE — 73130 X-RAY EXAM OF HAND: CPT | Mod: TC,HCNC,RT

## 2022-02-21 PROCEDURE — 1101F PT FALLS ASSESS-DOCD LE1/YR: CPT | Mod: CPTII,S$GLB,, | Performed by: ORTHOPAEDIC SURGERY

## 2022-02-21 PROCEDURE — 99999 PR PBB SHADOW E&M-EST. PATIENT-LVL III: ICD-10-PCS | Mod: PBBFAC,,, | Performed by: ORTHOPAEDIC SURGERY

## 2022-02-21 PROCEDURE — 73130 XR HAND COMPLETE 3 VIEW RIGHT: ICD-10-PCS | Mod: 26,HCNC,RT, | Performed by: RADIOLOGY

## 2022-02-21 RX ADMIN — METHYLPREDNISOLONE ACETATE 40 MG: 40 INJECTION, SUSPENSION INTRA-ARTICULAR; INTRALESIONAL; INTRAMUSCULAR; SOFT TISSUE at 10:02

## 2022-02-24 ENCOUNTER — OFFICE VISIT (OUTPATIENT)
Dept: OPTOMETRY | Facility: CLINIC | Age: 82
End: 2022-02-24
Payer: COMMERCIAL

## 2022-02-24 DIAGNOSIS — H25.13 NUCLEAR SCLEROSIS, BILATERAL: Primary | ICD-10-CM

## 2022-02-24 DIAGNOSIS — H40.003 GLAUCOMA SUSPECT OF BOTH EYES: ICD-10-CM

## 2022-02-24 DIAGNOSIS — H52.203 ASTIGMATISM OF BOTH EYES, UNSPECIFIED TYPE: ICD-10-CM

## 2022-02-24 DIAGNOSIS — H52.4 PRESBYOPIA OF BOTH EYES: ICD-10-CM

## 2022-02-24 PROCEDURE — 99999 PR PBB SHADOW E&M-EST. PATIENT-LVL III: CPT | Mod: PBBFAC,,, | Performed by: OPTOMETRIST

## 2022-02-24 PROCEDURE — 99999 PR PBB SHADOW E&M-EST. PATIENT-LVL III: ICD-10-PCS | Mod: PBBFAC,,, | Performed by: OPTOMETRIST

## 2022-02-24 PROCEDURE — 92012 PR EYE EXAM, EST PATIENT,INTERMED: ICD-10-PCS | Mod: S$GLB,,, | Performed by: OPTOMETRIST

## 2022-02-24 PROCEDURE — 92015 PR REFRACTION: ICD-10-PCS | Mod: S$GLB,,, | Performed by: OPTOMETRIST

## 2022-02-24 PROCEDURE — 92012 INTRM OPH EXAM EST PATIENT: CPT | Mod: S$GLB,,, | Performed by: OPTOMETRIST

## 2022-02-24 PROCEDURE — 92015 DETERMINE REFRACTIVE STATE: CPT | Mod: S$GLB,,, | Performed by: OPTOMETRIST

## 2022-02-24 NOTE — PATIENT INSTRUCTIONS
Bilateral nuclear sclerotic cataract in both eye, consistent with age.  No need for cataract surgery in either eye, based on the visual acuity achieved with refraction today.    Astigmatic refractive error in each eye, with very satisfactory best-corrected visual acuity in each eye.  Presbyopia consistent with age.    Followed by Dr. Garcia as glaucoma suspect (low risk).     Mr. Meza is not using topical medication(s) for control/reduction of intraocular pressure.    New spectacle lens Rx issued for full-time wear.    Scheduled to see Dr. Garcia with HVF test in May,2022.  Defer  dilated fundus exam (DFE) to Dr. Garcia.    Recheck refraction in one year.

## 2022-02-25 RX ORDER — METHYLPREDNISOLONE ACETATE 40 MG/ML
40 INJECTION, SUSPENSION INTRA-ARTICULAR; INTRALESIONAL; INTRAMUSCULAR; SOFT TISSUE
Status: DISCONTINUED | OUTPATIENT
Start: 2022-02-21 | End: 2022-02-25 | Stop reason: HOSPADM

## 2022-02-25 NOTE — PROGRESS NOTES
[facility]  History & Physical  Orthopedics    SUBJECTIVE:      COVID-19 attestation:  This patient was treated during the COVID-19 pandemic.  This was discussed with the patient, they are aware of our current policies and procedures, were given the option of delaying their visit and or switching to a virtual visit, delaying their surgery when applicable, and they elect to proceed.    Chief Complaint:   Chief Complaint   Patient presents with    Right Hand - Pain       Referring Provider: Self, Aaareferral     History of Present Illness:  Patient is a 81 y.o. right hand dominant male who presents today with complaints of numbness and tingling in the hands for months. He reports that he has been dropping objects. He reports mild neck pain. He also reports pain and stinging in the right long finger for about a month. He denies any trauma. He has a history of tophaceous gout.    He had a right ring finger trigger release 10-12 years ago with Dr. Frausto.    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    Symptoms are aggravated by activity and movement.    Symptoms are alleviated by rest.    Symptoms consist of pain and numbness/tingling.    The patient rates their pain as a 0/10.    Attempted treatment(s) and/or interventions include rest and activity modification.     The patient denies any fevers, chills, N/V, D/C and presents for evaluation.       Past Medical History:   Diagnosis Date    Basal cell carcinoma 1/5/12    right nose    Basal cell carcinoma 10/09/2017    left nasal bridge and left cheek    Cataract     Glaucoma     Gout 7/18/2014    Mixed hyperlipidemia     Osteoarthritis of right foot 10/26/2019    SVT (supraventricular tachycardia) 1/14/2005    ablation by Marixa of left lateral free wall accessory bypass tract    Type 2 diabetes mellitus with hyperglycemia, without long-term current use of insulin 4/9/2018     Past Surgical History:    Procedure Laterality Date    COLONOSCOPY  12/2015    skin cancer nose and neck      SVT accessory pathway ablation  1/14/2005    Dr. Calvin     Review of patient's allergies indicates:   Allergen Reactions    Macadamia nut oil Anaphylaxis     Pt states only had a reaction with macadamia nuts.      Social History     Social History Narrative    Not on file     Family History   Problem Relation Age of Onset    Glaucoma Mother     Cancer Mother         Breast    Glaucoma Father     Macular degeneration Father     Stroke Father     Melanoma Father     Cancer Brother         Colon    Colon cancer Brother     No Known Problems Maternal Aunt     No Known Problems Maternal Uncle     No Known Problems Paternal Aunt     No Known Problems Paternal Uncle     No Known Problems Maternal Grandmother     No Known Problems Maternal Grandfather     No Known Problems Paternal Grandmother     No Known Problems Paternal Grandfather     Diabetes Neg Hx     Psoriasis Neg Hx     Lupus Neg Hx     Eczema Neg Hx     Acne Neg Hx     Amblyopia Neg Hx     Blindness Neg Hx     Cataracts Neg Hx     Hypertension Neg Hx     Retinal detachment Neg Hx     Strabismus Neg Hx     Thyroid disease Neg Hx          Current Outpatient Medications:     apixaban (ELIQUIS) 5 mg Tab, Take 1 tablet (5 mg total) by mouth 2 (two) times daily., Disp: 180 tablet, Rfl: 3    atorvastatin (LIPITOR) 20 MG tablet, Take 1 tablet (20 mg total) by mouth every evening., Disp: 90 tablet, Rfl: 3    lisinopriL 10 MG tablet, Take 1 tablet (10 mg total) by mouth once daily., Disp: 90 tablet, Rfl: 3    LUMIGAN 0.01 % Drop, INSTILL ONE DROP IN BOTH EYES IN THE EVENING , Disp: 7.5 mL, Rfl: 3    metoprolol succinate (TOPROL-XL) 50 MG 24 hr tablet, Take 1 tablet (50 mg total) by mouth once daily., Disp: 90 tablet, Rfl: 3    multivitamin (THERAGRAN) per tablet, Take 1 tablet by mouth once daily., Disp: , Rfl:     sars-cov-2, covid-19, (MODERNA  COVID-19) 100 mcg/0.5 ml injection, Inject into the muscle., Disp: 0.5 mL, Rfl: 0    VIT C/VIT E/LUTEIN/MIN/OMEGA-3 (OCUVITE ORAL), Take 1 tablet by mouth once daily. , Disp: , Rfl:     colchicine (COLCRYS) 0.6 mg tablet, Take 1 tablet (0.6 mg total) by mouth 2 (two) times daily. Until symptoms of gout flare resolve (Patient taking differently: Take 0.6 mg by mouth as needed. Until symptoms of gout flare resolve), Disp: 180 tablet, Rfl: 0    ROS    Review of Systems:  Constitutional: no fever or chills  Eyes: no visual changes  ENT: no nasal congestion or sore throat  Respiratory: no cough or shortness of breath  Cardiovascular: no chest pain  Gastrointestinal: no nausea or vomiting, tolerating diet  Musculoskeletal: pain and numbness/tingling    OBJECTIVE:      Vital Signs (Most Recent):  Vitals:    02/21/22 1121   Weight: 100.4 kg (221 lb 5.5 oz)   Height: 6' (1.829 m)     Body mass index is 30.02 kg/m².    Physical Exam    Physical Exam:  Constitutional: The patient appears well-developed and well-nourished. No distress.   Head: Normocephalic and atraumatic.   Nose: Nose normal.   Eyes: Conjunctivae and EOM are normal.   Neck: No tracheal deviation present.   Cardiovascular: Normal rate and intact distal pulses.    Pulmonary/Chest: Effort normal. No respiratory distress.   Abdominal: There is no guarding.   Lymphatic: Negative for adenopathy   Neurological: The patient is alert.   Psychiatric: The patient has a normal mood and affect.     Cervical Exam:  ROM decreased  Negative Spurling's  Positive Huang's    Bilateral Hand/Wrist Examination:    Observation/Inspection:  Swelling  none    Deformity  Sorin's nodes  Discoloration  none     Scars   none    Atrophy  none    HAND/WRIST EXAMINATION:  Finkelstein's Test   Neg  WHAT Test    Neg  CMC grind    Neg  Circumduction test   Neg    Right long finger demonstrable catching and locking, tender to palpation A1 pulley otherwise Bilateral ROM hand/wrist/elbow  full    Neurovascular Exam:  Digits WWP, brisk CR < 3s throughout  Slightly altered sensibility right hand otherwise NVI motor/LTS to M/R/U nerves, radial pulse 2+  2+ biceps and brachioradialis reflexes  Tinel's Test - Carpal Tunnel  neg  Tinel's Test - Cubital Tunnel  neg  Phalen's Test    neg  Median Nerve Compression Test neg    Diagnostic Results:     X-rays AP, lateral and oblique right hand taken today are independently reviewed by me and show 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         ASSESSMENT/PLAN:      81 y.o. yo male with   Encounter Diagnoses   Name Primary?    Cervical radiculopathy Yes    Trigger middle finger of right hand     Primary osteoarthritis of first carpometacarpal joint of right hand     Primary osteoarthritis of right hand     Right hand pain       Plan:  With regards to his cervical radiculopathy, I have recommended an EMG/NCS to assess the severity.    We have discussed the natural history of trigger fingers and hand arthritis including treatment options such as splinting, oral and topical anti-inflammatories, cortisone injections and surgery.    -I have offered him a selective injection. I have explained the risks, benefits, and alternatives of the procedure in detail.  The patient voices understanding and all questions have been answered. The patient agrees to proceed as planned. So after a sterile prep of the skin in the normal fashion the right long finger flexor sheath was injected using a 25 gauge needle with a combination of 1cc 1% plain lidocaine and 40 mg of methylprednisolone.  The patient is cautioned and immediate relief of pain is secondary to the local anesthetic and will be temporary.  After the anesthetic wears off there may be a increase in pain that may last for a few hours or a few days and they should use ice to help alleviate this flair up of pain. Patient tolerated the  procedure well.    I will see him after EMG/NCS or for any other questions or problems in the interim as indicated and certainly for any other issues.    The patient's pathophysiology was explained in detail with reference to x-rays, models, other visual aids as appropriate.  Treatment options were discussed in detail.  Questions were invited and answered to the patient's satisfaction. I reviewed Primary care , and other specialty's notes to better coordinate patient's care.        Alisa Rodriguez MD     Please be aware that this note has been generated with the assistance of MModal voice-to-text.  Please excuse any spelling or grammatical errors.

## 2022-02-25 NOTE — PROCEDURES
Tendon Sheath    Date/Time: 2/21/2022 10:00 AM  Performed by: Alisa Rodriguez MD  Authorized by: Alisa Rodriguez MD     Consent Done?:  Yes (Verbal)  Indications:  Pain  Timeout: prior to procedure the correct patient, procedure, and site was verified    Prep: patient was prepped and draped in usual sterile fashion      Local anesthesia used?: Yes    Anesthesia:  Local infiltration  Local anesthetic:  Lidocaine 1% without epinephrine  Anesthetic total (ml):  1    Location:  Long finger  Site:  R long flexor tendon sheath  Ultrasonic guidance for needle placement?: No    Needle size:  25 G  Approach:  Volar  Medications:  40 mg methylPREDNISolone acetate 40 mg/mL  Patient tolerance:  Patient tolerated the procedure well with no immediate complications

## 2022-02-26 NOTE — PROGRESS NOTES
"HPI     Patient's age: 81 y.o. WM  Occupation: retired  Approximate date of last eye examination:   12/28/2020  Name of last eye doctor seen: Dr. Garcia 01/14/2022       Dx:  Open angle with borderline findings. Low Risk.  Bilateral     04/09/2019-  Wears glasses? yes     If yes, wears  Full-time or part-time?  Full-time  Present glasses are: Bifocal, SV Distance, SV Reading?  ST bifocal  Approximate age of present glasses:  1 yr   Got new glasses following last exam, or subsequently?:  Yes    Any problem with [facility] with glasses?  No problems  Wears CLs?:  no  Headaches?  no  Eye pain/discomfort? No                                                                                   Flashes?  no  Floaters?  No   Diplopia/Double vision?  no  Patient's Ocular History:         Any eye surgeries? no         Any eye injury?  no         Any treatment for eye disease?  no  Family history of eye disease?  Father had macular degeneration, and   mother had glaucoma  Significant patient medical history:         1. Diabetes?  no       If yes, IDDM or NIDDM? n/a   2. HBP?  Yes, takes meds  States well-controlled               3. Other (describe):  H/o tachycardia. H/a atrial   fibrillation. Takes Pradaxia.  S/P ablation.     ! OTC eyedrops currently using:  No    ! Prescription eye meds currently using:  no   ! Any history of allergy/adverse reaction to any eye meds used   previously?  Sammy QHS OU   ! Any history of allergy/adverse reaction to eyedrops used during prior   eye exam(s)? no    ! Any history of allergy/adverse reaction to Epinephrine or similar meds?   None     ! Patient okay with use of anesthetic eyedrops to check eye pressure?    yes        ! Patient okay with use of eyedrops to dilate pupils today?  yes   !  Allergies/Medications/Medical History/Family History reviewed today?    Yes       PD =   64/60  Desired reading distance =  18.5"                                                     "             Last edited by Chente Baker MA on 2/24/2022 11:52 AM. (History)            Assessment /Plan     For exam results, see Encounter Report.    1. Nuclear sclerosis, bilateral     2. Glaucoma suspect of both eyes     3. Astigmatism of both eyes, unspecified type     4. Presbyopia of both eyes                    Bilateral nuclear sclerotic cataract in both eye, consistent with age.  No need for cataract surgery in either eye, based on the visual acuity achieved with refraction today.    Astigmatic refractive error in each eye, with very satisfactory best-corrected visual acuity in each eye.  Presbyopia consistent with age.    Followed by Dr. Garcia as glaucoma suspect (low risk).     Mr. Meza is not using topical medication(s) for control/reduction of intraocular pressure.    New spectacle lens Rx issued for full-time wear.    Scheduled to see Dr. Garcia with HVF test in May,2022.  Defer  dilated fundus exam (DFE) to Dr. Garcia.    Recheck refraction in one year.

## 2022-03-03 RX ORDER — DABIGATRAN ETEXILATE MESYLATE 150 MG/1
CAPSULE ORAL
Qty: 180 CAPSULE | Refills: 3 | OUTPATIENT
Start: 2022-03-03

## 2022-03-03 NOTE — TELEPHONE ENCOUNTER
Lab Results   Component Value Date    BNP 39 07/14/2020     10/28/2021    K 5.0 10/28/2021    MG 1.9 07/14/2020     (H) 10/28/2021    CO2 27 10/28/2021    PHOS 2.8 10/27/2019    BUN 16 10/28/2021    CREATININE 1.1 10/28/2021     (H) 10/28/2021    HGBA1C 5.6 10/28/2019    AST 36 10/28/2021    ALT 55 (H) 10/28/2021    ALBUMIN 4.1 10/28/2021    PROT 6.5 10/28/2021    BILITOT 1.2 (H) 10/28/2021    WBC 6.37 10/28/2021    HGB 14.7 10/28/2021    HCT 44.1 10/28/2021     10/28/2021    INR 1.4 (H) 12/30/2018    TSH 1.757 10/28/2021    F4MMFZY 6.6 04/19/2004    FREET4 0.87 11/04/2015    CHOL 74 (L) 01/11/2021    HDL 22 (L) 01/11/2021    LDLCALC 30.8 (L) 01/11/2021    TRIG 106 01/11/2021     His medication list includes Eliquis and Pradaxa and he cannot be on both.  Will ask the office to contact him and see what he is actually taking, correct his medication list let me know

## 2022-03-09 DIAGNOSIS — I48.0 PAF (PAROXYSMAL ATRIAL FIBRILLATION): ICD-10-CM

## 2022-03-09 NOTE — TELEPHONE ENCOUNTER
----- Message from Viola Leigh sent at 3/9/2022  3:52 PM CST -----  Regarding: rx  Requesting an RX refill or new RX.  Is this a refill or new RX: new  RX name and strength   apixaban (ELIQUIS) 5 mg Tab  Is this a 30 day or 90 day RX:   Pharmacy name and phone #  [facility] - [facility] [address]   Phone:  [phone]  Fax:  [phone]        The doctors have asked that we provide their patients with the following 2 reminders -- prescription refills can take up to 72 hours, and a friendly reminder that in the future you can use your [facility] account to request refills:        Thank you

## 2022-03-18 DIAGNOSIS — I48.0 PAF (PAROXYSMAL ATRIAL FIBRILLATION): ICD-10-CM

## 2022-03-24 ENCOUNTER — TELEPHONE (OUTPATIENT)
Dept: INTERNAL MEDICINE | Facility: CLINIC | Age: 82
End: 2022-03-24
Payer: MEDICARE

## 2022-03-24 NOTE — TELEPHONE ENCOUNTER
----- Message from Ruben Arriaza MD sent at 3/24/2022  8:25 AM CDT -----  Needs a recall to be seen end of April / early may.

## 2022-03-24 NOTE — TELEPHONE ENCOUNTER
Spoke to patient and did not want to schedule----states that he is doing fine and sees his Cardiologist every 6 mths.

## 2022-03-28 ENCOUNTER — TELEPHONE (OUTPATIENT)
Dept: NEUROLOGY | Facility: CLINIC | Age: 82
End: 2022-03-28
Payer: MEDICARE

## 2022-03-28 NOTE — TELEPHONE ENCOUNTER
----- Message from Agnieszka Willis sent at 3/28/2022  8:41 AM CDT -----  Patient Call Back    Who Called: PT     What is the request in detail: Pt calling to speak with someone regarding the rescheduling of his appointment. The pt would like to know if he can reschedule for some time this week. Please advise.    Can the clinic reply by [facility]?    Best Call Back Number: [phone] (ARI)

## 2022-04-14 ENCOUNTER — PROCEDURE VISIT (OUTPATIENT)
Dept: NEUROLOGY | Facility: CLINIC | Age: 82
End: 2022-04-14
Payer: MEDICARE

## 2022-04-14 DIAGNOSIS — M54.12 CERVICAL RADICULOPATHY: ICD-10-CM

## 2022-04-14 DIAGNOSIS — G56.01 CARPAL TUNNEL SYNDROME ON RIGHT: ICD-10-CM

## 2022-04-14 PROCEDURE — 95910 NRV CNDJ TEST 7-8 STUDIES: CPT | Mod: S$GLB,,, | Performed by: NEUROMUSCULOSKELETAL MEDICINE & OMM

## 2022-04-14 PROCEDURE — 95886 MUSC TEST DONE W/N TEST COMP: CPT | Mod: S$GLB,,, | Performed by: NEUROMUSCULOSKELETAL MEDICINE & OMM

## 2022-04-14 PROCEDURE — 95886 PR EMG COMPLETE, W/ NERVE CONDUCTION STUDIES, 5+ MUSCLES: ICD-10-PCS | Mod: S$GLB,,, | Performed by: NEUROMUSCULOSKELETAL MEDICINE & OMM

## 2022-04-14 PROCEDURE — 95910 PR NERVE CONDUCTION STUDY; 7-8 STUDIES: ICD-10-PCS | Mod: S$GLB,,, | Performed by: NEUROMUSCULOSKELETAL MEDICINE & OMM

## 2022-04-18 ENCOUNTER — TELEPHONE (OUTPATIENT)
Dept: CARDIOLOGY | Facility: CLINIC | Age: 82
End: 2022-04-18
Payer: MEDICARE

## 2022-04-18 DIAGNOSIS — Z79.01 CURRENT USE OF LONG TERM ANTICOAGULATION: ICD-10-CM

## 2022-04-18 DIAGNOSIS — E78.2 MIXED HYPERLIPIDEMIA: Primary | ICD-10-CM

## 2022-04-18 NOTE — TELEPHONE ENCOUNTER
Will order CBC, CMP and lipid    ----- Message from Koki Galvan MA sent at 4/18/2022  2:05 PM CDT -----  Regarding: FW: lab    ----- Message -----  From: Viola Leigh  Sent: 4/18/2022   1:47 PM CDT  To: [facility] Staff  Subject: lab                                              Pls call pt at [phone].  He is scheduled for a f/u on 5/24/22 and says he usually has labs prior.    Thank you

## 2022-05-04 ENCOUNTER — OFFICE VISIT (OUTPATIENT)
Dept: ORTHOPEDICS | Facility: CLINIC | Age: 82
End: 2022-05-04
Payer: MEDICARE

## 2022-05-04 DIAGNOSIS — G56.01 CARPAL TUNNEL SYNDROME ON RIGHT: Primary | ICD-10-CM

## 2022-05-04 DIAGNOSIS — M65.331 TRIGGER MIDDLE FINGER OF RIGHT HAND: ICD-10-CM

## 2022-05-04 PROCEDURE — 1126F PR PAIN SEVERITY QUANTIFIED, NO PAIN PRESENT: ICD-10-PCS | Mod: CPTII,S$GLB,, | Performed by: ORTHOPAEDIC SURGERY

## 2022-05-04 PROCEDURE — 99214 OFFICE O/P EST MOD 30 MIN: CPT | Mod: S$GLB,,, | Performed by: ORTHOPAEDIC SURGERY

## 2022-05-04 PROCEDURE — 99999 PR PBB SHADOW E&M-EST. PATIENT-LVL III: ICD-10-PCS | Mod: PBBFAC,,, | Performed by: ORTHOPAEDIC SURGERY

## 2022-05-04 PROCEDURE — 99214 PR OFFICE/OUTPT VISIT, EST, LEVL IV, 30-39 MIN: ICD-10-PCS | Mod: S$GLB,,, | Performed by: ORTHOPAEDIC SURGERY

## 2022-05-04 PROCEDURE — 1126F AMNT PAIN NOTED NONE PRSNT: CPT | Mod: CPTII,S$GLB,, | Performed by: ORTHOPAEDIC SURGERY

## 2022-05-04 PROCEDURE — 99999 PR PBB SHADOW E&M-EST. PATIENT-LVL III: CPT | Mod: PBBFAC,,, | Performed by: ORTHOPAEDIC SURGERY

## 2022-05-04 NOTE — PROGRESS NOTES
[facility]  History & Physical  Orthopedics    SUBJECTIVE:      Chief Complaint:   Chief Complaint   Patient presents with    Right Hand - Pain       Referring Provider: No ref. provider found     History of Present Illness:  Patient is a 81 y.o. right hand dominant male who presents today as follow up from trigger finger and CTS with complaints of numbness and tingling in the hands for months. He reports that he has been dropping objects. He reports mild neck pain. He also reports pain and stinging in the right long finger. He denies any trauma. He has a history of tophaceous gout.    Last visit he was injected in his right long trigger finger but he reports little to no relief.    He recently completed EMG studies that showed Severe Right CTS.    He had a right ring finger trigger release 10-12 years ago with Dr. Frausto.    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    Symptoms are aggravated by activity and movement.    Symptoms are alleviated by rest.    Symptoms consist of pain and numbness/tingling.    The patient rates their pain as a 0/10.    Attempted treatment(s) and/or interventions include rest and activity modification.     The patient denies any fevers, chills, N/V, D/C and presents for evaluation.           Past Medical History:   Diagnosis Date    Basal cell carcinoma 1/5/12    right nose    Basal cell carcinoma 10/09/2017    left nasal bridge and left cheek    Cataract     Glaucoma     Gout 7/18/2014    Mixed hyperlipidemia     Osteoarthritis of right foot 10/26/2019    SVT (supraventricular tachycardia) 1/14/2005    ablation by [facility] of left lateral free wall accessory bypass tract    Type 2 diabetes mellitus with hyperglycemia, without long-term current use of insulin 4/9/2018     Past Surgical History:   Procedure Laterality Date    COLONOSCOPY  12/2015    skin cancer nose and neck      SVT accessory pathway ablation  1/14/2005     Dr. Calvin     Review of patient's allergies indicates:   Allergen Reactions    Macadamia nut oil Anaphylaxis     Pt states only had a reaction with macadamia nuts.      Social History     Social History Narrative    Not on file     Family History   Problem Relation Age of Onset    Glaucoma Mother     Cancer Mother         Breast    Glaucoma Father     Macular degeneration Father     Stroke Father     Melanoma Father     Cancer Brother         Colon    Colon cancer Brother     No Known Problems Maternal Aunt     No Known Problems Maternal Uncle     No Known Problems Paternal Aunt     No Known Problems Paternal Uncle     No Known Problems Maternal Grandmother     No Known Problems Maternal Grandfather     No Known Problems Paternal Grandmother     No Known Problems Paternal Grandfather     Diabetes Neg Hx     Psoriasis Neg Hx     Lupus Neg Hx     Eczema Neg Hx     Acne Neg Hx     Amblyopia Neg Hx     Blindness Neg Hx     Cataracts Neg Hx     Hypertension Neg Hx     Retinal detachment Neg Hx     Strabismus Neg Hx     Thyroid disease Neg Hx          Current Outpatient Medications:     apixaban (ELIQUIS) 5 mg Tab, Take 1 tablet (5 mg total) by mouth 2 (two) times daily., Disp: 180 tablet, Rfl: 3    atorvastatin (LIPITOR) 20 MG tablet, Take 1 tablet (20 mg total) by mouth every evening., Disp: 90 tablet, Rfl: 3    lisinopriL 10 MG tablet, Take 1 tablet (10 mg total) by mouth once daily., Disp: 90 tablet, Rfl: 3    LUMIGAN 0.01 % Drop, INSTILL ONE DROP IN BOTH EYES IN THE EVENING , Disp: 7.5 mL, Rfl: 3    metoprolol succinate (TOPROL-XL) 50 MG 24 hr tablet, Take 1 tablet (50 mg total) by mouth once daily., Disp: 90 tablet, Rfl: 3    multivitamin (THERAGRAN) per tablet, Take 1 tablet by mouth once daily., Disp: , Rfl:     sars-cov-2, covid-19, (MODERNA COVID-19) 100 mcg/0.5 ml injection, Inject into the muscle., Disp: 0.5 mL, Rfl: 0    VIT C/VIT E/LUTEIN/MIN/OMEGA-3 (OCUVITE ORAL),  Take 1 tablet by mouth once daily. , Disp: , Rfl:     colchicine (COLCRYS) 0.6 mg tablet, Take 1 tablet (0.6 mg total) by mouth 2 (two) times daily. Until symptoms of gout flare resolve (Patient taking differently: Take 0.6 mg by mouth as needed. Until symptoms of gout flare resolve), Disp: 180 tablet, Rfl: 0    ROS    Review of Systems:  Constitutional: no fever or chills  Eyes: no visual changes  ENT: no nasal congestion or sore throat  Respiratory: no cough or shortness of breath  Cardiovascular: no chest pain  Gastrointestinal: no nausea or vomiting, tolerating diet  Musculoskeletal: pain and numbness/tingling    OBJECTIVE:      Vital Signs (Most Recent):  There were no vitals filed for this visit.  There is no height or weight on file to calculate BMI.    Physical Exam    Physical Exam:  Constitutional: The patient appears well-developed and well-nourished. No distress.   Head: Normocephalic and atraumatic.   Nose: Nose normal.   Eyes: Conjunctivae and EOM are normal.   Neck: No tracheal deviation present.   Cardiovascular: Normal rate and intact distal pulses.    Pulmonary/Chest: Effort normal. No respiratory distress.   Abdominal: There is no guarding.   Lymphatic: Negative for adenopathy   Neurological: The patient is alert.   Psychiatric: The patient has a normal mood and affect.     Cervical Exam:  ROM decreased  Negative Spurling's  Positive Huang's    Bilateral Hand/Wrist Examination:    Observation/Inspection:  Swelling  none    Deformity  Sorin's nodes  Discoloration  none     Scars   none    Atrophy  none    HAND/WRIST EXAMINATION:  Finkelstein's Test   Neg  WHAT Test    Neg  CMC grind    Neg  Circumduction test   Neg    Right long finger demonstrable catching and locking, tender to palpation A1 pulley otherwise Bilateral ROM hand/wrist/elbow full    Neurovascular Exam:  Digits WWP, brisk CR < 3s throughout  Slightly altered sensibility right hand otherwise NVI motor/LTS to M/R/U nerves, radial  pulse 2+  2+ biceps and brachioradialis reflexes  Tinel's Test - Carpal Tunnel  neg  Tinel's Test - Cubital Tunnel  neg  Phalen's Test    neg  Median Nerve Compression Test neg    Diagnostic Results:     X-rays AP, lateral and oblique right hand taken today are independently reviewed by me and show 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        ASSESSMENT/PLAN:      81 y.o. yo male with   Encounter Diagnoses   Name Primary?    Carpal tunnel syndrome on right Yes    Trigger middle finger of right hand       Plan:     We have discussed conservative vs. surgical treatment as well as risks, benefits and alternatives for CTS and trigger finger.  He has exhausted conservative measures and would like to proceed with surgery. Surgery would include right carpal tunnel release and right long finger trigger finger release.      We have discussed risks of hand surgery which include but are not limited to blood clots in the legs that can travel to the lungs (pulmonary embolism). Pulmonary embolism can cause shortness of breath, chest pain, and even shock. Other risks include urinary tract infection, nausea and vomiting (usually related to pain medication), chronic pain, bleeding, nerve damage, blood vessel injury, scarring and infection of the hand which can require re-operation. Furthermore, the risks of anesthesia include potential heart, lung, kidney, and liver damage.  Informed consent was obtained.  He understands and would like to proceed with surgery in the near future.    The patient's pathophysiology was explained in detail with reference to x-rays, models, other visual aids as appropriate.  Treatment options were discussed in detail.  Questions were invited and answered to the patient's satisfaction. I reviewed Primary care , and other specialty's notes to better coordinate patient's care.        Alisa Rodriguez MD     Please be aware  that this note has been generated with the assistance of [employer] voice-to-text.  Please excuse any spelling or grammatical errors.

## 2022-05-09 ENCOUNTER — CLINICAL SUPPORT (OUTPATIENT)
Dept: OPHTHALMOLOGY | Facility: CLINIC | Age: 82
End: 2022-05-09
Payer: MEDICARE

## 2022-05-09 ENCOUNTER — OFFICE VISIT (OUTPATIENT)
Dept: OPHTHALMOLOGY | Facility: CLINIC | Age: 82
End: 2022-05-09
Payer: MEDICARE

## 2022-05-09 DIAGNOSIS — H57.819 BROW PTOSIS: ICD-10-CM

## 2022-05-09 DIAGNOSIS — H40.022 OPEN ANGLE WITH BORDERLINE FINDINGS AND HIGH GLAUCOMA RISK IN LEFT EYE: ICD-10-CM

## 2022-05-09 DIAGNOSIS — Z83.518 FAMILY HISTORY OF MACULAR DEGENERATION: ICD-10-CM

## 2022-05-09 DIAGNOSIS — H52.4 HYPEROPIA OF BOTH EYES WITH ASTIGMATISM AND PRESBYOPIA: ICD-10-CM

## 2022-05-09 DIAGNOSIS — H25.13 NUCLEAR SCLEROSIS OF BOTH EYES: ICD-10-CM

## 2022-05-09 DIAGNOSIS — H52.203 HYPEROPIA OF BOTH EYES WITH ASTIGMATISM AND PRESBYOPIA: ICD-10-CM

## 2022-05-09 DIAGNOSIS — H40.1111 PRIMARY OPEN-ANGLE GLAUCOMA, RIGHT EYE, MILD STAGE: Primary | ICD-10-CM

## 2022-05-09 DIAGNOSIS — H52.03 HYPEROPIA OF BOTH EYES WITH ASTIGMATISM AND PRESBYOPIA: ICD-10-CM

## 2022-05-09 DIAGNOSIS — H57.03 SMALL PUPILS: ICD-10-CM

## 2022-05-09 PROCEDURE — 92014 COMPRE OPH EXAM EST PT 1/>: CPT | Mod: S$GLB,,, | Performed by: OPHTHALMOLOGY

## 2022-05-09 PROCEDURE — 1101F PR PT FALLS ASSESS DOC 0-1 FALLS W/OUT INJ PAST YR: ICD-10-PCS | Mod: CPTII,S$GLB,, | Performed by: OPHTHALMOLOGY

## 2022-05-09 PROCEDURE — 3288F FALL RISK ASSESSMENT DOCD: CPT | Mod: CPTII,S$GLB,, | Performed by: OPHTHALMOLOGY

## 2022-05-09 PROCEDURE — 92133 POSTERIOR SEGMENT OCT OPTIC NERVE(OCULAR COHERENCE TOMOGRAPHY) - OU - BOTH EYES: ICD-10-PCS | Mod: S$GLB,,, | Performed by: OPHTHALMOLOGY

## 2022-05-09 PROCEDURE — 92083 HUMPHREY VISUAL FIELD - OU - BOTH EYES: ICD-10-PCS | Mod: S$GLB,,, | Performed by: OPHTHALMOLOGY

## 2022-05-09 PROCEDURE — 1101F PT FALLS ASSESS-DOCD LE1/YR: CPT | Mod: CPTII,S$GLB,, | Performed by: OPHTHALMOLOGY

## 2022-05-09 PROCEDURE — 1160F RVW MEDS BY RX/DR IN RCRD: CPT | Mod: CPTII,S$GLB,, | Performed by: OPHTHALMOLOGY

## 2022-05-09 PROCEDURE — 3288F PR FALLS RISK ASSESSMENT DOCUMENTED: ICD-10-PCS | Mod: CPTII,S$GLB,, | Performed by: OPHTHALMOLOGY

## 2022-05-09 PROCEDURE — 1159F MED LIST DOCD IN RCRD: CPT | Mod: CPTII,S$GLB,, | Performed by: OPHTHALMOLOGY

## 2022-05-09 PROCEDURE — 92014 PR EYE EXAM, EST PATIENT,COMPREHESV: ICD-10-PCS | Mod: S$GLB,,, | Performed by: OPHTHALMOLOGY

## 2022-05-09 PROCEDURE — 1160F PR REVIEW ALL MEDS BY PRESCRIBER/CLIN PHARMACIST DOCUMENTED: ICD-10-PCS | Mod: CPTII,S$GLB,, | Performed by: OPHTHALMOLOGY

## 2022-05-09 PROCEDURE — 92083 EXTENDED VISUAL FIELD XM: CPT | Mod: S$GLB,,, | Performed by: OPHTHALMOLOGY

## 2022-05-09 PROCEDURE — 99999 PR PBB SHADOW E&M-EST. PATIENT-LVL II: CPT | Mod: PBBFAC,,, | Performed by: OPHTHALMOLOGY

## 2022-05-09 PROCEDURE — 99999 PR PBB SHADOW E&M-EST. PATIENT-LVL II: ICD-10-PCS | Mod: PBBFAC,,, | Performed by: OPHTHALMOLOGY

## 2022-05-09 PROCEDURE — 1126F PR PAIN SEVERITY QUANTIFIED, NO PAIN PRESENT: ICD-10-PCS | Mod: CPTII,S$GLB,, | Performed by: OPHTHALMOLOGY

## 2022-05-09 PROCEDURE — 92133 CPTRZD OPH DX IMG PST SGM ON: CPT | Mod: S$GLB,,, | Performed by: OPHTHALMOLOGY

## 2022-05-09 PROCEDURE — 1126F AMNT PAIN NOTED NONE PRSNT: CPT | Mod: CPTII,S$GLB,, | Performed by: OPHTHALMOLOGY

## 2022-05-09 PROCEDURE — 1159F PR MEDICATION LIST DOCUMENTED IN MEDICAL RECORD: ICD-10-PCS | Mod: CPTII,S$GLB,, | Performed by: OPHTHALMOLOGY

## 2022-05-09 RX ORDER — LATANOPROST 50 UG/ML
1 SOLUTION/ DROPS OPHTHALMIC NIGHTLY
Qty: 7.5 ML | Refills: 3 | Status: SHIPPED | OUTPATIENT
Start: 2022-05-09 | End: 2023-03-03

## 2022-05-09 NOTE — PROGRESS NOTES
HPI     DLS: 9/17/2021    C/O: Pt states vision is doing good no changes since last exam.       1) POAG OD   2) Glaucoma Suspect OS   3) NS OU   4) Ptosis   5) FmHx ARMD   6) Hyperopia with Astigmatism/ Presbyopia     Meds: Luimgan QHS OU     Last edited by Mann Sherwood on 5/9/2022  8:25 AM. (History)          Assessment /Plan     For exam results, see Encounter Report.    Primary open-angle glaucoma, right eye, mild stage  -     Roblero Visual Field - OU - Extended - Both Eyes  -     Posterior Segment OCT Optic Nerve- Both eyes  -     latanoprost 0.005 % ophthalmic solution; Place 1 drop into both eyes every evening.  Dispense: 7.5 mL; Refill: 3    Open angle with borderline findings and high glaucoma risk in left eye  -     Roblero Visual Field - OU - Extended - Both Eyes  -     Posterior Segment OCT Optic Nerve- Both eyes  -     latanoprost 0.005 % ophthalmic solution; Place 1 drop into both eyes every evening.  Dispense: 7.5 mL; Refill: 3    Nuclear sclerosis of both eyes    Brow ptosis    Small pupils    Family history of macular degeneration    Hyperopia of both eyes with astigmatism and presbyopia        1. Open angle with borderline glaucoma findings - Both Eyes (POAG od // suspect os )   Glaucoma (type and duration)   LTG suspect  First HVF 2011  First photos 2011  Glaucoma drops started 2/11/2021 (monitored off gtts 2011 to 2021 - then got new INS od)      Family history   = father-also my patient, + mom- she has passed  Glaucoma meds   lumigan ou - started 2/11/2021  H/O adverse rxn to glaucoma drops  None (? Avoid BB - bradycardia)   LASERS   none  GLAUCOMA SURGERIES   none  OTHER EYE SURGERIES   none  CDR   0.8 with thin sup rim // 0/70-0.75 - rim appears intact   Tbase  16-24    Tmax  21/24      Ttarget   16 ou (set 5/9/2022 ) - had VF prog ou withIOP 16-20 ou        HVF  5 test 2011 to 2018 - full od // full os-            2 test 2021 to 2022 - New IAD/NS  od // ? New INS  os   Gono  +3  ou  CCT  [id number]  OCT  4 test  to  - RNFL - bord TS od (+prog  to ) // nl os  HRT   5 test  to  - MR -  Dec. IT od,  // dec. IN os, border IT /// CDR 0.72 od // 0.70 os  Disc photos  , ,   - OIS //  - harmony     Ttoday    Test done today - IOP check, // OCT ONH OU // 24-2 OU // DFE OU   2. Nuclear sclerosis - Both Eyes   - mild BCVA 20/20 ou  BATh 20/60 od // 20/70 os (10/2013)   3. Ptosis   -patient would like to monitor for now as he is not having any trouble    4. Family history of macular degeneration   -father has adv wet ARMD - gets injections with arend   5. Hyperopia, astigmatism , presbyopia  PC +0.75+1.00x177        +1.00+1.50x180   ADD +2.50  glasses Teto   6. I use to see his father, and also saw his mother -  - his dad  2015 @ age 89   - his mom passed at age 98  - both had glaucoma, his dad also had  wet ARMD - saw Arend     Plan    New INS od - now has POAG od - mild   + IAD/NS od x 2 - confirmed   OCT od - bord TS now - (+progfrom )   Bord glaucoma os - high risk    Switch to latanoprost 2/2 cost of lumigan     Cont to monitor cataracts     - not vis sign yet - dilates medium only - smallish pupils     Photo file updated 2022     F/U 3 months with IOP check with change to latanoprost and gonio   Re-set IOP target to 16 ou - appears to have had progression ou with IOP's 16-20  Likely needs a 2nd drop or an slt - target IOP is now 16 ou      Keep F/U with Teto prn - can see him one more time before he retires for glasses ect

## 2022-05-09 NOTE — PROGRESS NOTES
HVF done ou. Rel/fix poor od fair os coop. Good ou./chart checked for latex allergy./1.00 + .50 x 165/od .75 + 2.00 x 173/os-h

## 2022-05-18 ENCOUNTER — LAB VISIT (OUTPATIENT)
Dept: LAB | Facility: HOSPITAL | Age: 82
End: 2022-05-18
Attending: INTERNAL MEDICINE
Payer: MEDICARE

## 2022-05-18 DIAGNOSIS — E78.2 MIXED HYPERLIPIDEMIA: ICD-10-CM

## 2022-05-18 DIAGNOSIS — Z79.01 CURRENT USE OF LONG TERM ANTICOAGULATION: ICD-10-CM

## 2022-05-18 LAB
ALBUMIN SERPL BCP-MCNC: 4.1 G/DL (ref 3.5–5.2)
ALP SERPL-CCNC: 76 U/L (ref 55–135)
ALT SERPL W/O P-5'-P-CCNC: 28 U/L (ref 10–44)
ANION GAP SERPL CALC-SCNC: 6 MMOL/L (ref 8–16)
AST SERPL-CCNC: 26 U/L (ref 10–40)
BASOPHILS # BLD AUTO: 0.05 K/UL (ref 0–0.2)
BASOPHILS NFR BLD: 0.8 % (ref 0–1.9)
BILIRUB SERPL-MCNC: 1.5 MG/DL (ref 0.1–1)
BUN SERPL-MCNC: 24 MG/DL (ref 8–23)
CALCIUM SERPL-MCNC: 9.4 MG/DL (ref 8.7–10.5)
CHLORIDE SERPL-SCNC: 110 MMOL/L (ref 95–110)
CHOLEST SERPL-MCNC: 69 MG/DL (ref 120–199)
CHOLEST/HDLC SERPL: 3.1 {RATIO} (ref 2–5)
CO2 SERPL-SCNC: 28 MMOL/L (ref 23–29)
CREAT SERPL-MCNC: 1.1 MG/DL (ref 0.5–1.4)
DIFFERENTIAL METHOD: ABNORMAL
EOSINOPHIL # BLD AUTO: 0.2 K/UL (ref 0–0.5)
EOSINOPHIL NFR BLD: 2.6 % (ref 0–8)
ERYTHROCYTE [DISTWIDTH] IN BLOOD BY AUTOMATED COUNT: 13.2 % (ref 11.5–14.5)
EST. GFR  (AFRICAN AMERICAN): >60 ML/MIN/1.73 M^2
EST. GFR  (NON AFRICAN AMERICAN): >60 ML/MIN/1.73 M^2
GLUCOSE SERPL-MCNC: 117 MG/DL (ref 70–110)
HCT VFR BLD AUTO: 42.6 % (ref 40–54)
HDLC SERPL-MCNC: 22 MG/DL (ref 40–75)
HDLC SERPL: 31.9 % (ref 20–50)
HGB BLD-MCNC: 14.3 G/DL (ref 14–18)
IMM GRANULOCYTES # BLD AUTO: 0.02 K/UL (ref 0–0.04)
IMM GRANULOCYTES NFR BLD AUTO: 0.3 % (ref 0–0.5)
LDLC SERPL CALC-MCNC: 30.4 MG/DL (ref 63–159)
LYMPHOCYTES # BLD AUTO: 1.8 K/UL (ref 1–4.8)
LYMPHOCYTES NFR BLD: 27.6 % (ref 18–48)
MCH RBC QN AUTO: 31.4 PG (ref 27–31)
MCHC RBC AUTO-ENTMCNC: 33.6 G/DL (ref 32–36)
MCV RBC AUTO: 93 FL (ref 82–98)
MONOCYTES # BLD AUTO: 0.4 K/UL (ref 0.3–1)
MONOCYTES NFR BLD: 6.3 % (ref 4–15)
NEUTROPHILS # BLD AUTO: 4 K/UL (ref 1.8–7.7)
NEUTROPHILS NFR BLD: 62.4 % (ref 38–73)
NONHDLC SERPL-MCNC: 47 MG/DL
NRBC BLD-RTO: 0 /100 WBC
PLATELET # BLD AUTO: 177 K/UL (ref 150–450)
PMV BLD AUTO: 10.8 FL (ref 9.2–12.9)
POTASSIUM SERPL-SCNC: 4.3 MMOL/L (ref 3.5–5.1)
PROT SERPL-MCNC: 6.1 G/DL (ref 6–8.4)
RBC # BLD AUTO: 4.56 M/UL (ref 4.6–6.2)
SODIUM SERPL-SCNC: 144 MMOL/L (ref 136–145)
TRIGL SERPL-MCNC: 83 MG/DL (ref 30–150)
WBC # BLD AUTO: 6.46 K/UL (ref 3.9–12.7)

## 2022-05-18 PROCEDURE — 80061 LIPID PANEL: CPT | Performed by: INTERNAL MEDICINE

## 2022-05-18 PROCEDURE — 85025 COMPLETE CBC W/AUTO DIFF WBC: CPT | Performed by: INTERNAL MEDICINE

## 2022-05-18 PROCEDURE — 36415 COLL VENOUS BLD VENIPUNCTURE: CPT | Mod: PO | Performed by: INTERNAL MEDICINE

## 2022-05-18 PROCEDURE — 80053 COMPREHEN METABOLIC PANEL: CPT | Performed by: INTERNAL MEDICINE

## 2022-05-24 ENCOUNTER — OFFICE VISIT (OUTPATIENT)
Dept: CARDIOLOGY | Facility: CLINIC | Age: 82
End: 2022-05-24
Payer: MEDICARE

## 2022-05-24 VITALS
BODY MASS INDEX: 29.41 KG/M2 | HEIGHT: 72 IN | HEART RATE: 58 BPM | DIASTOLIC BLOOD PRESSURE: 74 MMHG | SYSTOLIC BLOOD PRESSURE: 129 MMHG | WEIGHT: 217.13 LBS

## 2022-05-24 DIAGNOSIS — Z98.890 S/P ABLATION OF ATRIAL FLUTTER: Chronic | ICD-10-CM

## 2022-05-24 DIAGNOSIS — I10 ESSENTIAL HYPERTENSION: Chronic | ICD-10-CM

## 2022-05-24 DIAGNOSIS — E78.2 MIXED HYPERLIPIDEMIA: Chronic | ICD-10-CM

## 2022-05-24 DIAGNOSIS — Z86.79 S/P ABLATION OF ATRIAL FLUTTER: Chronic | ICD-10-CM

## 2022-05-24 DIAGNOSIS — Z79.01 CURRENT USE OF LONG TERM ANTICOAGULATION: ICD-10-CM

## 2022-05-24 DIAGNOSIS — N18.31 STAGE 3A CHRONIC KIDNEY DISEASE: ICD-10-CM

## 2022-05-24 DIAGNOSIS — I45.10 RIGHT BUNDLE BRANCH BLOCK (RBBB): Chronic | ICD-10-CM

## 2022-05-24 DIAGNOSIS — I48.0 PAF (PAROXYSMAL ATRIAL FIBRILLATION): Primary | Chronic | ICD-10-CM

## 2022-05-24 DIAGNOSIS — R00.1 SINUS BRADYCARDIA: Chronic | ICD-10-CM

## 2022-05-24 PROCEDURE — 3078F DIAST BP <80 MM HG: CPT | Mod: CPTII,S$GLB,, | Performed by: INTERNAL MEDICINE

## 2022-05-24 PROCEDURE — 1126F AMNT PAIN NOTED NONE PRSNT: CPT | Mod: CPTII,S$GLB,, | Performed by: INTERNAL MEDICINE

## 2022-05-24 PROCEDURE — 1101F PT FALLS ASSESS-DOCD LE1/YR: CPT | Mod: CPTII,S$GLB,, | Performed by: INTERNAL MEDICINE

## 2022-05-24 PROCEDURE — 3074F SYST BP LT 130 MM HG: CPT | Mod: CPTII,S$GLB,, | Performed by: INTERNAL MEDICINE

## 2022-05-24 PROCEDURE — 1160F RVW MEDS BY RX/DR IN RCRD: CPT | Mod: CPTII,S$GLB,, | Performed by: INTERNAL MEDICINE

## 2022-05-24 PROCEDURE — 3074F PR MOST RECENT SYSTOLIC BLOOD PRESSURE < 130 MM HG: ICD-10-PCS | Mod: CPTII,S$GLB,, | Performed by: INTERNAL MEDICINE

## 2022-05-24 PROCEDURE — 1126F PR PAIN SEVERITY QUANTIFIED, NO PAIN PRESENT: ICD-10-PCS | Mod: CPTII,S$GLB,, | Performed by: INTERNAL MEDICINE

## 2022-05-24 PROCEDURE — 1159F PR MEDICATION LIST DOCUMENTED IN MEDICAL RECORD: ICD-10-PCS | Mod: CPTII,S$GLB,, | Performed by: INTERNAL MEDICINE

## 2022-05-24 PROCEDURE — 99214 PR OFFICE/OUTPT VISIT, EST, LEVL IV, 30-39 MIN: ICD-10-PCS | Mod: S$GLB,,, | Performed by: INTERNAL MEDICINE

## 2022-05-24 PROCEDURE — 1159F MED LIST DOCD IN RCRD: CPT | Mod: CPTII,S$GLB,, | Performed by: INTERNAL MEDICINE

## 2022-05-24 PROCEDURE — 1101F PR PT FALLS ASSESS DOC 0-1 FALLS W/OUT INJ PAST YR: ICD-10-PCS | Mod: CPTII,S$GLB,, | Performed by: INTERNAL MEDICINE

## 2022-05-24 PROCEDURE — 1160F PR REVIEW ALL MEDS BY PRESCRIBER/CLIN PHARMACIST DOCUMENTED: ICD-10-PCS | Mod: CPTII,S$GLB,, | Performed by: INTERNAL MEDICINE

## 2022-05-24 PROCEDURE — 3288F PR FALLS RISK ASSESSMENT DOCUMENTED: ICD-10-PCS | Mod: CPTII,S$GLB,, | Performed by: INTERNAL MEDICINE

## 2022-05-24 PROCEDURE — 99214 OFFICE O/P EST MOD 30 MIN: CPT | Mod: S$GLB,,, | Performed by: INTERNAL MEDICINE

## 2022-05-24 PROCEDURE — 3078F PR MOST RECENT DIASTOLIC BLOOD PRESSURE < 80 MM HG: ICD-10-PCS | Mod: CPTII,S$GLB,, | Performed by: INTERNAL MEDICINE

## 2022-05-24 PROCEDURE — 99999 PR PBB SHADOW E&M-EST. PATIENT-LVL IV: ICD-10-PCS | Mod: PBBFAC,,, | Performed by: INTERNAL MEDICINE

## 2022-05-24 PROCEDURE — 3288F FALL RISK ASSESSMENT DOCD: CPT | Mod: CPTII,S$GLB,, | Performed by: INTERNAL MEDICINE

## 2022-05-24 PROCEDURE — 99999 PR PBB SHADOW E&M-EST. PATIENT-LVL IV: CPT | Mod: PBBFAC,,, | Performed by: INTERNAL MEDICINE

## 2022-05-24 NOTE — PROGRESS NOTES
Subjective:     Patient ID:  Danial Meza is a 81 y.o. male who presents for follow-up of Hypertension and s/p Ablation    HPI:   80 yo WM with hypertension, lipid disorder, tachy-xu syndrome, prior AFl ablation and PAF.  He has rare palpitations.  He is status post SVT ablation by Dr. Gutierrez.  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    He has no known coronary artery disease.  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  He has not had sores or swelling    His LDL cholesterol is currently 31 on low-dose statin therapy.  He also has low HDL.    Review of Systems   Constitutional: Negative for malaise/fatigue.   Cardiovascular: Positive for palpitations. Negative for chest pain, orthopnea, claudication, leg swelling and PND.       Objective:   Physical Exam  Constitutional:       General: He is not in acute distress.     Appearance: He is well-developed. He is not diaphoretic.   HENT:      Head: Normocephalic and atraumatic.      Mouth/Throat:      Pharynx: No oropharyngeal exudate.   Eyes:      General: No scleral icterus.        Right eye: No discharge.         Left eye: No discharge.      Conjunctiva/sclera: Conjunctivae normal.   Neck:      Thyroid: No thyromegaly.      Vascular: No JVD.   Cardiovascular:      Rate and Rhythm: Normal rate and regular rhythm.      Heart sounds: Normal heart sounds. No murmur heard.    No gallop.   Pulmonary:      Effort: Pulmonary effort is normal.      Breath sounds: Normal breath sounds.   Abdominal:      General: Bowel sounds are normal. There is no distension.      Palpations: Abdomen is soft. There is no mass.      Tenderness: There is no abdominal tenderness.   Musculoskeletal:         General: No tenderness.   Skin:     General: Skin is warm and dry.   Neurological:      Mental Status: He is alert and oriented  to person, place, and time.   Psychiatric:         Behavior: Behavior normal.         Thought Content: Thought content normal.         Judgment: Judgment normal.          Assessment:     1. PAF (paroxysmal atrial fibrillation)    2. Mixed hyperlipidemia    3. Essential hypertension    4. S/P ablation of atrial flutter 11/4/15    5. Sinus bradycardia    6. Right bundle branch block (RBBB)    7. Stage 3a chronic kidney disease    8. Current use of long term anticoagulation      Plan:       PAF (paroxysmal atrial fibrillation)  Comments:  Quiescent    Mixed hyperlipidemia  Comments:  Continue moderate dose statin    Essential hypertension    S/P ablation of atrial flutter 11/4/15    Sinus bradycardia  Comments:  Asymptomatic    Right bundle branch block (RBBB)    Stage 3a chronic kidney disease    Current use of long term anticoagulation  Comments:  No bleeding problems      Return to clinic in 1 year.

## 2022-05-24 NOTE — PATIENT INSTRUCTIONS
"I recommend the book, "The Obesity Code" for weight loss; it recommends intermittent fasting and avoidance of sugar, artificial sweeteners and refined carbohydrates.    Also, here is information on a Mediterranean type diet including fish, the pesco-mediterranean diet from the American College of Cardiology:    1.  Humans are evolutionarily adapted to obtain calories and nutrients from both plant and animal food sources. Many people overconsume animal products, often-processed meats high in saturated fats and chemical additives. In contrast, while strict veganism has gained popularity for many reasons and has value in certain groups, it can cause nutritional deficiencies (vitamin B12, high-quality proteins, iron, zinc, omega-3 fatty acid, vitamin D, and calcium), and predispose to osteopenia, loss of muscle mass, and anemia. This is not true of a lacto-ovo vegetarian diet, which allows no animal-based food except for eggs and dairy. A 6-year study of [id number] [place] [employer] reported a decreased incidence of all-cause mortality when comparing vegetarians with nonvegetarians. However, when the vegetarians were stratified into vegans, lacto-ovo vegetarians, pesco-vegetarians, and semi-vegetarians, the pesco-vegetarians had lowest risks for all-cause mortality, cardiovascular disease (CVD) mortality, and mortality from other causes.     2.  The authors propose a plant-rich diet rich in nuts with fish and seafood as the principle source of animal food. Known as the Pesco-Mediterranean diet, it is supplemented with extra-virgin olive oil (EVOO), which is the principle fat source, along with moderate amounts of dairy (particularly yogurt and cheese) and eggs, as well as modest amounts of alcohol consumption (ideally red wine with the evening meal), but few red and processed meats.     3.  Both epidemiological studies and randomized clinical trials indicate that the traditional Mediterranean diet is associated with " lower risks for all-cause and CVD mortality, coronary heart disease, metabolic syndrome, diabetes, cognitive decline, neurodegenerative diseases (including Alzheimers), depression, overall cancer mortality, and breast and colorectal cancers.     4.  The traditional Mediterranean diet has been endorsed in the most recent Dietary Guidelines for Americans and the American College of Cardiology/American Heart Association guidelines. The 2020 U[employer]. News & World Report ranked it #1 for overall health based upon it being nutritious, safe, relatively easy to follow, protective against CVD and diabetes, and effective for weight loss.     5.  Fish and seafood are important sources of vitamins protein and omega-3 fatty acids, of which the higher blood and adipose tissue are associated with reduced fatal and nonfatal myocardial infarction. When not fried, fish consumption has been associated with reduced risk of heart failure, and the incidence of the metabolic syndrome, coronary heart disease, ischemic stroke, and sudden cardiac death, particularly when seafood replaces less healthy foods.     6.  Unrestricted use of olive oil in the kitchen, on salads (with vinegar), cooking vegetables, and at the table is the foundation of the traditional Mediterranean diet, although olive oil quality is crucial, which makes it expensive. EVOO retains hydrophilic components of olives including highly bioactive polyphenols, which are believed to underlie many of EVOOs cardiometabolic benefits, such as reduced low-density lipoprotein cholesterol (LDL-C) and increased high-density lipoprotein cholesterol (HDL-C), improved vascular reactivity, enhanced HDL particle functionality, and a lower diabetes risk.     7.  Tree nuts, an integral component of the traditional Mediterranean diet, are nutrient dense rich in unsaturated fats, fiber, protein, polyphenols, phytosterols, and tocopherols. Nut consumption is associated with decreased incidence  and mortality rates from both CVD and coronary artery disease (CAD), as well as atrial fibrillation and diabetes. Randomized controlled trials have shown that diets enriched with nuts produce cardiometabolic benefits including improvements in insulin sensitivity, LDL-C, inflammation, and vascular reactivity. A 1-daily serving of mixed nuts resulted in a 28% reduction in CVD risk. Generous intake of nuts does not promote weight gain because of increased satiety and incomplete digestion.     8.  Legumes are an excellent source of vegetable protein, folate, and magnesium and fiber, and like other seeds including peanuts, are rich in polyphenols. Consumption of legumes has been linked to a reduced risk of incident and fatal CVD and CAD, as well as improvements in blood glucose, cholesterol, blood pressure, and body weight. Legumes, like fish, are a satiating and healthy substitute for red meat and processed meats.     9.  Dairy products and eggs are important sources of protein, nonsodium minerals, probiotics, and vitamin D. Although there is no clear consensus among nutrition experts on the role of dairy products in CVD risk, they are allowed in this Pesco-Mediterranean diet. Fermented low-fat versions, such as yogurt and soft cheeses, are preferred; butter and hard cheese are high in saturated fats and salt.     10.  Eggs are composed of beneficial nutrients including all essential amino acids, in addition to minerals (selenium, phosphorus, iodine, zinc), vitamins (A, D, B2, B12, niacin), and carotenoids (lutein, zeaxanthin). Although each yolk contains about 184 mg of dietary cholesterol, large prospective cohorts suggest that egg consumption is unrelated to serum cholesterol and does not increase CVD risk. Eggs are allowed in the Pesco-Mediterranean diet; egg whites are unlimited and preferably no more than 5 yolks/week.     11.  Whole grains, such as barley, whole oats, rye, corn, buckwheat, brown rice, and quinoa,  are an integral part of the traditional Mediterranean diet. Pasta is an example of a starchy food that has a low glycemic index despite being a refined carbohydrate. In the context of a low glycemic index dietary pattern such as the Mediterranean diet, pasta does not adversely affect adiposity and may even help reduce body weight and there is no evidence that pasta promotes cardiometabolic risk factors. White rice is associated with type 2 diabetes mellitus in Asians but not in Western cohorts, possibly because it is cooked and served plain in [place] and in Western cultures cooked in mixed dishes with vegetables and vegetable oil including EVOO.     12.  The staple beverage of the Pesco-Mediterranean diet is water--which can be flavored but not sweetened. Unsweetened tea and coffee are rich in antioxidants and are associated with improved CVD outcomes. If alcohol is consumed at all, dry red wine is recommended, with the ideal amount being a single glass (6 oz) for women and 1 or 2 glasses/day for men (6-12 oz) consumed with meals.     13.  Time-restricted eating, a type of intermittent fasting, is the practice of limiting the daily intake of calories to a window of time usually between 6-12 hours each day. Intermittent fasting when done on a regular basis has been shown to decrease intra-abdominal adipose tissue and reduce free-radical production. This elicits powerful cellular responses that improve glucose metabolism and reduce systemic inflammation, and may also reduce risks of diabetes, CVD, cancer, and neurodegenerative diseases. After a 12-hour overnight fast, insulin levels are typically low, and glycogen stores have been depleted. In this fasted state, the body starts mobilizing fatty acids from adipose cells to burn as metabolic fuel instead of glucose. This improves insulin sensitivity. Time-restricted eating is not more effective for weight loss than standard calorie-restriction, but appears to enhance CV  health even in nonobese people. Fasting may also lower blood pressure and resting heart rate and improve autonomic balance with augmented heart rate variability.     14.  The evidence regarding time-restricted eating is mostly based on animal models and observational human studies. The most popular form of time-restricted eating involves eating two rather than three meals and compressing the calorie-consumption window. No head-to-head studies have been performed to assess the optimal time window.

## 2022-05-30 ENCOUNTER — ANESTHESIA EVENT (OUTPATIENT)
Dept: SURGERY | Facility: HOSPITAL | Age: 82
End: 2022-05-30
Payer: MEDICARE

## 2022-05-30 NOTE — ANESTHESIA PREPROCEDURE EVALUATION
05/30/2022  Danial Meza is a 81 y.o., male.  Patient's chart and medical records reviewed.  H/O RBBB, afib and svt s/p ablation.  No evidence of pacemaker or AICD.  OK to proceed to [facility] where case will be performed under local/mac.  Pre-operative evaluation for Procedure(s) (LRB):  RELEASE, CARPAL TUNNEL (Right)  RELEASE, TRIGGER FINGER (Right)    Danial Meza is a 82 y.o. 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    Nuclear sclerosis - Both Eyes    Family history of macular degeneration    Hyperopia with astigmatism and presbyopia    Gout    Essential hypertension    Mixed hyperlipidemia    PAF (paroxysmal atrial fibrillation)    Open angle with borderline findings, low risk    Current use of long term anticoagulation    S/P ablation of atrial flutter 11/4/15    Venous insufficiency of right leg    Sinus bradycardia    Right bundle branch block (RBBB)    Brow ptosis    Small pupils    History of Bell's palsy    Impaired fasting glucose    Elevated transaminase level    Calculus of gallbladder without cholecystitis    RUQ abdominal pain with movement    Community acquired pneumonia of left lower lobe of lung    Osteoarthritis of right foot    Osteomyelitis of right foot    Gouty arthritis of toe of right foot    Stage 3a chronic kidney disease    SVT (supraventricular tachycardia)    Class 1 obesity due to excess calories without serious comorbidity with body mass index (BMI) of 30.0 to 30.9 in adult    Carpal tunnel syndrome on right       Review of patient's allergies indicates:   Allergen Reactions    Macadamia nut oil Anaphylaxis     Pt states only had a reaction with macadamia nuts.        No current facility-administered medications on file prior to encounter.     Current Outpatient Medications on File  Prior to Encounter   Medication Sig Dispense Refill    atorvastatin (LIPITOR) 20 MG tablet Take 1 tablet (20 mg total) by mouth every evening. 90 tablet 3    latanoprost 0.005 % ophthalmic solution Place 1 drop into both eyes every evening. 7.5 mL 3    lisinopriL 10 MG tablet Take 1 tablet (10 mg total) by mouth once daily. 90 tablet 3    metoprolol succinate (TOPROL-XL) 50 MG 24 hr tablet Take 1 tablet (50 mg total) by mouth once daily. 90 tablet 3    multivitamin (THERAGRAN) per tablet Take 1 tablet by mouth once daily.      VIT C/VIT E/LUTEIN/MIN/OMEGA-3 (OCUVITE ORAL) Take 1 tablet by mouth once daily.       apixaban (ELIQUIS) 5 mg Tab Take 1 tablet (5 mg total) by mouth 2 (two) times daily. 180 tablet 3    colchicine (COLCRYS) 0.6 mg tablet Take 1 tablet (0.6 mg total) by mouth 2 (two) times daily. Until symptoms of gout flare resolve (Patient taking differently: Take 0.6 mg by mouth as needed. Until symptoms of gout flare resolve) 180 tablet 0    sars-cov-2, covid-19, (MODERNA COVID-19) 100 mcg/0.5 ml injection Inject into the muscle. 0.5 mL 0       Past Surgical History:   Procedure Laterality Date    COLONOSCOPY  12/2015    skin cancer nose and neck      SVT accessory pathway ablation  1/14/2005    Dr. Calvin         CBC:  Lab Results   Component Value Date    WBC 6.46 05/18/2022    RBC 4.56 (L) 05/18/2022    HGB 14.3 05/18/2022    HCT 42.6 05/18/2022     05/18/2022    MCV 93 05/18/2022    MCH 31.4 (H) 05/18/2022    MCHC 33.6 05/18/2022       CMP:   Lab Results   Component Value Date     05/18/2022    K 4.3 05/18/2022     05/18/2022    CO2 28 05/18/2022    BUN 24 (H) 05/18/2022    CREATININE 1.1 05/18/2022     (H) 05/18/2022    CALCIUM 9.4 05/18/2022    ALBUMIN 4.1 05/18/2022    PROT 6.1 05/18/2022    ALKPHOS 76 05/18/2022    ALT 28 05/18/2022    AST 26 05/18/2022    BILITOT 1.5 (H) 05/18/2022       INR:  No results found for: PT, INR, PROTIME, APTT      Diagnostic  Studies:      EKG:   Results for orders placed or performed in visit on 09/23/21   IN OFFICE EKG 12-LEAD (to [place])    Collection Time: 09/23/21  9:43 AM    Narrative    Test Reason : I48.0,    Vent. Rate : 040 BPM     Atrial Rate : 040 BPM     P-R Int : 228 ms          QRS Dur : 144 ms      QT Int : 442 ms       P-R-T Axes : 050 057 042 degrees     QTc Int : 360 ms    Marked sinus bradycardia with marked sinus arrythmia with 1st degree A-V  block  Right bundle branch block  Abnormal ECG  When compared with ECG of 07-FEB-2019 09:27,  Nonspecific T wave abnormality now evident in Inferior leads  QT has shortened  Confirmed by Jose Rafael Sorto JR., MD ([id number]) on 10/4/2021 11:46:51 AM    Referred By: BROOKE   SELF           Confirmed By:Jose Rafael Sorto        2D Echo:  Results for orders placed or performed during the hospital encounter of 11/03/15   2D echo with color flow doppler   Result Value Ref Range    EF + QEF 65 55 - 65    Est. PA Systolic Pressure 20     Tricuspid Valve Regurgitation MILD        Stress Test:   Results for orders placed in visit on 02/07/19    Stress test with myocardial perfusion (Cupid Only)    Interpretation Summary  · The perfusion scan is free of evidence from myocardial ischemia or injury.  · Gated perfusion images showed an ejection fraction of 60% at rest and 58% post-stress. Normal is >51%  · LV cavity size is normal at rest and normal at stress.  · Resting wall motion is physiologic.  · The EKG portion of this study is negative for myocardial ischemia.  · There were no arrhythmias during stress.  · The patient reported no symptoms during the stress test.  · There is no prior study available for comparison.      Pre-op Vitals [06/07/22 0546]   BP Pulse Resp Temp SpO2   (!) 147/82 86 18 36.5 °C (97.7 °F) 99 %      Height Weight BMI (Calculated)     6' 217 lb 29.4         Pre-op Vitals [06/07/22 0546]   BP Pulse Resp Temp SpO2   (!) 147/82 86 18 36.5 °C (97.7 °F) 99 %       Height Weight BMI (Calculated)     6' 217 lb 29.4            Pre-op Assessment          Review of Systems      Physical Exam  General: Well nourished    Airway:  Mallampati: I / I  Mouth Opening: Normal  TM Distance: Normal  Tongue: Normal  Neck ROM: Normal ROM    Dental:  Intact    Chest/Lungs:  Clear to auscultation    Heart:  Rate: Normal  Rhythm: Regular Rhythm  Sounds: Normal        Anesthesia Plan  Type of Anesthesia, risks & benefits discussed:    Anesthesia Type: MAC, Gen ETT, Gen Supraglottic Airway, Gen Natural Airway  Intra-op Monitoring Plan: Standard ASA Monitors  Post Op Pain Control Plan: multimodal analgesia and peripheral nerve block  Induction:  IV  Informed Consent: Informed consent signed with the Patient and all parties understand the risks and agree with anesthesia plan.  All questions answered.   ASA Score: 3  Day of Surgery Review of History & Physical: H&P Update referred to the surgeon/provider.    Ready For Surgery From Anesthesia Perspective.     .

## 2022-06-06 DIAGNOSIS — G56.01 CARPAL TUNNEL SYNDROME ON RIGHT: Primary | ICD-10-CM

## 2022-06-06 DIAGNOSIS — M65.331 TRIGGER MIDDLE FINGER OF RIGHT HAND: ICD-10-CM

## 2022-06-06 RX ORDER — TRAMADOL HYDROCHLORIDE 50 MG/1
50 TABLET ORAL EVERY 6 HOURS
Qty: 20 TABLET | Refills: 0 | Status: SHIPPED | OUTPATIENT
Start: 2022-06-06 | End: 2022-06-06

## 2022-06-06 RX ORDER — ONDANSETRON 4 MG/1
4 TABLET, ORALLY DISINTEGRATING ORAL ONCE
Qty: 20 TABLET | Refills: 0 | Status: SHIPPED | OUTPATIENT
Start: 2022-06-06 | End: 2022-06-27

## 2022-06-06 RX ORDER — TRAMADOL HYDROCHLORIDE 50 MG/1
50 TABLET ORAL EVERY 6 HOURS
Qty: 25 TABLET | Refills: 0 | Status: SHIPPED | OUTPATIENT
Start: 2022-06-06 | End: 2022-09-15

## 2022-06-06 RX ORDER — ONDANSETRON 4 MG/1
4 TABLET, ORALLY DISINTEGRATING ORAL EVERY 6 HOURS PRN
Qty: 20 TABLET | Refills: 0 | Status: SHIPPED | OUTPATIENT
Start: 2022-06-06 | End: 2022-06-06

## 2022-06-07 ENCOUNTER — ANESTHESIA (OUTPATIENT)
Dept: SURGERY | Facility: HOSPITAL | Age: 82
End: 2022-06-07
Payer: MEDICARE

## 2022-06-07 ENCOUNTER — HOSPITAL ENCOUNTER (OUTPATIENT)
Facility: HOSPITAL | Age: 82
Discharge: HOME | End: 2022-06-07
Attending: ORTHOPAEDIC SURGERY | Admitting: ORTHOPAEDIC SURGERY
Payer: MEDICARE

## 2022-06-07 VITALS
OXYGEN SATURATION: 98 % | RESPIRATION RATE: 63 BRPM | BODY MASS INDEX: 29.39 KG/M2 | WEIGHT: 217 LBS | SYSTOLIC BLOOD PRESSURE: 166 MMHG | HEART RATE: 68 BPM | HEIGHT: 72 IN | TEMPERATURE: 98 F | DIASTOLIC BLOOD PRESSURE: 73 MMHG

## 2022-06-07 DIAGNOSIS — G56.01 RIGHT CARPAL TUNNEL SYNDROME: ICD-10-CM

## 2022-06-07 DIAGNOSIS — G56.01 CARPAL TUNNEL SYNDROME ON RIGHT: Primary | ICD-10-CM

## 2022-06-07 LAB — POCT GLUCOSE: 120 MG/DL (ref 70–110)

## 2022-06-07 PROCEDURE — 64721 PR REVISE MEDIAN N/CARPAL TUNNEL SURG: ICD-10-PCS | Mod: 51,RT,, | Performed by: ORTHOPAEDIC SURGERY

## 2022-06-07 PROCEDURE — 63600175 PHARM REV CODE 636 W HCPCS: Performed by: NURSE ANESTHETIST, CERTIFIED REGISTERED

## 2022-06-07 PROCEDURE — 64721 CARPAL TUNNEL SURGERY: CPT | Mod: 51,RT,, | Performed by: ORTHOPAEDIC SURGERY

## 2022-06-07 PROCEDURE — D9220A PRA ANESTHESIA: ICD-10-PCS | Mod: ANES,,, | Performed by: ANESTHESIOLOGY

## 2022-06-07 PROCEDURE — 99900035 HC TECH TIME PER 15 MIN (STAT)

## 2022-06-07 PROCEDURE — 26145 TENDON EXCISION PALM/FINGER: CPT | Mod: F7,,, | Performed by: ORTHOPAEDIC SURGERY

## 2022-06-07 PROCEDURE — 37000008 HC ANESTHESIA 1ST 15 MINUTES: Performed by: ORTHOPAEDIC SURGERY

## 2022-06-07 PROCEDURE — 36000707: Performed by: ORTHOPAEDIC SURGERY

## 2022-06-07 PROCEDURE — 94761 N-INVAS EAR/PLS OXIMETRY MLT: CPT

## 2022-06-07 PROCEDURE — 71000033 HC RECOVERY, INTIAL HOUR: Performed by: ORTHOPAEDIC SURGERY

## 2022-06-07 PROCEDURE — 71000015 HC POSTOP RECOV 1ST HR: Performed by: ORTHOPAEDIC SURGERY

## 2022-06-07 PROCEDURE — 26145 PR RAD EXCIS JT LINING,FLEXOR,EACH: ICD-10-PCS | Mod: F7,,, | Performed by: ORTHOPAEDIC SURGERY

## 2022-06-07 PROCEDURE — 25000003 PHARM REV CODE 250: Performed by: ORTHOPAEDIC SURGERY

## 2022-06-07 PROCEDURE — 25000003 PHARM REV CODE 250: Performed by: NURSE ANESTHETIST, CERTIFIED REGISTERED

## 2022-06-07 PROCEDURE — D9220A PRA ANESTHESIA: Mod: CRNA,,, | Performed by: NURSE ANESTHETIST, CERTIFIED REGISTERED

## 2022-06-07 PROCEDURE — D9220A PRA ANESTHESIA: Mod: ANES,,, | Performed by: ANESTHESIOLOGY

## 2022-06-07 PROCEDURE — 36000706: Performed by: ORTHOPAEDIC SURGERY

## 2022-06-07 PROCEDURE — D9220A PRA ANESTHESIA: ICD-10-PCS | Mod: CRNA,,, | Performed by: NURSE ANESTHETIST, CERTIFIED REGISTERED

## 2022-06-07 PROCEDURE — 37000009 HC ANESTHESIA EA ADD 15 MINS: Performed by: ORTHOPAEDIC SURGERY

## 2022-06-07 PROCEDURE — 25000003 PHARM REV CODE 250: Performed by: SURGERY

## 2022-06-07 PROCEDURE — 82962 GLUCOSE BLOOD TEST: CPT | Performed by: ORTHOPAEDIC SURGERY

## 2022-06-07 PROCEDURE — 27201423 OPTIME MED/SURG SUP & DEVICES STERILE SUPPLY: Performed by: ORTHOPAEDIC SURGERY

## 2022-06-07 RX ORDER — HYDROCODONE BITARTRATE AND ACETAMINOPHEN 5; 325 MG/1; MG/1
1 TABLET ORAL EVERY 4 HOURS PRN
Status: DISCONTINUED | OUTPATIENT
Start: 2022-06-07 | End: 2022-06-07 | Stop reason: HOSPADM

## 2022-06-07 RX ORDER — FENTANYL CITRATE 50 UG/ML
25 INJECTION, SOLUTION INTRAMUSCULAR; INTRAVENOUS EVERY 5 MIN PRN
Status: DISCONTINUED | OUTPATIENT
Start: 2022-06-07 | End: 2022-06-07 | Stop reason: HOSPADM

## 2022-06-07 RX ORDER — LIDOCAINE HYDROCHLORIDE 10 MG/ML
INJECTION, SOLUTION EPIDURAL; INFILTRATION; INTRACAUDAL; PERINEURAL
Status: DISCONTINUED | OUTPATIENT
Start: 2022-06-07 | End: 2022-06-07 | Stop reason: HOSPADM

## 2022-06-07 RX ORDER — ACETAMINOPHEN 500 MG
1000 TABLET ORAL
Status: COMPLETED | OUTPATIENT
Start: 2022-06-07 | End: 2022-06-07

## 2022-06-07 RX ORDER — LIDOCAINE HYDROCHLORIDE 10 MG/ML
1 INJECTION, SOLUTION EPIDURAL; INFILTRATION; INTRACAUDAL; PERINEURAL ONCE
Status: ACTIVE | OUTPATIENT
Start: 2022-06-07

## 2022-06-07 RX ORDER — MUPIROCIN 20 MG/G
OINTMENT TOPICAL 2 TIMES DAILY
Status: DISCONTINUED | OUTPATIENT
Start: 2022-06-07 | End: 2022-06-07 | Stop reason: HOSPADM

## 2022-06-07 RX ORDER — BUPIVACAINE HYDROCHLORIDE 2.5 MG/ML
INJECTION, SOLUTION EPIDURAL; INFILTRATION; INTRACAUDAL
Status: DISCONTINUED | OUTPATIENT
Start: 2022-06-07 | End: 2022-06-07 | Stop reason: HOSPADM

## 2022-06-07 RX ORDER — OXYCODONE HYDROCHLORIDE 5 MG/1
5 TABLET ORAL
Status: DISCONTINUED | OUTPATIENT
Start: 2022-06-07 | End: 2022-06-07 | Stop reason: HOSPADM

## 2022-06-07 RX ORDER — CELECOXIB 200 MG/1
400 CAPSULE ORAL ONCE
Status: COMPLETED | OUTPATIENT
Start: 2022-06-07 | End: 2022-06-07

## 2022-06-07 RX ORDER — LIDOCAINE HYDROCHLORIDE 20 MG/ML
INJECTION INTRAVENOUS
Status: DISCONTINUED | OUTPATIENT
Start: 2022-06-07 | End: 2022-06-07

## 2022-06-07 RX ORDER — MIDAZOLAM HYDROCHLORIDE 1 MG/ML
INJECTION, SOLUTION INTRAMUSCULAR; INTRAVENOUS
Status: DISCONTINUED | OUTPATIENT
Start: 2022-06-07 | End: 2022-06-07

## 2022-06-07 RX ORDER — PROPOFOL 10 MG/ML
VIAL (ML) INTRAVENOUS
Status: DISCONTINUED | OUTPATIENT
Start: 2022-06-07 | End: 2022-06-07

## 2022-06-07 RX ORDER — PROPOFOL 10 MG/ML
VIAL (ML) INTRAVENOUS CONTINUOUS PRN
Status: DISCONTINUED | OUTPATIENT
Start: 2022-06-07 | End: 2022-06-07

## 2022-06-07 RX ORDER — CEFAZOLIN SODIUM/D5W 2 G/100 ML
2 PLASTIC BAG, INJECTION (ML) INTRAVENOUS
Status: COMPLETED | OUTPATIENT
Start: 2022-06-07 | End: 2022-06-07

## 2022-06-07 RX ORDER — BACITRACIN ZINC 500 UNIT/G
OINTMENT (GRAM) TOPICAL
Status: DISCONTINUED | OUTPATIENT
Start: 2022-06-07 | End: 2022-06-07 | Stop reason: HOSPADM

## 2022-06-07 RX ORDER — MUPIROCIN 20 MG/G
OINTMENT TOPICAL
Status: DISCONTINUED | OUTPATIENT
Start: 2022-06-07 | End: 2022-06-07 | Stop reason: HOSPADM

## 2022-06-07 RX ADMIN — PROPOFOL 10 MG: 10 INJECTION, EMULSION INTRAVENOUS at 07:06

## 2022-06-07 RX ADMIN — MIDAZOLAM HYDROCHLORIDE 1 MG: 1 INJECTION, SOLUTION INTRAMUSCULAR; INTRAVENOUS at 07:06

## 2022-06-07 RX ADMIN — MUPIROCIN: 20 OINTMENT TOPICAL at 06:06

## 2022-06-07 RX ADMIN — ACETAMINOPHEN 1000 MG: 500 TABLET ORAL at 06:06

## 2022-06-07 RX ADMIN — PROPOFOL 20 MG: 10 INJECTION, EMULSION INTRAVENOUS at 07:06

## 2022-06-07 RX ADMIN — LIDOCAINE HYDROCHLORIDE 80 MG: 20 INJECTION, SOLUTION INTRAVENOUS at 07:06

## 2022-06-07 RX ADMIN — CELECOXIB 400 MG: 200 CAPSULE ORAL at 06:06

## 2022-06-07 RX ADMIN — PROPOFOL 60 MCG/KG/MIN: 10 INJECTION, EMULSION INTRAVENOUS at 07:06

## 2022-06-07 RX ADMIN — Medication 2 G: at 07:06

## 2022-06-07 NOTE — PLAN OF CARE
Patient's pre-op complete at 0615. Patient's wife at bedside. Patient reports he gave his wife his wallet in lobby prior to being brought back to room. Patient's belonging bag in locker. Awaiting site colette and H&P update. Patient does not voice any concerns. Will continue to monitor.

## 2022-06-07 NOTE — BRIEF OP NOTE
[place] - Surgery ([facility])  Surgery Department  Operative Note    SUMMARY     Date of Procedure: 6/7/2022     Procedure: Procedure(s) (LRB):  RELEASE, CARPAL TUNNEL (Right)  RELEASE, TRIGGER FINGER, TENOSYNOVECTOMY (Right)     Surgeon(s) and Role:     * Alisa Rodriguez MD - Primary    Assisting Surgeon: None    Pre-Operative Diagnosis: Carpal tunnel syndrome on right [G56.01]  Trigger middle finger of right hand [M65.331]    Post-Operative Diagnosis: Post-Op Diagnosis Codes:     * Carpal tunnel syndrome on right [G56.01]     * Trigger middle finger of right hand [M65.331]    Anesthesia: Local MAC      Estimated Blood Loss (EBL): none           Implants: * No implants in log *    Specimens:   Specimen (24h ago, onward)            None                  Condition: Good    Disposition: PACU - hemodynamically stable.    Attestation: I was present and scrubbed for the entire procedure.

## 2022-06-07 NOTE — DISCHARGE SUMMARY
[place] - Surgery (Hospital)  Discharge Note  Short Stay    Procedure(s) (LRB):  RELEASE, CARPAL TUNNEL (Right)  RELEASE, TRIGGER FINGER, TENOSYNOVECTOMY (Right)    OUTCOME: Patient tolerated treatment/procedure well without complication and is now ready for discharge.    DISPOSITION: Home or Self Care    FINAL DIAGNOSIS:  Right carpal tunnel release, right flexor tenosynovitis    FOLLOWUP: In clinic    DISCHARGE INSTRUCTIONS:    Discharge Procedure Orders   Diet general     Keep surgical extremity elevated     Ice to affected area     Lifting restrictions     No driving, operating heavy equipment or signing legal documents while taking pain medication.     Leave dressing on - Keep it clean, dry, and intact until clinic visit     Call MD for:  temperature >100.4     Call MD for:  persistent nausea and vomiting     Call MD for:  severe uncontrolled pain     Call MD for:  difficulty breathing, headache or visual disturbances     Call MD for:  redness, tenderness, or signs of infection (pain, swelling, redness, odor or green/yellow discharge around incision site)     Call MD for:  hives     Call MD for:  persistent dizziness or light-headedness     Call MD for:  extreme fatigue

## 2022-06-07 NOTE — ANESTHESIA POSTPROCEDURE EVALUATION
Anesthesia Post Evaluation    Patient: Danial Susana    Procedure(s) Performed: Procedure(s) (LRB):  RELEASE, CARPAL TUNNEL (Right)  RELEASE, TRIGGER FINGER, TENOSYNOVECTOMY (Right)    Final Anesthesia Type: general      Patient location during evaluation: PACU  Patient participation: Yes- Able to Participate  Level of consciousness: awake and alert  Post-procedure vital signs: reviewed and stable  Pain management: adequate  Airway patency: patent    PONV status at discharge: No PONV  Anesthetic complications: no      Cardiovascular status: blood pressure returned to baseline  Respiratory status: unassisted  Hydration status: euvolemic  Follow-up not needed.          Vitals Value Taken Time   /73 06/07/22 0817   Temp 36.4 °C (97.5 °F) 06/07/22 0815   Pulse 72 06/07/22 0820   Resp 63 06/07/22 0815   SpO2 98 % 06/07/22 0820   Vitals shown include unvalidated device data.      Event Time   Out of Recovery 08:20:00         Pain/Maribell Score: Pain Rating Prior to Med Admin: 0 (6/7/2022  6:11 AM)  Maribell Score: 10 (6/7/2022  7:50 AM)

## 2022-06-07 NOTE — H&P
Alisa Rodriguez MD    Hand Surgery  Carpal tunnel syndrome on right +1 more    Dx  Right Hand - Pain    Reason for Visit       Progress Notes  Alisa Rodriguez MD (Physician)   Hand Surgery  Expand All Collapse All     [facility]  History & Physical  Orthopedics     SUBJECTIVE:       Chief Complaint:       Chief Complaint   Patient presents with    Right Hand - Pain         Referring Provider: No ref. provider found      History of Present Illness:  Patient is a 81 y.o. right hand dominant male who presents today as follow up from trigger finger and CTS with complaints of numbness and tingling in the hands for months. He reports that he has been dropping objects. He reports mild neck pain. He also reports pain and stinging in the right long finger. He denies any trauma. He has a history of tophaceous gout.     Last visit he was injected in his right long trigger finger but he reports little to no relief.     He recently completed EMG studies that showed Severe Right CTS.     He had a right ring finger trigger release 10-12 years ago with Dr. Frausto.     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     Symptoms are aggravated by activity and movement.     Symptoms are alleviated by rest.     Symptoms consist of pain and numbness/tingling.     The patient rates their pain as a 0/10.     Attempted treatment(s) and/or interventions include rest and activity modification.     The patient denies any fevers, chills, N/V, D/C and presents for evaluation.                   Past Medical History:   Diagnosis Date    Basal cell carcinoma 1/5/12     right nose    Basal cell carcinoma 10/09/2017     left nasal bridge and left cheek    Cataract      Glaucoma      Gout 7/18/2014    Mixed hyperlipidemia      Osteoarthritis of right foot 10/26/2019    SVT (supraventricular tachycardia) 1/14/2005     ablation by [facility] of left lateral free wall accessory bypass tract    Type 2  diabetes mellitus with hyperglycemia, without long-term current use of insulin 4/9/2018            Past Surgical History:   Procedure Laterality Date    COLONOSCOPY   12/2015    skin cancer nose and neck        SVT accessory pathway ablation   1/14/2005     Dr. Calvin            Review of patient's allergies indicates:   Allergen Reactions    Macadamia nut oil Anaphylaxis       Pt states only had a reaction with macadamia nuts.       Social History          Social History Narrative    Not on file            Family History   Problem Relation Age of Onset    Glaucoma Mother      Cancer Mother           Breast    Glaucoma Father      Macular degeneration Father      Stroke Father      Melanoma Father      Cancer Brother           Colon    Colon cancer Brother      No Known Problems Maternal Aunt      No Known Problems Maternal Uncle      No Known Problems Paternal Aunt      No Known Problems Paternal Uncle      No Known Problems Maternal Grandmother      No Known Problems Maternal Grandfather      No Known Problems Paternal Grandmother      No Known Problems Paternal Grandfather      Diabetes Neg Hx      Psoriasis Neg Hx      Lupus Neg Hx      Eczema Neg Hx      Acne Neg Hx      Amblyopia Neg Hx      Blindness Neg Hx      Cataracts Neg Hx      Hypertension Neg Hx      Retinal detachment Neg Hx      Strabismus Neg Hx      Thyroid disease Neg Hx              Current Outpatient Medications:     apixaban (ELIQUIS) 5 mg Tab, Take 1 tablet (5 mg total) by mouth 2 (two) times daily., Disp: 180 tablet, Rfl: 3    atorvastatin (LIPITOR) 20 MG tablet, Take 1 tablet (20 mg total) by mouth every evening., Disp: 90 tablet, Rfl: 3    lisinopriL 10 MG tablet, Take 1 tablet (10 mg total) by mouth once daily., Disp: 90 tablet, Rfl: 3    LUMIGAN 0.01 % Drop, INSTILL ONE DROP IN BOTH EYES IN THE EVENING , Disp: 7.5 mL, Rfl: 3    metoprolol succinate (TOPROL-XL) 50 MG 24 hr tablet, Take 1 tablet (50  mg total) by mouth once daily., Disp: 90 tablet, Rfl: 3    multivitamin (THERAGRAN) per tablet, Take 1 tablet by mouth once daily., Disp: , Rfl:     sars-cov-2, covid-19, (MODERNA COVID-19) 100 mcg/0.5 ml injection, Inject into the muscle., Disp: 0.5 mL, Rfl: 0    VIT C/VIT E/LUTEIN/MIN/OMEGA-3 (OCUVITE ORAL), Take 1 tablet by mouth once daily. , Disp: , Rfl:     colchicine (COLCRYS) 0.6 mg tablet, Take 1 tablet (0.6 mg total) by mouth 2 (two) times daily. Until symptoms of gout flare resolve (Patient taking differently: Take 0.6 mg by mouth as needed. Until symptoms of gout flare resolve), Disp: 180 tablet, Rfl: 0     ROS     Review of Systems:  Constitutional: no fever or chills  Eyes: no visual changes  ENT: no nasal congestion or sore throat  Respiratory: no cough or shortness of breath  Cardiovascular: no chest pain  Gastrointestinal: no nausea or vomiting, tolerating diet  Musculoskeletal: pain and numbness/tingling     OBJECTIVE:       Vital Signs (Most Recent):  Vitals   There were no vitals filed for this visit.     There is no height or weight on file to calculate BMI.     Physical Exam     Physical Exam:  Constitutional: The patient appears well-developed and well-nourished. No distress.   Head: Normocephalic and atraumatic.   Nose: Nose normal.   Eyes: Conjunctivae and EOM are normal.   Neck: No tracheal deviation present.   Cardiovascular: Normal rate and intact distal pulses.    Pulmonary/Chest: Effort normal. No respiratory distress.   Abdominal: There is no guarding.   Lymphatic: Negative for adenopathy   Neurological: The patient is alert.   Psychiatric: The patient has a normal mood and affect.      Cervical Exam:  ROM decreased  Negative Spurling's  Positive Huang's     Bilateral Hand/Wrist Examination:     Observation/Inspection:  Swelling                       none                  Deformity                     Sorin's nodes  Discoloration               none                  Scars                            none                  Atrophy                        none     HAND/WRIST EXAMINATION:  Finkelstein's Test                                Neg  WHAT Test                                         Neg  CMC grind                                           Neg  Circumduction test                              Neg     Right long finger demonstrable catching and locking, tender to palpation A1 pulley otherwise Bilateral ROM hand/wrist/elbow full     Neurovascular Exam:  Digits WWP, brisk CR < 3s throughout  Slightly altered sensibility right hand otherwise NVI motor/LTS to M/R/U nerves, radial pulse 2+  2+ biceps and brachioradialis reflexes  Tinel's Test - Carpal Tunnel                neg  Tinel's Test - Cubital Tunnel               neg  Phalen's Test                                      neg  Median Nerve Compression Test       neg     Diagnostic Results:     X-rays AP, lateral and oblique right hand taken today are independently reviewed by me and show 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           ASSESSMENT/PLAN:       81 y.o. yo male with        Encounter Diagnoses   Name Primary?    Carpal tunnel syndrome on right Yes    Trigger middle finger of right hand        Plan:      We have discussed conservative vs. surgical treatment as well as risks, benefits and alternatives for CTS and trigger finger.  He has exhausted conservative measures and would like to proceed with surgery. Surgery would include right carpal tunnel release and right long finger trigger finger release.      We have discussed risks of hand surgery which include but are not limited to blood clots in the legs that can travel to the lungs (pulmonary embolism). Pulmonary embolism can cause shortness of breath, chest pain, and even shock. Other risks include urinary tract infection, nausea and vomiting (usually related to pain medication), chronic pain,  bleeding, nerve damage, blood vessel injury, scarring and infection of the hand which can require re-operation. Furthermore, the risks of anesthesia include potential heart, lung, kidney, and liver damage.  Informed consent was obtained.  He understands and would like to proceed with surgery in the near future.     The patient's pathophysiology was explained in detail with reference to x-rays, models, other visual aids as appropriate.  Treatment options were discussed in detail.  Questions were invited and answered to the patient's satisfaction. I reviewed Primary care , and other specialty's notes to better coordinate patient's care.          Alisa Rodriguez MD

## 2022-06-07 NOTE — TRANSFER OF CARE
Anesthesia Transfer of Care Note    Patient: Danial Susana    Procedure(s) Performed: Procedure(s) (LRB):  RELEASE, CARPAL TUNNEL (Right)  RELEASE, TRIGGER FINGER, TENOSYNOVECTOMY (Right)    Patient location: PACU    Anesthesia Type: general    Transport from OR: Transported from OR on 6-10 L/min O2 by face mask with adequate spontaneous ventilation    Post pain: adequate analgesia    Post assessment: tolerated procedure well and no apparent anesthetic complications    Post vital signs: stable    Level of consciousness: awake and sedated    Nausea/Vomiting: no nausea/vomiting    Complications: none    Transfer of care protocol was followed      Last vitals:   Visit Vitals  BP (!) 147/82   Pulse 72   Temp 36.5 °C (97.7 °F) (Oral)   Resp 16   Ht 6' (1.829 m)   Wt 98.4 kg (217 lb)   SpO2 99%   BMI 29.43 kg/m²

## 2022-06-07 NOTE — PLAN OF CARE
Patient's wife states she is not comfortable driving his car. Patient insists on driving home. Charge nurse at bedside to explain to patient he cannot drive for 24 hours after receiving anesthesia. Patient's brother called for ride home. Discharge delayed.

## 2022-06-07 NOTE — PLAN OF CARE
VSS. Patient able to tolerate oral liquids. Patient denies c/o pain. Patient/family received home medication per bedside delivery. Dressing intact. No distress noted. Patient states he is ready for discharge. Discharge instructions reviewed with patient and family, verbalized understanding. IV discontinued with catheter tip intact. Family at bedside to help patient dress. Patient states his wife will drive him home.

## 2022-06-07 NOTE — PLAN OF CARE
"Wife at bedside. Patient states he is driving home after discharge. I explained to patient he cannot drive for 24 hours after receiving anesthesia. He states he "has no other choice." Notified charge nurse.   "

## 2022-06-07 NOTE — OP NOTE
[facility])  Surgery Department  Operative Note    SUMMARY     Date of Procedure: 6/7/2022     Procedure: Procedure(s) (LRB):  RELEASE, CARPAL TUNNEL (Right)  RELEASE, TRIGGER FINGER, TENOSYNOVECTOMY (Right)       1. Right carpal tunnel release, cpt 67089  2. Right long finger flexor tenosynovectomy, cpt 76270      Surgeon(s) and Role:     * Alisa Rodriguez MD - Primary    Assisting Surgeon: None    Pre-Operative Diagnosis: Carpal tunnel syndrome on right [G56.01]  Right long finger stenosing flexor tenosynovitis    Post-Operative Diagnosis: Post-Op Diagnosis Codes:     * Carpal tunnel syndrome on right [G56.01]     Right long finger stenosing flexor tenosynovitis    Anesthesia: Local MAC    Indication for Procedure: 81 yo male with carpal tunnel syndrome confirmed by EMG/NCS and right long finger stenosing flexor tenosynovitis and had failed conservative treatment. Risks and benefits of the procedure were discussed with the patient and informed consent was obtained.    Description of the Findings of the Procedure: The patient was seen in the preoperative holding area and the right hand was marked. The patient was taken to the OR, placed supine on the table, and a padded hand table was used. After monitored anesthesia care was admitted without difficulty, a time-out procedure was performed identifying the patient, the operative site and the procedure to be performed. A tourniquet was placed on the arm and the upper extremity was prepped and draped in standard sterile fashion. The upper extremity was elevated and exsanguinated with an Esmarch bandage, and the tourniquet was inflated to 250 mm Hg. 10cc of 1% lidocaine plain and 0.25% bupivacaine was used for a local block of the carpal tunnel. A 2 cm incision was made over the carpal tunnel.  The palmar fascia was sharply incised.  Urszula retractors were used to expose the transverse carpal ligament, this was sharply incised.  A carpal tunnel skid was  placed underneath the transverse carpal ligament proximally, and the proximal transverse carpal ligament as well as the distal forearm fascia was sharply released. The median nerve was found to be significantly compressed through the carpal tunnel. The carpal tunnel skid was replaced distally, and the transverse carpal ligament was released under direct visualization.  The median nerve was found to be completely decompressed.     Attention was turned to the right long finger, 1 cm incision was made over the A1 pulley of the right long finger. Littler scissors were used to dissect down to the flexor sheath. The A1 pulley was sharply incised, and released both proximally and distally. The A2 pulley was protected. There was a moderate amount of flexor tenosynovitis on the flexor tendon, this was removed with Littler scissors. The neurovascular bundles were identified and protected throughout the case. The finger were put through a range of motion and there was no longer any catching and locking.  The neurovascular bundles were identified and protected throughout the case. The wound was then irrigated with normal saline using a bulb syringe. 3-0 prolene was used to close the skin in a horizontal mattress fashion. Adaptic, 4x4, cast padding and coban were used to dress the hand. The tourniquet was deflated and the hand and fingers were pink and well-perfused.  All instrument, sponge and needle counts were correct. The patient tolerated the procedure well.  He was taken awake, alert and in good condition to the recovery room.    Complications: No    Estimated Blood Loss (EBL): none           Specimens: none           Condition: Good    Disposition: PACU - hemodynamically stable.    Attestation: I was present and scrubbed for the entire procedure.

## 2022-06-20 ENCOUNTER — OFFICE VISIT (OUTPATIENT)
Dept: ORTHOPEDICS | Facility: CLINIC | Age: 82
End: 2022-06-20
Payer: MEDICARE

## 2022-06-20 VITALS — HEIGHT: 72 IN | BODY MASS INDEX: 29.39 KG/M2 | WEIGHT: 217 LBS

## 2022-06-20 DIAGNOSIS — G56.01 CARPAL TUNNEL SYNDROME ON RIGHT: Primary | ICD-10-CM

## 2022-06-20 DIAGNOSIS — M65.331 TRIGGER MIDDLE FINGER OF RIGHT HAND: ICD-10-CM

## 2022-06-20 PROCEDURE — 1126F AMNT PAIN NOTED NONE PRSNT: CPT | Mod: CPTII,S$GLB,, | Performed by: ORTHOPAEDIC SURGERY

## 2022-06-20 PROCEDURE — 1160F PR REVIEW ALL MEDS BY PRESCRIBER/CLIN PHARMACIST DOCUMENTED: ICD-10-PCS | Mod: CPTII,S$GLB,, | Performed by: ORTHOPAEDIC SURGERY

## 2022-06-20 PROCEDURE — 99024 PR POST-OP FOLLOW-UP VISIT: ICD-10-PCS | Mod: S$GLB,,, | Performed by: ORTHOPAEDIC SURGERY

## 2022-06-20 PROCEDURE — 99999 PR PBB SHADOW E&M-EST. PATIENT-LVL III: ICD-10-PCS | Mod: PBBFAC,,, | Performed by: ORTHOPAEDIC SURGERY

## 2022-06-20 PROCEDURE — 1126F PR PAIN SEVERITY QUANTIFIED, NO PAIN PRESENT: ICD-10-PCS | Mod: CPTII,S$GLB,, | Performed by: ORTHOPAEDIC SURGERY

## 2022-06-20 PROCEDURE — 1159F PR MEDICATION LIST DOCUMENTED IN MEDICAL RECORD: ICD-10-PCS | Mod: CPTII,S$GLB,, | Performed by: ORTHOPAEDIC SURGERY

## 2022-06-20 PROCEDURE — 1160F RVW MEDS BY RX/DR IN RCRD: CPT | Mod: CPTII,S$GLB,, | Performed by: ORTHOPAEDIC SURGERY

## 2022-06-20 PROCEDURE — 99999 PR PBB SHADOW E&M-EST. PATIENT-LVL III: CPT | Mod: PBBFAC,,, | Performed by: ORTHOPAEDIC SURGERY

## 2022-06-20 PROCEDURE — 99024 POSTOP FOLLOW-UP VISIT: CPT | Mod: S$GLB,,, | Performed by: ORTHOPAEDIC SURGERY

## 2022-06-20 PROCEDURE — 1159F MED LIST DOCD IN RCRD: CPT | Mod: CPTII,S$GLB,, | Performed by: ORTHOPAEDIC SURGERY

## 2022-06-20 NOTE — PROGRESS NOTES
Danial Meza presents for post-operative evaluation of   Encounter Diagnoses   Name Primary?    Carpal tunnel syndrome on right Yes    Trigger middle finger of right hand    The patient is now 13 days s/p right carpal tunnel release and right long finger tenosynovectomy.  Overall the patient reports doing well.  The patient reports appropriate postoperative soreness with no pain.      PE:    AA&O x 4.  NAD  HEENT:  NCAT, sclera nonicteric  Lungs:  Respirations are equal and unlabored.  CV:  2+ bilateral upper and lower extremity pulses.  MSK: The wound is healing well with no signs of erythema or warmth.  There is no drainage.  All sutures were removed today. Neurovascularly intact and has 5/5 thenar and intrinsic strength.    A/P: Status post above, doing well  1) Continue with light use of the hand  2) F/U 4 weeks  3) Call with any questions/concerns in the interim        Alisa Rodriguez MD     Please be aware that this note has been generated with the assistance of odal voice-to-text.  Please excuse any spelling or grammatical errors.

## 2022-07-13 ENCOUNTER — LAB VISIT (OUTPATIENT)
Dept: LAB | Facility: HOSPITAL | Age: 82
End: 2022-07-13
Attending: INTERNAL MEDICINE
Payer: MEDICARE

## 2022-07-13 ENCOUNTER — OFFICE VISIT (OUTPATIENT)
Dept: INTERNAL MEDICINE | Facility: CLINIC | Age: 82
End: 2022-07-13
Payer: MEDICARE

## 2022-07-13 VITALS
TEMPERATURE: 97 F | RESPIRATION RATE: 16 BRPM | SYSTOLIC BLOOD PRESSURE: 122 MMHG | HEIGHT: 72 IN | HEART RATE: 73 BPM | DIASTOLIC BLOOD PRESSURE: 80 MMHG | BODY MASS INDEX: 29.35 KG/M2 | WEIGHT: 216.69 LBS | OXYGEN SATURATION: 97 %

## 2022-07-13 DIAGNOSIS — N18.31 STAGE 3A CHRONIC KIDNEY DISEASE: ICD-10-CM

## 2022-07-13 DIAGNOSIS — M1A.9XX1 TOPHACEOUS GOUT: ICD-10-CM

## 2022-07-13 DIAGNOSIS — Z86.79 S/P ABLATION OF ATRIAL FLUTTER: ICD-10-CM

## 2022-07-13 DIAGNOSIS — R73.09 ELEVATED RANDOM BLOOD GLUCOSE LEVEL: ICD-10-CM

## 2022-07-13 DIAGNOSIS — Z98.890 S/P ABLATION OF ATRIAL FLUTTER: ICD-10-CM

## 2022-07-13 DIAGNOSIS — Z09 FOLLOW UP: Primary | ICD-10-CM

## 2022-07-13 DIAGNOSIS — I48.0 PAF (PAROXYSMAL ATRIAL FIBRILLATION): ICD-10-CM

## 2022-07-13 DIAGNOSIS — I47.10 SVT (SUPRAVENTRICULAR TACHYCARDIA): ICD-10-CM

## 2022-07-13 LAB
ESTIMATED AVG GLUCOSE: 128 MG/DL (ref 68–131)
HBA1C MFR BLD: 6.1 % (ref 4–5.6)
URATE SERPL-MCNC: 8.2 MG/DL (ref 3.4–7)

## 2022-07-13 PROCEDURE — 99214 PR OFFICE/OUTPT VISIT, EST, LEVL IV, 30-39 MIN: ICD-10-PCS | Mod: S$GLB,,, | Performed by: INTERNAL MEDICINE

## 2022-07-13 PROCEDURE — 3288F FALL RISK ASSESSMENT DOCD: CPT | Mod: CPTII,S$GLB,, | Performed by: INTERNAL MEDICINE

## 2022-07-13 PROCEDURE — 1159F MED LIST DOCD IN RCRD: CPT | Mod: CPTII,S$GLB,, | Performed by: INTERNAL MEDICINE

## 2022-07-13 PROCEDURE — 3079F PR MOST RECENT DIASTOLIC BLOOD PRESSURE 80-89 MM HG: ICD-10-PCS | Mod: CPTII,S$GLB,, | Performed by: INTERNAL MEDICINE

## 2022-07-13 PROCEDURE — 83036 HEMOGLOBIN GLYCOSYLATED A1C: CPT | Performed by: INTERNAL MEDICINE

## 2022-07-13 PROCEDURE — 3288F PR FALLS RISK ASSESSMENT DOCUMENTED: ICD-10-PCS | Mod: CPTII,S$GLB,, | Performed by: INTERNAL MEDICINE

## 2022-07-13 PROCEDURE — 84550 ASSAY OF BLOOD/URIC ACID: CPT | Performed by: INTERNAL MEDICINE

## 2022-07-13 PROCEDURE — 3074F PR MOST RECENT SYSTOLIC BLOOD PRESSURE < 130 MM HG: ICD-10-PCS | Mod: CPTII,S$GLB,, | Performed by: INTERNAL MEDICINE

## 2022-07-13 PROCEDURE — 99999 PR PBB SHADOW E&M-EST. PATIENT-LVL IV: CPT | Mod: PBBFAC,,, | Performed by: INTERNAL MEDICINE

## 2022-07-13 PROCEDURE — 1160F PR REVIEW ALL MEDS BY PRESCRIBER/CLIN PHARMACIST DOCUMENTED: ICD-10-PCS | Mod: CPTII,S$GLB,, | Performed by: INTERNAL MEDICINE

## 2022-07-13 PROCEDURE — 3079F DIAST BP 80-89 MM HG: CPT | Mod: CPTII,S$GLB,, | Performed by: INTERNAL MEDICINE

## 2022-07-13 PROCEDURE — 1160F RVW MEDS BY RX/DR IN RCRD: CPT | Mod: CPTII,S$GLB,, | Performed by: INTERNAL MEDICINE

## 2022-07-13 PROCEDURE — 36415 COLL VENOUS BLD VENIPUNCTURE: CPT | Performed by: INTERNAL MEDICINE

## 2022-07-13 PROCEDURE — 1101F PR PT FALLS ASSESS DOC 0-1 FALLS W/OUT INJ PAST YR: ICD-10-PCS | Mod: CPTII,S$GLB,, | Performed by: INTERNAL MEDICINE

## 2022-07-13 PROCEDURE — 1126F AMNT PAIN NOTED NONE PRSNT: CPT | Mod: CPTII,S$GLB,, | Performed by: INTERNAL MEDICINE

## 2022-07-13 PROCEDURE — 1126F PR PAIN SEVERITY QUANTIFIED, NO PAIN PRESENT: ICD-10-PCS | Mod: CPTII,S$GLB,, | Performed by: INTERNAL MEDICINE

## 2022-07-13 PROCEDURE — 99214 OFFICE O/P EST MOD 30 MIN: CPT | Mod: S$GLB,,, | Performed by: INTERNAL MEDICINE

## 2022-07-13 PROCEDURE — 1159F PR MEDICATION LIST DOCUMENTED IN MEDICAL RECORD: ICD-10-PCS | Mod: CPTII,S$GLB,, | Performed by: INTERNAL MEDICINE

## 2022-07-13 PROCEDURE — 99999 PR PBB SHADOW E&M-EST. PATIENT-LVL IV: ICD-10-PCS | Mod: PBBFAC,,, | Performed by: INTERNAL MEDICINE

## 2022-07-13 PROCEDURE — 3074F SYST BP LT 130 MM HG: CPT | Mod: CPTII,S$GLB,, | Performed by: INTERNAL MEDICINE

## 2022-07-13 PROCEDURE — 1101F PT FALLS ASSESS-DOCD LE1/YR: CPT | Mod: CPTII,S$GLB,, | Performed by: INTERNAL MEDICINE

## 2022-07-13 NOTE — PROGRESS NOTES
Subjective:       Patient ID: Danial Meza is a 82 y.o. male.    Chief Complaint: Follow-up      HPI  Danial Meza is a 82 y.o. year old male known to me here for follow up. Doing well since last visit. Main concern is his wife's inactivity, it is putting a strain in the household. She is low energy, he wants her to do more.     Does report tophaceous gout on R toe.     Review of Systems   Constitutional: Negative for activity change, appetite change, chills, fatigue, fever and unexpected weight change.   HENT: Negative for congestion, rhinorrhea and sore throat.    Eyes: Negative for visual disturbance.   Respiratory: Negative for shortness of breath.    Cardiovascular: Negative for chest pain.   Gastrointestinal: Negative for abdominal pain, diarrhea, nausea and vomiting.   Genitourinary: Negative for difficulty urinating and dysuria.   Musculoskeletal: Negative for arthralgias, back pain and myalgias.   Skin: Negative for color change and rash.   Neurological: Negative for dizziness, weakness and headaches.         Past Medical History:   Diagnosis Date    Basal cell carcinoma 1/5/12    right nose    Basal cell carcinoma 10/09/2017    left nasal bridge and left cheek    Cataract     Glaucoma     Gout 7/18/2014    Mixed hyperlipidemia     Osteoarthritis of right foot 10/26/2019    SVT (supraventricular tachycardia) 1/14/2005    ablation by [facility] of left lateral free wall accessory bypass tract    Type 2 diabetes mellitus with hyperglycemia, without long-term current use of insulin 4/9/2018        Prior to Admission medications    Medication Sig Start Date End Date Taking? Authorizing Provider   apixaban (ELIQUIS) 5 mg Tab Take 1 tablet (5 mg total) by mouth 2 (two) times daily. 3/18/22  Yes Bruce Hdez MD   atorvastatin (LIPITOR) 20 MG tablet Take 1 tablet (20 mg total) by mouth every evening. 12/15/21  Yes Jose Rafael Sorto Jr., MD   latanoprost 0.005 % ophthalmic solution Place 1 drop into both  eyes every evening. 5/9/22  Yes Huma Garcia MD   lisinopriL 10 MG tablet Take 1 tablet (10 mg total) by mouth once daily. 12/15/21  Yes Jose Rafael Sorto Jr., MD   metoprolol succinate (TOPROL-XL) 50 MG 24 hr tablet Take 1 tablet (50 mg total) by mouth once daily. 2/15/22 2/15/23 Yes Jose Rafael Sorto Jr., MD   multivitamin (THERAGRAN) per tablet Take 1 tablet by mouth once daily.   Yes Historical Provider   sars-cov-2, covid-19, (MODERNA COVID-19) 100 mcg/0.5 ml injection Inject into the muscle. 10/28/21  Yes Autumn Lawson MD   traMADoL (ULTRAM) 50 mg tablet Take 1 tablet (50 mg total) by mouth every 6 (six) hours. 6/6/22  Yes Alisa Rodriguez MD   VIT C/VIT E/LUTEIN/MIN/OMEGA-3 (OCUVITE ORAL) Take 1 tablet by mouth once daily.    Yes Historical Provider   colchicine (COLCRYS) 0.6 mg tablet Take 1 tablet (0.6 mg total) by mouth 2 (two) times daily. Until symptoms of gout flare resolve  Patient taking differently: Take 0.6 mg by mouth as needed. Until symptoms of gout flare resolve 12/4/19 5/24/22  Louie Durant MD        Past medical history, surgical history, and family medical history reviewed and updated as appropriate.    Medications and allergies reviewed.     Objective:          Vitals:    07/13/22 1452   BP: 122/80   BP Location: Right arm   Patient Position: Sitting   BP Method: Medium (Manual)   Pulse: 73   Resp: 16   Temp: 97 °F (36.1 °C)   TempSrc: Oral   SpO2: 97%   Weight: 98.3 kg (216 lb 11.4 oz)   Height: 6' (1.829 m)     Body mass index is 29.39 kg/m².  Physical Exam  Constitutional:       General: He is not in acute distress.     Appearance: He is well-developed.   HENT:      Head: Normocephalic and atraumatic.      Nose: Nose normal.   Eyes:      General: No scleral icterus.     Extraocular Movements: Extraocular movements intact.   Neck:      Thyroid: No thyromegaly.      Vascular: No JVD.      Trachea: No tracheal deviation.   Cardiovascular:      Rate and Rhythm: Normal rate  and regular rhythm.      Heart sounds: Normal heart sounds. No murmur heard.    No friction rub. No gallop.   Pulmonary:      Effort: Pulmonary effort is normal. No respiratory distress.      Breath sounds: Normal breath sounds. No wheezing or rales.   Abdominal:      General: Bowel sounds are normal. There is no distension.      Palpations: Abdomen is soft. There is no mass.      Tenderness: There is no abdominal tenderness.   Musculoskeletal:         General: No tenderness. Normal range of motion.      Cervical back: Normal range of motion and neck supple.   Lymphadenopathy:      Cervical: No cervical adenopathy.   Skin:     General: Skin is warm and dry.      Findings: No rash.   Neurological:      Mental Status: He is alert and oriented to person, place, and time.      Cranial Nerves: No cranial nerve deficit.      Deep Tendon Reflexes: Reflexes normal.   Psychiatric:         Behavior: Behavior normal.         Lab Results   Component Value Date    WBC 6.46 05/18/2022    HGB 14.3 05/18/2022    HCT 42.6 05/18/2022     05/18/2022    CHOL 69 (L) 05/18/2022    TRIG 83 05/18/2022    HDL 22 (L) 05/18/2022    ALT 28 05/18/2022    AST 26 05/18/2022     05/18/2022    K 4.3 05/18/2022     05/18/2022    CREATININE 1.1 05/18/2022    BUN 24 (H) 05/18/2022    CO2 28 05/18/2022    TSH 1.757 10/28/2021    PSA 1.3 10/28/2021    INR 1.4 (H) 12/30/2018    HGBA1C 6.1 (H) 07/13/2022       Assessment:       1. Follow up    2. Tophaceous gout    3. Elevated random blood glucose level    4. PAF (paroxysmal atrial fibrillation)    5. Stage 3a chronic kidney disease    6. SVT (supraventricular tachycardia)    7. S/P ablation of atrial flutter 11/4/15          Plan:     Danial was seen today for follow-up.    Diagnoses and all orders for this visit:    Follow up    Tophaceous gout  -     Uric Acid; Future    Elevated random blood glucose level  -     Hemoglobin A1C; Future    PAF (paroxysmal atrial fibrillation)    Stage  3a chronic kidney disease    SVT (supraventricular tachycardia)    S/P ablation of atrial flutter 11/4/15    doing well since last visit  afib controlled, on anticoagulation  ckd 3a - controlled, discussed avoiding nephrotoxics  Svt - controlled on bb, no episodes of tachycardia reported  Gout - recheck uric acid next labwork  Recheck a1c    Domestic home issues - long discussion regarding communication, independence, psychosocial stressors with wife at home. >30 minutes discussing home issues. Pt to try to introduce more activities to allow better communication between him and his wife. He feels his wife is depressed (she is also my pt).     labwork drawn prior to appointment discussed today.    Health maintenance reviewed with patient. Patient is due for covid-19 booster, will go with wife.    Follow up in about 6 months (around 1/13/2023).    Ruben Ariraza MD  Internal Medicine / Primary Care  [facility]  7/13/2022

## 2022-07-15 ENCOUNTER — TELEPHONE (OUTPATIENT)
Dept: ORTHOPEDICS | Facility: CLINIC | Age: 82
End: 2022-07-15
Payer: MEDICARE

## 2022-07-16 ENCOUNTER — TELEPHONE (OUTPATIENT)
Dept: INTERNAL MEDICINE | Facility: CLINIC | Age: 82
End: 2022-07-16
Payer: MEDICARE

## 2022-08-07 NOTE — PROGRESS NOTES
HPI     DLS: 5/09/2022    Pt here for 3 Month IOP Check;    Meds;  Latanoprost QHS OU     1) POAG OD   2) Glaucoma Suspect OS   3) NS OU   4) Ptosis   5) FmHx ARMD   6) Hyperopia with Astigmatism/ Presbyopia     Last edited by Idalia Esteban on 8/8/2022  8:25 AM. (History)            Assessment /Plan     For exam results, see Encounter Report.    Primary open-angle glaucoma, right eye, mild stage    Open angle with borderline findings and high glaucoma risk in left eye    Nuclear sclerosis of both eyes    Brow ptosis    Small pupils    Family history of macular degeneration    Hyperopia of both eyes with astigmatism and presbyopia          1. Open angle with borderline glaucoma findings - Both Eyes (POAG od // suspect os )   Glaucoma (type and duration)   LTG suspect  First HVF 2011  First photos 2011  Glaucoma drops started 2/11/2021 (monitored off gtts 2011 to 2021 - then got new INS od)      Family history   = father-also my patient, + mom- she has passed  Glaucoma meds   lumigan ou - started 2/11/2021  H/O adverse rxn to glaucoma drops  None (? Avoid BB - bradycardia)   LASERS   none  GLAUCOMA SURGERIES   none  OTHER EYE SURGERIES   none  CDR   0.8 with thin sup rim // 0/70-0.75 - rim appears intact   Tbase  16-24    Tmax  21/24      Ttarget   16 ou (set 5/9/2022 ) - had VF prog ou withIOP 16-20 ou        HVF  5 test 2011 to 2018 - full od // full os-            2 test 2021 to 2022 - New IAD/NS  od // ? New INS  os   Gono  +3 ou  CCT  [id number]  OCT  4 test 2012 to 2022 - RNFL - bord TS od (+prog 2018 to 2021) // nl os  HRT   5 test 2011 to 2019 - MR -  Dec. IT od,  // dec. IN os, border IT /// CDR 0.72 od // 0.70 os  Disc photos  2011, 2012, 2017  - OIS // 2021 - harmony     Ttoday  16/17  Test done today - IOP check,   2. Nuclear sclerosis - Both Eyes   - mild BCVA 20/20 ou  BATh 20/60 od // 20/70 os (10/2013)   3. Ptosis   -patient would like to monitor for now as he is not having any trouble    4. Family history  of macular degeneration   -father has adv wet ARMD - gets injections with arend   5. Hyperopia, astigmatism , presbyopia  PC +0.75+1.00x177        +1.00+1.50x180   ADD +2.50  glasses Teto   6. I use to see his father, and also saw his mother -  - his dad  2015 @ age 89   - his mom passed at age 98  - both had glaucoma, his dad also had  wet ARMD - saw Arend     Plan    New INS od - now has POAG od - mild   + IAD/NS od x 2 - confirmed   OCT od - bord TS now - (+progfrom )   Bord glaucoma os - high risk    Switch to latanoprost 2/2 cost of lumigan // IOP ok (2022)     Cont to monitor cataracts     - not vis sign yet - dilates medium only - smallish pupils     Photo file updated 2022     F/U 4  months with IOP check // gonio     Keep F/U with Teto (2022)

## 2022-08-08 ENCOUNTER — OFFICE VISIT (OUTPATIENT)
Dept: OPHTHALMOLOGY | Facility: CLINIC | Age: 82
End: 2022-08-08
Payer: MEDICARE

## 2022-08-08 ENCOUNTER — OFFICE VISIT (OUTPATIENT)
Dept: ORTHOPEDICS | Facility: CLINIC | Age: 82
End: 2022-08-08
Payer: MEDICARE

## 2022-08-08 VITALS — HEIGHT: 71 IN | WEIGHT: 203 LBS | BODY MASS INDEX: 28.42 KG/M2

## 2022-08-08 DIAGNOSIS — H40.1111 PRIMARY OPEN-ANGLE GLAUCOMA, RIGHT EYE, MILD STAGE: Primary | ICD-10-CM

## 2022-08-08 DIAGNOSIS — M65.331 TRIGGER MIDDLE FINGER OF RIGHT HAND: ICD-10-CM

## 2022-08-08 DIAGNOSIS — H52.03 HYPEROPIA OF BOTH EYES WITH ASTIGMATISM AND PRESBYOPIA: ICD-10-CM

## 2022-08-08 DIAGNOSIS — G56.01 CARPAL TUNNEL SYNDROME ON RIGHT: Primary | ICD-10-CM

## 2022-08-08 DIAGNOSIS — H52.4 HYPEROPIA OF BOTH EYES WITH ASTIGMATISM AND PRESBYOPIA: ICD-10-CM

## 2022-08-08 DIAGNOSIS — H57.819 BROW PTOSIS: ICD-10-CM

## 2022-08-08 DIAGNOSIS — H40.022 OPEN ANGLE WITH BORDERLINE FINDINGS AND HIGH GLAUCOMA RISK IN LEFT EYE: ICD-10-CM

## 2022-08-08 DIAGNOSIS — H52.203 HYPEROPIA OF BOTH EYES WITH ASTIGMATISM AND PRESBYOPIA: ICD-10-CM

## 2022-08-08 DIAGNOSIS — H25.13 NUCLEAR SCLEROSIS OF BOTH EYES: ICD-10-CM

## 2022-08-08 DIAGNOSIS — Z83.518 FAMILY HISTORY OF MACULAR DEGENERATION: ICD-10-CM

## 2022-08-08 DIAGNOSIS — H57.03 SMALL PUPILS: ICD-10-CM

## 2022-08-08 PROCEDURE — 1160F PR REVIEW ALL MEDS BY PRESCRIBER/CLIN PHARMACIST DOCUMENTED: ICD-10-PCS | Mod: CPTII,S$GLB,, | Performed by: ORTHOPAEDIC SURGERY

## 2022-08-08 PROCEDURE — 1159F PR MEDICATION LIST DOCUMENTED IN MEDICAL RECORD: ICD-10-PCS | Mod: CPTII,S$GLB,, | Performed by: OPHTHALMOLOGY

## 2022-08-08 PROCEDURE — 1160F PR REVIEW ALL MEDS BY PRESCRIBER/CLIN PHARMACIST DOCUMENTED: ICD-10-PCS | Mod: CPTII,S$GLB,, | Performed by: OPHTHALMOLOGY

## 2022-08-08 PROCEDURE — 99999 PR PBB SHADOW E&M-EST. PATIENT-LVL III: CPT | Mod: PBBFAC,,, | Performed by: ORTHOPAEDIC SURGERY

## 2022-08-08 PROCEDURE — 1126F PR PAIN SEVERITY QUANTIFIED, NO PAIN PRESENT: ICD-10-PCS | Mod: CPTII,S$GLB,, | Performed by: ORTHOPAEDIC SURGERY

## 2022-08-08 PROCEDURE — 1126F AMNT PAIN NOTED NONE PRSNT: CPT | Mod: CPTII,S$GLB,, | Performed by: OPHTHALMOLOGY

## 2022-08-08 PROCEDURE — 1160F RVW MEDS BY RX/DR IN RCRD: CPT | Mod: CPTII,S$GLB,, | Performed by: ORTHOPAEDIC SURGERY

## 2022-08-08 PROCEDURE — 99999 PR PBB SHADOW E&M-EST. PATIENT-LVL III: ICD-10-PCS | Mod: PBBFAC,,, | Performed by: OPHTHALMOLOGY

## 2022-08-08 PROCEDURE — 1159F MED LIST DOCD IN RCRD: CPT | Mod: CPTII,S$GLB,, | Performed by: OPHTHALMOLOGY

## 2022-08-08 PROCEDURE — 1159F PR MEDICATION LIST DOCUMENTED IN MEDICAL RECORD: ICD-10-PCS | Mod: CPTII,S$GLB,, | Performed by: ORTHOPAEDIC SURGERY

## 2022-08-08 PROCEDURE — 99999 PR PBB SHADOW E&M-EST. PATIENT-LVL III: CPT | Mod: PBBFAC,,, | Performed by: OPHTHALMOLOGY

## 2022-08-08 PROCEDURE — 1160F RVW MEDS BY RX/DR IN RCRD: CPT | Mod: CPTII,S$GLB,, | Performed by: OPHTHALMOLOGY

## 2022-08-08 PROCEDURE — 1159F MED LIST DOCD IN RCRD: CPT | Mod: CPTII,S$GLB,, | Performed by: ORTHOPAEDIC SURGERY

## 2022-08-08 PROCEDURE — 92012 INTRM OPH EXAM EST PATIENT: CPT | Mod: S$GLB,,, | Performed by: OPHTHALMOLOGY

## 2022-08-08 PROCEDURE — 3288F FALL RISK ASSESSMENT DOCD: CPT | Mod: CPTII,S$GLB,, | Performed by: OPHTHALMOLOGY

## 2022-08-08 PROCEDURE — 3288F PR FALLS RISK ASSESSMENT DOCUMENTED: ICD-10-PCS | Mod: CPTII,S$GLB,, | Performed by: OPHTHALMOLOGY

## 2022-08-08 PROCEDURE — 99024 POSTOP FOLLOW-UP VISIT: CPT | Mod: S$GLB,,, | Performed by: ORTHOPAEDIC SURGERY

## 2022-08-08 PROCEDURE — 1126F PR PAIN SEVERITY QUANTIFIED, NO PAIN PRESENT: ICD-10-PCS | Mod: CPTII,S$GLB,, | Performed by: OPHTHALMOLOGY

## 2022-08-08 PROCEDURE — 1101F PR PT FALLS ASSESS DOC 0-1 FALLS W/OUT INJ PAST YR: ICD-10-PCS | Mod: CPTII,S$GLB,, | Performed by: OPHTHALMOLOGY

## 2022-08-08 PROCEDURE — 1101F PT FALLS ASSESS-DOCD LE1/YR: CPT | Mod: CPTII,S$GLB,, | Performed by: OPHTHALMOLOGY

## 2022-08-08 PROCEDURE — 92012 PR EYE EXAM, EST PATIENT,INTERMED: ICD-10-PCS | Mod: S$GLB,,, | Performed by: OPHTHALMOLOGY

## 2022-08-08 PROCEDURE — 99024 PR POST-OP FOLLOW-UP VISIT: ICD-10-PCS | Mod: S$GLB,,, | Performed by: ORTHOPAEDIC SURGERY

## 2022-08-08 PROCEDURE — 1126F AMNT PAIN NOTED NONE PRSNT: CPT | Mod: CPTII,S$GLB,, | Performed by: ORTHOPAEDIC SURGERY

## 2022-08-08 PROCEDURE — 99999 PR PBB SHADOW E&M-EST. PATIENT-LVL III: ICD-10-PCS | Mod: PBBFAC,,, | Performed by: ORTHOPAEDIC SURGERY

## 2022-08-20 NOTE — PROGRESS NOTES
Danial Meza presents for post-operative evaluation of   Encounter Diagnoses   Name Primary?    Carpal tunnel syndrome on right Yes    Trigger middle finger of right hand    The patient is now almost 9 weeks s/p right carpal tunnel release and right long finger tenosynovectomy.  Overall the patient reports doing well.  The patient reports no pain and return to daily activities without difficulty.      PE:    AA&O x 4.  NAD  HEENT:  NCAT, sclera nonicteric  Lungs:  Respirations are equal and unlabored.  CV:  2+ bilateral upper and lower extremity pulses.  MSK: The wound is healing well with no signs of erythema or warmth.  Neurovascularly intact and has 5/5 thenar and intrinsic strength.    A/P: Status post above, doing well  1) Continue with activities as tolerated  2) as needed  3) Call with any questions/concerns in the interim        Alisa Rodriguez MD     Please be aware that this note has been generated with the assistance of MModal voice-to-text.  Please excuse any spelling or grammatical errors.

## 2022-09-15 ENCOUNTER — OFFICE VISIT (OUTPATIENT)
Dept: INTERNAL MEDICINE | Facility: CLINIC | Age: 82
End: 2022-09-15
Payer: MEDICARE

## 2022-09-15 VITALS
WEIGHT: 205.5 LBS | HEART RATE: 96 BPM | HEIGHT: 71 IN | SYSTOLIC BLOOD PRESSURE: 124 MMHG | DIASTOLIC BLOOD PRESSURE: 80 MMHG | OXYGEN SATURATION: 100 % | BODY MASS INDEX: 28.77 KG/M2

## 2022-09-15 DIAGNOSIS — I45.10 RIGHT BUNDLE BRANCH BLOCK (RBBB): Chronic | ICD-10-CM

## 2022-09-15 DIAGNOSIS — R73.01 IMPAIRED FASTING GLUCOSE: ICD-10-CM

## 2022-09-15 DIAGNOSIS — Z00.00 ENCOUNTER FOR PREVENTIVE HEALTH EXAMINATION: Primary | ICD-10-CM

## 2022-09-15 DIAGNOSIS — I77.819 ECTATIC AORTA: ICD-10-CM

## 2022-09-15 DIAGNOSIS — R00.1 SINUS BRADYCARDIA: Chronic | ICD-10-CM

## 2022-09-15 DIAGNOSIS — Z86.79 S/P ABLATION OF ATRIAL FLUTTER: Chronic | ICD-10-CM

## 2022-09-15 DIAGNOSIS — E66.09 CLASS 1 OBESITY DUE TO EXCESS CALORIES WITHOUT SERIOUS COMORBIDITY WITH BODY MASS INDEX (BMI) OF 30.0 TO 30.9 IN ADULT: ICD-10-CM

## 2022-09-15 DIAGNOSIS — Z98.890 S/P ABLATION OF ATRIAL FLUTTER: Chronic | ICD-10-CM

## 2022-09-15 DIAGNOSIS — I48.0 PAF (PAROXYSMAL ATRIAL FIBRILLATION): Chronic | ICD-10-CM

## 2022-09-15 DIAGNOSIS — M10.9 GOUTY ARTHRITIS OF TOE OF RIGHT FOOT: ICD-10-CM

## 2022-09-15 DIAGNOSIS — E78.2 MIXED HYPERLIPIDEMIA: Chronic | ICD-10-CM

## 2022-09-15 DIAGNOSIS — N18.31 STAGE 3A CHRONIC KIDNEY DISEASE: ICD-10-CM

## 2022-09-15 DIAGNOSIS — I47.10 SVT (SUPRAVENTRICULAR TACHYCARDIA): Chronic | ICD-10-CM

## 2022-09-15 DIAGNOSIS — I10 ESSENTIAL HYPERTENSION: Chronic | ICD-10-CM

## 2022-09-15 PROBLEM — R10.11 RUQ ABDOMINAL PAIN: Status: RESOLVED | Noted: 2018-12-30 | Resolved: 2022-09-15

## 2022-09-15 PROCEDURE — 1101F PR PT FALLS ASSESS DOC 0-1 FALLS W/OUT INJ PAST YR: ICD-10-PCS | Mod: CPTII,S$GLB,, | Performed by: NURSE PRACTITIONER

## 2022-09-15 PROCEDURE — 3288F PR FALLS RISK ASSESSMENT DOCUMENTED: ICD-10-PCS | Mod: CPTII,S$GLB,, | Performed by: NURSE PRACTITIONER

## 2022-09-15 PROCEDURE — 3074F SYST BP LT 130 MM HG: CPT | Mod: CPTII,S$GLB,, | Performed by: NURSE PRACTITIONER

## 2022-09-15 PROCEDURE — 1170F FXNL STATUS ASSESSED: CPT | Mod: CPTII,S$GLB,, | Performed by: NURSE PRACTITIONER

## 2022-09-15 PROCEDURE — 1170F PR FUNCTIONAL STATUS ASSESSED: ICD-10-PCS | Mod: CPTII,S$GLB,, | Performed by: NURSE PRACTITIONER

## 2022-09-15 PROCEDURE — 99499 UNLISTED E&M SERVICE: CPT | Mod: HCNC,S$GLB,, | Performed by: NURSE PRACTITIONER

## 2022-09-15 PROCEDURE — 1159F PR MEDICATION LIST DOCUMENTED IN MEDICAL RECORD: ICD-10-PCS | Mod: CPTII,S$GLB,, | Performed by: NURSE PRACTITIONER

## 2022-09-15 PROCEDURE — 1126F PR PAIN SEVERITY QUANTIFIED, NO PAIN PRESENT: ICD-10-PCS | Mod: CPTII,S$GLB,, | Performed by: NURSE PRACTITIONER

## 2022-09-15 PROCEDURE — 3074F PR MOST RECENT SYSTOLIC BLOOD PRESSURE < 130 MM HG: ICD-10-PCS | Mod: CPTII,S$GLB,, | Performed by: NURSE PRACTITIONER

## 2022-09-15 PROCEDURE — G0439 PR MEDICARE ANNUAL WELLNESS SUBSEQUENT VISIT: ICD-10-PCS | Mod: S$GLB,,, | Performed by: NURSE PRACTITIONER

## 2022-09-15 PROCEDURE — 99999 PR PBB SHADOW E&M-EST. PATIENT-LVL IV: ICD-10-PCS | Mod: PBBFAC,,, | Performed by: NURSE PRACTITIONER

## 2022-09-15 PROCEDURE — 3079F PR MOST RECENT DIASTOLIC BLOOD PRESSURE 80-89 MM HG: ICD-10-PCS | Mod: CPTII,S$GLB,, | Performed by: NURSE PRACTITIONER

## 2022-09-15 PROCEDURE — 1101F PT FALLS ASSESS-DOCD LE1/YR: CPT | Mod: CPTII,S$GLB,, | Performed by: NURSE PRACTITIONER

## 2022-09-15 PROCEDURE — 1126F AMNT PAIN NOTED NONE PRSNT: CPT | Mod: CPTII,S$GLB,, | Performed by: NURSE PRACTITIONER

## 2022-09-15 PROCEDURE — 99499 RISK ADDL DX/OHS AUDIT: ICD-10-PCS | Mod: HCNC,S$GLB,, | Performed by: NURSE PRACTITIONER

## 2022-09-15 PROCEDURE — 1160F PR REVIEW ALL MEDS BY PRESCRIBER/CLIN PHARMACIST DOCUMENTED: ICD-10-PCS | Mod: CPTII,S$GLB,, | Performed by: NURSE PRACTITIONER

## 2022-09-15 PROCEDURE — G0439 PPPS, SUBSEQ VISIT: HCPCS | Mod: S$GLB,,, | Performed by: NURSE PRACTITIONER

## 2022-09-15 PROCEDURE — 1160F RVW MEDS BY RX/DR IN RCRD: CPT | Mod: CPTII,S$GLB,, | Performed by: NURSE PRACTITIONER

## 2022-09-15 PROCEDURE — 3079F DIAST BP 80-89 MM HG: CPT | Mod: CPTII,S$GLB,, | Performed by: NURSE PRACTITIONER

## 2022-09-15 PROCEDURE — 1159F MED LIST DOCD IN RCRD: CPT | Mod: CPTII,S$GLB,, | Performed by: NURSE PRACTITIONER

## 2022-09-15 PROCEDURE — 99999 PR PBB SHADOW E&M-EST. PATIENT-LVL IV: CPT | Mod: PBBFAC,,, | Performed by: NURSE PRACTITIONER

## 2022-09-15 PROCEDURE — 3288F FALL RISK ASSESSMENT DOCD: CPT | Mod: CPTII,S$GLB,, | Performed by: NURSE PRACTITIONER

## 2022-09-15 NOTE — PROGRESS NOTES
I offered to discuss advanced care planning, including how to pick a person who would make decisions for you if you were unable to make them for yourself, called a health care power of , and what kind of decisions you might make such as use of life sustaining treatments such as ventilators and tube feeding when faced with a life limiting illness recorded on a living will that they will need to know. (How you want to be cared for as you near the end of your natural life)     X  Patient has advanced directives written and agrees to provide copies to the institution.

## 2022-09-15 NOTE — PATIENT INSTRUCTIONS
Counseling and Referral of Other Preventative  (Italic type indicates deductible and co-insurance are waived)    Patient Name: Danial Meza  Today's Date: 9/15/2022    Health Maintenance         Date Due Completion Date    COVID-19 Vaccine (3 - Moderna risk series) 11/25/2021 10/28/2021    Influenza Vaccine (1) 09/01/2022 11/11/2021    Colonoscopy 02/07/2027 2/7/2017    Lipid Panel 05/18/2027 5/18/2022    TETANUS VACCINE 01/15/2029 1/15/2019          No orders of the defined types were placed in this encounter.      The following information is provided to all patients.  This information is to help you find resources for any of the problems found today that may be affecting your health:                Living healthy guide: www.[facility].[place].gov      Understanding Diabetes: www.diabetes.org      Eating healthy: www.cdc.gov/healthyweight      CDC home safety checklist: www.cdc.gov/steadi/patient.html      Agency on Aging: www.goea.[place].[facility]      Alcoholics anonymous (AA): www.aa.org      Physical Activity: www.cynthia.nih.gov/kb4rghr      Tobacco use: www.quitwithusla.org

## 2022-09-15 NOTE — PROGRESS NOTES
"  Danial Meza presented for a  Medicare AWV and comprehensive Health Risk Assessment today. The following components were reviewed and updated:    Medical history  Family History  Social history  Allergies and Current Medications  Health Risk Assessment  Health Maintenance  Care Team         ** See Completed Assessments for Annual Wellness Visit within the encounter summary.**         The following assessments were completed:  Living Situation  CAGE  Depression Screening  Timed Get Up and Go  Whisper Test  Cognitive Function Screening      Nutrition Screening  ADL Screening  PAQ Screening  OPIOID Screening: Patient does not have a prescription for narcotics. Patient does not use substance         Vitals:    09/15/22 1439 09/15/22 1448   BP:  124/80   BP Location:  Right arm   Patient Position:  Sitting   Pulse:  96   SpO2:  100%   Weight: 93.2 kg (205 lb 7.5 oz)    Height: 5' 11" (1.803 m)      Body mass index is 28.66 kg/m².  Physical Exam  Vitals and nursing note reviewed.   Constitutional:       Appearance: He is well-developed.   HENT:      Head: Normocephalic.   Cardiovascular:      Rate and Rhythm: Normal rate and regular rhythm.   Pulmonary:      Effort: Pulmonary effort is normal.      Breath sounds: Normal breath sounds.   Abdominal:      General: Bowel sounds are normal.      Palpations: Abdomen is soft.   Musculoskeletal:         General: Normal range of motion.   Skin:     General: Skin is warm and dry.   Neurological:      Mental Status: He is alert and oriented to person, place, and time.      Motor: No abnormal muscle tone.   Psychiatric:         Mood and Affect: Mood normal.             Diagnoses and health risks identified today and associated recommendations/orders:    1. Encounter for preventive health examination  Here for Health Risk Assessment/Annual Wellness Visit.  Health maintenance reviewed and updated. Follow up in one year.   Patient does not have a prescription for narcotics. Patient " does not use substance.      2. Essential hypertension  Chronic, stable on current medications. Followed by PCP, Cardiology.    3. Ectatic aorta  Chronic, stable on current medications. Noted CXR 4/26/17. Followed by PCP, Cardiology.    4. SVT (supraventricular tachycardia)  Chronic, stable on current medications. Followed by PCP, Cardiology.    5. Sinus bradycardia  Chronic, stable. Followed by PCP, Cardiology.    6. PAF (paroxysmal atrial fibrillation)  Chronic, stable on current medications. Followed by PCP, Cardiology.    7. S/P ablation of atrial flutter 11/4/15  Followed by PCP, Cardiology.    8. Right bundle branch block (RBBB)  Chronic, stable. Followed by PCP, Cardiology.    9. Mixed hyperlipidemia  Chronic, stable on current medication. Followed by PCP, Cardiology.    10. Stage 3a chronic kidney disease  Chronic, stable on current medications. Followed by PCP.     11. Impaired fasting glucose  Chronic, stable. Last A1c 6.1.  Followed by PCP.     12. Class 1 obesity due to excess calories without serious comorbidity with body mass index (BMI) of 30.0 to 30.9 in adult  Weight decreased 18 pounds form PCP visit 10.2021 with diet modification. Followed by PCP.    13. 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 Followed by PCP      Provided Danial with a 5-10 year written screening schedule and personal prevention plan. Recommendations were developed using the USPSTF age appropriate recommendations. Education, counseling, and referrals were provided as needed. After Visit Summary printed and given to patient which includes a list of additional screenings\tests needed.    Follow up in 4 months (on 1/13/2023).with PCP    Laina Dumont NP

## 2022-09-22 ENCOUNTER — TELEPHONE (OUTPATIENT)
Dept: DERMATOLOGY | Facility: CLINIC | Age: 82
End: 2022-09-22
Payer: MEDICARE

## 2022-09-22 NOTE — TELEPHONE ENCOUNTER
----- Message from Francisco Martinez sent at 9/22/2022 10:27 AM CDT -----  Contact: [phone]  Pt is calling to get scheduled for a appt. Annual skin check  Pt access tried but no appts available.  Pt would like a call back. [phone]

## 2022-10-03 DIAGNOSIS — I10 ESSENTIAL HYPERTENSION: ICD-10-CM

## 2022-10-03 RX ORDER — LISINOPRIL 10 MG/1
10 TABLET ORAL DAILY
Qty: 90 TABLET | Refills: 3 | Status: SHIPPED | OUTPATIENT
Start: 2022-10-03 | End: 2023-09-07

## 2022-10-03 NOTE — TELEPHONE ENCOUNTER
----- Message from Louie Jackson sent at 10/3/2022  3:31 PM CDT -----  Regarding: Rx refill  PT called for     Rx:  lisinopriL 10 MG tablet      Pharmacy [facility] ([facility]      Thanks

## 2022-10-07 ENCOUNTER — TELEPHONE (OUTPATIENT)
Dept: RHEUMATOLOGY | Facility: CLINIC | Age: 82
End: 2022-10-07
Payer: MEDICARE

## 2022-10-07 NOTE — TELEPHONE ENCOUNTER
Spoke with the patient and the first date I could get him scheduled will be on 10/18/2022 at 1 pm with Dr Bassett. He stated his toe was very swollen and I advised the patient to call Dr Arriaza so he could run labs before his appointment with Dr Bassett so that he can be treated until he sees rheumatology

## 2022-10-10 ENCOUNTER — TELEPHONE (OUTPATIENT)
Dept: INTERNAL MEDICINE | Facility: CLINIC | Age: 82
End: 2022-10-10
Payer: MEDICARE

## 2022-10-10 DIAGNOSIS — E79.0 HYPERURICEMIA: Primary | ICD-10-CM

## 2022-10-10 NOTE — TELEPHONE ENCOUNTER
----- Message from Kendy Blas sent at 10/10/2022  9:44 AM CDT -----  Contact: [phone]  Pt called to advise that the Rheumatologist is requesting he get a uric acid lab lawanda before his appointment so he can have those levels before he is seen. Please Advise

## 2022-10-11 ENCOUNTER — TELEPHONE (OUTPATIENT)
Dept: INTERNAL MEDICINE | Facility: CLINIC | Age: 82
End: 2022-10-11
Payer: MEDICARE

## 2022-10-11 ENCOUNTER — LAB VISIT (OUTPATIENT)
Dept: LAB | Facility: HOSPITAL | Age: 82
End: 2022-10-11
Attending: INTERNAL MEDICINE
Payer: MEDICARE

## 2022-10-11 DIAGNOSIS — E79.0 HYPERURICEMIA: ICD-10-CM

## 2022-10-11 LAB — URATE SERPL-MCNC: 7.8 MG/DL (ref 3.4–7)

## 2022-10-11 PROCEDURE — 36415 COLL VENOUS BLD VENIPUNCTURE: CPT | Performed by: INTERNAL MEDICINE

## 2022-10-11 PROCEDURE — 84550 ASSAY OF BLOOD/URIC ACID: CPT | Performed by: INTERNAL MEDICINE

## 2022-10-11 NOTE — TELEPHONE ENCOUNTER
----- Message from Ruben Arriaza MD sent at 10/11/2022  3:40 PM CDT -----  Uric acid is still elevated, follow up with rheumatology as scheduled.

## 2022-10-18 ENCOUNTER — HOSPITAL ENCOUNTER (OUTPATIENT)
Dept: RADIOLOGY | Facility: HOSPITAL | Age: 82
Discharge: HOME | End: 2022-10-18
Attending: INTERNAL MEDICINE
Payer: MEDICARE

## 2022-10-18 ENCOUNTER — OFFICE VISIT (OUTPATIENT)
Dept: RHEUMATOLOGY | Facility: CLINIC | Age: 82
End: 2022-10-18
Payer: MEDICARE

## 2022-10-18 VITALS
BODY MASS INDEX: 29.64 KG/M2 | SYSTOLIC BLOOD PRESSURE: 147 MMHG | HEART RATE: 85 BPM | DIASTOLIC BLOOD PRESSURE: 86 MMHG | WEIGHT: 212.5 LBS

## 2022-10-18 DIAGNOSIS — M1A.9XX1 GOUT WITH TOPHI: ICD-10-CM

## 2022-10-18 DIAGNOSIS — M1A.9XX0 CHRONIC GOUT WITHOUT TOPHUS, UNSPECIFIED CAUSE, UNSPECIFIED SITE: ICD-10-CM

## 2022-10-18 DIAGNOSIS — Z79.01 CURRENT USE OF LONG TERM ANTICOAGULATION: ICD-10-CM

## 2022-10-18 DIAGNOSIS — I48.0 PAF (PAROXYSMAL ATRIAL FIBRILLATION): Chronic | ICD-10-CM

## 2022-10-18 DIAGNOSIS — M72.0 DUPUYTREN'S CONTRACTURE OF RIGHT HAND: ICD-10-CM

## 2022-10-18 DIAGNOSIS — M1A.9XX1 GOUT WITH TOPHI: Primary | ICD-10-CM

## 2022-10-18 PROCEDURE — 73630 X-RAY EXAM OF FOOT: CPT | Mod: 26,RT,, | Performed by: RADIOLOGY

## 2022-10-18 PROCEDURE — 73630 X-RAY EXAM OF FOOT: CPT | Mod: TC,RT

## 2022-10-18 PROCEDURE — 99999 PR PBB SHADOW E&M-EST. PATIENT-LVL III: CPT | Mod: PBBFAC,,, | Performed by: INTERNAL MEDICINE

## 2022-10-18 PROCEDURE — 3079F PR MOST RECENT DIASTOLIC BLOOD PRESSURE 80-89 MM HG: ICD-10-PCS | Mod: CPTII,S$GLB,, | Performed by: INTERNAL MEDICINE

## 2022-10-18 PROCEDURE — 3077F PR MOST RECENT SYSTOLIC BLOOD PRESSURE >= 140 MM HG: ICD-10-PCS | Mod: CPTII,S$GLB,, | Performed by: INTERNAL MEDICINE

## 2022-10-18 PROCEDURE — 3079F DIAST BP 80-89 MM HG: CPT | Mod: CPTII,S$GLB,, | Performed by: INTERNAL MEDICINE

## 2022-10-18 PROCEDURE — 1159F PR MEDICATION LIST DOCUMENTED IN MEDICAL RECORD: ICD-10-PCS | Mod: CPTII,S$GLB,, | Performed by: INTERNAL MEDICINE

## 2022-10-18 PROCEDURE — 1126F PR PAIN SEVERITY QUANTIFIED, NO PAIN PRESENT: ICD-10-PCS | Mod: CPTII,S$GLB,, | Performed by: INTERNAL MEDICINE

## 2022-10-18 PROCEDURE — 73630 XR FOOT COMPLETE 3 VIEW RIGHT: ICD-10-PCS | Mod: 26,RT,, | Performed by: RADIOLOGY

## 2022-10-18 PROCEDURE — 1160F RVW MEDS BY RX/DR IN RCRD: CPT | Mod: CPTII,S$GLB,, | Performed by: INTERNAL MEDICINE

## 2022-10-18 PROCEDURE — 3077F SYST BP >= 140 MM HG: CPT | Mod: CPTII,S$GLB,, | Performed by: INTERNAL MEDICINE

## 2022-10-18 PROCEDURE — 99999 PR PBB SHADOW E&M-EST. PATIENT-LVL III: ICD-10-PCS | Mod: PBBFAC,,, | Performed by: INTERNAL MEDICINE

## 2022-10-18 PROCEDURE — 1160F PR REVIEW ALL MEDS BY PRESCRIBER/CLIN PHARMACIST DOCUMENTED: ICD-10-PCS | Mod: CPTII,S$GLB,, | Performed by: INTERNAL MEDICINE

## 2022-10-18 PROCEDURE — 1126F AMNT PAIN NOTED NONE PRSNT: CPT | Mod: CPTII,S$GLB,, | Performed by: INTERNAL MEDICINE

## 2022-10-18 PROCEDURE — 99204 PR OFFICE/OUTPT VISIT, NEW, LEVL IV, 45-59 MIN: ICD-10-PCS | Mod: S$GLB,,, | Performed by: INTERNAL MEDICINE

## 2022-10-18 PROCEDURE — 99204 OFFICE O/P NEW MOD 45 MIN: CPT | Mod: S$GLB,,, | Performed by: INTERNAL MEDICINE

## 2022-10-18 PROCEDURE — 1159F MED LIST DOCD IN RCRD: CPT | Mod: CPTII,S$GLB,, | Performed by: INTERNAL MEDICINE

## 2022-10-18 RX ORDER — COLCHICINE 0.6 MG/1
0.6 TABLET ORAL DAILY
Qty: 90 TABLET | Refills: 3 | Status: SHIPPED | OUTPATIENT
Start: 2022-10-18 | End: 2023-08-29

## 2022-10-18 RX ORDER — ALLOPURINOL 100 MG/1
100 TABLET ORAL DAILY
Qty: 30 TABLET | Refills: 5 | Status: SHIPPED | OUTPATIENT
Start: 2022-10-18 | End: 2022-11-17

## 2022-10-20 NOTE — PROGRESS NOTES
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  It is usually in the right foot.  It was of sudden onset.  It lasted a few days and then went away.  It was infrequent at 1st but has become more persistent.  Four years ago he developed an ulcer on his left 4th toe.  The discharge revealed uric acid crystals.  He had been on allopurinol in the past but has not been on it recently.  He denies any pain between the attacks.  He has had some numbness in his hands and had previous carpal tunnel surgery.  She has some locking of the fingers.  He had noticed an area of swelling in the Achilles tendon.  He has no other peripheral joint complaints.  He takes colchicine when he has an attack, which helps.    No fever, headache, rash, conjunctivitis, oral ulcers, dry eyes or mouth, Raynaud's phenomenon, pleurisy, chronic or bloody diarrhea, vaginal or urethral discharge or ulcer, numbness or tingling, blood clots or phlebitis.  He has no history kidney stones.  He has no family history of gout or arthritis.      Systems review:  General:  Weight has been stable   Cardiac:  Has a history of atrial fibrillation  GI: No abdominal pain or peptic ulcer disease.  No liver problems.      Physical examination:  Skin:  No rashes  ENT:  Adequate tears and saliva.  No conjunctivitis or oral ulcers.  No tophi on his ears.    Chest:  Clear to auscultation   Cardiac:  He has an irregular rhythm   Abdomen:  No organomegaly or masses.  No tenderness to palpation   Extremities:  No pedal edema he has an open sore on the right 4th toe.  Musculoskeletal:  Cervical spine has good range of motion without pain on range of motion.    Shoulders, elbows, wrists are unremarkable.    He has a Dupuytren's contracture of the right 3rd finger.    Lumbar spine has good range of motion without pain on range of motion.    Hips, knees, ankles are unremarkable.    He has enlargement of the medial aspect of the  1st toe compatible with a tophus.  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  He has bony enlargement of his left 1st MTP.      Assessment:  Chronic tophaceous gout     Plans:  1.  I discussed with him the treatment of gout  2.  Laboratory studies obtained  3.  I have ordered x-ray of his feet   4. I placed him on allopurinol 100 mg daily  5.  I told him to take colchicine 0.6 mg daily   6. He will have uric acid level in 1 month  7.  Return in 6 months

## 2022-10-26 ENCOUNTER — TELEPHONE (OUTPATIENT)
Dept: RHEUMATOLOGY | Facility: CLINIC | Age: 82
End: 2022-10-26
Payer: MEDICARE

## 2022-10-26 NOTE — TELEPHONE ENCOUNTER
Spoke with patient regarding Dr. Bassett message      ----- Message from Marcia Hall MA sent at 10/21/2022  4:34 PM CDT -----    ----- Message -----  From: Alirio Bassett MD  Sent: 10/20/2022  11:42 AM CDT  To: Serjio CHAPMAN Staff    The foot x-ray shows changes of gout which is worse than it was 4 years ago.

## 2022-11-09 ENCOUNTER — TELEPHONE (OUTPATIENT)
Dept: PODIATRY | Facility: CLINIC | Age: 82
End: 2022-11-09
Payer: MEDICARE

## 2022-11-17 ENCOUNTER — LAB VISIT (OUTPATIENT)
Dept: LAB | Facility: HOSPITAL | Age: 82
End: 2022-11-17
Attending: INTERNAL MEDICINE
Payer: MEDICARE

## 2022-11-17 DIAGNOSIS — M1A.9XX1 GOUT WITH TOPHI: ICD-10-CM

## 2022-11-17 LAB — URATE SERPL-MCNC: 7.3 MG/DL (ref 3.4–7)

## 2022-11-17 PROCEDURE — 84550 ASSAY OF BLOOD/URIC ACID: CPT | Performed by: INTERNAL MEDICINE

## 2022-11-17 PROCEDURE — 36415 COLL VENOUS BLD VENIPUNCTURE: CPT | Performed by: INTERNAL MEDICINE

## 2022-11-22 ENCOUNTER — OFFICE VISIT (OUTPATIENT)
Dept: DERMATOLOGY | Facility: CLINIC | Age: 82
End: 2022-11-22
Payer: MEDICARE

## 2022-11-22 DIAGNOSIS — D18.01 ANGIOMA OF SKIN: ICD-10-CM

## 2022-11-22 DIAGNOSIS — D48.5 NEOPLASM OF UNCERTAIN BEHAVIOR OF SKIN: Primary | ICD-10-CM

## 2022-11-22 DIAGNOSIS — L82.1 SK (SEBORRHEIC KERATOSIS): ICD-10-CM

## 2022-11-22 DIAGNOSIS — Z85.828 PERSONAL HISTORY OF MALIGNANT NEOPLASM OF SKIN: ICD-10-CM

## 2022-11-22 PROCEDURE — 1159F PR MEDICATION LIST DOCUMENTED IN MEDICAL RECORD: ICD-10-PCS | Mod: CPTII,S$GLB,, | Performed by: DERMATOLOGY

## 2022-11-22 PROCEDURE — 88305 TISSUE EXAM BY PATHOLOGIST: ICD-10-PCS | Mod: 26,,, | Performed by: DERMATOLOGY

## 2022-11-22 PROCEDURE — 88305 TISSUE EXAM BY PATHOLOGIST: CPT | Mod: 26,,, | Performed by: DERMATOLOGY

## 2022-11-22 PROCEDURE — 11102 PR TANGENTIAL BIOPSY, SKIN, SINGLE LESION: ICD-10-PCS | Mod: S$GLB,,, | Performed by: DERMATOLOGY

## 2022-11-22 PROCEDURE — 3288F PR FALLS RISK ASSESSMENT DOCUMENTED: ICD-10-PCS | Mod: CPTII,S$GLB,, | Performed by: DERMATOLOGY

## 2022-11-22 PROCEDURE — 1101F PT FALLS ASSESS-DOCD LE1/YR: CPT | Mod: CPTII,S$GLB,, | Performed by: DERMATOLOGY

## 2022-11-22 PROCEDURE — 3288F FALL RISK ASSESSMENT DOCD: CPT | Mod: CPTII,S$GLB,, | Performed by: DERMATOLOGY

## 2022-11-22 PROCEDURE — 1160F RVW MEDS BY RX/DR IN RCRD: CPT | Mod: CPTII,S$GLB,, | Performed by: DERMATOLOGY

## 2022-11-22 PROCEDURE — 99213 OFFICE O/P EST LOW 20 MIN: CPT | Mod: 25,S$GLB,, | Performed by: DERMATOLOGY

## 2022-11-22 PROCEDURE — 11102 TANGNTL BX SKIN SINGLE LES: CPT | Mod: S$GLB,,, | Performed by: DERMATOLOGY

## 2022-11-22 PROCEDURE — 99213 PR OFFICE/OUTPT VISIT, EST, LEVL III, 20-29 MIN: ICD-10-PCS | Mod: 25,S$GLB,, | Performed by: DERMATOLOGY

## 2022-11-22 PROCEDURE — 99999 PR PBB SHADOW E&M-EST. PATIENT-LVL III: CPT | Mod: PBBFAC,,, | Performed by: DERMATOLOGY

## 2022-11-22 PROCEDURE — 1160F PR REVIEW ALL MEDS BY PRESCRIBER/CLIN PHARMACIST DOCUMENTED: ICD-10-PCS | Mod: CPTII,S$GLB,, | Performed by: DERMATOLOGY

## 2022-11-22 PROCEDURE — 99999 PR PBB SHADOW E&M-EST. PATIENT-LVL III: ICD-10-PCS | Mod: PBBFAC,,, | Performed by: DERMATOLOGY

## 2022-11-22 PROCEDURE — 1159F MED LIST DOCD IN RCRD: CPT | Mod: CPTII,S$GLB,, | Performed by: DERMATOLOGY

## 2022-11-22 PROCEDURE — 88305 TISSUE EXAM BY PATHOLOGIST: CPT | Performed by: DERMATOLOGY

## 2022-11-22 PROCEDURE — 1101F PR PT FALLS ASSESS DOC 0-1 FALLS W/OUT INJ PAST YR: ICD-10-PCS | Mod: CPTII,S$GLB,, | Performed by: DERMATOLOGY

## 2022-11-22 RX ORDER — ALLOPURINOL 100 MG/1
100 TABLET ORAL DAILY
COMMUNITY
End: 2022-11-23 | Stop reason: SDUPTHER

## 2022-11-22 NOTE — PATIENT INSTRUCTIONS
Shave Biopsy Wound Care    Your doctor has performed a shave biopsy today.  A band aid and vaseline ointment has been placed over the site.  This should remain in place for NO LONGER THAN 48 hours.  It is fine to remove the bandaid after 24 hours, if the area is no longer bleeding. It is recommended that you keep the area dry (do not wet)) for the first 24 hours.  After 24 hours, wash the area with warm soap and water and apply Vaseline jelly.  Many patients prefer to use Neosporin or Bacitracin ointment.  This is acceptable; however, know that you can develop an allergy to this medication even if you have used it safely for years.  It is important to keep the area moist.  Letting it dry out and get air slows healing time, and will worsen the scar.        If you notice increasing redness, tenderness, pain, or yellow drainage at the biopsy site, please notify your doctor.  These are signs of an infection.    If your biopsy site is bleeding, apply firm pressure for 15 minutes straight.  Repeat for another 15 minutes, if it is still bleeding.   If the surgical site continues to bleed, then please contact your doctor.      For [facility] users:   You will receive your biopsy results in [facility] as soon as they are available. Please be assured that your physician/provider will review your results and will then determine what further treatment, evaluation, or planning is required. You should be contacted by your physician's/provider's office within 5 business days of receiving your results; If not, please reach out to directly. This is one more way [employer] is putting you first.     [facility] [place] [address]/ [phone] FAX/ [email or web address]

## 2022-11-22 NOTE — PROGRESS NOTES
Subjective:       Patient ID:  Danial Meza is a 82 y.o. male who presents for   Chief Complaint   Patient presents with    Skin Check     UBSE    Lesion     L wrist     History of Present Illness: The patient presents for follow up of skin check.    The patient was last seen on: 10/22/2021 for ED&C of BCC on L forearm  This is a high risk patient here to check for the development of new lesions.    Patient with new complaint of lesion(s)  Location: L wrist  Duration: within year  Symptoms: raised  Relieving factors/Previous treatments: none       Review of Systems   Skin:  Negative for daily sunscreen use, activity-related sunscreen use, recent sunburn and wears hat.   Hematologic/Lymphatic: Bruises/bleeds easily (on pradaxa).      Objective:    Physical Exam   Constitutional: He appears well-developed and well-nourished. No distress.   Neurological: He is alert and oriented to person, place, and time. He is not disoriented.   Psychiatric: He has a normal mood and affect.   Skin:   Areas Examined (abnormalities noted in diagram):   Scalp / Hair Palpated and Inspected  Head / Face Inspection Performed  Neck Inspection Performed  Chest / Axilla Inspection Performed  Back Inspection Performed  RUE Inspected  LUE Inspection Performed                     Diagram Legend     Erythematous scaling macule/papule c/w actinic keratosis       Vascular papule c/w angioma      Pigmented verrucoid papule/plaque c/w seborrheic keratosis      Yellow umbilicated papule c/w sebaceous hyperplasia      Irregularly shaped tan macule c/w lentigo     1-2 mm smooth white papules consistent with Milia      Movable subcutaneous cyst with punctum c/w epidermal inclusion cyst      Subcutaneous movable cyst c/w pilar cyst      Firm pink to brown papule c/w dermatofibroma      Pedunculated fleshy papule(s) c/w skin tag(s)      Evenly pigmented macule c/w junctional nevus     Mildly variegated pigmented, slightly irregular-bordered macule c/w  mildly atypical nevus      Flesh colored to evenly pigmented papule c/w intradermal nevus       Pink pearly papule/plaque c/w basal cell carcinoma      Erythematous hyperkeratotic cursted plaque c/w SCC      Surgical scar with no sign of skin cancer recurrence      Open and closed comedones      Inflammatory papules and pustules      Verrucoid papule consistent consistent with wart     Erythematous eczematous patches and plaques     Dystrophic onycholytic nail with subungual debris c/w onychomycosis     Umbilicated papule    Erythematous-base heme-crusted tan verrucoid plaque consistent with inflamed seborrheic keratosis     Erythematous Silvery Scaling Plaque c/w Psoriasis     See annotation            Assessment / Plan:      Pathology Orders:       Normal Orders This Visit    Specimen to Pathology, Dermatology     Questions:    Procedure Type: Dermatology and skin neoplasms    Number of Specimens: 1    ------------------------: -------------------------    Spec 1 Procedure: Biopsy    Spec 1 Clinical Impression: r/o bcc    Spec 1 Source: left chest    Release to patient: Immediate          Neoplasm of uncertain behavior of skin  -     Specimen to Pathology, Dermatology    Shave biopsy procedure note:    Shave biopsy performed after verbal consent including risk of infection, scar, recurrence, need for additional treatment of site. Area prepped with alcohol, anesthetized with approximately 1.0cc of 1% lidocaine because of epinephrine shortage. Lesional tissue shaved with razor blade. Hemostasis achieved with application of aluminum chloride followed by hyfrecation and Monsels if needed.  No complications. Dressing applied. Wound care explained.    Spoke to patient. Sent Rx for aldara cream qhs x 4 weeks. Discussed using only a small amount and ok to reuse packet. Do not cover medicine with bandaid. Explained that area may get inflamed, red, crusty or painful with use of cream. Pt expresses understanding. Needs f/u  with me in 3 months to recheck site      SK (seborrheic keratosis)  These are benign inherited growths without a malignant potential. Reassurance given to patient. No treatment is necessary.       Angioma of skin   - minor problem and chronic.   Reassurance given to patient. No treatment necessary.       Personal history of malignant neoplasm of skin  Area(s) of previous NMSC evaluated with no signs of recurrence.    Upper body skin examination performed today including at least 6 points as noted in physical examination. Suspicious lesions noted.    Recommend daily sun protection/avoidance and use of at least SPF 30, broad spectrum sunscreen (OTC drug).            Follow up in about 1 year (around 11/22/2023).

## 2022-11-23 RX ORDER — ALLOPURINOL 100 MG/1
200 TABLET ORAL DAILY
Qty: 60 TABLET | Refills: 3 | Status: SHIPPED | OUTPATIENT
Start: 2022-11-23 | End: 2022-12-21

## 2022-11-25 ENCOUNTER — TELEPHONE (OUTPATIENT)
Dept: RHEUMATOLOGY | Facility: CLINIC | Age: 82
End: 2022-11-25
Payer: MEDICARE

## 2022-11-25 NOTE — TELEPHONE ENCOUNTER
Spoke with patient regarding Dr. Bassett message.      ----- Message from Alirio Bassett MD sent at 11/25/2022  7:58 AM CST -----  Regarding: RE: Results  Contact: [phone]  He is to increase his allopurinol to 2 tablets daily he can taken both at the same time.  I have called in a new prescription to cover the increased amount.  He needs repeat blood work in 1 month.  ----- Message -----  From: Rudy Pringle MA  Sent: 11/23/2022   5:00 PM CST  To: Alirio Bassett MD  Subject: FW: Results                                      Please advise  ----- Message -----  From: Marcia Hall MA  Sent: 11/23/2022   4:52 PM CST  To: Rudy Pringle MA  Subject: FW: Results                                        ----- Message -----  From: Lona Dias  Sent: 11/23/2022   3:37 PM CST  To: Serjio CHAPMAN Staff  Subject: Results                                          Calling to get results from blood work and speak with doctor or nurse to increase or decrease Rx allopurinoL (ZYLOPRIM) 100 MG tablet. Please call to discuss.    [id number] - [facility] [id number] [facility] [address]  Phone: [phone] Fax: [phone]

## 2022-12-05 ENCOUNTER — TELEPHONE (OUTPATIENT)
Dept: DERMATOLOGY | Facility: CLINIC | Age: 82
End: 2022-12-05
Payer: MEDICARE

## 2022-12-05 NOTE — TELEPHONE ENCOUNTER
----- Message from Faye Dior MA sent at 12/5/2022  4:07 PM CST -----  Regarding: FW: Results  Contact: pt [phone]    ----- Message -----  From: Latonia Raygoza  Sent: 12/5/2022   2:57 PM CST  To: Prashant Carlisle Staff  Subject: Results                                          Pt was seen 11/22/2022 for biopsy, pt has not received results, please call

## 2022-12-05 NOTE — TELEPHONE ENCOUNTER
Spoke with patient, notified him that his biopsy is still in process and can take 2 weeks or more sometimes.  He verbalized understanding.

## 2022-12-07 ENCOUNTER — TELEPHONE (OUTPATIENT)
Dept: INTERNAL MEDICINE | Facility: CLINIC | Age: 82
End: 2022-12-07
Payer: MEDICARE

## 2022-12-07 DIAGNOSIS — R73.01 IMPAIRED FASTING GLUCOSE: Primary | ICD-10-CM

## 2022-12-07 NOTE — TELEPHONE ENCOUNTER
----- Message from Maribel Phillips sent at 12/7/2022 11:02 AM CST -----  Contact: [phone]  Pt is calling for the nurse to please give him a return call he states he is having a blood test in December for Gout and he is needing to know if the dr can add to the lab in regards to the diabetic issues please give return call

## 2022-12-08 LAB
FINAL PATHOLOGIC DIAGNOSIS: NORMAL
GROSS: NORMAL
Lab: NORMAL
MICROSCOPIC EXAM: NORMAL

## 2022-12-12 ENCOUNTER — OFFICE VISIT (OUTPATIENT)
Dept: OPHTHALMOLOGY | Facility: CLINIC | Age: 82
End: 2022-12-12
Payer: MEDICARE

## 2022-12-12 DIAGNOSIS — H25.13 NUCLEAR SCLEROSIS OF BOTH EYES: ICD-10-CM

## 2022-12-12 DIAGNOSIS — H40.022 OPEN ANGLE WITH BORDERLINE FINDINGS AND HIGH GLAUCOMA RISK IN LEFT EYE: ICD-10-CM

## 2022-12-12 DIAGNOSIS — H40.1111 PRIMARY OPEN-ANGLE GLAUCOMA, RIGHT EYE, MILD STAGE: Primary | ICD-10-CM

## 2022-12-12 PROCEDURE — 92020 PR SPECIAL EYE EVAL,GONIOSCOPY: ICD-10-PCS | Mod: S$GLB,,, | Performed by: OPHTHALMOLOGY

## 2022-12-12 PROCEDURE — 1159F MED LIST DOCD IN RCRD: CPT | Mod: CPTII,S$GLB,, | Performed by: OPHTHALMOLOGY

## 2022-12-12 PROCEDURE — 3288F FALL RISK ASSESSMENT DOCD: CPT | Mod: CPTII,S$GLB,, | Performed by: OPHTHALMOLOGY

## 2022-12-12 PROCEDURE — 1126F PR PAIN SEVERITY QUANTIFIED, NO PAIN PRESENT: ICD-10-PCS | Mod: CPTII,S$GLB,, | Performed by: OPHTHALMOLOGY

## 2022-12-12 PROCEDURE — 1160F PR REVIEW ALL MEDS BY PRESCRIBER/CLIN PHARMACIST DOCUMENTED: ICD-10-PCS | Mod: CPTII,S$GLB,, | Performed by: OPHTHALMOLOGY

## 2022-12-12 PROCEDURE — 92012 PR EYE EXAM, EST PATIENT,INTERMED: ICD-10-PCS | Mod: S$GLB,,, | Performed by: OPHTHALMOLOGY

## 2022-12-12 PROCEDURE — 99999 PR PBB SHADOW E&M-EST. PATIENT-LVL II: CPT | Mod: PBBFAC,,, | Performed by: OPHTHALMOLOGY

## 2022-12-12 PROCEDURE — 92012 INTRM OPH EXAM EST PATIENT: CPT | Mod: S$GLB,,, | Performed by: OPHTHALMOLOGY

## 2022-12-12 PROCEDURE — 3288F PR FALLS RISK ASSESSMENT DOCUMENTED: ICD-10-PCS | Mod: CPTII,S$GLB,, | Performed by: OPHTHALMOLOGY

## 2022-12-12 PROCEDURE — 1160F RVW MEDS BY RX/DR IN RCRD: CPT | Mod: CPTII,S$GLB,, | Performed by: OPHTHALMOLOGY

## 2022-12-12 PROCEDURE — 1126F AMNT PAIN NOTED NONE PRSNT: CPT | Mod: CPTII,S$GLB,, | Performed by: OPHTHALMOLOGY

## 2022-12-12 PROCEDURE — 1159F PR MEDICATION LIST DOCUMENTED IN MEDICAL RECORD: ICD-10-PCS | Mod: CPTII,S$GLB,, | Performed by: OPHTHALMOLOGY

## 2022-12-12 PROCEDURE — 92020 GONIOSCOPY: CPT | Mod: S$GLB,,, | Performed by: OPHTHALMOLOGY

## 2022-12-12 PROCEDURE — 1101F PT FALLS ASSESS-DOCD LE1/YR: CPT | Mod: CPTII,S$GLB,, | Performed by: OPHTHALMOLOGY

## 2022-12-12 PROCEDURE — 99999 PR PBB SHADOW E&M-EST. PATIENT-LVL II: ICD-10-PCS | Mod: PBBFAC,,, | Performed by: OPHTHALMOLOGY

## 2022-12-12 PROCEDURE — 1101F PR PT FALLS ASSESS DOC 0-1 FALLS W/OUT INJ PAST YR: ICD-10-PCS | Mod: CPTII,S$GLB,, | Performed by: OPHTHALMOLOGY

## 2022-12-12 NOTE — PROGRESS NOTES
HPI    DLS:  8/8/22    Pt here for 4 month IOP check and gonio.  Pt without visual complaints   today OU .  Pt denies eye pain OU.     Meds;   Latanoprost QHS OU     1) POAG OD   2) Glaucoma Suspect OS   3) NS OU   4) Ptosis   5) FmHx ARMD   6) Hyperopia with Astigmatism/ Presbyopia     Last edited by Ellen Parmar MA on 12/12/2022  8:19 AM.            Assessment /Plan     For exam results, see Encounter Report.    There are no diagnoses linked to this encounter.  HPI    DLS:  8/8/22    Pt here for 4 month IOP check and gonio.  Pt without visual complaints   today OU .  Pt denies eye pain OU.     Meds;   Latanoprost QHS OU     1) POAG OD   2) Glaucoma Suspect OS   3) NS OU   4) Ptosis   5) FmHx ARMD   6) Hyperopia with Astigmatism/ Presbyopia     Last edited by Ellen Parmar MA on 12/12/2022  8:19 AM.            Assessment /Plan     For exam results, see Encounter Report.    Primary open-angle glaucoma, right eye, mild stage    Open angle with borderline findings and high glaucoma risk in left eye    Nuclear sclerosis of both eyes          1. Open angle with borderline glaucoma findings - Both Eyes (POAG od // suspect os )   Glaucoma (type and duration)   LTG suspect  First HVF 2011  First photos 2011  Glaucoma drops started 2/11/2021 (monitored off gtts 2011 to 2021 - then got new INS od)      Family history   = father-also my patient, + mom- she has passed  Glaucoma meds   lumigan ou - started 2/11/2021  H/O adverse rxn to glaucoma drops  None (? Avoid BB - bradycardia)   LASERS   none  GLAUCOMA SURGERIES   none  OTHER EYE SURGERIES   none  CDR   0.8 with thin sup rim // 0/70-0.75 - rim appears intact   Tbase  16-24    Tmax  21/24      Ttarget   16 ou (set 5/9/2022 ) - had VF prog ou withIOP 16-20 ou        HVF  5 test 2011 to 2018 - full od // full os-            2 test 2021 to 2022 - New IAD/NS  od // ? New INS  os   Gono  +3 ou  CCT  [id number]  OCT  4 test 2012 to 2022 - RNFL - bord TS od (+prog   to ) // nl os  HRT   5 test  to  - MR -  Dec. IT od,  // dec. IN os, border IT /// CDR 0.72 od // 0.70 os  Disc photos  , ,   - OIS //  - harmony     Ttoday    Test done today - IOP check, gonio     2. Nuclear sclerosis - Both Eyes   - mild BCVA 20/20 ou  BATh 20/60 od // 20/70 os (10/2013)   3. Ptosis   -patient would like to monitor for now as he is not having any trouble    4. Family history of macular degeneration   -father has adv wet ARMD - gets injections with arend   5. Hyperopia, astigmatism , presbyopia  PC +0.75+1.00x177        +1.00+1.50x180   ADD +2.50  glasses Teto   6. I use to see his father, and also saw his mother -  - his dad  2015 @ age 89   - his mom passed at age 98  - both had glaucoma, his dad also had  wet ARMD - saw Arend         Plan    New INS od - now has POAG od - mild   + IAD/NS od x 2 - confirmed   OCT od - bord TS now - (+progfrom )   Bord glaucoma os - high risk    Switch to latanoprost 2/2 cost of lumigan // IOP ok (2022)     Cont to monitor cataracts     - not vis sign yet - dilates medium only - smallish pupils     Photo file updated 2022     Phaco/IOL ou prn  - pt is content with vision for now       Likely VS Cat. Will do combo migs when patient is ready. Patient ready for eval next visit      F/U 4  months with IOP / HVF / DFE / OCT   Last saw  Teto (2022) - F/U prn - happy with glasses for now   Drivers license expirs 2023 - presently he can still pass the vision test

## 2022-12-21 ENCOUNTER — TELEPHONE (OUTPATIENT)
Dept: INTERNAL MEDICINE | Facility: CLINIC | Age: 82
End: 2022-12-21
Payer: MEDICARE

## 2022-12-21 NOTE — TELEPHONE ENCOUNTER
----- Message from Jeronimo Ryan sent at 12/21/2022 12:41 PM CST -----  Contact: self [phone]  Pt stated had a colonoscopy at [facility] and will like to know did provider received the info.    Please call and advise

## 2022-12-21 NOTE — TELEPHONE ENCOUNTER
Spoke to patient and had Colonoscopy on Monday at  by Dr. William Aguayo # [phone]  Patient will call his office and have them faxed over the report to you.   No oral lesions; no gross abnormalities

## 2022-12-27 ENCOUNTER — LAB VISIT (OUTPATIENT)
Dept: LAB | Facility: HOSPITAL | Age: 82
End: 2022-12-27
Attending: INTERNAL MEDICINE
Payer: MEDICARE

## 2022-12-27 ENCOUNTER — TELEPHONE (OUTPATIENT)
Dept: RHEUMATOLOGY | Facility: CLINIC | Age: 82
End: 2022-12-27
Payer: MEDICARE

## 2022-12-27 ENCOUNTER — PATIENT OUTREACH (OUTPATIENT)
Dept: ADMINISTRATIVE | Facility: HOSPITAL | Age: 82
End: 2022-12-27
Payer: MEDICARE

## 2022-12-27 DIAGNOSIS — R73.01 IMPAIRED FASTING GLUCOSE: ICD-10-CM

## 2022-12-27 DIAGNOSIS — M1A.9XX1 GOUT WITH TOPHI: ICD-10-CM

## 2022-12-27 LAB
ANION GAP SERPL CALC-SCNC: 8 MMOL/L (ref 8–16)
BUN SERPL-MCNC: 17 MG/DL (ref 8–23)
CALCIUM SERPL-MCNC: 9.8 MG/DL (ref 8.7–10.5)
CHLORIDE SERPL-SCNC: 108 MMOL/L (ref 95–110)
CO2 SERPL-SCNC: 25 MMOL/L (ref 23–29)
CREAT SERPL-MCNC: 1.2 MG/DL (ref 0.5–1.4)
EST. GFR  (NO RACE VARIABLE): >60 ML/MIN/1.73 M^2
ESTIMATED AVG GLUCOSE: 114 MG/DL (ref 68–131)
GLUCOSE SERPL-MCNC: 164 MG/DL (ref 70–110)
HBA1C MFR BLD: 5.6 % (ref 4–5.6)
POTASSIUM SERPL-SCNC: 4.4 MMOL/L (ref 3.5–5.1)
SODIUM SERPL-SCNC: 141 MMOL/L (ref 136–145)
URATE SERPL-MCNC: 6.1 MG/DL (ref 3.4–7)

## 2022-12-27 PROCEDURE — 83036 HEMOGLOBIN GLYCOSYLATED A1C: CPT | Performed by: INTERNAL MEDICINE

## 2022-12-27 PROCEDURE — 84550 ASSAY OF BLOOD/URIC ACID: CPT | Performed by: INTERNAL MEDICINE

## 2022-12-27 PROCEDURE — 80048 BASIC METABOLIC PNL TOTAL CA: CPT | Performed by: INTERNAL MEDICINE

## 2022-12-27 PROCEDURE — 36415 COLL VENOUS BLD VENIPUNCTURE: CPT | Performed by: INTERNAL MEDICINE

## 2022-12-27 RX ORDER — ALLOPURINOL 300 MG/1
300 TABLET ORAL DAILY
Qty: 90 TABLET | Refills: 1 | Status: SHIPPED | OUTPATIENT
Start: 2022-12-27 | End: 2023-05-29

## 2022-12-27 NOTE — LETTER
AUTHORIZATION FOR RELEASE OF   CONFIDENTIAL INFORMATION    Dear Dr. William Aguayo,    We are seeing Danial Meza, date of birth 1940, in the clinic at [facility]. Ruben Arriaza MD is the patient's PCP. Danial Meza has an outstanding lab/procedure at the time we reviewed his chart. In order to help keep his health information updated, he has authorized us to request the following medical record(s):        (  )  MAMMOGRAM                                      (X )  COLONOSCOPY      (  )  PAP SMEAR                                          (  )  OUTSIDE LAB RESULTS     (  )  DEXA SCAN                                          (  )  EYE EXAM            (  )  FOOT EXAM                                          (  )  ENTIRE RECORD     (  )  OUTSIDE IMMUNIZATIONS                 (  )  _______________         Please fax records to Ruben Arriaza MD, [phone]     If you have any questions, please contact [facility] at [phone].           Patient Name: Danial Meza  : 1940  Patient Phone #: [phone]

## 2022-12-27 NOTE — PROGRESS NOTES
Health Maintenance Due   Topic Date Due    COVID-19 Vaccine (3 - Moderna risk series) 11/25/2021    Influenza Vaccine (1) 09/01/2022     Requested records from [facility]

## 2022-12-28 ENCOUNTER — TELEPHONE (OUTPATIENT)
Dept: RHEUMATOLOGY | Facility: CLINIC | Age: 82
End: 2022-12-28
Payer: MEDICARE

## 2022-12-28 NOTE — TELEPHONE ENCOUNTER
Spoke with patient regarding Dr. Bassett message.      ----- Message -----  From: Alirio Bassett MD  Sent: 12/27/2022   2:29 PM CST  To: Serjio CHAPMAN Staff    We are almost there.  We need to get his uric acid below 5.0.  He is now at 6.1.  Increase the allopurinol to 3 tablets (300 mg) daily.  I will call in a prescription for the 300 mg size and when he finishes up what he has, he can start the 300 mg and only have to take 1 daily.  He does not need further lab until he sees me in 3 months.

## 2022-12-29 ENCOUNTER — TELEPHONE (OUTPATIENT)
Dept: INTERNAL MEDICINE | Facility: CLINIC | Age: 82
End: 2022-12-29
Payer: MEDICARE

## 2022-12-29 DIAGNOSIS — R93.3 ABNORMAL COLONOSCOPY: Primary | ICD-10-CM

## 2022-12-29 DIAGNOSIS — K62.9 RECTAL ABNORMALITY: ICD-10-CM

## 2023-01-04 ENCOUNTER — TELEPHONE (OUTPATIENT)
Dept: DERMATOLOGY | Facility: CLINIC | Age: 83
End: 2023-01-04
Payer: MEDICARE

## 2023-01-04 ENCOUNTER — PATIENT OUTREACH (OUTPATIENT)
Dept: ADMINISTRATIVE | Facility: HOSPITAL | Age: 83
End: 2023-01-04
Payer: MEDICARE

## 2023-01-04 NOTE — TELEPHONE ENCOUNTER
Spoke with patient, reviewed biopsy results with him of benign sk, no treatment needed.  He verbalized understanding and reports that he does not have the mychart to be able to look at his results.

## 2023-01-04 NOTE — TELEPHONE ENCOUNTER
----- Message from Bhaskar Whipple sent at 1/4/2023  9:09 AM CST -----  Contact: pt  Pt requesting call back RE: would like to go over results from biopsy.        Confirmed contact below:  Contact Name:Danial Meza  Phone Number: [phone]

## 2023-01-06 ENCOUNTER — TELEPHONE (OUTPATIENT)
Dept: SURGERY | Facility: CLINIC | Age: 83
End: 2023-01-06
Payer: MEDICARE

## 2023-01-06 NOTE — TELEPHONE ENCOUNTER
Called and informed pt I moved his appt up one week. Informed him I do not have the path from the biopsy. He said the nurse called him yesterday to tell him it was benign. I will call Dr Aguayo's office to get a copy.

## 2023-01-06 NOTE — TELEPHONE ENCOUNTER
----- Message from Jyothi Hall sent at 1/6/2023  2:23 PM CST -----  Regarding: appt  Contact: [phone]  Patient called to request sooner appt if possible Please call to discuss further

## 2023-01-13 ENCOUNTER — OFFICE VISIT (OUTPATIENT)
Dept: INTERNAL MEDICINE | Facility: CLINIC | Age: 83
End: 2023-01-13
Payer: MEDICARE

## 2023-01-13 VITALS
HEIGHT: 71 IN | OXYGEN SATURATION: 100 % | WEIGHT: 208.31 LBS | BODY MASS INDEX: 29.16 KG/M2 | SYSTOLIC BLOOD PRESSURE: 128 MMHG | DIASTOLIC BLOOD PRESSURE: 80 MMHG | HEART RATE: 86 BPM

## 2023-01-13 DIAGNOSIS — I10 ESSENTIAL HYPERTENSION: ICD-10-CM

## 2023-01-13 DIAGNOSIS — I87.2 VENOUS INSUFFICIENCY OF RIGHT LEG: ICD-10-CM

## 2023-01-13 DIAGNOSIS — E78.2 MIXED HYPERLIPIDEMIA: Chronic | ICD-10-CM

## 2023-01-13 DIAGNOSIS — I77.819 ECTATIC AORTA: ICD-10-CM

## 2023-01-13 DIAGNOSIS — I48.0 PAF (PAROXYSMAL ATRIAL FIBRILLATION): ICD-10-CM

## 2023-01-13 DIAGNOSIS — Z00.00 ENCOUNTER FOR ANNUAL PHYSICAL EXAM: Primary | ICD-10-CM

## 2023-01-13 DIAGNOSIS — E79.0 HYPERURICEMIA: ICD-10-CM

## 2023-01-13 DIAGNOSIS — M10.9 GOUTY ARTHRITIS OF TOE OF RIGHT FOOT: ICD-10-CM

## 2023-01-13 DIAGNOSIS — R93.3 ABNORMAL COLONOSCOPY: ICD-10-CM

## 2023-01-13 DIAGNOSIS — R73.03 PREDIABETES: ICD-10-CM

## 2023-01-13 DIAGNOSIS — N18.31 STAGE 3A CHRONIC KIDNEY DISEASE: ICD-10-CM

## 2023-01-13 DIAGNOSIS — I47.10 SVT (SUPRAVENTRICULAR TACHYCARDIA): ICD-10-CM

## 2023-01-13 PROCEDURE — 99999 PR PBB SHADOW E&M-EST. PATIENT-LVL III: CPT | Mod: PBBFAC,HCNC,, | Performed by: INTERNAL MEDICINE

## 2023-01-13 PROCEDURE — 3079F DIAST BP 80-89 MM HG: CPT | Mod: HCNC,CPTII,S$GLB, | Performed by: INTERNAL MEDICINE

## 2023-01-13 PROCEDURE — 3288F FALL RISK ASSESSMENT DOCD: CPT | Mod: HCNC,CPTII,S$GLB, | Performed by: INTERNAL MEDICINE

## 2023-01-13 PROCEDURE — 1126F AMNT PAIN NOTED NONE PRSNT: CPT | Mod: HCNC,CPTII,S$GLB, | Performed by: INTERNAL MEDICINE

## 2023-01-13 PROCEDURE — 3074F PR MOST RECENT SYSTOLIC BLOOD PRESSURE < 130 MM HG: ICD-10-PCS | Mod: HCNC,CPTII,S$GLB, | Performed by: INTERNAL MEDICINE

## 2023-01-13 PROCEDURE — 99999 PR PBB SHADOW E&M-EST. PATIENT-LVL III: ICD-10-PCS | Mod: PBBFAC,HCNC,, | Performed by: INTERNAL MEDICINE

## 2023-01-13 PROCEDURE — 1126F PR PAIN SEVERITY QUANTIFIED, NO PAIN PRESENT: ICD-10-PCS | Mod: HCNC,CPTII,S$GLB, | Performed by: INTERNAL MEDICINE

## 2023-01-13 PROCEDURE — 3079F PR MOST RECENT DIASTOLIC BLOOD PRESSURE 80-89 MM HG: ICD-10-PCS | Mod: HCNC,CPTII,S$GLB, | Performed by: INTERNAL MEDICINE

## 2023-01-13 PROCEDURE — 1159F PR MEDICATION LIST DOCUMENTED IN MEDICAL RECORD: ICD-10-PCS | Mod: HCNC,CPTII,S$GLB, | Performed by: INTERNAL MEDICINE

## 2023-01-13 PROCEDURE — 1160F RVW MEDS BY RX/DR IN RCRD: CPT | Mod: HCNC,CPTII,S$GLB, | Performed by: INTERNAL MEDICINE

## 2023-01-13 PROCEDURE — 1159F MED LIST DOCD IN RCRD: CPT | Mod: HCNC,CPTII,S$GLB, | Performed by: INTERNAL MEDICINE

## 2023-01-13 PROCEDURE — 3074F SYST BP LT 130 MM HG: CPT | Mod: HCNC,CPTII,S$GLB, | Performed by: INTERNAL MEDICINE

## 2023-01-13 PROCEDURE — 99397 PR PREVENTIVE VISIT,EST,65 & OVER: ICD-10-PCS | Mod: HCNC,S$GLB,, | Performed by: INTERNAL MEDICINE

## 2023-01-13 PROCEDURE — 3288F PR FALLS RISK ASSESSMENT DOCUMENTED: ICD-10-PCS | Mod: HCNC,CPTII,S$GLB, | Performed by: INTERNAL MEDICINE

## 2023-01-13 PROCEDURE — 99397 PER PM REEVAL EST PAT 65+ YR: CPT | Mod: HCNC,S$GLB,, | Performed by: INTERNAL MEDICINE

## 2023-01-13 PROCEDURE — 1101F PR PT FALLS ASSESS DOC 0-1 FALLS W/OUT INJ PAST YR: ICD-10-PCS | Mod: HCNC,CPTII,S$GLB, | Performed by: INTERNAL MEDICINE

## 2023-01-13 PROCEDURE — 1101F PT FALLS ASSESS-DOCD LE1/YR: CPT | Mod: HCNC,CPTII,S$GLB, | Performed by: INTERNAL MEDICINE

## 2023-01-13 PROCEDURE — 1160F PR REVIEW ALL MEDS BY PRESCRIBER/CLIN PHARMACIST DOCUMENTED: ICD-10-PCS | Mod: HCNC,CPTII,S$GLB, | Performed by: INTERNAL MEDICINE

## 2023-01-13 NOTE — PROGRESS NOTES
Subjective:       Patient ID: Danial Meza is a 82 y.o. male.    Chief Complaint: Follow-up      HPI  Danial Meza is a 82 y.o. 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 Doing well. Gout much better controlled. Has purposefully lost weight.     Review of Systems   Constitutional:  Negative for activity change, appetite change, chills, fatigue, fever and unexpected weight change.   HENT:  Negative for congestion, rhinorrhea and sore throat.    Eyes:  Negative for visual disturbance.   Respiratory:  Negative for shortness of breath.    Cardiovascular:  Negative for chest pain.   Gastrointestinal:  Negative for abdominal pain, diarrhea, nausea and vomiting.   Genitourinary:  Negative for difficulty urinating and dysuria.   Musculoskeletal:  Negative for arthralgias, back pain and myalgias.   Skin:  Negative for color change and rash.   Neurological:  Negative for dizziness, weakness and headaches.       Past Medical History:   Diagnosis Date    Basal cell carcinoma 1/5/12    right nose    Basal cell carcinoma 10/09/2017    left nasal bridge and left cheek    Cataract     Glaucoma     Gout 7/18/2014    Mixed hyperlipidemia     Osteoarthritis of right foot 10/26/2019    SVT (supraventricular tachycardia) 1/14/2005    ablation by Marixa of left lateral free wall accessory bypass tract    Type 2 diabetes mellitus with hyperglycemia, without long-term current use of insulin 4/9/2018        Prior to Admission medications    Medication Sig Start Date End Date Taking? Authorizing Provider   allopurinoL (ZYLOPRIM) 300 MG tablet Take 1 tablet (300 mg total) by mouth once daily. 12/27/22 3/27/23 Yes Alirio Bassett MD   apixaban (ELIQUIS) 5 mg Tab Take 1 tablet (5 mg total) by mouth 2 (two) times daily. 3/18/22  Yes Bruce Hdez MD   atorvastatin (LIPITOR) 20 MG tablet TAKE 1 TABLET (20 MG TOTAL) BY MOUTH EVERY EVENING. 11/30/22   "Yes Jose Rafael Sorto Jr., MD   colchicine (COLCRYS) 0.6 mg tablet Take 1 tablet (0.6 mg total) by mouth once daily. 10/18/22 10/18/23 Yes Alirio Bassett MD   latanoprost 0.005 % ophthalmic solution Place 1 drop into both eyes every evening. 5/9/22  Yes Huma Garcia MD   lisinopriL 10 MG tablet Take 1 tablet (10 mg total) by mouth once daily. 10/3/22  Yes Jose Rafael Sorto Jr., MD   metoprolol succinate (TOPROL-XL) 50 MG 24 hr tablet TAKE 1 TABLET EVERY DAY 12/8/22  Yes Bruce Hdez MD   multivitamin (THERAGRAN) per tablet Take 1 tablet by mouth once daily.   Yes Historical Provider   VIT C/VIT E/LUTEIN/MIN/OMEGA-3 (OCUVITE ORAL) Take 1 tablet by mouth once daily.    Yes Historical Provider        Past medical history, surgical history, and family medical history reviewed and updated as appropriate.    Medications and allergies reviewed.     Objective:          Vitals:    01/13/23 1358   BP: 128/80   BP Location: Right arm   Patient Position: Sitting   Pulse: 86   SpO2: 100%   Weight: 94.5 kg (208 lb 5.4 oz)   Height: 5' 11" (1.803 m)     Body mass index is 29.06 kg/m².  Physical Exam  Constitutional:       General: He is not in acute distress.     Appearance: He is well-developed.   HENT:      Head: Normocephalic and atraumatic.      Nose: Nose normal.   Eyes:      General: No scleral icterus.     Extraocular Movements: Extraocular movements intact.   Neck:      Thyroid: No thyromegaly.      Vascular: No JVD.      Trachea: No tracheal deviation.   Cardiovascular:      Rate and Rhythm: Normal rate and regular rhythm.      Heart sounds: Normal heart sounds. No murmur heard.    No friction rub. No gallop.   Pulmonary:      Effort: Pulmonary effort is normal. No respiratory distress.      Breath sounds: Normal breath sounds. No wheezing or rales.   Abdominal:      General: Bowel sounds are normal. There is no distension.      Palpations: Abdomen is soft. There is no mass.      Tenderness: There is no " abdominal tenderness.   Musculoskeletal:         General: No tenderness. Normal range of motion.      Cervical back: Normal range of motion and neck supple.   Lymphadenopathy:      Cervical: No cervical adenopathy.   Skin:     General: Skin is warm and dry.      Findings: No rash.   Neurological:      Mental Status: He is alert and oriented to person, place, and time.      Cranial Nerves: No cranial nerve deficit.      Deep Tendon Reflexes: Reflexes normal.   Psychiatric:         Behavior: Behavior normal.       Lab Results   Component Value Date    WBC 6.46 05/18/2022    HGB 14.3 05/18/2022    HCT 42.6 05/18/2022     05/18/2022    CHOL 69 (L) 05/18/2022    TRIG 83 05/18/2022    HDL 22 (L) 05/18/2022    ALT 28 05/18/2022    AST 26 05/18/2022     12/27/2022    K 4.4 12/27/2022     12/27/2022    CREATININE 1.2 12/27/2022    BUN 17 12/27/2022    CO2 25 12/27/2022    TSH 1.757 10/28/2021    PSA 1.3 10/28/2021    INR 1.4 (H) 12/30/2018    HGBA1C 5.6 12/27/2022       Assessment:       1. Encounter for annual physical exam    2. Essential hypertension    3. Stage 3a chronic kidney disease    4. Hyperuricemia    5. Gouty arthritis of toe of right foot    6. Prediabetes    7. PAF (paroxysmal atrial fibrillation)    8. SVT (supraventricular tachycardia)    9. Abnormal colonoscopy    10. Ectatic aorta    11. Venous insufficiency of right leg    12. Mixed hyperlipidemia          Plan:     Danial was seen today for follow-up.    Diagnoses and all orders for this visit:    Encounter for annual physical exam    Essential hypertension  Comments:  controlled, no changes to current management  Orders:  -     CBC Auto Differential; Future  -     Comprehensive Metabolic Panel; Future  -     TSH; Future    Stage 3a chronic kidney disease  Comments:  stable, avoid nephrotoxics    Hyperuricemia  Comments:  controlled, no changes to current management    Gouty arthritis of toe of right foot    Prediabetes  Comments:  no  changes to current management, lifestyle controlled  Orders:  -     Hemoglobin A1C; Future    PAF (paroxysmal atrial fibrillation)  Comments:  controlled, no changes to current management; on eliquis    SVT (supraventricular tachycardia)    Abnormal colonoscopy  Comments:  scheduled for follow up with colorectal    Ectatic aorta    Venous insufficiency of right leg  Comments:  not bothersome to patient. stable.    Mixed hyperlipidemia  Comments:  controlled, no changes to current management  Orders:  -     Lipid Panel; Future      Benign physical examination, no issues identified. Will obtain routine labwork and age appropriate health screenings.     Health maintenance reviewed with patient. Patient is due for flu and covid-19 booster    Follow up in about 6 months (around 7/13/2023).    Ruben Arriaza MD  Internal Medicine / Primary Care  [facility]  1/13/2023

## 2023-01-13 NOTE — PATIENT INSTRUCTIONS
Return to clinic in 6 months or sooner if needed.     Fasting labwork in 6 months prior to appointment.

## 2023-01-18 ENCOUNTER — PATIENT OUTREACH (OUTPATIENT)
Dept: ADMINISTRATIVE | Facility: HOSPITAL | Age: 83
End: 2023-01-18
Payer: MEDICARE

## 2023-01-25 ENCOUNTER — OFFICE VISIT (OUTPATIENT)
Dept: SURGERY | Facility: CLINIC | Age: 83
End: 2023-01-25
Payer: MEDICARE

## 2023-01-25 VITALS
HEART RATE: 61 BPM | HEIGHT: 71 IN | SYSTOLIC BLOOD PRESSURE: 127 MMHG | DIASTOLIC BLOOD PRESSURE: 64 MMHG | BODY MASS INDEX: 28.93 KG/M2 | WEIGHT: 206.63 LBS

## 2023-01-25 DIAGNOSIS — K62.9 RECTAL ABNORMALITY: ICD-10-CM

## 2023-01-25 DIAGNOSIS — R93.3 ABNORMAL COLONOSCOPY: ICD-10-CM

## 2023-01-25 DIAGNOSIS — K62.89 RECTAL INFLAMMATION: Primary | ICD-10-CM

## 2023-01-25 PROCEDURE — 3074F PR MOST RECENT SYSTOLIC BLOOD PRESSURE < 130 MM HG: ICD-10-PCS | Mod: HCNC,CPTII,S$GLB, | Performed by: COLON & RECTAL SURGERY

## 2023-01-25 PROCEDURE — 46600 DIAGNOSTIC ANOSCOPY SPX: CPT | Mod: HCNC,S$GLB,, | Performed by: COLON & RECTAL SURGERY

## 2023-01-25 PROCEDURE — 3078F DIAST BP <80 MM HG: CPT | Mod: HCNC,CPTII,S$GLB, | Performed by: COLON & RECTAL SURGERY

## 2023-01-25 PROCEDURE — 46600 PR DIAG2STIC A2SCOPY: ICD-10-PCS | Mod: HCNC,S$GLB,, | Performed by: COLON & RECTAL SURGERY

## 2023-01-25 PROCEDURE — 1101F PT FALLS ASSESS-DOCD LE1/YR: CPT | Mod: HCNC,CPTII,S$GLB, | Performed by: COLON & RECTAL SURGERY

## 2023-01-25 PROCEDURE — 1126F PR PAIN SEVERITY QUANTIFIED, NO PAIN PRESENT: ICD-10-PCS | Mod: HCNC,CPTII,S$GLB, | Performed by: COLON & RECTAL SURGERY

## 2023-01-25 PROCEDURE — 1126F AMNT PAIN NOTED NONE PRSNT: CPT | Mod: HCNC,CPTII,S$GLB, | Performed by: COLON & RECTAL SURGERY

## 2023-01-25 PROCEDURE — 1159F MED LIST DOCD IN RCRD: CPT | Mod: HCNC,CPTII,S$GLB, | Performed by: COLON & RECTAL SURGERY

## 2023-01-25 PROCEDURE — 99203 OFFICE O/P NEW LOW 30 MIN: CPT | Mod: HCNC,25,S$GLB, | Performed by: COLON & RECTAL SURGERY

## 2023-01-25 PROCEDURE — 99999 PR PBB SHADOW E&M-EST. PATIENT-LVL III: CPT | Mod: PBBFAC,HCNC,, | Performed by: COLON & RECTAL SURGERY

## 2023-01-25 PROCEDURE — 1159F PR MEDICATION LIST DOCUMENTED IN MEDICAL RECORD: ICD-10-PCS | Mod: HCNC,CPTII,S$GLB, | Performed by: COLON & RECTAL SURGERY

## 2023-01-25 PROCEDURE — 99203 PR OFFICE/OUTPT VISIT, NEW, LEVL III, 30-44 MIN: ICD-10-PCS | Mod: HCNC,25,S$GLB, | Performed by: COLON & RECTAL SURGERY

## 2023-01-25 PROCEDURE — 3288F PR FALLS RISK ASSESSMENT DOCUMENTED: ICD-10-PCS | Mod: HCNC,CPTII,S$GLB, | Performed by: COLON & RECTAL SURGERY

## 2023-01-25 PROCEDURE — 99999 PR PBB SHADOW E&M-EST. PATIENT-LVL III: ICD-10-PCS | Mod: PBBFAC,HCNC,, | Performed by: COLON & RECTAL SURGERY

## 2023-01-25 PROCEDURE — 3078F PR MOST RECENT DIASTOLIC BLOOD PRESSURE < 80 MM HG: ICD-10-PCS | Mod: HCNC,CPTII,S$GLB, | Performed by: COLON & RECTAL SURGERY

## 2023-01-25 PROCEDURE — 3288F FALL RISK ASSESSMENT DOCD: CPT | Mod: HCNC,CPTII,S$GLB, | Performed by: COLON & RECTAL SURGERY

## 2023-01-25 PROCEDURE — 3074F SYST BP LT 130 MM HG: CPT | Mod: HCNC,CPTII,S$GLB, | Performed by: COLON & RECTAL SURGERY

## 2023-01-25 PROCEDURE — 1101F PR PT FALLS ASSESS DOC 0-1 FALLS W/OUT INJ PAST YR: ICD-10-PCS | Mod: HCNC,CPTII,S$GLB, | Performed by: COLON & RECTAL SURGERY

## 2023-01-25 NOTE — PROGRESS NOTES
The patient is seen in follow-up of recent colonoscopy which time he was noted to have evidence of abnormal distal rectal mucosa.  The patient denies any abdominal pain, change in bowel habits or rectal bleeding.  He denies any fecal incontinence or prolapse of tissue with bowel movements.  He feels fine.          Past Medical History:   Diagnosis Date    Basal cell carcinoma 1/5/12    right nose    Basal cell carcinoma 10/09/2017    left nasal bridge and left cheek    Cataract     Glaucoma     Gout 7/18/2014    Mixed hyperlipidemia     Osteoarthritis of right foot 10/26/2019    SVT (supraventricular tachycardia) 1/14/2005    ablation by Marixa of left lateral free wall accessory bypass tract    Type 2 diabetes mellitus with hyperglycemia, without long-term current use of insulin 4/9/2018     Past Surgical History:   Procedure Laterality Date    CARPAL TUNNEL RELEASE Right 6/7/2022    Procedure: RELEASE, CARPAL TUNNEL;  Surgeon: Alisa Rodriguez MD;  Location: [facility] OR;  Service: Orthopedics;  Laterality: Right;    COLONOSCOPY  12/2015    skin cancer nose and neck      SVT accessory pathway ablation  1/14/2005    Dr. Calvin    TRIGGER FINGER RELEASE Right 6/7/2022    Procedure: RELEASE, TRIGGER FINGER, TENOSYNOVECTOMY;  Surgeon: Alisa Rodriguez MD;  Location: [facility] OR;  Service: Orthopedics;  Laterality: Right;     Review of patient's allergies indicates:   Allergen Reactions    Macadamia nut oil Anaphylaxis     Pt states only had a reaction with macadamia nuts.      Current Outpatient Medications on File Prior to Visit   Medication Sig Dispense Refill    allopurinoL (ZYLOPRIM) 300 MG tablet Take 1 tablet (300 mg total) by mouth once daily. 90 tablet 1    apixaban (ELIQUIS) 5 mg Tab Take 1 tablet (5 mg total) by mouth 2 (two) times daily. 180 tablet 3    atorvastatin (LIPITOR) 20 MG tablet TAKE 1 TABLET (20 MG TOTAL) BY MOUTH EVERY EVENING. 90 tablet 3    colchicine (COLCRYS) 0.6 mg tablet Take 1 tablet (0.6 mg  total) by mouth once daily. 90 tablet 3    latanoprost 0.005 % ophthalmic solution Place 1 drop into both eyes every evening. 7.5 mL 3    lisinopriL 10 MG tablet Take 1 tablet (10 mg total) by mouth once daily. 90 tablet 3    metoprolol succinate (TOPROL-XL) 50 MG 24 hr tablet TAKE 1 TABLET EVERY DAY 90 tablet 3    multivitamin (THERAGRAN) per tablet Take 1 tablet by mouth once daily.      VIT C/VIT E/LUTEIN/MIN/OMEGA-3 (OCUVITE ORAL) Take 1 tablet by mouth once daily.        Current Facility-Administered Medications on File Prior to Visit   Medication Dose Route Frequency Provider Last Rate Last Admin    LIDOcaine (PF) 10 mg/ml (1%) injection 10 mg  1 mL Intradermal Once Sai Masterson MD         Family History   Problem Relation Age of Onset    Glaucoma Mother     Cancer Mother         Breast    Glaucoma Father     Macular degeneration Father     Stroke Father     Melanoma Father     Cancer Brother         Colon    Colon cancer Brother     Cancer Son         lymphoma - remission    No Known Problems Maternal Aunt     No Known Problems Maternal Uncle     No Known Problems Paternal Aunt     No Known Problems Paternal Uncle     No Known Problems Maternal Grandmother     No Known Problems Maternal Grandfather     No Known Problems Paternal Grandmother     No Known Problems Paternal Grandfather     Diabetes Neg Hx     Psoriasis Neg Hx     Lupus Neg Hx     Eczema Neg Hx     Acne Neg Hx     Amblyopia Neg Hx     Blindness Neg Hx     Cataracts Neg Hx     Hypertension Neg Hx     Retinal detachment Neg Hx     Strabismus Neg Hx     Thyroid disease Neg Hx      Social History     Socioeconomic History    Marital status:    Occupational History    Occupation: Retired   Tobacco Use    Smoking status: Never    Smokeless tobacco: Never   Substance and Sexual Activity    Alcohol use: Yes     Comment: 1 time biweekly    Drug use: No    Sexual activity: Yes     Partners: Female     Social Determinants of Health  "    Financial Resource Strain: Low Risk     Difficulty of Paying Living Expenses: Not hard at all   Food Insecurity: No Food Insecurity    Worried About Running Out of Food in the Last Year: Never true    Ran Out of Food in the Last Year: Never true   Transportation Needs: No Transportation Needs    Lack of Transportation (Medical): No    Lack of Transportation (Non-Medical): No   Physical Activity: Insufficiently Active    Days of Exercise per Week: 1 day    Minutes of Exercise per Session: 80 min   Stress: No Stress Concern Present    Feeling of Stress : Not at all   Social Connections: Moderately Integrated    Frequency of Communication with Friends and Family: More than three times a week    Frequency of Social Gatherings with Friends and Family: Once a week    Attends Mu-ism Services: 1 to 4 times per year    Active Member of Clubs or Organizations: No    Attends Club or Organization Meetings: Never    Marital Status:    Housing Stability: Unknown    Unable to Pay for Housing in the Last Year: No    Unstable Housing in the Last Year: No     ROS:  GENERAL: No fever, chills, fatigability or weight loss.  Integument:  No rashes, redness, icterus  CHEST: Denies MCBRIDE, cyanosis, wheezing, cough and sputum production.  CARDIOVASCULAR: Denies chest pain, PND, orthopnea or reduced exercise tolerance.  GI:  Denies abd pain, dysphagia, nausea, vomiting, no hematemesis, no rectal pain  : Denies burning on urination, no hematuria, no bacteriuria  MSK:  No deformities, swelling, joint pain swelling  Neurologic:  No HAs, seizures, weakness, paresthesias, gait problems    Healthy-appearing male in no acute distress  /64 (BP Location: Left arm, Patient Position: Sitting, BP Method: Large (Automatic))   Pulse 61   Ht 5' 11" (1.803 m)   Wt 93.7 kg (206 lb 9.6 oz)   BMI 28.81 kg/m²   Skin anicteric  Atraumatic normocephalic extraocular movements intact  Neck is supple, trachea midline, thyroid not " enlarged  Chest is symmetric and normal excursions, no retractions  Breathing comfortably  Rectal exam normal appearing anus  CHANDRA normal tone, no masses    Assessment   history of abnormal rectal mucosa, biopsies negative evidence of proctitis  This could be prep related  No symptoms to suggest significant anorectal disease    Plan  Anoscopy, see below  Return to clinic p.r.n.    Anoscopy Procedure Note:   Verbal consent obtained    Indications:  history of abnormal rectal  mucosa     Post procedure diagnosis:  same    Procedure: anoscopy    Surgeon MAHSA    Assistant: MA    Specimen none    Findings:  nl appearing anorectal mucosa    Right posterior hemorrhoid grade 1  Right anterior hemorrhoid grade 2  Left lateral hemorrhoid grade 1    Pt tolerated procedure well.      No complications.

## 2023-04-04 ENCOUNTER — TELEPHONE (OUTPATIENT)
Dept: OPHTHALMOLOGY | Facility: CLINIC | Age: 83
End: 2023-04-04
Payer: MEDICARE

## 2023-04-04 ENCOUNTER — TELEPHONE (OUTPATIENT)
Dept: SURGERY | Facility: CLINIC | Age: 83
End: 2023-04-04
Payer: MEDICARE

## 2023-04-04 NOTE — TELEPHONE ENCOUNTER
----- Message from Nicolle Beyer sent at 4/4/2023  2:05 PM CDT -----  Regarding: Follow up Appt  Contact: Pt @ [phone]  Pt is calling to see if  wanted him with a follow up appt. Asking for a call back

## 2023-04-04 NOTE — TELEPHONE ENCOUNTER
Tried calling pt back but the phone rings and hangs up after a few rings.  He does not use the portal. He did not need to f/u with Dr Ventura.

## 2023-04-04 NOTE — TELEPHONE ENCOUNTER
Pt has appt on 4/17/23 but  will be out that day. I left a message for pt to call back to r/s appt.

## 2023-04-06 ENCOUNTER — OFFICE VISIT (OUTPATIENT)
Dept: RHEUMATOLOGY | Facility: CLINIC | Age: 83
End: 2023-04-06
Payer: MEDICARE

## 2023-04-06 ENCOUNTER — LAB VISIT (OUTPATIENT)
Dept: LAB | Facility: HOSPITAL | Age: 83
End: 2023-04-06
Attending: INTERNAL MEDICINE
Payer: MEDICARE

## 2023-04-06 VITALS
WEIGHT: 211.63 LBS | DIASTOLIC BLOOD PRESSURE: 63 MMHG | SYSTOLIC BLOOD PRESSURE: 119 MMHG | BODY MASS INDEX: 29.63 KG/M2 | HEIGHT: 71 IN | HEART RATE: 69 BPM

## 2023-04-06 DIAGNOSIS — M1A.9XX1 GOUT WITH TOPHI: ICD-10-CM

## 2023-04-06 DIAGNOSIS — M1A.9XX1 GOUT WITH TOPHI: Primary | ICD-10-CM

## 2023-04-06 LAB — URATE SERPL-MCNC: 5.3 MG/DL (ref 3.4–7)

## 2023-04-06 PROCEDURE — 3074F SYST BP LT 130 MM HG: CPT | Mod: HCNC,CPTII,S$GLB, | Performed by: INTERNAL MEDICINE

## 2023-04-06 PROCEDURE — 84550 ASSAY OF BLOOD/URIC ACID: CPT | Mod: HCNC | Performed by: INTERNAL MEDICINE

## 2023-04-06 PROCEDURE — 1160F PR REVIEW ALL MEDS BY PRESCRIBER/CLIN PHARMACIST DOCUMENTED: ICD-10-PCS | Mod: HCNC,CPTII,S$GLB, | Performed by: INTERNAL MEDICINE

## 2023-04-06 PROCEDURE — 1159F PR MEDICATION LIST DOCUMENTED IN MEDICAL RECORD: ICD-10-PCS | Mod: HCNC,CPTII,S$GLB, | Performed by: INTERNAL MEDICINE

## 2023-04-06 PROCEDURE — 99999 PR PBB SHADOW E&M-EST. PATIENT-LVL III: ICD-10-PCS | Mod: PBBFAC,HCNC,, | Performed by: INTERNAL MEDICINE

## 2023-04-06 PROCEDURE — 1126F PR PAIN SEVERITY QUANTIFIED, NO PAIN PRESENT: ICD-10-PCS | Mod: HCNC,CPTII,S$GLB, | Performed by: INTERNAL MEDICINE

## 2023-04-06 PROCEDURE — 99999 PR PBB SHADOW E&M-EST. PATIENT-LVL III: CPT | Mod: PBBFAC,HCNC,, | Performed by: INTERNAL MEDICINE

## 2023-04-06 PROCEDURE — 1126F AMNT PAIN NOTED NONE PRSNT: CPT | Mod: HCNC,CPTII,S$GLB, | Performed by: INTERNAL MEDICINE

## 2023-04-06 PROCEDURE — 36415 COLL VENOUS BLD VENIPUNCTURE: CPT | Mod: HCNC | Performed by: INTERNAL MEDICINE

## 2023-04-06 PROCEDURE — 99213 PR OFFICE/OUTPT VISIT, EST, LEVL III, 20-29 MIN: ICD-10-PCS | Mod: HCNC,S$GLB,, | Performed by: INTERNAL MEDICINE

## 2023-04-06 PROCEDURE — 3078F PR MOST RECENT DIASTOLIC BLOOD PRESSURE < 80 MM HG: ICD-10-PCS | Mod: HCNC,CPTII,S$GLB, | Performed by: INTERNAL MEDICINE

## 2023-04-06 PROCEDURE — 1159F MED LIST DOCD IN RCRD: CPT | Mod: HCNC,CPTII,S$GLB, | Performed by: INTERNAL MEDICINE

## 2023-04-06 PROCEDURE — 99213 OFFICE O/P EST LOW 20 MIN: CPT | Mod: HCNC,S$GLB,, | Performed by: INTERNAL MEDICINE

## 2023-04-06 PROCEDURE — 1160F RVW MEDS BY RX/DR IN RCRD: CPT | Mod: HCNC,CPTII,S$GLB, | Performed by: INTERNAL MEDICINE

## 2023-04-06 PROCEDURE — 3074F PR MOST RECENT SYSTOLIC BLOOD PRESSURE < 130 MM HG: ICD-10-PCS | Mod: HCNC,CPTII,S$GLB, | Performed by: INTERNAL MEDICINE

## 2023-04-06 PROCEDURE — 3078F DIAST BP <80 MM HG: CPT | Mod: HCNC,CPTII,S$GLB, | Performed by: INTERNAL MEDICINE

## 2023-04-06 RX ORDER — SOD SULF/POT CHLORIDE/MAG SULF 1.479 G
TABLET ORAL
COMMUNITY
Start: 2022-11-21 | End: 2023-09-11

## 2023-04-06 RX ORDER — SULFAMETHOXAZOLE AND TRIMETHOPRIM 800; 160 MG/1; MG/1
TABLET ORAL
COMMUNITY
Start: 2023-02-06 | End: 2023-07-13

## 2023-04-06 ASSESSMENT — ROUTINE ASSESSMENT OF PATIENT INDEX DATA (RAPID3)
PSYCHOLOGICAL DISTRESS SCORE: 0
PAIN SCORE: 0
TOTAL RAPID3 SCORE: 0
FATIGUE SCORE: 0
AM STIFFNESS SCORE: 0, NO
MDHAQ FUNCTION SCORE: 0
PATIENT GLOBAL ASSESSMENT SCORE: 0

## 2023-04-07 NOTE — PROGRESS NOTES
History of present illness:  82-year-old male I saw initially in October.  He had a 50 year history of gout.  He had been treated intermittently with allopurinol.  He would take colchicine during an acute attack.  He had not had a recent gout attack.  He had evidence of tophi in the right foot.  X-ray confirmed gout damage to the feet.  I placed him back on allopurinol 100 mg daily.  He is now on 300 mg daily.  I also placed him on daily colchicine.  He comes back for follow-up.      He is not had any gout attacks.  He is tolerating the medications.  He is not seen any change in the size of his tophi.  He is had no other recent medical problems.      Physical examination:  Musculoskeletal:  He has tophi of the right 1st MTP and the left 4th toe.  He has no acute synovitis.      Assessment:  Chronic tophaceous gout    Plans:  I will check his uric acid level today.  If it is around 5, I will continue him on allopurinol 300 mg daily and see him back in 12 months.  I will let him know he can stop the colchicine.  If his uric acid is not adequate, I will increase the allopurinol and continue him on colchicine until the allopurinol is therapeutic.  He will need further laboratory studies if I increased the allopurinol.

## 2023-05-06 NOTE — PROGRESS NOTES
HPI    DLS: 12/12/2022    Pt here for HVF review/OCT;  Pt states no eye pain or discomfort.     Meds;  Latanoprost QHS OU    1) POAG OD   2) Glaucoma Suspect OS   3) NS OU   4) Ptosis   5) FmHx ARMD   6) Hyperopia with Ast igmatism/ Presbyopia    Last edited by Idalia Esteban on 5/8/2023  3:46 PM.            Assessment /Plan     For exam results, see Encounter Report.    Primary open-angle glaucoma, right eye, mild stage    Open angle with borderline findings and high glaucoma risk in left eye    Nuclear sclerosis of both eyes    Brow ptosis    Small pupils    Family history of macular degeneration    Hyperopia of both eyes with astigmatism and presbyopia          1. Open angle with borderline glaucoma findings - Both Eyes (POAG od // suspect os )   Glaucoma (type and duration)   LTG suspect  First HVF 2011  First photos 2011  Glaucoma drops started 2/11/2021 (monitored off gtts 2011 to 2021 - then got new INS od)      Family history   = father-also my patient, + mom- she has passed  Glaucoma meds   lumigan ou - started 2/11/2021  H/O adverse rxn to glaucoma drops  None (? Avoid BB - bradycardia)   LASERS   none  GLAUCOMA SURGERIES   none  OTHER EYE SURGERIES   none  CDR   0.8 with thin sup rim // 0.7  - rim appears intact   Tbase  16-24    Tmax  21/24      Ttarget   16 ou (set 5/9/2022 ) - had VF prog ou withIOP 16-20 ou        HVF  5 test 2011 to 2018 - full od // full os-            2 test 2021 to 2022 - New IAD/NS  od // ? New INS  os   Gono  +3 ou  CCT  [id number]  OCT  4 test 2012 to 2022 - RNFL - bord TS od (+prog 2018 to 2021) // nl os  HRT   5 test 2011 to 2019 - MR -  Dec. IT od,  // dec. IN os, border IT /// CDR 0.72 od // 0.70 os  Disc photos  2011, 2012, 2017  - OIS // 2021 - harmony     Ttoday  17/17  Test done today - IOP check,HVF / DFE / OCT      2. Nuclear sclerosis - Both Eyes   - mild BCVA 20/20 ou  BATh 20/60 od // 20/70 os (10/2013)   3. Ptosis   -patient would like to monitor for now as he is not  having any trouble    4. Family history of macular degeneration   -father has adv wet ARMD - gets injections with arend   5. Hyperopia, astigmatism , presbyopia  PC +0.75+1.00x177        +1.00+1.50x180   ADD +2.50  glasses Teto   6. I use to see his father, and also saw his mother -  - his dad  2015 @ age 89   - his mom passed at age 98  - both had glaucoma, his dad also had  wet ARMD - saw Arend         Plan    New INS od - now has POAG od - mild   + IAD/NS od x 2 - confirmed   OCT od - bord TS now - (+progfrom )   Bord glaucoma os - high risk    Switch to latanoprost / cost of lumigan // IOP ok (2022)     Cont to monitor cataracts     - not vis sign yet - dilates medium only - smallish pupils     Photo file updated 2023    Phaco/IOL ou prn  - pt is content with vision for now       Likely VS Cat. Will do combo migs when patient is ready. Patient ready for eval next visit      F/U 4  months with IOP /  gonio     Last saw  Teto (2022) - F/U prn - happy with glasses for now   Drivers license expirs 2023 - presently he can still pass the vision test

## 2023-05-08 ENCOUNTER — OFFICE VISIT (OUTPATIENT)
Dept: OPHTHALMOLOGY | Facility: CLINIC | Age: 83
End: 2023-05-08
Payer: MEDICARE

## 2023-05-08 ENCOUNTER — CLINICAL SUPPORT (OUTPATIENT)
Dept: OPHTHALMOLOGY | Facility: CLINIC | Age: 83
End: 2023-05-08
Payer: MEDICARE

## 2023-05-08 DIAGNOSIS — H52.03 HYPEROPIA OF BOTH EYES WITH ASTIGMATISM AND PRESBYOPIA: ICD-10-CM

## 2023-05-08 DIAGNOSIS — H57.03 SMALL PUPILS: ICD-10-CM

## 2023-05-08 DIAGNOSIS — H25.13 NUCLEAR SCLEROSIS OF BOTH EYES: ICD-10-CM

## 2023-05-08 DIAGNOSIS — Z83.518 FAMILY HISTORY OF MACULAR DEGENERATION: ICD-10-CM

## 2023-05-08 DIAGNOSIS — H52.4 HYPEROPIA OF BOTH EYES WITH ASTIGMATISM AND PRESBYOPIA: ICD-10-CM

## 2023-05-08 DIAGNOSIS — H57.819 BROW PTOSIS: ICD-10-CM

## 2023-05-08 DIAGNOSIS — H40.022 OPEN ANGLE WITH BORDERLINE FINDINGS AND HIGH GLAUCOMA RISK IN LEFT EYE: ICD-10-CM

## 2023-05-08 DIAGNOSIS — H52.203 HYPEROPIA OF BOTH EYES WITH ASTIGMATISM AND PRESBYOPIA: ICD-10-CM

## 2023-05-08 DIAGNOSIS — H40.1111 PRIMARY OPEN-ANGLE GLAUCOMA, RIGHT EYE, MILD STAGE: Primary | ICD-10-CM

## 2023-05-08 PROCEDURE — 92133 POSTERIOR SEGMENT OCT OPTIC NERVE(OCULAR COHERENCE TOMOGRAPHY) - OU - BOTH EYES: ICD-10-PCS | Mod: HCNC,S$GLB,, | Performed by: OPHTHALMOLOGY

## 2023-05-08 PROCEDURE — 99999 PR PBB SHADOW E&M-EST. PATIENT-LVL III: ICD-10-PCS | Mod: PBBFAC,HCNC,, | Performed by: OPHTHALMOLOGY

## 2023-05-08 PROCEDURE — 1159F PR MEDICATION LIST DOCUMENTED IN MEDICAL RECORD: ICD-10-PCS | Mod: HCNC,CPTII,S$GLB, | Performed by: OPHTHALMOLOGY

## 2023-05-08 PROCEDURE — 1101F PT FALLS ASSESS-DOCD LE1/YR: CPT | Mod: HCNC,CPTII,S$GLB, | Performed by: OPHTHALMOLOGY

## 2023-05-08 PROCEDURE — 3288F PR FALLS RISK ASSESSMENT DOCUMENTED: ICD-10-PCS | Mod: HCNC,CPTII,S$GLB, | Performed by: OPHTHALMOLOGY

## 2023-05-08 PROCEDURE — 1160F PR REVIEW ALL MEDS BY PRESCRIBER/CLIN PHARMACIST DOCUMENTED: ICD-10-PCS | Mod: HCNC,CPTII,S$GLB, | Performed by: OPHTHALMOLOGY

## 2023-05-08 PROCEDURE — 92083 EXTENDED VISUAL FIELD XM: CPT | Mod: HCNC,S$GLB,, | Performed by: OPHTHALMOLOGY

## 2023-05-08 PROCEDURE — 1159F MED LIST DOCD IN RCRD: CPT | Mod: HCNC,CPTII,S$GLB, | Performed by: OPHTHALMOLOGY

## 2023-05-08 PROCEDURE — 99999 PR PBB SHADOW E&M-EST. PATIENT-LVL III: CPT | Mod: PBBFAC,HCNC,, | Performed by: OPHTHALMOLOGY

## 2023-05-08 PROCEDURE — 1126F PR PAIN SEVERITY QUANTIFIED, NO PAIN PRESENT: ICD-10-PCS | Mod: HCNC,CPTII,S$GLB, | Performed by: OPHTHALMOLOGY

## 2023-05-08 PROCEDURE — 99214 PR OFFICE/OUTPT VISIT, EST, LEVL IV, 30-39 MIN: ICD-10-PCS | Mod: HCNC,S$GLB,, | Performed by: OPHTHALMOLOGY

## 2023-05-08 PROCEDURE — 1160F RVW MEDS BY RX/DR IN RCRD: CPT | Mod: HCNC,CPTII,S$GLB, | Performed by: OPHTHALMOLOGY

## 2023-05-08 PROCEDURE — 92083 HUMPHREY VISUAL FIELD - OU - BOTH EYES: ICD-10-PCS | Mod: HCNC,S$GLB,, | Performed by: OPHTHALMOLOGY

## 2023-05-08 PROCEDURE — 92133 CPTRZD OPH DX IMG PST SGM ON: CPT | Mod: HCNC,S$GLB,, | Performed by: OPHTHALMOLOGY

## 2023-05-08 PROCEDURE — 1101F PR PT FALLS ASSESS DOC 0-1 FALLS W/OUT INJ PAST YR: ICD-10-PCS | Mod: HCNC,CPTII,S$GLB, | Performed by: OPHTHALMOLOGY

## 2023-05-08 PROCEDURE — 99214 OFFICE O/P EST MOD 30 MIN: CPT | Mod: HCNC,S$GLB,, | Performed by: OPHTHALMOLOGY

## 2023-05-08 PROCEDURE — 1126F AMNT PAIN NOTED NONE PRSNT: CPT | Mod: HCNC,CPTII,S$GLB, | Performed by: OPHTHALMOLOGY

## 2023-05-08 PROCEDURE — 3288F FALL RISK ASSESSMENT DOCD: CPT | Mod: HCNC,CPTII,S$GLB, | Performed by: OPHTHALMOLOGY

## 2023-05-08 NOTE — Clinical Note
Old patient of yours - wants to see you for glasses - can you have your techs make him an appt. NO RUSH - pt is doing fine

## 2023-05-11 NOTE — PROGRESS NOTES
Visual field test done.  Patient stated no latex allergies used coverlet    MRN: [id number] Date: 5/11/2023      Glasses Prescription     Sphere Cylinder Axis Dist VA Add   Right +1.00 +0.50 165 20/25+2 +2.50   Left +0.75 +2.00 173 20/20 (-) +2.50   Comments: ST bifocal, OR single vision distance lenses and/or single vision reading lenses.   ST trifocal okay, if desired.       If progressives, +2.75 D add OU         Ordered, Authorized, and Signed By: Ned

## 2023-05-16 ENCOUNTER — PES CALL (OUTPATIENT)
Dept: ADMINISTRATIVE | Facility: CLINIC | Age: 83
End: 2023-05-16
Payer: MEDICARE

## 2023-06-15 ENCOUNTER — OFFICE VISIT (OUTPATIENT)
Dept: CARDIOLOGY | Facility: CLINIC | Age: 83
End: 2023-06-15
Payer: MEDICARE

## 2023-06-15 VITALS
WEIGHT: 209 LBS | HEART RATE: 80 BPM | SYSTOLIC BLOOD PRESSURE: 120 MMHG | DIASTOLIC BLOOD PRESSURE: 81 MMHG | BODY MASS INDEX: 29.26 KG/M2 | HEIGHT: 71 IN

## 2023-06-15 DIAGNOSIS — I48.0 PAF (PAROXYSMAL ATRIAL FIBRILLATION): Primary | Chronic | ICD-10-CM

## 2023-06-15 DIAGNOSIS — E78.2 MIXED HYPERLIPIDEMIA: Chronic | ICD-10-CM

## 2023-06-15 DIAGNOSIS — I10 ESSENTIAL HYPERTENSION: Chronic | ICD-10-CM

## 2023-06-15 DIAGNOSIS — Z98.890 S/P ABLATION OF ATRIAL FLUTTER: Chronic | ICD-10-CM

## 2023-06-15 DIAGNOSIS — Z86.79 S/P ABLATION OF ATRIAL FLUTTER: Chronic | ICD-10-CM

## 2023-06-15 PROCEDURE — 3288F FALL RISK ASSESSMENT DOCD: CPT | Mod: CPTII,S$GLB,, | Performed by: INTERNAL MEDICINE

## 2023-06-15 PROCEDURE — 1101F PR PT FALLS ASSESS DOC 0-1 FALLS W/OUT INJ PAST YR: ICD-10-PCS | Mod: CPTII,S$GLB,, | Performed by: INTERNAL MEDICINE

## 2023-06-15 PROCEDURE — 3079F PR MOST RECENT DIASTOLIC BLOOD PRESSURE 80-89 MM HG: ICD-10-PCS | Mod: CPTII,S$GLB,, | Performed by: INTERNAL MEDICINE

## 2023-06-15 PROCEDURE — 3074F PR MOST RECENT SYSTOLIC BLOOD PRESSURE < 130 MM HG: ICD-10-PCS | Mod: CPTII,S$GLB,, | Performed by: INTERNAL MEDICINE

## 2023-06-15 PROCEDURE — 1159F PR MEDICATION LIST DOCUMENTED IN MEDICAL RECORD: ICD-10-PCS | Mod: CPTII,S$GLB,, | Performed by: INTERNAL MEDICINE

## 2023-06-15 PROCEDURE — 1126F AMNT PAIN NOTED NONE PRSNT: CPT | Mod: CPTII,S$GLB,, | Performed by: INTERNAL MEDICINE

## 2023-06-15 PROCEDURE — 1160F RVW MEDS BY RX/DR IN RCRD: CPT | Mod: CPTII,S$GLB,, | Performed by: INTERNAL MEDICINE

## 2023-06-15 PROCEDURE — 99214 OFFICE O/P EST MOD 30 MIN: CPT | Mod: 25,S$GLB,, | Performed by: INTERNAL MEDICINE

## 2023-06-15 PROCEDURE — 99214 PR OFFICE/OUTPT VISIT, EST, LEVL IV, 30-39 MIN: ICD-10-PCS | Mod: 25,S$GLB,, | Performed by: INTERNAL MEDICINE

## 2023-06-15 PROCEDURE — 3288F PR FALLS RISK ASSESSMENT DOCUMENTED: ICD-10-PCS | Mod: CPTII,S$GLB,, | Performed by: INTERNAL MEDICINE

## 2023-06-15 PROCEDURE — 3079F DIAST BP 80-89 MM HG: CPT | Mod: CPTII,S$GLB,, | Performed by: INTERNAL MEDICINE

## 2023-06-15 PROCEDURE — 99999 PR PBB SHADOW E&M-EST. PATIENT-LVL III: ICD-10-PCS | Mod: PBBFAC,,, | Performed by: INTERNAL MEDICINE

## 2023-06-15 PROCEDURE — 93000 EKG 12-LEAD: ICD-10-PCS | Mod: S$GLB,,, | Performed by: INTERNAL MEDICINE

## 2023-06-15 PROCEDURE — 1101F PT FALLS ASSESS-DOCD LE1/YR: CPT | Mod: CPTII,S$GLB,, | Performed by: INTERNAL MEDICINE

## 2023-06-15 PROCEDURE — 1160F PR REVIEW ALL MEDS BY PRESCRIBER/CLIN PHARMACIST DOCUMENTED: ICD-10-PCS | Mod: CPTII,S$GLB,, | Performed by: INTERNAL MEDICINE

## 2023-06-15 PROCEDURE — 93000 ELECTROCARDIOGRAM COMPLETE: CPT | Mod: S$GLB,,, | Performed by: INTERNAL MEDICINE

## 2023-06-15 PROCEDURE — 3074F SYST BP LT 130 MM HG: CPT | Mod: CPTII,S$GLB,, | Performed by: INTERNAL MEDICINE

## 2023-06-15 PROCEDURE — 1159F MED LIST DOCD IN RCRD: CPT | Mod: CPTII,S$GLB,, | Performed by: INTERNAL MEDICINE

## 2023-06-15 PROCEDURE — 1126F PR PAIN SEVERITY QUANTIFIED, NO PAIN PRESENT: ICD-10-PCS | Mod: CPTII,S$GLB,, | Performed by: INTERNAL MEDICINE

## 2023-06-15 PROCEDURE — 99999 PR PBB SHADOW E&M-EST. PATIENT-LVL III: CPT | Mod: PBBFAC,,, | Performed by: INTERNAL MEDICINE

## 2023-06-15 NOTE — PROGRESS NOTES
Subjective:     Patient ID:  Danial Meza is a 83 y.o. male who presents for follow-up of Follow-up    HPI:   80 yo WM with hypertension, lipid disorder, tachy-xu syndrome, prior AFl ablation and PAF.  He has no palpitations, the resolved several years ago.  He is status post SVT ablation by Dr. Gutierrez.  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    He has no known coronary artery disease.  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  He has not had sores or swelling    His LDL cholesterol is currently 31 on low-dose statin therapy.  He also has low HDL.    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    Review of Systems   Constitutional:  Negative for malaise/fatigue.   Cardiovascular:  Positive for palpitations. Negative for chest pain, orthopnea, claudication, leg swelling and PND.     Objective:   Physical Exam  Constitutional:       General: He is not in acute distress.     Appearance: He is well-developed. He is not diaphoretic.   HENT:      Head: Normocephalic and atraumatic.      Mouth/Throat:      Pharynx: No oropharyngeal exudate.   Eyes:      General: No scleral icterus.        Right eye: No discharge.         Left eye: No discharge.      Conjunctiva/sclera: Conjunctivae normal.   Neck:      Thyroid: No thyromegaly.      Vascular: No JVD.   Cardiovascular:      Rate and Rhythm: Normal rate and regular rhythm.      Heart sounds: Normal heart sounds. No murmur heard.    No gallop.   Pulmonary:      Effort: Pulmonary effort is normal.      Breath sounds: Normal breath sounds.   Abdominal:      General: Bowel sounds are normal. There is no distension.      Palpations: Abdomen is soft. There is no mass.      Tenderness: There is no abdominal tenderness.   Musculoskeletal:         General: No tenderness.   Skin:     General: Skin is warm and  dry.   Neurological:      Mental Status: He is alert and oriented to person, place, and time.   Psychiatric:         Behavior: Behavior normal.         Thought Content: Thought content normal.         Judgment: Judgment normal.        Assessment:     1. PAF (paroxysmal atrial fibrillation)    2. S/P ablation of atrial flutter 11/4/15    3. Mixed hyperlipidemia    4. Essential hypertension      Plan:       PAF (paroxysmal atrial fibrillation)  Comments:  EKG now shows atrial fibrillation, appears to be permanent, will accept as rhythm of choice, discussed with patient, he is not interested in cardioversion  Orders:  -     EKG 12-lead  -     Echo; Future; Expected date: 06/22/2023    S/P ablation of atrial flutter 11/4/15    Mixed hyperlipidemia  Comments:  Excellent on moderate dose statin    Essential hypertension  Comments:  Blood pressure well controlled      Return to clinic in 1 year.

## 2023-06-16 ENCOUNTER — TELEPHONE (OUTPATIENT)
Dept: CARDIOLOGY | Facility: CLINIC | Age: 83
End: 2023-06-16
Payer: MEDICARE

## 2023-07-06 ENCOUNTER — LAB VISIT (OUTPATIENT)
Dept: LAB | Facility: HOSPITAL | Age: 83
End: 2023-07-06
Attending: INTERNAL MEDICINE
Payer: MEDICARE

## 2023-07-06 DIAGNOSIS — R73.03 PREDIABETES: ICD-10-CM

## 2023-07-06 DIAGNOSIS — I10 ESSENTIAL HYPERTENSION: ICD-10-CM

## 2023-07-06 DIAGNOSIS — E78.2 MIXED HYPERLIPIDEMIA: Chronic | ICD-10-CM

## 2023-07-06 LAB
ALBUMIN SERPL BCP-MCNC: 4.2 G/DL (ref 3.5–5.2)
ALP SERPL-CCNC: 75 U/L (ref 55–135)
ALT SERPL W/O P-5'-P-CCNC: 18 U/L (ref 10–44)
ANION GAP SERPL CALC-SCNC: 7 MMOL/L (ref 8–16)
AST SERPL-CCNC: 18 U/L (ref 10–40)
BASOPHILS # BLD AUTO: 0.04 K/UL (ref 0–0.2)
BASOPHILS NFR BLD: 0.6 % (ref 0–1.9)
BILIRUB SERPL-MCNC: 1.2 MG/DL (ref 0.1–1)
BUN SERPL-MCNC: 25 MG/DL (ref 8–23)
CALCIUM SERPL-MCNC: 10 MG/DL (ref 8.7–10.5)
CHLORIDE SERPL-SCNC: 110 MMOL/L (ref 95–110)
CHOLEST SERPL-MCNC: 83 MG/DL (ref 120–199)
CHOLEST/HDLC SERPL: 3.8 {RATIO} (ref 2–5)
CO2 SERPL-SCNC: 27 MMOL/L (ref 23–29)
CREAT SERPL-MCNC: 1.2 MG/DL (ref 0.5–1.4)
DIFFERENTIAL METHOD: ABNORMAL
EOSINOPHIL # BLD AUTO: 0.2 K/UL (ref 0–0.5)
EOSINOPHIL NFR BLD: 2.8 % (ref 0–8)
ERYTHROCYTE [DISTWIDTH] IN BLOOD BY AUTOMATED COUNT: 13.3 % (ref 11.5–14.5)
EST. GFR  (NO RACE VARIABLE): >60 ML/MIN/1.73 M^2
ESTIMATED AVG GLUCOSE: 114 MG/DL (ref 68–131)
GLUCOSE SERPL-MCNC: 128 MG/DL (ref 70–110)
HBA1C MFR BLD: 5.6 % (ref 4–5.6)
HCT VFR BLD AUTO: 43.2 % (ref 40–54)
HDLC SERPL-MCNC: 22 MG/DL (ref 40–75)
HDLC SERPL: 26.5 % (ref 20–50)
HGB BLD-MCNC: 14.8 G/DL (ref 14–18)
IMM GRANULOCYTES # BLD AUTO: 0.03 K/UL (ref 0–0.04)
IMM GRANULOCYTES NFR BLD AUTO: 0.5 % (ref 0–0.5)
LDLC SERPL CALC-MCNC: 31.6 MG/DL (ref 63–159)
LYMPHOCYTES # BLD AUTO: 1.8 K/UL (ref 1–4.8)
LYMPHOCYTES NFR BLD: 28.3 % (ref 18–48)
MCH RBC QN AUTO: 33.7 PG (ref 27–31)
MCHC RBC AUTO-ENTMCNC: 34.3 G/DL (ref 32–36)
MCV RBC AUTO: 98 FL (ref 82–98)
MONOCYTES # BLD AUTO: 0.3 K/UL (ref 0.3–1)
MONOCYTES NFR BLD: 5.5 % (ref 4–15)
NEUTROPHILS # BLD AUTO: 3.9 K/UL (ref 1.8–7.7)
NEUTROPHILS NFR BLD: 62.3 % (ref 38–73)
NONHDLC SERPL-MCNC: 61 MG/DL
NRBC BLD-RTO: 0 /100 WBC
PLATELET # BLD AUTO: 154 K/UL (ref 150–450)
PMV BLD AUTO: 11.1 FL (ref 9.2–12.9)
POTASSIUM SERPL-SCNC: 5 MMOL/L (ref 3.5–5.1)
PROT SERPL-MCNC: 6.5 G/DL (ref 6–8.4)
RBC # BLD AUTO: 4.39 M/UL (ref 4.6–6.2)
SODIUM SERPL-SCNC: 144 MMOL/L (ref 136–145)
TRIGL SERPL-MCNC: 147 MG/DL (ref 30–150)
TSH SERPL DL<=0.005 MIU/L-ACNC: 2.67 UIU/ML (ref 0.4–4)
WBC # BLD AUTO: 6.18 K/UL (ref 3.9–12.7)

## 2023-07-06 PROCEDURE — 84443 ASSAY THYROID STIM HORMONE: CPT | Performed by: INTERNAL MEDICINE

## 2023-07-06 PROCEDURE — 85025 COMPLETE CBC W/AUTO DIFF WBC: CPT | Performed by: INTERNAL MEDICINE

## 2023-07-06 PROCEDURE — 80053 COMPREHEN METABOLIC PANEL: CPT | Performed by: INTERNAL MEDICINE

## 2023-07-06 PROCEDURE — 83036 HEMOGLOBIN GLYCOSYLATED A1C: CPT | Performed by: INTERNAL MEDICINE

## 2023-07-06 PROCEDURE — 36415 COLL VENOUS BLD VENIPUNCTURE: CPT | Performed by: INTERNAL MEDICINE

## 2023-07-06 PROCEDURE — 80061 LIPID PANEL: CPT | Performed by: INTERNAL MEDICINE

## 2023-07-13 ENCOUNTER — OFFICE VISIT (OUTPATIENT)
Dept: INTERNAL MEDICINE | Facility: CLINIC | Age: 83
End: 2023-07-13
Payer: MEDICARE

## 2023-07-13 VITALS
HEART RATE: 75 BPM | DIASTOLIC BLOOD PRESSURE: 70 MMHG | SYSTOLIC BLOOD PRESSURE: 120 MMHG | WEIGHT: 210.56 LBS | HEIGHT: 71 IN | BODY MASS INDEX: 29.48 KG/M2 | OXYGEN SATURATION: 99 %

## 2023-07-13 DIAGNOSIS — I48.0 PAF (PAROXYSMAL ATRIAL FIBRILLATION): ICD-10-CM

## 2023-07-13 DIAGNOSIS — Z09 FOLLOW-UP EXAM: Primary | ICD-10-CM

## 2023-07-13 DIAGNOSIS — R73.03 PREDIABETES: ICD-10-CM

## 2023-07-13 DIAGNOSIS — E79.0 HYPERURICEMIA: ICD-10-CM

## 2023-07-13 DIAGNOSIS — N18.31 STAGE 3A CHRONIC KIDNEY DISEASE: ICD-10-CM

## 2023-07-13 DIAGNOSIS — Z79.01 CURRENT USE OF LONG TERM ANTICOAGULATION: ICD-10-CM

## 2023-07-13 DIAGNOSIS — M1A.9XX1 TOPHACEOUS GOUT: ICD-10-CM

## 2023-07-13 DIAGNOSIS — E78.2 MIXED HYPERLIPIDEMIA: Chronic | ICD-10-CM

## 2023-07-13 DIAGNOSIS — I10 ESSENTIAL HYPERTENSION: ICD-10-CM

## 2023-07-13 PROCEDURE — 3074F SYST BP LT 130 MM HG: CPT | Mod: CPTII,S$GLB,, | Performed by: INTERNAL MEDICINE

## 2023-07-13 PROCEDURE — 1101F PT FALLS ASSESS-DOCD LE1/YR: CPT | Mod: CPTII,S$GLB,, | Performed by: INTERNAL MEDICINE

## 2023-07-13 PROCEDURE — 3288F FALL RISK ASSESSMENT DOCD: CPT | Mod: CPTII,S$GLB,, | Performed by: INTERNAL MEDICINE

## 2023-07-13 PROCEDURE — 99214 OFFICE O/P EST MOD 30 MIN: CPT | Mod: S$GLB,,, | Performed by: INTERNAL MEDICINE

## 2023-07-13 PROCEDURE — 3074F PR MOST RECENT SYSTOLIC BLOOD PRESSURE < 130 MM HG: ICD-10-PCS | Mod: CPTII,S$GLB,, | Performed by: INTERNAL MEDICINE

## 2023-07-13 PROCEDURE — 99999 PR PBB SHADOW E&M-EST. PATIENT-LVL III: ICD-10-PCS | Mod: PBBFAC,,, | Performed by: INTERNAL MEDICINE

## 2023-07-13 PROCEDURE — 99999 PR PBB SHADOW E&M-EST. PATIENT-LVL III: CPT | Mod: PBBFAC,,, | Performed by: INTERNAL MEDICINE

## 2023-07-13 PROCEDURE — 3078F PR MOST RECENT DIASTOLIC BLOOD PRESSURE < 80 MM HG: ICD-10-PCS | Mod: CPTII,S$GLB,, | Performed by: INTERNAL MEDICINE

## 2023-07-13 PROCEDURE — 3288F PR FALLS RISK ASSESSMENT DOCUMENTED: ICD-10-PCS | Mod: CPTII,S$GLB,, | Performed by: INTERNAL MEDICINE

## 2023-07-13 PROCEDURE — 1101F PR PT FALLS ASSESS DOC 0-1 FALLS W/OUT INJ PAST YR: ICD-10-PCS | Mod: CPTII,S$GLB,, | Performed by: INTERNAL MEDICINE

## 2023-07-13 PROCEDURE — 1159F PR MEDICATION LIST DOCUMENTED IN MEDICAL RECORD: ICD-10-PCS | Mod: CPTII,S$GLB,, | Performed by: INTERNAL MEDICINE

## 2023-07-13 PROCEDURE — 3078F DIAST BP <80 MM HG: CPT | Mod: CPTII,S$GLB,, | Performed by: INTERNAL MEDICINE

## 2023-07-13 PROCEDURE — 1159F MED LIST DOCD IN RCRD: CPT | Mod: CPTII,S$GLB,, | Performed by: INTERNAL MEDICINE

## 2023-07-13 PROCEDURE — 99214 PR OFFICE/OUTPT VISIT, EST, LEVL IV, 30-39 MIN: ICD-10-PCS | Mod: S$GLB,,, | Performed by: INTERNAL MEDICINE

## 2023-07-13 NOTE — PROGRESS NOTES
Subjective:       Patient ID: Danial Meza is a 83 y.o. male.    Chief Complaint: Follow-up      HPI  Danial Meza is a 83 y.o. year old male established patient presents for follow up. HTN, HLD, gout, afib on OAC (rate controlled, seems to be new predominant rhythm). At baseline health, no new complaints.    Review of Systems   Constitutional:  Negative for activity change, appetite change, chills, fatigue, fever and unexpected weight change.   HENT:  Negative for congestion, rhinorrhea and sore throat.    Eyes:  Negative for visual disturbance.   Respiratory:  Negative for shortness of breath.    Cardiovascular:  Negative for chest pain.   Gastrointestinal:  Negative for abdominal pain, diarrhea, nausea and vomiting.   Genitourinary:  Negative for difficulty urinating and dysuria.   Musculoskeletal:  Negative for arthralgias, back pain and myalgias.   Skin:  Negative for color change and rash.   Neurological:  Negative for dizziness, weakness and headaches.       Past Medical History:   Diagnosis Date    Basal cell carcinoma 1/5/12    right nose    Basal cell carcinoma 10/09/2017    left nasal bridge and left cheek    Cataract     Glaucoma     Gout 7/18/2014    Mixed hyperlipidemia     Osteoarthritis of right foot 10/26/2019    SVT (supraventricular tachycardia) 1/14/2005    ablation by [facility] of left lateral free wall accessory bypass tract    Type 2 diabetes mellitus with hyperglycemia, without long-term current use of insulin 4/9/2018        Prior to Admission medications    Medication Sig Start Date End Date Taking? Authorizing Provider   allopurinoL (ZYLOPRIM) 300 MG tablet TAKE 1 TABLET ONE TIME DAILY 5/29/23  Yes Lizbet Desai MD   atorvastatin (LIPITOR) 20 MG tablet TAKE 1 TABLET (20 MG TOTAL) BY MOUTH EVERY EVENING. 11/30/22  Yes Jose Rafael Sorto Jr., MD   colchicine (COLCRYS) 0.6 mg tablet Take 1 tablet (0.6 mg total) by mouth once daily. 10/18/22 10/18/23 Yes Alirio Bassett MD   ELIQUIS 5 mg  "Tab TAKE 1 TABLET TWICE DAILY 3/20/23  Yes Ruben Arriaza MD   latanoprost 0.005 % ophthalmic solution Place 1 drop into both eyes every evening. 3/3/23  Yes Huma Garcia MD   lisinopriL 10 MG tablet Take 1 tablet (10 mg total) by mouth once daily. 10/3/22  Yes Jose Rafael Sorto Jr., MD   metoprolol succinate (TOPROL-XL) 50 MG 24 hr tablet TAKE 1 TABLET EVERY DAY 12/8/22  Yes Bruce Hdez MD   multivitamin (THERAGRAN) per tablet Take 1 tablet by mouth once daily.   Yes Historical Provider   SUTAB 1.479-0.188- 0.225 gram tablet TAKE 24 TABLETS BY MOUTH SPLIT DOSE AS DIRECTED FOR COLONOSCOPY PREP 11/21/22  Yes Historical Provider   VIT C/VIT E/LUTEIN/MIN/OMEGA-3 (OCUVITE ORAL) Take 1 tablet by mouth once daily.    Yes Historical Provider   allopurinoL (ZYLOPRIM) 100 MG tablet TAKE TWO TABLETS BY MOUTH ONCE DAILY 2/22/23 7/13/23  Alirio Bassett MD   sulfamethoxazole-trimethoprim 800-160mg (BACTRIM DS) 800-160 mg Tab Take by mouth. 2/6/23 7/13/23  Historical Provider        Past medical history, surgical history, and family medical history reviewed and updated as appropriate.    Medications and allergies reviewed.     Objective:          Vitals:    07/13/23 0844   BP: 120/70   BP Location: Right arm   Patient Position: Sitting   BP Method: Medium (Manual)   Pulse: 75   SpO2: 99%   Weight: 95.5 kg (210 lb 8.6 oz)   Height: 5' 11" (1.803 m)     Body mass index is 29.36 kg/m².  Physical Exam  Constitutional:       General: He is not in acute distress.     Appearance: He is well-developed.   HENT:      Head: Normocephalic and atraumatic.      Nose: Nose normal.   Eyes:      General: No scleral icterus.     Extraocular Movements: Extraocular movements intact.      Pupils: Pupils are equal, round, and reactive to light.   Neck:      Thyroid: No thyromegaly.      Vascular: No JVD.      Trachea: No tracheal deviation.   Cardiovascular:      Rate and Rhythm: Normal rate. Rhythm irregular.      Heart sounds: Normal heart " sounds. No murmur heard.    No friction rub. No gallop.   Pulmonary:      Effort: Pulmonary effort is normal. No respiratory distress.      Breath sounds: Normal breath sounds. No wheezing or rales.   Abdominal:      General: Bowel sounds are normal. There is no distension.      Palpations: Abdomen is soft. There is no mass.      Tenderness: There is no abdominal tenderness.   Musculoskeletal:         General: No tenderness. Normal range of motion.      Cervical back: Normal range of motion and neck supple.   Lymphadenopathy:      Cervical: No cervical adenopathy.   Skin:     General: Skin is warm and dry.      Findings: No rash.   Neurological:      Mental Status: He is alert and oriented to person, place, and time.      Cranial Nerves: No cranial nerve deficit.      Deep Tendon Reflexes: Reflexes normal.   Psychiatric:         Behavior: Behavior normal.       Lab Results   Component Value Date    WBC 6.18 07/06/2023    HGB 14.8 07/06/2023    HCT 43.2 07/06/2023     07/06/2023    CHOL 83 (L) 07/06/2023    TRIG 147 07/06/2023    HDL 22 (L) 07/06/2023    ALT 18 07/06/2023    AST 18 07/06/2023     07/06/2023    K 5.0 07/06/2023     07/06/2023    CREATININE 1.2 07/06/2023    BUN 25 (H) 07/06/2023    CO2 27 07/06/2023    TSH 2.670 07/06/2023    PSA 1.3 10/28/2021    INR 1.4 (H) 12/30/2018    HGBA1C 5.6 07/06/2023       Assessment:       1. Follow-up exam    2. PAF (paroxysmal atrial fibrillation)    3. Current use of long term anticoagulation    4. Essential hypertension    5. Mixed hyperlipidemia    6. Prediabetes    7. Hyperuricemia    8. Tophaceous gout    9. Stage 3a chronic kidney disease          Plan:     Danial was seen today for follow-up.    Diagnoses and all orders for this visit:    Follow-up exam    PAF (paroxysmal atrial fibrillation)  Comments:  may now be pedominant rhythm. Is rate controlled on toprol 50 and is on eliquis 5 BID    Current use of long term anticoagulation    Essential  hypertension  Comments:  controlled, no changes to current management    Mixed hyperlipidemia  Comments:  controlled, no changes to current management    Prediabetes  Comments:  lifestyle controlled, no changes to current management    Hyperuricemia  Comments:  on allopurinol 300 mg daily and colchicine 0.6 mg daily    Tophaceous gout    Stage 3a chronic kidney disease  Comments:  renal function stable, no changes to current management    Benign physical examination, no issues identified. Will obtain routine labwork and age appropriate health screenings.     Health maintenance reviewed with patient.     Follow up in about 6 months (around 1/13/2024).    Ruben Arriaza MD  Internal Medicine / Primary Care  [facility]  7/13/2023

## 2023-08-07 ENCOUNTER — LAB VISIT (OUTPATIENT)
Dept: LAB | Facility: HOSPITAL | Age: 83
End: 2023-08-07
Attending: INTERNAL MEDICINE
Payer: MEDICARE

## 2023-08-07 DIAGNOSIS — M1A.9XX1 GOUT WITH TOPHI: ICD-10-CM

## 2023-08-07 LAB — URATE SERPL-MCNC: 5.1 MG/DL (ref 3.4–7)

## 2023-08-07 PROCEDURE — 84550 ASSAY OF BLOOD/URIC ACID: CPT | Mod: HCNC | Performed by: INTERNAL MEDICINE

## 2023-08-07 PROCEDURE — 36415 COLL VENOUS BLD VENIPUNCTURE: CPT | Mod: HCNC | Performed by: INTERNAL MEDICINE

## 2023-08-14 ENCOUNTER — HOSPITAL ENCOUNTER (OUTPATIENT)
Dept: CARDIOLOGY | Facility: HOSPITAL | Age: 83
Discharge: HOME | End: 2023-08-14
Attending: INTERNAL MEDICINE
Payer: MEDICARE

## 2023-08-14 VITALS
SYSTOLIC BLOOD PRESSURE: 149 MMHG | DIASTOLIC BLOOD PRESSURE: 81 MMHG | HEART RATE: 88 BPM | HEIGHT: 71 IN | WEIGHT: 210 LBS | BODY MASS INDEX: 29.4 KG/M2

## 2023-08-14 DIAGNOSIS — I48.0 PAF (PAROXYSMAL ATRIAL FIBRILLATION): Chronic | ICD-10-CM

## 2023-08-14 DIAGNOSIS — I10 ESSENTIAL HYPERTENSION: Primary | Chronic | ICD-10-CM

## 2023-08-14 LAB
ASCENDING AORTA: 3.46 CM
AV INDEX (PROSTH): 0.74
AV MEAN GRADIENT: 4 MMHG
AV PEAK GRADIENT: 7 MMHG
AV VALVE AREA BY VELOCITY RATIO: 2.62 CM²
AV VALVE AREA: 2.72 CM²
AV VELOCITY RATIO: 0.72
BSA FOR ECHO PROCEDURE: 2.18 M2
CV ECHO LV RWT: 0.33 CM
DOP CALC AO PEAK VEL: 1.3 M/S
DOP CALC AO VTI: 22.33 CM
DOP CALC LVOT AREA: 3.7 CM2
DOP CALC LVOT DIAMETER: 2.16 CM
DOP CALC LVOT PEAK VEL: 0.93 M/S
DOP CALC LVOT STROKE VOLUME: 60.83 CM3
DOP CALC MV VTI: 14.07 CM
DOP CALCLVOT PEAK VEL VTI: 16.61 CM
E/E' RATIO: 7.25 M/S
ECHO LV POSTERIOR WALL: 0.86 CM (ref 0.6–1.1)
FRACTIONAL SHORTENING: 34 % (ref 28–44)
INTERVENTRICULAR SEPTUM: 0.87 CM (ref 0.6–1.1)
LA MAJOR: 5.64 CM
LA MINOR: 5.6 CM
LA WIDTH: 4.73 CM
LEFT ATRIUM SIZE: 4.51 CM
LEFT ATRIUM VOLUME INDEX MOD: 41.2 ML/M2
LEFT ATRIUM VOLUME INDEX: 47.4 ML/M2
LEFT ATRIUM VOLUME MOD: 88.61 CM3
LEFT ATRIUM VOLUME: 101.9 CM3
LEFT INTERNAL DIMENSION IN SYSTOLE: 3.51 CM (ref 2.1–4)
LEFT VENTRICLE DIASTOLIC VOLUME INDEX: 62.7 ML/M2
LEFT VENTRICLE DIASTOLIC VOLUME: 134.81 ML
LEFT VENTRICLE MASS INDEX: 77 G/M2
LEFT VENTRICLE SYSTOLIC VOLUME INDEX: 23.8 ML/M2
LEFT VENTRICLE SYSTOLIC VOLUME: 51.09 ML
LEFT VENTRICULAR INTERNAL DIMENSION IN DIASTOLE: 5.29 CM (ref 3.5–6)
LEFT VENTRICULAR MASS: 165.26 G
LV LATERAL E/E' RATIO: 5.12 M/S
LV SEPTAL E/E' RATIO: 12.43 M/S
MV A" WAVE DURATION": 11.13 MSEC
MV MEAN GRADIENT: 2 MMHG
MV PEAK E VEL: 0.87 M/S
MV PEAK GRADIENT: 3 MMHG
MV VALVE AREA BY CONTINUITY EQUATION: 4.32 CM2
PISA MRMAX VEL: 0.05 M/S
PISA TR MAX VEL: 2.71 M/S
PULM VEIN S/D RATIO: 1.21
PV PEAK D VEL: 0.29 M/S
PV PEAK S VEL: 0.35 M/S
RA MAJOR: 4.77 CM
RA PRESSURE ESTIMATED: 3 MMHG
RA WIDTH: 3.86 CM
RIGHT VENTRICULAR END-DIASTOLIC DIMENSION: 3.28 CM
RV TB RVSP: 6 MMHG
SINUS: 3.7 CM
STJ: 3.37 CM
TDI LATERAL: 0.17 M/S
TDI SEPTAL: 0.07 M/S
TDI: 0.12 M/S
TR MAX PG: 29 MMHG
TRICUSPID ANNULAR PLANE SYSTOLIC EXCURSION: 1.69 CM
TV REST PULMONARY ARTERY PRESSURE: 32 MMHG
Z-SCORE OF LEFT VENTRICULAR DIMENSION IN END DIASTOLE: -2.77
Z-SCORE OF LEFT VENTRICULAR DIMENSION IN END SYSTOLE: -1.52

## 2023-08-14 PROCEDURE — 93306 ECHO (CUPID ONLY): ICD-10-PCS | Mod: 26,,, | Performed by: INTERNAL MEDICINE

## 2023-08-14 PROCEDURE — 93306 TTE W/DOPPLER COMPLETE: CPT

## 2023-08-14 PROCEDURE — 93306 TTE W/DOPPLER COMPLETE: CPT | Mod: 26,,, | Performed by: INTERNAL MEDICINE

## 2023-08-18 ENCOUNTER — HOSPITAL ENCOUNTER (OUTPATIENT)
Dept: RADIOLOGY | Facility: HOSPITAL | Age: 83
Discharge: HOME | End: 2023-08-18
Attending: INTERNAL MEDICINE
Payer: MEDICARE

## 2023-08-18 DIAGNOSIS — I10 ESSENTIAL HYPERTENSION: Chronic | ICD-10-CM

## 2023-08-18 PROCEDURE — 71046 X-RAY EXAM CHEST 2 VIEWS: CPT | Mod: TC

## 2023-08-18 PROCEDURE — 71046 XR CHEST PA AND LATERAL: ICD-10-PCS | Mod: 26,,, | Performed by: RADIOLOGY

## 2023-08-18 PROCEDURE — 71046 X-RAY EXAM CHEST 2 VIEWS: CPT | Mod: 26,,, | Performed by: RADIOLOGY

## 2023-08-22 ENCOUNTER — TELEPHONE (OUTPATIENT)
Dept: CARDIOLOGY | Facility: CLINIC | Age: 83
End: 2023-08-22
Payer: MEDICARE

## 2023-08-22 NOTE — TELEPHONE ENCOUNTER
----- Message from Jose Rafael Sorto Jr., MD sent at 8/18/2023 10:09 AM CDT -----  Please call, chest x-ray does not show fluid, appears to be normal.

## 2023-08-28 DIAGNOSIS — M1A.9XX0 CHRONIC GOUT WITHOUT TOPHUS, UNSPECIFIED CAUSE, UNSPECIFIED SITE: ICD-10-CM

## 2023-08-29 RX ORDER — COLCHICINE 0.6 MG/1
0.6 TABLET ORAL
Qty: 90 TABLET | Refills: 3 | Status: SHIPPED | OUTPATIENT
Start: 2023-08-29

## 2023-09-06 DIAGNOSIS — I10 ESSENTIAL HYPERTENSION: ICD-10-CM

## 2023-09-07 RX ORDER — LISINOPRIL 10 MG/1
10 TABLET ORAL DAILY
Qty: 90 TABLET | Refills: 3 | Status: SHIPPED | OUTPATIENT
Start: 2023-09-07

## 2023-09-10 NOTE — PROGRESS NOTES
HPI     Glaucoma            Comments: 4 month ck and pt states no changes since last exam           Comments    DLS: 5/8/23    1) POAG OD  2) Glaucoma Suspect OS  3) NS OU  4) Ptosis  5) FmHx ARMD  6) Hyperopia with Astigmatism/ Presbyopia    MEDS:  Latanoprost QHS OU            Last edited by Genny Paulson MA on 9/11/2023 10:00 AM.            Assessment /Plan     For exam results, see Encounter Report.    Primary open-angle glaucoma, right eye, mild stage    Open angle with borderline findings and high glaucoma risk in left eye    Nuclear sclerosis of both eyes    Brow ptosis    Small pupils    Family history of macular degeneration    Hyperopia of both eyes with astigmatism and presbyopia          1. Open angle with borderline glaucoma findings - Both Eyes (POAG od // suspect os )   Glaucoma (type and duration)   LTG suspect  First HVF 2011  First photos 2011  Glaucoma drops started 2/11/2021 (monitored off gtts 2011 to 2021 - then got new INS od)      Family history   = father-also my patient, + mom- she has passed  Glaucoma meds   lumigan ou - started 2/11/2021  H/O adverse rxn to glaucoma drops  None (? Avoid BB - bradycardia)   LASERS   none  GLAUCOMA SURGERIES   none  OTHER EYE SURGERIES   none  CDR   0.8 with thin sup rim // 0.7  - rim appears intact   Tbase  16-24    Tmax  21/24      Ttarget   16 ou (set 5/9/2022 ) - had VF prog ou withIOP 16-20 ou        HVF  5 test 2011 to 2018 - full od // full os-            2 test 2021 to 2022 - New IAD/NS  od // ? New INS  os   Gono  +3 ou  CCT  [id number]  OCT  4 test 2012 to 2022 - RNFL - bord TS od (+prog 2018 to 2021) // nl os  HRT   5 test 2011 to 2019 - MR -  Dec. IT od,  // dec. IN os, border IT /// CDR 0.72 od // 0.70 os  Disc photos  2011, 2012, 2017  - OIS // 2021 - harmony     Ttoday  18/12  Test done today - IOP check, gonio      2. Nuclear sclerosis - Both Eyes   - mild BCVA 20/20 ou  BATh 20/60 od // 20/70 os (10/2013)   3. Ptosis   -patient would like to  monitor for now as he is not having any trouble    4. Family history of macular degeneration   -father has adv wet ARMD - gets injections with arend   5. Hyperopia, astigmatism , presbyopia  PC +0.75+1.00x177        +1.00+1.50x180   ADD +2.50  glasses Teto   6. I use to see his father, and also saw his mother -  - his dad  2015 @ age 89   - his mom passed at age 98  - both had glaucoma, his dad also had  wet ARMD - saw Arend         Plan    New INS od - now has POAG od - mild   + IAD/NS od x 2 - confirmed   OCT od - bord TS now - (+progfrom )   Bord glaucoma os - high risk    Cont  latanoprost     Cont to monitor cataracts     - not vis sign yet - dilates medium only - smallish pupils     Photo file updated 2023    Phaco/IOL ou prn  - pt is content with vision for now     Likely VS Cat. Will do combo migs when patient is ready. Patient ready for eval next visit    F/U 6  months with IOP /  AR/MR/BAT - cataract check   - (has an appt with Teto in 1-2 weeks - 2023)     Drivers license expirs 2023 - presently he can still pass the vision test

## 2023-09-11 ENCOUNTER — OFFICE VISIT (OUTPATIENT)
Dept: OPHTHALMOLOGY | Facility: CLINIC | Age: 83
End: 2023-09-11
Payer: MEDICARE

## 2023-09-11 DIAGNOSIS — H57.819 BROW PTOSIS: ICD-10-CM

## 2023-09-11 DIAGNOSIS — Z83.518 FAMILY HISTORY OF MACULAR DEGENERATION: ICD-10-CM

## 2023-09-11 DIAGNOSIS — H52.03 HYPEROPIA OF BOTH EYES WITH ASTIGMATISM AND PRESBYOPIA: ICD-10-CM

## 2023-09-11 DIAGNOSIS — H52.4 HYPEROPIA OF BOTH EYES WITH ASTIGMATISM AND PRESBYOPIA: ICD-10-CM

## 2023-09-11 DIAGNOSIS — H52.203 HYPEROPIA OF BOTH EYES WITH ASTIGMATISM AND PRESBYOPIA: ICD-10-CM

## 2023-09-11 DIAGNOSIS — H57.03 SMALL PUPILS: ICD-10-CM

## 2023-09-11 DIAGNOSIS — H40.022 OPEN ANGLE WITH BORDERLINE FINDINGS AND HIGH GLAUCOMA RISK IN LEFT EYE: ICD-10-CM

## 2023-09-11 DIAGNOSIS — H25.13 NUCLEAR SCLEROSIS OF BOTH EYES: ICD-10-CM

## 2023-09-11 DIAGNOSIS — H40.1111 PRIMARY OPEN-ANGLE GLAUCOMA, RIGHT EYE, MILD STAGE: Primary | ICD-10-CM

## 2023-09-11 PROCEDURE — 3288F FALL RISK ASSESSMENT DOCD: CPT | Mod: HCNC,CPTII,S$GLB, | Performed by: OPHTHALMOLOGY

## 2023-09-11 PROCEDURE — 99214 PR OFFICE/OUTPT VISIT, EST, LEVL IV, 30-39 MIN: ICD-10-PCS | Mod: HCNC,S$GLB,, | Performed by: OPHTHALMOLOGY

## 2023-09-11 PROCEDURE — 99999 PR PBB SHADOW E&M-EST. PATIENT-LVL II: CPT | Mod: PBBFAC,HCNC,, | Performed by: OPHTHALMOLOGY

## 2023-09-11 PROCEDURE — 1159F PR MEDICATION LIST DOCUMENTED IN MEDICAL RECORD: ICD-10-PCS | Mod: HCNC,CPTII,S$GLB, | Performed by: OPHTHALMOLOGY

## 2023-09-11 PROCEDURE — 92020 GONIOSCOPY: CPT | Mod: HCNC,S$GLB,, | Performed by: OPHTHALMOLOGY

## 2023-09-11 PROCEDURE — 99214 OFFICE O/P EST MOD 30 MIN: CPT | Mod: HCNC,S$GLB,, | Performed by: OPHTHALMOLOGY

## 2023-09-11 PROCEDURE — 1160F RVW MEDS BY RX/DR IN RCRD: CPT | Mod: HCNC,CPTII,S$GLB, | Performed by: OPHTHALMOLOGY

## 2023-09-11 PROCEDURE — 1159F MED LIST DOCD IN RCRD: CPT | Mod: HCNC,CPTII,S$GLB, | Performed by: OPHTHALMOLOGY

## 2023-09-11 PROCEDURE — 1126F AMNT PAIN NOTED NONE PRSNT: CPT | Mod: HCNC,CPTII,S$GLB, | Performed by: OPHTHALMOLOGY

## 2023-09-11 PROCEDURE — 3288F PR FALLS RISK ASSESSMENT DOCUMENTED: ICD-10-PCS | Mod: HCNC,CPTII,S$GLB, | Performed by: OPHTHALMOLOGY

## 2023-09-11 PROCEDURE — 1101F PR PT FALLS ASSESS DOC 0-1 FALLS W/OUT INJ PAST YR: ICD-10-PCS | Mod: HCNC,CPTII,S$GLB, | Performed by: OPHTHALMOLOGY

## 2023-09-11 PROCEDURE — 1160F PR REVIEW ALL MEDS BY PRESCRIBER/CLIN PHARMACIST DOCUMENTED: ICD-10-PCS | Mod: HCNC,CPTII,S$GLB, | Performed by: OPHTHALMOLOGY

## 2023-09-11 PROCEDURE — 1101F PT FALLS ASSESS-DOCD LE1/YR: CPT | Mod: HCNC,CPTII,S$GLB, | Performed by: OPHTHALMOLOGY

## 2023-09-11 PROCEDURE — 99999 PR PBB SHADOW E&M-EST. PATIENT-LVL II: ICD-10-PCS | Mod: PBBFAC,HCNC,, | Performed by: OPHTHALMOLOGY

## 2023-09-11 PROCEDURE — 1126F PR PAIN SEVERITY QUANTIFIED, NO PAIN PRESENT: ICD-10-PCS | Mod: HCNC,CPTII,S$GLB, | Performed by: OPHTHALMOLOGY

## 2023-09-11 PROCEDURE — 92020 PR SPECIAL EYE EVAL,GONIOSCOPY: ICD-10-PCS | Mod: HCNC,S$GLB,, | Performed by: OPHTHALMOLOGY

## 2023-09-20 ENCOUNTER — OFFICE VISIT (OUTPATIENT)
Dept: OPTOMETRY | Facility: CLINIC | Age: 83
End: 2023-09-20
Payer: COMMERCIAL

## 2023-09-20 DIAGNOSIS — H52.203 HYPEROPIA OF BOTH EYES WITH ASTIGMATISM AND PRESBYOPIA: ICD-10-CM

## 2023-09-20 DIAGNOSIS — H25.13 NUCLEAR SCLEROSIS, BILATERAL: Primary | ICD-10-CM

## 2023-09-20 DIAGNOSIS — H40.1110 PRIMARY OPEN ANGLE GLAUCOMA OF RIGHT EYE, UNSPECIFIED GLAUCOMA STAGE: ICD-10-CM

## 2023-09-20 DIAGNOSIS — H52.4 HYPEROPIA OF BOTH EYES WITH ASTIGMATISM AND PRESBYOPIA: ICD-10-CM

## 2023-09-20 DIAGNOSIS — H52.03 HYPEROPIA OF BOTH EYES WITH ASTIGMATISM AND PRESBYOPIA: ICD-10-CM

## 2023-09-20 DIAGNOSIS — H40.002 GLAUCOMA SUSPECT OF LEFT EYE: ICD-10-CM

## 2023-09-20 PROCEDURE — 99999 PR PBB SHADOW E&M-EST. PATIENT-LVL II: ICD-10-PCS | Mod: PBBFAC,HCNC,, | Performed by: OPTOMETRIST

## 2023-09-20 PROCEDURE — 92015 DETERMINE REFRACTIVE STATE: CPT | Mod: HCNC,S$GLB,, | Performed by: OPTOMETRIST

## 2023-09-20 PROCEDURE — 99999 PR PBB SHADOW E&M-EST. PATIENT-LVL II: CPT | Mod: PBBFAC,HCNC,, | Performed by: OPTOMETRIST

## 2023-09-20 PROCEDURE — 92012 INTRM OPH EXAM EST PATIENT: CPT | Mod: HCNC,S$GLB,, | Performed by: OPTOMETRIST

## 2023-09-20 PROCEDURE — 92015 PR REFRACTION: ICD-10-PCS | Mod: HCNC,S$GLB,, | Performed by: OPTOMETRIST

## 2023-09-20 PROCEDURE — 92012 PR EYE EXAM, EST PATIENT,INTERMED: ICD-10-PCS | Mod: HCNC,S$GLB,, | Performed by: OPTOMETRIST

## 2023-09-20 NOTE — PATIENT INSTRUCTIONS
POAG in OD and glaucoma suspect in OS.  Followed by Dr. Garcia.  Last visit with Dr. Garcia two weeks ago.  Continue Latanoprost 0.005% in both eyes.  Bilateral nuclear sclerosis of lens, consistent with age.  No need for cataract surgery in either eye at this time.  Monitor.    Hyperopia with astigmatism in each eye.  Presbyopia/  New spectacle lens RX issued for full=time wear.    Continue to instill Latnaoprost drops in both eyes every evening.  Follow up with Dr. Garcia as she recommends.  Recheck refraction in one year

## 2023-09-20 NOTE — PROGRESS NOTES
"HPI     eye exam            Comments: General eye exam and refraction.  Last eye exam on 09/11/2023 by Dr. Garcia.    DX"  POAG OD and glaucoma suspect OS, nuclear sclerosis OU. Ptosis , Fam   hx of ARMD,  Using Latanoprost 0.005% in both eyes every evening.           Comments    Patient's age: 83 y.o. WM   Occupation: retired   Approximate date of last eye examination:  Dr. Garcia 09/11/2023     See diagnosis above.  Wears glasses? yes      If yes, wears  Full-time or part-time?  Full-time   Present glasses are: Bifocal, SV Distance, SV Reading?  ST bifocal   Approximate age of present glasses:  1 yr +  Got new glasses following last exam, or subsequently?:  Yes    Any problem with VA with glasses?  Maybe vision has gotten a little   worse.   Wears CLs?:  no   Headaches?  no   Eye pain/discomfort? No                                                                                   Flashes?  no   Floaters?  No   Diplopia/Double vision?  no   Patient's Ocular History:          Any eye surgeries? no          Any eye injury?  no          Any treatment for eye disease?  no   Family history of eye disease?  Father had macular degeneration, and   mother had glaucoma   Significant patient medical history:         1. Diabetes?  no      If yes, IDDM or NIDDM? n/a   2. HBP?  Yes, takes meds  States well-controlled               3. Other (describe):  H/o tachycardia. H/a atrial   fibrillation. Takes Pradaxia.  S/P ablation.      ! OTC eyedrops currently using:  No       TAKES PRESERVISION SUPPLEMENT BY MOUTH (FATHER HAD ARMD)   ! Prescription eye meds currently using:  Latanoprost 0.005% qPM OU    ! Any history of allergy/adverse reaction to any eye meds used   previously?  Luimgan QHS OU    ! Any history of allergy/adverse reaction to eyedrops used during prior   eye exam(s)? no    ! Any history of allergy/adverse reaction to Epinephrine or similar meds?   None    ! Patient okay with use of anesthetic eyedrops to check " "eye pressure?    yes        ! Patient okay with use of eyedrops to dilate pupils today?  yes    !  Allergies/Medications/Medical History/Family History reviewed today?    Yes       PD =   64/60   Desired reading distance =  18.5"              Last edited by Ned Irene, OD on 9/20/2023 12:55 PM.            Assessment /Plan     For exam results, see Encounter Report.    1. Nuclear sclerosis, bilateral        2. Primary open angle glaucoma of right eye, unspecified glaucoma stage        3. Glaucoma suspect of left eye        4. Hyperopia of both eyes with astigmatism and presbyopia                         POAG in OD and glaucoma suspect in OS.  Followed by Dr. Garcia.  Last visit with Dr. Garcia two weeks ago.  Continue Latanoprost 0.005% in both eyes.  Bilateral nuclear sclerosis of lens, consistent with age.  No need for cataract surgery in either eye at this time.  Monitor.    Hyperopia with astigmatism in each eye.  Presbyopia/  New spectacle lens RX issued for full=time wear.    Continue to instill Latnaoprost drops in both eyes every evening.  Follow up with Dr. Garcia as she recommends.  Recheck refraction in one year     "

## 2023-10-17 ENCOUNTER — OFFICE VISIT (OUTPATIENT)
Dept: INTERNAL MEDICINE | Facility: CLINIC | Age: 83
End: 2023-10-17
Payer: MEDICARE

## 2023-10-17 VITALS
HEART RATE: 82 BPM | DIASTOLIC BLOOD PRESSURE: 84 MMHG | OXYGEN SATURATION: 99 % | SYSTOLIC BLOOD PRESSURE: 136 MMHG | HEIGHT: 71 IN | BODY MASS INDEX: 29.69 KG/M2 | WEIGHT: 212.06 LBS

## 2023-10-17 DIAGNOSIS — I48.0 PAF (PAROXYSMAL ATRIAL FIBRILLATION): Chronic | ICD-10-CM

## 2023-10-17 DIAGNOSIS — I77.819 ECTATIC AORTA: ICD-10-CM

## 2023-10-17 DIAGNOSIS — I45.10 RIGHT BUNDLE BRANCH BLOCK (RBBB): Chronic | ICD-10-CM

## 2023-10-17 DIAGNOSIS — I47.10 SVT (SUPRAVENTRICULAR TACHYCARDIA): Chronic | ICD-10-CM

## 2023-10-17 DIAGNOSIS — M1A.9XX0 CHRONIC GOUT WITHOUT TOPHUS, UNSPECIFIED CAUSE, UNSPECIFIED SITE: ICD-10-CM

## 2023-10-17 DIAGNOSIS — H40.003 OPEN ANGLE WITH BORDERLINE INTRAOCULAR PRESSURE OF BOTH EYES: ICD-10-CM

## 2023-10-17 DIAGNOSIS — Z79.01 CURRENT USE OF LONG TERM ANTICOAGULATION: ICD-10-CM

## 2023-10-17 DIAGNOSIS — E78.2 MIXED HYPERLIPIDEMIA: Chronic | ICD-10-CM

## 2023-10-17 DIAGNOSIS — D69.6 THROMBOCYTOPENIA: ICD-10-CM

## 2023-10-17 DIAGNOSIS — N18.31 STAGE 3A CHRONIC KIDNEY DISEASE: ICD-10-CM

## 2023-10-17 DIAGNOSIS — R73.01 IMPAIRED FASTING GLUCOSE: ICD-10-CM

## 2023-10-17 DIAGNOSIS — D69.2 SENILE PURPURA: ICD-10-CM

## 2023-10-17 DIAGNOSIS — I87.2 VENOUS INSUFFICIENCY OF RIGHT LEG: ICD-10-CM

## 2023-10-17 DIAGNOSIS — Z00.00 ENCOUNTER FOR PREVENTIVE HEALTH EXAMINATION: Primary | ICD-10-CM

## 2023-10-17 DIAGNOSIS — I10 ESSENTIAL HYPERTENSION: Chronic | ICD-10-CM

## 2023-10-17 PROCEDURE — 3288F FALL RISK ASSESSMENT DOCD: CPT | Mod: HCNC,CPTII,S$GLB, | Performed by: NURSE PRACTITIONER

## 2023-10-17 PROCEDURE — 1100F PR PT FALLS ASSESS DOC 2+ FALLS/FALL W/INJURY/YR: ICD-10-PCS | Mod: HCNC,CPTII,S$GLB, | Performed by: NURSE PRACTITIONER

## 2023-10-17 PROCEDURE — G0439 PR MEDICARE ANNUAL WELLNESS SUBSEQUENT VISIT: ICD-10-PCS | Mod: HCNC,S$GLB,, | Performed by: NURSE PRACTITIONER

## 2023-10-17 PROCEDURE — 1159F PR MEDICATION LIST DOCUMENTED IN MEDICAL RECORD: ICD-10-PCS | Mod: HCNC,CPTII,S$GLB, | Performed by: NURSE PRACTITIONER

## 2023-10-17 PROCEDURE — 1170F FXNL STATUS ASSESSED: CPT | Mod: HCNC,CPTII,S$GLB, | Performed by: NURSE PRACTITIONER

## 2023-10-17 PROCEDURE — 3288F PR FALLS RISK ASSESSMENT DOCUMENTED: ICD-10-PCS | Mod: HCNC,CPTII,S$GLB, | Performed by: NURSE PRACTITIONER

## 2023-10-17 PROCEDURE — 1126F PR PAIN SEVERITY QUANTIFIED, NO PAIN PRESENT: ICD-10-PCS | Mod: HCNC,CPTII,S$GLB, | Performed by: NURSE PRACTITIONER

## 2023-10-17 PROCEDURE — G0439 PPPS, SUBSEQ VISIT: HCPCS | Mod: HCNC,S$GLB,, | Performed by: NURSE PRACTITIONER

## 2023-10-17 PROCEDURE — 1126F AMNT PAIN NOTED NONE PRSNT: CPT | Mod: HCNC,CPTII,S$GLB, | Performed by: NURSE PRACTITIONER

## 2023-10-17 PROCEDURE — 1159F MED LIST DOCD IN RCRD: CPT | Mod: HCNC,CPTII,S$GLB, | Performed by: NURSE PRACTITIONER

## 2023-10-17 PROCEDURE — 3079F PR MOST RECENT DIASTOLIC BLOOD PRESSURE 80-89 MM HG: ICD-10-PCS | Mod: HCNC,CPTII,S$GLB, | Performed by: NURSE PRACTITIONER

## 2023-10-17 PROCEDURE — 1100F PTFALLS ASSESS-DOCD GE2>/YR: CPT | Mod: HCNC,CPTII,S$GLB, | Performed by: NURSE PRACTITIONER

## 2023-10-17 PROCEDURE — 3075F SYST BP GE 130 - 139MM HG: CPT | Mod: HCNC,CPTII,S$GLB, | Performed by: NURSE PRACTITIONER

## 2023-10-17 PROCEDURE — 99999 PR PBB SHADOW E&M-EST. PATIENT-LVL IV: ICD-10-PCS | Mod: PBBFAC,HCNC,, | Performed by: NURSE PRACTITIONER

## 2023-10-17 PROCEDURE — 1170F PR FUNCTIONAL STATUS ASSESSED: ICD-10-PCS | Mod: HCNC,CPTII,S$GLB, | Performed by: NURSE PRACTITIONER

## 2023-10-17 PROCEDURE — 3079F DIAST BP 80-89 MM HG: CPT | Mod: HCNC,CPTII,S$GLB, | Performed by: NURSE PRACTITIONER

## 2023-10-17 PROCEDURE — 1160F PR REVIEW ALL MEDS BY PRESCRIBER/CLIN PHARMACIST DOCUMENTED: ICD-10-PCS | Mod: HCNC,CPTII,S$GLB, | Performed by: NURSE PRACTITIONER

## 2023-10-17 PROCEDURE — 1160F RVW MEDS BY RX/DR IN RCRD: CPT | Mod: HCNC,CPTII,S$GLB, | Performed by: NURSE PRACTITIONER

## 2023-10-17 PROCEDURE — 3075F PR MOST RECENT SYSTOLIC BLOOD PRESS GE 130-139MM HG: ICD-10-PCS | Mod: HCNC,CPTII,S$GLB, | Performed by: NURSE PRACTITIONER

## 2023-10-17 PROCEDURE — 99999 PR PBB SHADOW E&M-EST. PATIENT-LVL IV: CPT | Mod: PBBFAC,HCNC,, | Performed by: NURSE PRACTITIONER

## 2023-10-17 NOTE — PROGRESS NOTES
"Danial Meza presented for a  Medicare AWV and comprehensive Health Risk Assessment today. The following components were reviewed and updated:    Medical history  Family History  Social history  Allergies and Current Medications  Health Risk Assessment  Health Maintenance  Care Team         ** See Completed Assessments for Annual Wellness Visit within the encounter summary.**         The following assessments were completed:  Living Situation  CAGE  Depression Screening  Timed Get Up and Go  Whisper Test - Abnormal, declines audiology referral  Cognitive Function Screening     Nutrition Screening  ADL Screening  PAQ Screening  Review for Opioid Screening: Pt does not have Rx for Opioids.  Review for Substance Use Disorders: Patient does not use substances.        Vitals:    10/17/23 0840   BP: 136/84   BP Location: Right arm   Pulse: 82   SpO2: 99%   Weight: 96.2 kg (212 lb 1.3 oz)   Height: 5' 11" (1.803 m)     Body mass index is 29.58 kg/m².    Physical Exam  Vitals reviewed.   Constitutional:       Appearance: Normal appearance.   HENT:      Head: Normocephalic.   Cardiovascular:      Rate and Rhythm: Rhythm irregular.   Pulmonary:      Effort: Pulmonary effort is normal.   Abdominal:      General: Bowel sounds are normal.   Musculoskeletal:         General: Normal range of motion.      Right lower leg: No edema.      Left lower leg: No edema.   Skin:     General: Skin is warm and dry.      Capillary Refill: Capillary refill takes less than 2 seconds.      Comments: Purpura   Neurological:      Mental Status: He is alert and oriented to person, place, and time.   Psychiatric:         Behavior: Behavior normal.         Thought Content: Thought content normal.         Judgment: Judgment normal.                 Diagnoses and health risks identified today and associated recommendations/orders:    1. Encounter for preventive health examination  Assessments completed.  HM recommendations reviewed. Discussed flu and " covid booster.  F/u with PCP as scheduled.    2. Stage 3a chronic kidney disease  Chronic, stable on current regimen. Followed by PCP.    3. Ectatic aorta  Chronic, stable on current regimen. Followed by cardiology.    4. PAF (paroxysmal atrial fibrillation)  Chronic, stable on current regimen. Followed by cardiology.    5. Thrombocytopenia  Chronic, stable on current regimen. Followed by PCP.    6. Senile purpura  Chronic, stable on current regimen. Followed by PCP.    7. Essential hypertension  Chronic, stable on current regimen. Followed by cardiology.    8. Mixed hyperlipidemia  Chronic, stable on current regimen. Followed by cardiology.    9. Right bundle branch block (RBBB)  Chronic, stable on current regimen. Followed by cardiology.    10. SVT (supraventricular tachycardia)  Chronic, stable on current regimen. Followed by cardiology.    11. Venous insufficiency of right leg  Chronic, stable on current regimen. Followed by cardiology.    12. Current use of long term anticoagulation  Chronic, stable on current regimen. Followed by cardiology.    13. BMI 29.0-29.9,adult  Chronic, stable on current regimen. Followed by PCP.    14. Impaired fasting glucose  Chronic, stable on current regimen. Followed by PCP.    15. 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 Followed by optometry.    16. Chronic gout without tophus, unspecified cause, unspecified site  Chronic, stable on current regimen. Followed by rheumatology.      Provided Danial with a 5-10 year written screening schedule and personal prevention plan. Recommendations were developed using the USPSTF age appropriate recommendations. Education, counseling, and referrals were provided as needed. After Visit Summary printed and given to patient which includes a list of additional screenings\tests needed.    Follow up in about 1 year (around 10/17/2024) for Medicare AWV and with PCP as instructed.       Lyn  MATTHEW Lujan    I offered to discuss advanced care planning, including how to pick a person who would make decisions for you if you were unable to make them for yourself, called a health care power of , and what kind of decisions you might make such as use of life sustaining treatments such as ventilators and tube feeding when faced with a life limiting illness recorded on a living will that they will need to know. (How you want to be cared for as you near the end of your natural life)     X  Patient has advanced directives written and agrees to provide copies to the institution.

## 2023-10-17 NOTE — PATIENT INSTRUCTIONS
1. Follow up with Ruben Rogers MD as scheduled.    2. Flu shots available. RSV vaccines now available.    Counseling and Referral of Other Preventative  (Italic type indicates deductible and co-insurance are waived)    Patient Name: Danial Meza  Today's Date: 10/17/2023    Health Maintenance         Date Due Completion Date    COVID-19 Vaccine (3 - Moderna risk series) 11/25/2021 10/28/2021    Influenza Vaccine (1) 09/01/2023 11/11/2021    Hemoglobin A1c (Prediabetes) 07/06/2024 7/6/2023    Lipid Panel 07/06/2028 7/6/2023    TETANUS VACCINE 01/15/2029 1/15/2019    Colonoscopy 12/19/2032 12/19/2022          No orders of the defined types were placed in this encounter.      The following information is provided to all patients.  This information is to help you find resources for any of the problems found today that may be affecting your health:                Living healthy guide: www.[facility].[place].[facility]      Understanding Diabetes: www.diabetes.org      Eating healthy: www.cdc.gov/healthyweight      CDC home safety checklist: www.cdc.gov/steadi/patient.html      Agency on Aging: www.goea.[place].gov      Alcoholics anonymous (AA): www.aa.org      Physical Activity: www.cynthia.nih.gov/kz6pkse      Tobacco use: www.quitwithusla.org

## 2023-11-08 ENCOUNTER — TELEPHONE (OUTPATIENT)
Dept: INTERNAL MEDICINE | Facility: CLINIC | Age: 83
End: 2023-11-08
Payer: MEDICARE

## 2023-11-08 NOTE — TELEPHONE ENCOUNTER
----- Message from Irais Delgado sent at 11/8/2023 12:33 PM CST -----  Contact: Danial [phone]  Would like to receive medical advice.      Would they like a call back or a response via [facility]:  call back    Additional information:      Danial is calling to speak to the provider about getting an ENT provider to have his ears check and cleaned. Please call Danial back for advice.

## 2023-11-13 ENCOUNTER — TELEPHONE (OUTPATIENT)
Dept: INTERNAL MEDICINE | Facility: CLINIC | Age: 83
End: 2023-11-13

## 2023-11-13 NOTE — TELEPHONE ENCOUNTER
----- Message from Anitra Moore sent at 11/10/2023  9:07 AM CST -----  Contact: [phone]  [email or web address]     Patient is calling for Medical Advice regarding:   pt is calling to speak to the provider about getting an ENT provider to have his ears check and cleaned. Please call Danial back for advice.      Would like response via [facility]:  ##call back     Comments:   Second notice

## 2023-11-14 NOTE — TELEPHONE ENCOUNTER
----- Message from Viola Booker sent at 11/13/2023  9:08 AM CST -----  Contact: Danial   Patient needs call back. He is very upset. He sent Dr Arriaza a message since 11/8/2023 and hasn't received a response since. Message is in Patient's chart.  Please call to advise

## 2023-11-26 DIAGNOSIS — E11.65 TYPE 2 DIABETES MELLITUS WITH HYPERGLYCEMIA, WITHOUT LONG-TERM CURRENT USE OF INSULIN: ICD-10-CM

## 2023-11-26 DIAGNOSIS — E78.2 MIXED HYPERLIPIDEMIA: ICD-10-CM

## 2023-11-27 ENCOUNTER — OFFICE VISIT (OUTPATIENT)
Dept: DERMATOLOGY | Facility: CLINIC | Age: 83
End: 2023-11-27
Payer: MEDICARE

## 2023-11-27 DIAGNOSIS — D18.01 ANGIOMA OF SKIN: ICD-10-CM

## 2023-11-27 DIAGNOSIS — L57.8 CHRONIC SOLAR DERMATITIS: ICD-10-CM

## 2023-11-27 DIAGNOSIS — D22.9 MULTIPLE BENIGN NEVI: ICD-10-CM

## 2023-11-27 DIAGNOSIS — L82.1 SK (SEBORRHEIC KERATOSIS): ICD-10-CM

## 2023-11-27 DIAGNOSIS — L57.0 AK (ACTINIC KERATOSIS): Primary | ICD-10-CM

## 2023-11-27 DIAGNOSIS — Z85.828 PERSONAL HISTORY OF MALIGNANT NEOPLASM OF SKIN: ICD-10-CM

## 2023-11-27 DIAGNOSIS — B07.9 VERRUCA VULGARIS: ICD-10-CM

## 2023-11-27 DIAGNOSIS — L30.9 ECZEMA, UNSPECIFIED TYPE: ICD-10-CM

## 2023-11-27 PROCEDURE — 1159F PR MEDICATION LIST DOCUMENTED IN MEDICAL RECORD: ICD-10-PCS | Mod: HCNC,CPTII,S$GLB, | Performed by: DERMATOLOGY

## 2023-11-27 PROCEDURE — 1160F PR REVIEW ALL MEDS BY PRESCRIBER/CLIN PHARMACIST DOCUMENTED: ICD-10-PCS | Mod: HCNC,CPTII,S$GLB, | Performed by: DERMATOLOGY

## 2023-11-27 PROCEDURE — 17000 PR DESTRUCTION(LASER SURGERY,CRYOSURGERY,CHEMOSURGERY),PREMALIGNANT LESIONS,FIRST LESION: ICD-10-PCS | Mod: HCNC,S$GLB,, | Performed by: DERMATOLOGY

## 2023-11-27 PROCEDURE — 1126F AMNT PAIN NOTED NONE PRSNT: CPT | Mod: HCNC,CPTII,S$GLB, | Performed by: DERMATOLOGY

## 2023-11-27 PROCEDURE — 1160F RVW MEDS BY RX/DR IN RCRD: CPT | Mod: HCNC,CPTII,S$GLB, | Performed by: DERMATOLOGY

## 2023-11-27 PROCEDURE — 3288F FALL RISK ASSESSMENT DOCD: CPT | Mod: HCNC,CPTII,S$GLB, | Performed by: DERMATOLOGY

## 2023-11-27 PROCEDURE — 17003 DESTRUCTION, PREMALIGNANT LESIONS; SECOND THROUGH 14 LESIONS: ICD-10-PCS | Mod: HCNC,S$GLB,, | Performed by: DERMATOLOGY

## 2023-11-27 PROCEDURE — 17003 DESTRUCT PREMALG LES 2-14: CPT | Mod: HCNC,S$GLB,, | Performed by: DERMATOLOGY

## 2023-11-27 PROCEDURE — 99214 PR OFFICE/OUTPT VISIT, EST, LEVL IV, 30-39 MIN: ICD-10-PCS | Mod: 25,HCNC,S$GLB, | Performed by: DERMATOLOGY

## 2023-11-27 PROCEDURE — 99999 PR PBB SHADOW E&M-EST. PATIENT-LVL III: ICD-10-PCS | Mod: PBBFAC,HCNC,, | Performed by: DERMATOLOGY

## 2023-11-27 PROCEDURE — 99999 PR PBB SHADOW E&M-EST. PATIENT-LVL III: CPT | Mod: PBBFAC,HCNC,, | Performed by: DERMATOLOGY

## 2023-11-27 PROCEDURE — 17000 DESTRUCT PREMALG LESION: CPT | Mod: HCNC,S$GLB,, | Performed by: DERMATOLOGY

## 2023-11-27 PROCEDURE — 3288F PR FALLS RISK ASSESSMENT DOCUMENTED: ICD-10-PCS | Mod: HCNC,CPTII,S$GLB, | Performed by: DERMATOLOGY

## 2023-11-27 PROCEDURE — 1126F PR PAIN SEVERITY QUANTIFIED, NO PAIN PRESENT: ICD-10-PCS | Mod: HCNC,CPTII,S$GLB, | Performed by: DERMATOLOGY

## 2023-11-27 PROCEDURE — 1101F PT FALLS ASSESS-DOCD LE1/YR: CPT | Mod: HCNC,CPTII,S$GLB, | Performed by: DERMATOLOGY

## 2023-11-27 PROCEDURE — 1101F PR PT FALLS ASSESS DOC 0-1 FALLS W/OUT INJ PAST YR: ICD-10-PCS | Mod: HCNC,CPTII,S$GLB, | Performed by: DERMATOLOGY

## 2023-11-27 PROCEDURE — 1159F MED LIST DOCD IN RCRD: CPT | Mod: HCNC,CPTII,S$GLB, | Performed by: DERMATOLOGY

## 2023-11-27 PROCEDURE — 99214 OFFICE O/P EST MOD 30 MIN: CPT | Mod: 25,HCNC,S$GLB, | Performed by: DERMATOLOGY

## 2023-11-27 RX ORDER — CLOBETASOL PROPIONATE 0.5 MG/G
OINTMENT TOPICAL
Qty: 60 G | Refills: 0 | Status: SHIPPED | OUTPATIENT
Start: 2023-11-27

## 2023-11-27 RX ORDER — ATORVASTATIN CALCIUM 20 MG/1
20 TABLET, FILM COATED ORAL NIGHTLY
Qty: 90 TABLET | Refills: 3 | Status: SHIPPED | OUTPATIENT
Start: 2023-11-27

## 2023-11-27 NOTE — PROGRESS NOTES
Subjective:      Patient ID:  Danila Meza is a 83 y.o. male who presents for   Chief Complaint   Patient presents with    Skin Check     UBSE    Lesion     R thumb, L dorsal hand     History of Present Illness: The patient presents for follow up of skin check.    The patient was last seen on: 11/22/22 for skin check and bx to the left chest - ISK (benign lesion).  This is a high risk patient here to check for the development of new lesions.    Other skin complaints:   Patient with new complaint of lesion(s)  Location: R thumb  Duration: 2-4 weeks  Symptoms: bleeds occ  Relieving factors/Previous treatments: bacitracin oint prn    Patient with new complaint of lesion(s)  Location: L dorsal hand  Duration: unsure  Symptoms: none  Relieving factors/Previous treatments: none      Review of Systems   Skin:  Negative for daily sunscreen use, activity-related sunscreen use, recent sunburn and wears hat.   Hematologic/Lymphatic: Bruises/bleeds easily (on eliquis).       Objective:   Physical Exam   Constitutional: He appears well-developed and well-nourished. No distress.   Neurological: He is alert and oriented to person, place, and time. He is not disoriented.   Psychiatric: He has a normal mood and affect.   Skin:   Areas Examined (abnormalities noted in diagram):   Scalp / Hair Palpated and Inspected  Head / Face Inspection Performed  Neck Inspection Performed  Chest / Axilla Inspection Performed  Back Inspection Performed  RUE Inspected  LUE Inspection Performed  Nails and Digits Inspection Performed                     Diagram Legend     Erythematous scaling macule/papule c/w actinic keratosis       Vascular papule c/w angioma      Pigmented verrucoid papule/plaque c/w seborrheic keratosis      Yellow umbilicated papule c/w sebaceous hyperplasia      Irregularly shaped tan macule c/w lentigo     1-2 mm smooth white papules consistent with Milia      Movable subcutaneous cyst with punctum c/w epidermal inclusion  cyst      Subcutaneous movable cyst c/w pilar cyst      Firm pink to brown papule c/w dermatofibroma      Pedunculated fleshy papule(s) c/w skin tag(s)      Evenly pigmented macule c/w junctional nevus     Mildly variegated pigmented, slightly irregular-bordered macule c/w mildly atypical nevus      Flesh colored to evenly pigmented papule c/w intradermal nevus       Pink pearly papule/plaque c/w basal cell carcinoma      Erythematous hyperkeratotic cursted plaque c/w SCC      Surgical scar with no sign of skin cancer recurrence      Open and closed comedones      Inflammatory papules and pustules      Verrucoid papule consistent consistent with wart     Erythematous eczematous patches and plaques     Dystrophic onycholytic nail with subungual debris c/w onychomycosis     Umbilicated papule    Erythematous-base heme-crusted tan verrucoid plaque consistent with inflamed seborrheic keratosis     Erythematous Silvery Scaling Plaque c/w Psoriasis     See annotation      Assessment / Plan:        AK (actinic keratosis)  Cryosurgery Procedure Note    Verbal consent from the patient is obtained including, but not limited to, risk of hypopigmentation/hyperpigmentation, scar, recurrence of lesion. The patient is aware of the precancerous quality and need for treatment of these lesions. Liquid nitrogen cryosurgery is applied to the 2 actinic keratoses, as detailed in the physical exam, to produce a freeze injury. The patient is aware that blisters may form and is instructed on wound care with gentle cleansing and use of vaseline ointment to keep moist until healed. The patient is supplied a handout on cryosurgery and is instructed to call if lesions do not completely resolve.    Wear hat always!    Chronic solar dermatitis  Apply Am Lactin lotion or cream to arms and hands nightly. Available over-the counter.    SK (seborrheic keratosis)   - minor problem and chronic.   Reassurance given to patient. No treatment necessary.      Multiple benign nevi   - minor problem and chronic.   Reassurance given to patient. No treatment necessary.     Angioma of skin   - minor problem and chronic.   Reassurance given to patient. No treatment necessary.     Verruca vulgaris - right periungual with surrounding Eczema, unspecified type  -     clobetasol 0.05% (TEMOVATE) 0.05 % Oint; AAA thumb bid - occlude qhs  Dispense: 60 g; Refill: 0  Will reassess and treat wart when eczema resolved (consider candida for wart treatment)    Personal history of malignant neoplasm of skin  Area(s) of previous NMSC evaluated with no signs of recurrence.    Upper body skin examination performed today including at least 6 points as noted in physical examination. No lesions suspicious for malignancy noted.    Recommend daily sun protection/avoidance and use of at least SPF 30, broad spectrum sunscreen (OTC drug).              Follow up in about 1 year (around 11/27/2024), or if symptoms worsen or fail to improve.

## 2023-11-27 NOTE — PATIENT INSTRUCTIONS
CRYOSURGERY      Your doctor has used a method called cryosurgery to treat your skin condition. Cryosurgery refers to the use of very cold substances to treat a variety of skin conditions such as warts, pre-skin cancers, molluscum contagiosum, sun spots, and several benign growths. The substance we use in cryosurgery is liquid nitrogen and is so cold (-195 degrees Celsius) that is burns when administered.     Following treatment in the office, the skin may immediately burn and become red. You may find the area around the lesion is affected as well. It is sometimes necessary to treat not only the lesion, but a small area of the surrounding normal skin to achieve a good response.     A blister, and even a blood filled blister, may form after treatment.   This is a normal response. If the blister is painful, it is acceptable to sterilize a needle and with rubbing alcohol and gently pop the blister. It is important that you gently wash the area with soap and warm water as the blister fluid may contain wart virus if a wart was treated. Do no remove the roof of the blister.     The area treated can take anywhere from 1-3 weeks to heal. Healing time depends on the kind skin lesion treated, the location, and how aggressively the lesion was treated. It is recommended that the areas treated are covered with Vaseline or bacitracin ointment and a band-aid. If a band-aid is not practical, just ointment applied several times per day will do. Keeping these areas moist will speed the healing time.    Treatment with liquid nitrogen can leave a scar. In dark skin, it may be a light or dark scar, in light skin it may be a white or pink scar. These will generally fade with time, but may never go away completely.     If you have any concerns after your treatment, please feel free to call the office.       [address] [place] [address]/ [phone] FAX/ [email or web address]     SEBORRHEIC KERATOSES        What  causes seborrheic keratoses?    Seborrheic keratoses are harmless, common skin growths that first appear during adult life.  As time goes by, more growths appear.  Some persons have a very large number of them.  Seborrheic keratoses appear on both covered and uncovered parts of the body; they are not caused by sunlight.  The tendency to develop seborrheic keratoses is inherited.    Seborrheic keratoses are harmless and never become malignant.  They begin as slightly raised, light brown spots.  Gradually they thicken and take on a rough wartlike surface.  They slowly darken and may turn black.  These color changes are harmless.  Seborrheic keratoses are superficial and look as if they were stuck on the skin.  Persons who have had several seborrheic keratoses can usually recognize this type of benign growth.  However, if you are concerned or unsure about any growth, consult me.    Treatment    Seborrheic keratoses can easily be removed in the office.  The only reason for removing a seborrheic keratosis is your wish to get rid of it.

## 2023-11-28 ENCOUNTER — IMMUNIZATION (OUTPATIENT)
Dept: INTERNAL MEDICINE | Facility: CLINIC | Age: 83
End: 2023-11-28
Payer: MEDICARE

## 2023-11-28 ENCOUNTER — OFFICE VISIT (OUTPATIENT)
Dept: INTERNAL MEDICINE | Facility: CLINIC | Age: 83
End: 2023-11-28
Payer: MEDICARE

## 2023-11-28 VITALS
SYSTOLIC BLOOD PRESSURE: 138 MMHG | WEIGHT: 217.13 LBS | HEIGHT: 71 IN | BODY MASS INDEX: 30.4 KG/M2 | HEART RATE: 65 BPM | DIASTOLIC BLOOD PRESSURE: 90 MMHG

## 2023-11-28 DIAGNOSIS — R73.03 PREDIABETES: ICD-10-CM

## 2023-11-28 DIAGNOSIS — I87.2 VENOUS INSUFFICIENCY OF RIGHT LEG: ICD-10-CM

## 2023-11-28 DIAGNOSIS — Z76.89 ENCOUNTER TO ESTABLISH CARE: Primary | ICD-10-CM

## 2023-11-28 DIAGNOSIS — E78.2 MIXED HYPERLIPIDEMIA: Chronic | ICD-10-CM

## 2023-11-28 DIAGNOSIS — M1A.9XX0 CHRONIC GOUT WITHOUT TOPHUS, UNSPECIFIED CAUSE, UNSPECIFIED SITE: ICD-10-CM

## 2023-11-28 DIAGNOSIS — N18.31 STAGE 3A CHRONIC KIDNEY DISEASE: ICD-10-CM

## 2023-11-28 DIAGNOSIS — I10 ESSENTIAL HYPERTENSION: Chronic | ICD-10-CM

## 2023-11-28 DIAGNOSIS — I48.0 PAF (PAROXYSMAL ATRIAL FIBRILLATION): Chronic | ICD-10-CM

## 2023-11-28 DIAGNOSIS — R89.9 ABNORMAL LABORATORY TEST: ICD-10-CM

## 2023-11-28 DIAGNOSIS — Z23 NEED FOR VACCINATION: Primary | ICD-10-CM

## 2023-11-28 DIAGNOSIS — Z12.5 PROSTATE CANCER SCREENING: ICD-10-CM

## 2023-11-28 PROCEDURE — 1159F MED LIST DOCD IN RCRD: CPT | Mod: HCNC,CPTII,GC,S$GLB

## 2023-11-28 PROCEDURE — G0008 ADMIN INFLUENZA VIRUS VAC: HCPCS | Mod: HCNC,GC,S$GLB, | Performed by: FAMILY MEDICINE

## 2023-11-28 PROCEDURE — 1101F PT FALLS ASSESS-DOCD LE1/YR: CPT | Mod: HCNC,CPTII,GC,S$GLB

## 2023-11-28 PROCEDURE — 3075F SYST BP GE 130 - 139MM HG: CPT | Mod: HCNC,CPTII,GC,S$GLB

## 2023-11-28 PROCEDURE — 1159F PR MEDICATION LIST DOCUMENTED IN MEDICAL RECORD: ICD-10-PCS | Mod: HCNC,CPTII,GC,S$GLB

## 2023-11-28 PROCEDURE — 99999 PR PBB SHADOW E&M-EST. PATIENT-LVL IV: ICD-10-PCS | Mod: PBBFAC,HCNC,GC,

## 2023-11-28 PROCEDURE — 3288F FALL RISK ASSESSMENT DOCD: CPT | Mod: HCNC,CPTII,GC,S$GLB

## 2023-11-28 PROCEDURE — 90694 VACC AIIV4 NO PRSRV 0.5ML IM: CPT | Mod: HCNC,GC,S$GLB, | Performed by: FAMILY MEDICINE

## 2023-11-28 PROCEDURE — 90694 FLU VACCINE - QUADRIVALENT - ADJUVANTED: ICD-10-PCS | Mod: HCNC,GC,S$GLB, | Performed by: FAMILY MEDICINE

## 2023-11-28 PROCEDURE — 3080F DIAST BP >= 90 MM HG: CPT | Mod: HCNC,CPTII,GC,S$GLB

## 2023-11-28 PROCEDURE — 1101F PR PT FALLS ASSESS DOC 0-1 FALLS W/OUT INJ PAST YR: ICD-10-PCS | Mod: HCNC,CPTII,GC,S$GLB

## 2023-11-28 PROCEDURE — 99213 PR OFFICE/OUTPT VISIT, EST, LEVL III, 20-29 MIN: ICD-10-PCS | Mod: HCNC,GC,S$GLB,

## 2023-11-28 PROCEDURE — 3080F PR MOST RECENT DIASTOLIC BLOOD PRESSURE >= 90 MM HG: ICD-10-PCS | Mod: HCNC,CPTII,GC,S$GLB

## 2023-11-28 PROCEDURE — 99213 OFFICE O/P EST LOW 20 MIN: CPT | Mod: HCNC,GC,S$GLB,

## 2023-11-28 PROCEDURE — G0008 FLU VACCINE - QUADRIVALENT - ADJUVANTED: ICD-10-PCS | Mod: HCNC,GC,S$GLB, | Performed by: FAMILY MEDICINE

## 2023-11-28 PROCEDURE — 99999 PR PBB SHADOW E&M-EST. PATIENT-LVL IV: CPT | Mod: PBBFAC,HCNC,GC,

## 2023-11-28 PROCEDURE — 3075F PR MOST RECENT SYSTOLIC BLOOD PRESS GE 130-139MM HG: ICD-10-PCS | Mod: HCNC,CPTII,GC,S$GLB

## 2023-11-28 PROCEDURE — 3288F PR FALLS RISK ASSESSMENT DOCUMENTED: ICD-10-PCS | Mod: HCNC,CPTII,GC,S$GLB

## 2023-11-28 NOTE — PROGRESS NOTES
Clinic Note  11/28/2023      Subjective:       Patient ID:  Eddie is a 83 y.o. male being seen for an established care visit.    Chief Complaint: Establish Care    84 yo M with HTN, Afib (permanent, rate controlled per cards and Dr. Arriaza), HLD, gout, glaucoma, CKD3a who presents to establish care.     Afib:  - permanent  - no falls  - eliquis is free now. On metoprolol succinate too  - denies bruising/bleeding  - 2 ablations  - sees Dr. Sorto     HTN  - -128 at home  - checks at home  - denies dizziness  - has been on lisinopril 10 for many years    Gout  - followed by Dr. Bassett  - uric acid ordered for Dec 6 labs  - controlled on allopurinol and colchicine     HLD  - lipitor 20  - last LDL has been in 30s    Past medical hx: HTN, Afib (permanent, rate controlled per cards and Dr. Arriaza), HLD, gout, glaucoma, CKD3a     Current meds updated    Family hx  - brother had colon cancer  - son had lymphoma     Social Hx  Tobacco use: never  Vaping/E-cigarettes: none  EtOH use: 1-2 beers/week  Illicit drug use: none  IVDU: none  Diet: sometimes hot dogs and chicken livers. Veggies, 1/2 bagel, sausage or ward. Minimal fast food  Exercise: washing car, walking  Occupation: retired  Sexual hx: with wife     Vaccinations:  Flu: due, interested  Pneumococcal: complete  TDAP: complete  Shingrix: complete  COVID: not interested in booster  HPV: aged out    Health Maintenance:  Colonoscopy: hyperplastic polyp in Dec 2022  HIV screen: negative  HCV screen: negative  Lipid panel: not due  PSA: aged out  Eye exam: sees optometry regularly    Functionality:  Lives with wife  is able to complete daily activities  Ambulates independently           Review of Systems   Cardiovascular:  Negative for chest pain, palpitations and leg swelling.   Gastrointestinal:  Negative for abdominal pain.   Neurological:  Negative for dizziness.       Medication List with Changes/Refills   Current Medications    ALLOPURINOL (ZYLOPRIM) 300 MG  TABLET    TAKE 1 TABLET ONE TIME DAILY    ATORVASTATIN (LIPITOR) 20 MG TABLET    TAKE 1 TABLET (20 MG TOTAL) BY MOUTH EVERY EVENING.    CLOBETASOL 0.05% (TEMOVATE) 0.05 % OINT    AAA thumb bid - occlude qhs    COLCHICINE, GOUT, (COLCRYS) 0.6 MG TABLET    TAKE 1 TABLET ONE TIME DAILY    ELIQUIS 5 MG TAB    TAKE 1 TABLET TWICE DAILY    LATANOPROST 0.005 % OPHTHALMIC SOLUTION    Place 1 drop into both eyes every evening.    LISINOPRIL 10 MG TABLET    TAKE 1 TABLET (10 MG TOTAL) BY MOUTH ONCE DAILY.    METOPROLOL SUCCINATE (TOPROL-XL) 50 MG 24 HR TABLET    TAKE 1 TABLET EVERY DAY    MULTIVITAMIN (THERAGRAN) PER TABLET    Take 1 tablet by mouth once daily.    VIT C/VIT E/LUTEIN/MIN/OMEGA-3 (OCUVITE ORAL)    Take 1 tablet by mouth once daily.        Patient Active Problem List   Diagnosis    Open angle with borderline glaucoma findings - Both Eyes    Nuclear sclerosis - Both Eyes    Family history of macular degeneration    Hyperopia with astigmatism and presbyopia    Gout    Essential hypertension    Mixed hyperlipidemia    PAF (paroxysmal atrial fibrillation)    Open angle with borderline findings, low risk    Current use of long term anticoagulation    S/P ablation of atrial flutter 11/4/15    Venous insufficiency of right leg    Sinus bradycardia    Right bundle branch block (RBBB)    Brow ptosis    Small pupils    History of Bell's palsy    Impaired fasting glucose    Elevated transaminase level    Calculus of gallbladder without cholecystitis    Community acquired pneumonia of left lower lobe of lung    Osteoarthritis of right foot    Osteomyelitis of right foot    Gouty arthritis of toe of right foot    Stage 3a chronic kidney disease    SVT (supraventricular tachycardia)    Class 1 obesity due to excess calories without serious comorbidity with body mass index (BMI) of 30.0 to 30.9 in adult    Carpal tunnel syndrome on right    Ectatic aorta    Dupuytren's contracture of right hand    Gout with tophi          "  Objective:      BP (!) 138/90 (BP Location: Right arm)   Pulse 65   Ht 5' 11" (1.803 m)   Wt 98.5 kg (217 lb 2.5 oz)   BMI 30.29 kg/m²   Estimated body mass index is 30.29 kg/m² as calculated from the following:    Height as of this encounter: 5' 11" (1.803 m).    Weight as of this encounter: 98.5 kg (217 lb 2.5 oz).  Physical Exam  Constitutional:       General: He is not in acute distress.     Appearance: Normal appearance. He is not ill-appearing.   Eyes:      General: No scleral icterus.  Cardiovascular:      Rate and Rhythm: Normal rate. Rhythm irregular.      Pulses: Normal pulses.      Heart sounds: Murmur heard.   Pulmonary:      Effort: Pulmonary effort is normal. No respiratory distress.      Breath sounds: Normal breath sounds. No wheezing or rales.   Abdominal:      General: Abdomen is flat. There is no distension.      Palpations: Abdomen is soft.      Tenderness: There is no abdominal tenderness. There is no guarding.   Musculoskeletal:         General: Normal range of motion.      Right lower leg: No edema.      Left lower leg: No edema.      Comments: Varicose veins present   Skin:     General: Skin is warm and dry.      Coloration: Skin is not jaundiced.   Neurological:      General: No focal deficit present.      Mental Status: He is alert and oriented to person, place, and time.   Psychiatric:         Mood and Affect: Mood normal.         Behavior: Behavior normal.         Thought Content: Thought content normal.         Assessment and Plan:         Problem List Items Addressed This Visit          Cardiac/Vascular    Essential hypertension (Chronic)    Current Assessment & Plan     Elevated in clinic, but mostly controlled. Adherent with BP meds    Patient will measure at home and bring in log prior to follow up.               Mixed hyperlipidemia (Chronic)    Overview     10/10/18 start atorvastatin 20 mg hs         Current Assessment & Plan     Fasting labs ordered for January         " Relevant Orders    LIPID PANEL    PAF (paroxysmal atrial fibrillation) (Chronic)    Current Assessment & Plan     Controlled on toprol and eliquis         Relevant Orders    HEMOGLOBIN A1C    Venous insufficiency of right leg    Current Assessment & Plan     Has compression stockings, rarely uses            Renal/    Stage 3a chronic kidney disease    Relevant Orders    Comprehensive Metabolic Panel       Orthopedic    Gout     Other Visit Diagnoses       Encounter to establish care    -  Primary    Relevant Orders    CBC W/ AUTO DIFFERENTIAL    Prostate cancer screening        Relevant Orders    PSA, SCREENING    Prediabetes        Relevant Orders    HEMOGLOBIN A1C    Abnormal laboratory test        Relevant Orders    CBC W/ AUTO DIFFERENTIAL            Follow Up:   Follow up in about 6 months (around 5/28/2024) for follow up.        Pilo Bazzi      Signed:   Pilo Bazzi MD - PGY3  Internal Medicine  [facility]

## 2023-11-28 NOTE — ASSESSMENT & PLAN NOTE
Elevated in clinic, but mostly controlled. Adherent with BP meds    Patient will measure at home and bring in log prior to follow up.

## 2023-11-29 ENCOUNTER — TELEPHONE (OUTPATIENT)
Dept: INTERNAL MEDICINE | Facility: CLINIC | Age: 83
End: 2023-11-29
Payer: MEDICARE

## 2023-11-29 DIAGNOSIS — H93.8X9 SENSATION OF PLUGGED EAR, UNSPECIFIED LATERALITY: Primary | ICD-10-CM

## 2023-11-29 NOTE — TELEPHONE ENCOUNTER
Left pt a voicemail informing him that Dr. Rea isn't accepting new patients. Offered pt assistance with scheduling est care visit with someone else.    Nav ARMENDARIZ

## 2023-11-29 NOTE — TELEPHONE ENCOUNTER
----- Message from Kendy Blas sent at 11/29/2023  1:37 PM CST -----  Contact: [phone]  Pt called to advise that the provider used to see his parent's and he understands he is no longer accepting new pt's; however he is asking if the provider would make an exception for him. Please Advise

## 2023-12-06 ENCOUNTER — LAB VISIT (OUTPATIENT)
Dept: LAB | Facility: HOSPITAL | Age: 83
End: 2023-12-06
Attending: INTERNAL MEDICINE
Payer: MEDICARE

## 2023-12-06 DIAGNOSIS — R73.03 PREDIABETES: ICD-10-CM

## 2023-12-06 DIAGNOSIS — N18.31 STAGE 3A CHRONIC KIDNEY DISEASE: ICD-10-CM

## 2023-12-06 DIAGNOSIS — E78.2 MIXED HYPERLIPIDEMIA: Chronic | ICD-10-CM

## 2023-12-06 DIAGNOSIS — M1A.9XX1 GOUT WITH TOPHI: ICD-10-CM

## 2023-12-06 DIAGNOSIS — Z12.5 PROSTATE CANCER SCREENING: ICD-10-CM

## 2023-12-06 DIAGNOSIS — Z76.89 ENCOUNTER TO ESTABLISH CARE: ICD-10-CM

## 2023-12-06 DIAGNOSIS — R89.9 ABNORMAL LABORATORY TEST: ICD-10-CM

## 2023-12-06 DIAGNOSIS — I48.0 PAF (PAROXYSMAL ATRIAL FIBRILLATION): Chronic | ICD-10-CM

## 2023-12-06 LAB
ALBUMIN SERPL BCP-MCNC: 4.2 G/DL (ref 3.5–5.2)
ALP SERPL-CCNC: 81 U/L (ref 55–135)
ALT SERPL W/O P-5'-P-CCNC: 19 U/L (ref 10–44)
ANION GAP SERPL CALC-SCNC: 9 MMOL/L (ref 8–16)
AST SERPL-CCNC: 22 U/L (ref 10–40)
BASOPHILS # BLD AUTO: 0.06 K/UL (ref 0–0.2)
BASOPHILS NFR BLD: 1 % (ref 0–1.9)
BILIRUB SERPL-MCNC: 1.2 MG/DL (ref 0.1–1)
BUN SERPL-MCNC: 19 MG/DL (ref 8–23)
CALCIUM SERPL-MCNC: 9.9 MG/DL (ref 8.7–10.5)
CHLORIDE SERPL-SCNC: 110 MMOL/L (ref 95–110)
CHOLEST SERPL-MCNC: 81 MG/DL (ref 120–199)
CHOLEST/HDLC SERPL: 3.7 {RATIO} (ref 2–5)
CO2 SERPL-SCNC: 26 MMOL/L (ref 23–29)
COMPLEXED PSA SERPL-MCNC: 0.6 NG/ML (ref 0–4)
CREAT SERPL-MCNC: 1.1 MG/DL (ref 0.5–1.4)
DIFFERENTIAL METHOD: ABNORMAL
EOSINOPHIL # BLD AUTO: 0.2 K/UL (ref 0–0.5)
EOSINOPHIL NFR BLD: 3.2 % (ref 0–8)
ERYTHROCYTE [DISTWIDTH] IN BLOOD BY AUTOMATED COUNT: 13.1 % (ref 11.5–14.5)
EST. GFR  (NO RACE VARIABLE): >60 ML/MIN/1.73 M^2
ESTIMATED AVG GLUCOSE: 120 MG/DL (ref 68–131)
GLUCOSE SERPL-MCNC: 128 MG/DL (ref 70–110)
HBA1C MFR BLD: 5.8 % (ref 4–5.6)
HCT VFR BLD AUTO: 45.6 % (ref 40–54)
HDLC SERPL-MCNC: 22 MG/DL (ref 40–75)
HDLC SERPL: 27.2 % (ref 20–50)
HGB BLD-MCNC: 15.7 G/DL (ref 14–18)
IMM GRANULOCYTES # BLD AUTO: 0.01 K/UL (ref 0–0.04)
IMM GRANULOCYTES NFR BLD AUTO: 0.2 % (ref 0–0.5)
LDLC SERPL CALC-MCNC: 34.8 MG/DL (ref 63–159)
LYMPHOCYTES # BLD AUTO: 2.1 K/UL (ref 1–4.8)
LYMPHOCYTES NFR BLD: 33.6 % (ref 18–48)
MCH RBC QN AUTO: 34.1 PG (ref 27–31)
MCHC RBC AUTO-ENTMCNC: 34.4 G/DL (ref 32–36)
MCV RBC AUTO: 99 FL (ref 82–98)
MONOCYTES # BLD AUTO: 0.4 K/UL (ref 0.3–1)
MONOCYTES NFR BLD: 5.9 % (ref 4–15)
NEUTROPHILS # BLD AUTO: 3.5 K/UL (ref 1.8–7.7)
NEUTROPHILS NFR BLD: 56.1 % (ref 38–73)
NONHDLC SERPL-MCNC: 59 MG/DL
NRBC BLD-RTO: 0 /100 WBC
PLATELET # BLD AUTO: 149 K/UL (ref 150–450)
PMV BLD AUTO: 13.4 FL (ref 9.2–12.9)
POTASSIUM SERPL-SCNC: 4.5 MMOL/L (ref 3.5–5.1)
PROT SERPL-MCNC: 6.6 G/DL (ref 6–8.4)
RBC # BLD AUTO: 4.6 M/UL (ref 4.6–6.2)
SODIUM SERPL-SCNC: 145 MMOL/L (ref 136–145)
TRIGL SERPL-MCNC: 121 MG/DL (ref 30–150)
URATE SERPL-MCNC: 5.6 MG/DL (ref 3.4–7)
WBC # BLD AUTO: 6.25 K/UL (ref 3.9–12.7)

## 2023-12-06 PROCEDURE — 84153 ASSAY OF PSA TOTAL: CPT | Mod: HCNC

## 2023-12-06 PROCEDURE — 36415 COLL VENOUS BLD VENIPUNCTURE: CPT | Mod: HCNC

## 2023-12-06 PROCEDURE — 80053 COMPREHEN METABOLIC PANEL: CPT | Mod: HCNC

## 2023-12-06 PROCEDURE — 85025 COMPLETE CBC W/AUTO DIFF WBC: CPT | Mod: HCNC

## 2023-12-06 PROCEDURE — 80061 LIPID PANEL: CPT | Mod: HCNC

## 2023-12-06 PROCEDURE — 83036 HEMOGLOBIN GLYCOSYLATED A1C: CPT | Mod: HCNC

## 2023-12-06 PROCEDURE — 84550 ASSAY OF BLOOD/URIC ACID: CPT | Mod: HCNC | Performed by: INTERNAL MEDICINE

## 2023-12-08 ENCOUNTER — TELEPHONE (OUTPATIENT)
Dept: RHEUMATOLOGY | Facility: CLINIC | Age: 83
End: 2023-12-08
Payer: MEDICARE

## 2023-12-08 NOTE — TELEPHONE ENCOUNTER
-Discussed recent labs and pain with patient. Patient verbalized understanding     Thank you,   Kayli     ----- Message from Alirio Bassett MD sent at 12/7/2023  7:57 AM CST -----  Uric acid level is normal, continue the same dose of allopurinol.

## 2023-12-14 ENCOUNTER — TELEPHONE (OUTPATIENT)
Dept: CARDIOLOGY | Facility: CLINIC | Age: 83
End: 2023-12-14
Payer: MEDICARE

## 2023-12-14 NOTE — TELEPHONE ENCOUNTER
----- Message from Jose Rafael Sorto Jr., MD sent at 12/14/2023 11:42 AM CST -----  Regarding: RE: dental clr  Okay to stop Eliquis 2 days prior to procedure and day of procedure, resume the day subsequent.  ----- Message -----  From: Koki Galvan MA  Sent: 12/14/2023  11:08 AM CST  To: Jose Rafael Sorto Jr., MD  Subject: FW: dental clr                                     ----- Message -----  From: Viola Nelson  Sent: 12/14/2023  10:50 AM CST  To: [facility]ore[facility]illa Staff  Subject: dental clr                                       Pls call pt at [phone].  He is having a root canal on 12/19/23 and needs to know if he has to stop his eliquis.    Thank you

## 2023-12-26 ENCOUNTER — OFFICE VISIT (OUTPATIENT)
Dept: OTOLARYNGOLOGY | Facility: CLINIC | Age: 83
End: 2023-12-26
Payer: MEDICARE

## 2023-12-26 ENCOUNTER — CLINICAL SUPPORT (OUTPATIENT)
Dept: AUDIOLOGY | Facility: CLINIC | Age: 83
End: 2023-12-26
Payer: MEDICARE

## 2023-12-26 DIAGNOSIS — H90.3 SENSORINEURAL HEARING LOSS, BILATERAL: Primary | ICD-10-CM

## 2023-12-26 DIAGNOSIS — H90.3 SENSORINEURAL HEARING LOSS (SNHL), BILATERAL: Primary | ICD-10-CM

## 2023-12-26 DIAGNOSIS — L30.9 DERMATITIS OF EXTERNAL EAR: ICD-10-CM

## 2023-12-26 PROCEDURE — 92567 PR TYMPA2METRY: ICD-10-PCS | Mod: HCNC,S$GLB,, | Performed by: AUDIOLOGIST

## 2023-12-26 PROCEDURE — 1126F PR PAIN SEVERITY QUANTIFIED, NO PAIN PRESENT: ICD-10-PCS | Mod: HCNC,CPTII,S$GLB,

## 2023-12-26 PROCEDURE — 1160F PR REVIEW ALL MEDS BY PRESCRIBER/CLIN PHARMACIST DOCUMENTED: ICD-10-PCS | Mod: HCNC,CPTII,S$GLB,

## 2023-12-26 PROCEDURE — 99999 PR PBB SHADOW E&M-EST. PATIENT-LVL III: CPT | Mod: PBBFAC,HCNC,,

## 2023-12-26 PROCEDURE — 1159F PR MEDICATION LIST DOCUMENTED IN MEDICAL RECORD: ICD-10-PCS | Mod: HCNC,CPTII,S$GLB,

## 2023-12-26 PROCEDURE — 1160F RVW MEDS BY RX/DR IN RCRD: CPT | Mod: HCNC,CPTII,S$GLB,

## 2023-12-26 PROCEDURE — 1126F AMNT PAIN NOTED NONE PRSNT: CPT | Mod: HCNC,CPTII,S$GLB,

## 2023-12-26 PROCEDURE — 1101F PT FALLS ASSESS-DOCD LE1/YR: CPT | Mod: HCNC,CPTII,S$GLB,

## 2023-12-26 PROCEDURE — 3288F PR FALLS RISK ASSESSMENT DOCUMENTED: ICD-10-PCS | Mod: HCNC,CPTII,S$GLB,

## 2023-12-26 PROCEDURE — 99999 PR PBB SHADOW E&M-EST. PATIENT-LVL III: ICD-10-PCS | Mod: PBBFAC,HCNC,,

## 2023-12-26 PROCEDURE — 92557 PR COMPREHENSIVE HEARING TEST: ICD-10-PCS | Mod: HCNC,S$GLB,, | Performed by: AUDIOLOGIST

## 2023-12-26 PROCEDURE — 92567 TYMPANOMETRY: CPT | Mod: HCNC,S$GLB,, | Performed by: AUDIOLOGIST

## 2023-12-26 PROCEDURE — 99213 OFFICE O/P EST LOW 20 MIN: CPT | Mod: HCNC,S$GLB,,

## 2023-12-26 PROCEDURE — 92557 COMPREHENSIVE HEARING TEST: CPT | Mod: HCNC,S$GLB,, | Performed by: AUDIOLOGIST

## 2023-12-26 PROCEDURE — 3288F FALL RISK ASSESSMENT DOCD: CPT | Mod: HCNC,CPTII,S$GLB,

## 2023-12-26 PROCEDURE — 1159F MED LIST DOCD IN RCRD: CPT | Mod: HCNC,CPTII,S$GLB,

## 2023-12-26 PROCEDURE — 99213 PR OFFICE/OUTPT VISIT, EST, LEVL III, 20-29 MIN: ICD-10-PCS | Mod: HCNC,S$GLB,,

## 2023-12-26 PROCEDURE — 1101F PR PT FALLS ASSESS DOC 0-1 FALLS W/OUT INJ PAST YR: ICD-10-PCS | Mod: HCNC,CPTII,S$GLB,

## 2023-12-26 NOTE — PROGRESS NOTES
Audiologic Evaluation 12/26/2023:       Danial Meza, a 83 y.o. male, was seen today in the clinic for an audiologic evaluation for hearing loss.  Mr. Meza reported his loved ones are complaining that he is not hearing as well. He noted difficulty hearing in background noise. Mr. Meza denied tinnitus, otalgia, and dizziness. He noted a history of occupational noise exposure and shooting guns without hearing protection.     Tympanometry revealed Type A tympanogram in the right ear and Type A tympanogram in the left ear. Audiogram results revealed mild sloping to severe sensorineural hearing loss in the right ear and mild sloping to severe sensorineural hearing loss in the left ear.  Speech reception thresholds were noted at 40 dB in the right ear and 40 dB in the left ear.  Speech discrimination scores were 80% in the right ear and 80% in the left ear.    Recommendations:  Otologic evaluation  Hearing aid consultation  Annual audiogram  Hearing protection when in noise

## 2023-12-26 NOTE — PROGRESS NOTES
Subjective:   Danial Meza is a 83 y.o. male who presents for a hearing evaluation. He reports he believes he has has cerumen impaction and having difficulty hearing. Reports his wife has been noticing his hearing difficulty. Denies any otalgia, otorrhea, ear fullness/pressure, tinnitus or vertigo.    There is not a family history of hearing loss at a young age. There is not a prior history of ear surgery. There is not a prior history of ear infections. There is not a history of ear trauma. He reports a history of significant noise exposure ( recreational gun shooting many years ago and working at [employer]- no hearing protection..     Past Medical History  He has a past medical history of Basal cell carcinoma, Basal cell carcinoma, Cataract, Glaucoma, Gout, Mixed hyperlipidemia, Osteoarthritis of right foot, SVT (supraventricular tachycardia), and Type 2 diabetes mellitus with hyperglycemia, without long-term current use of insulin.    Past Surgical History  He has a past surgical history that includes SVT accessory pathway ablation (1/14/2005); skin cancer nose and neck; Colonoscopy (12/2015); Carpal tunnel release (Right, 6/7/2022); and Trigger finger release (Right, 6/7/2022).    Family History  His family history includes Cancer in his brother, mother, and son; Colon cancer in his brother; Glaucoma in his father and mother; Macular degeneration in his father; Melanoma in his father; No Known Problems in his maternal aunt, maternal grandfather, maternal grandmother, maternal uncle, paternal aunt, paternal grandfather, paternal grandmother, and paternal uncle; Stroke in his father.    Social History  He reports that he has never smoked. He has never used smokeless tobacco. He reports current alcohol use of about 2.0 standard drinks of alcohol per week. He reports that he does not use drugs.    Allergies  He is allergic to macadamia nut oil.    Medications  He has a current medication list which includes the  following prescription(s): allopurinol, atorvastatin, clobetasol 0.05%, colchicine, eliquis, latanoprost, lisinopril, metoprolol succinate, multivitamin, and beta-carotene(a)-vits c,e/mins, and the following Facility-Administered Medications: lidocaine (pf) 10 mg/ml (1%).  Review of Systems   HENT: Positive for hearing loss.  Negative for ear discharge, ear infection, ear pain, facial swelling, postnasal drip, ringing in the ears, runny nose, sinus infection and sinus pressure.      Neurological: Negative for dizziness and headaches.          Objective:     Constitutional:   He is oriented to person, place, and time. He appears well-developed and well-nourished. He appears alert. He is cooperative.  Non-toxic appearance. He does not have a sickly appearance. He does not appear ill. Normal speech.      Head:  Normocephalic and atraumatic. Head is without right periorbital erythema, without left periorbital erythema and without TMJ tenderness. Salivary glands normal.  Facial strength is normal.      Ears:    Right Ear: No lacerations. No drainage, swelling or tenderness. No foreign bodies. No mastoid tenderness. Tympanic membrane is not injected, not scarred, not perforated, not erythematous, not retracted and not bulging. Tympanic membrane mobility is normal. No middle ear effusion. No PE tube. No hemotympanum. Decreased hearing is noted.   Left Ear: No lacerations. No drainage, swelling or tenderness. No foreign bodies. No mastoid tenderness. Tympanic membrane is not injected, not scarred, not perforated, not erythematous, not retracted and not bulging. Tympanic membrane mobility is normal.  No middle ear effusion.  No PE tube. No hemotympanum. Decreased hearing is noted.   Ears:      Nose:  No mucosal edema, rhinorrhea or sinus tenderness. No epistaxis. Turbinates normal, no turbinate masses and no turbinate hypertrophy.  Right sinus exhibits no maxillary sinus tenderness and no frontal sinus tenderness. Left sinus  exhibits no maxillary sinus tenderness and no frontal sinus tenderness.     Mouth/Throat  Oropharynx clear and moist without lesions or asymmetry and normal uvula midline. Normal dentition. No uvula swelling, oral lesions, trismus or mucous membrane lesions. No oropharyngeal exudate, posterior oropharyngeal edema or posterior oropharyngeal erythema.     Neck:  Trachea normal, phonation normal and no adenopathy. Thyroid tenderness is present. No edema, no erythema and no neck rigidity present. No thyroid mass and no thyromegaly present.     He has no cervical adenopathy.     Pulmonary/Chest:   Effort normal.     Psychiatric:   He has a normal mood and affect. His speech is normal and behavior is normal.     Neurological:   He is alert and oriented to person, place, and time. He has neurological normal, alert and oriented.     Audiogram    I independently reviewed the tracings of the complete audiometric evaluation performed today. I reviewed the audiogram with the patient as well. Pertinent findings include : right ear with mild SNHL to borderline moderate severe HF SNHL. Left ear with moderate SNHL to moderate severe HF SNHL. Type A tympanogram bilaterally.   Assessment:     1. Sensorineural hearing loss (SNHL), bilateral    2. Dermatitis of external ear      Plan:   Danial was seen today for cerumen impaction.    Diagnoses and all orders for this visit:    Sensorineural hearing loss (SNHL), bilateral  Audiometric testing interpretation consistent with sensorineural hearing loss.  Discussed the etiology of SNHL. Medically cleared for hearing amplification, and will follow-up with Audiology if interested. Hearing conservation in noisy environments. Return to clinic every year for audiometric testing.    Dermatitis of external ear  Trial of 1 % hydrocortisone for a week or Neosporin for a week.  No scratching.     RTC as needed or if symptoms persist.  Questions answered.

## 2024-01-01 DIAGNOSIS — I47.10 SVT (SUPRAVENTRICULAR TACHYCARDIA): ICD-10-CM

## 2024-01-02 RX ORDER — METOPROLOL SUCCINATE 50 MG/1
TABLET, EXTENDED RELEASE ORAL
Qty: 90 TABLET | Refills: 3 | Status: SHIPPED | OUTPATIENT
Start: 2024-01-02

## 2024-01-16 ENCOUNTER — OFFICE VISIT (OUTPATIENT)
Dept: INTERNAL MEDICINE | Facility: CLINIC | Age: 84
End: 2024-01-16
Payer: MEDICARE

## 2024-01-16 VITALS
SYSTOLIC BLOOD PRESSURE: 124 MMHG | HEART RATE: 81 BPM | HEIGHT: 71 IN | DIASTOLIC BLOOD PRESSURE: 78 MMHG | BODY MASS INDEX: 30.34 KG/M2 | OXYGEN SATURATION: 99 % | WEIGHT: 216.69 LBS

## 2024-01-16 DIAGNOSIS — Z98.890 S/P ABLATION OF ATRIAL FLUTTER: ICD-10-CM

## 2024-01-16 DIAGNOSIS — Z86.69 HISTORY OF BELL'S PALSY: ICD-10-CM

## 2024-01-16 DIAGNOSIS — I48.0 PAF (PAROXYSMAL ATRIAL FIBRILLATION): Chronic | ICD-10-CM

## 2024-01-16 DIAGNOSIS — R73.03 PREDIABETES: ICD-10-CM

## 2024-01-16 DIAGNOSIS — H90.3 BILATERAL SENSORINEURAL HEARING LOSS: ICD-10-CM

## 2024-01-16 DIAGNOSIS — I77.819 ECTATIC AORTA: ICD-10-CM

## 2024-01-16 DIAGNOSIS — D69.2 SENILE PURPURA: ICD-10-CM

## 2024-01-16 DIAGNOSIS — N18.31 STAGE 3A CHRONIC KIDNEY DISEASE: ICD-10-CM

## 2024-01-16 DIAGNOSIS — E78.2 MIXED HYPERLIPIDEMIA: Chronic | ICD-10-CM

## 2024-01-16 DIAGNOSIS — I10 ESSENTIAL HYPERTENSION: Chronic | ICD-10-CM

## 2024-01-16 DIAGNOSIS — Z00.00 ENCOUNTER FOR ANNUAL PHYSICAL EXAM: Primary | ICD-10-CM

## 2024-01-16 DIAGNOSIS — I87.2 VENOUS INSUFFICIENCY OF RIGHT LEG: ICD-10-CM

## 2024-01-16 DIAGNOSIS — Z86.79 S/P ABLATION OF ATRIAL FLUTTER: ICD-10-CM

## 2024-01-16 DIAGNOSIS — M1A.9XX0 CHRONIC GOUT WITHOUT TOPHUS, UNSPECIFIED CAUSE, UNSPECIFIED SITE: ICD-10-CM

## 2024-01-16 PROCEDURE — 3078F DIAST BP <80 MM HG: CPT | Mod: HCNC,CPTII,S$GLB, | Performed by: INTERNAL MEDICINE

## 2024-01-16 PROCEDURE — 3074F SYST BP LT 130 MM HG: CPT | Mod: HCNC,CPTII,S$GLB, | Performed by: INTERNAL MEDICINE

## 2024-01-16 PROCEDURE — 99999 PR PBB SHADOW E&M-EST. PATIENT-LVL III: CPT | Mod: PBBFAC,HCNC,, | Performed by: INTERNAL MEDICINE

## 2024-01-16 PROCEDURE — 99397 PER PM REEVAL EST PAT 65+ YR: CPT | Mod: HCNC,S$GLB,, | Performed by: INTERNAL MEDICINE

## 2024-01-16 PROCEDURE — 1159F MED LIST DOCD IN RCRD: CPT | Mod: HCNC,CPTII,S$GLB, | Performed by: INTERNAL MEDICINE

## 2024-01-16 PROCEDURE — 1101F PT FALLS ASSESS-DOCD LE1/YR: CPT | Mod: HCNC,CPTII,S$GLB, | Performed by: INTERNAL MEDICINE

## 2024-01-16 PROCEDURE — 3288F FALL RISK ASSESSMENT DOCD: CPT | Mod: HCNC,CPTII,S$GLB, | Performed by: INTERNAL MEDICINE

## 2024-01-16 PROCEDURE — 1160F RVW MEDS BY RX/DR IN RCRD: CPT | Mod: HCNC,CPTII,S$GLB, | Performed by: INTERNAL MEDICINE

## 2024-01-16 PROCEDURE — 1126F AMNT PAIN NOTED NONE PRSNT: CPT | Mod: HCNC,CPTII,S$GLB, | Performed by: INTERNAL MEDICINE

## 2024-01-16 RX ORDER — ALLOPURINOL 300 MG/1
300 TABLET ORAL
Qty: 90 TABLET | Refills: 3 | OUTPATIENT
Start: 2024-01-16

## 2024-01-16 RX ORDER — ALLOPURINOL 300 MG/1
300 TABLET ORAL DAILY
Qty: 90 TABLET | Refills: 3 | Status: SHIPPED | OUTPATIENT
Start: 2024-01-16

## 2024-01-16 NOTE — PROGRESS NOTES
Subjective:       Patient ID: Danial Meza is a 83 y.o. male.    Chief Complaint: Follow-up      HPI  Danial Meza is a 83 y.o. year old male with HTN, HLD, gout, prediabetes, afib on OAC (rate controlled, seems to be new predominant rhythm), hx of ablation for flutter and  SVT, bilateral hearing loss. Since last visit, saw ENT. Getting hearing aids on Thursday.     Review of Systems   Constitutional:  Negative for activity change, appetite change, chills, fatigue, fever and unexpected weight change.   HENT:  Positive for hearing loss. Negative for congestion, rhinorrhea and sore throat.    Eyes:  Negative for visual disturbance.   Respiratory:  Negative for shortness of breath.    Cardiovascular:  Negative for chest pain.   Gastrointestinal:  Negative for abdominal pain, diarrhea, nausea and vomiting.   Genitourinary:  Negative for difficulty urinating and dysuria.   Musculoskeletal:  Negative for arthralgias, back pain and myalgias.   Skin:  Negative for color change and rash.   Neurological:  Negative for dizziness, weakness and headaches.         Past Medical History:   Diagnosis Date    Basal cell carcinoma 01/05/2012    right nose    Basal cell carcinoma 10/09/2017    left nasal bridge and left cheek    Cataract     Glaucoma     Gout 07/18/2014    Mixed hyperlipidemia     Osteoarthritis of right foot 10/26/2019    SVT (supraventricular tachycardia) 01/14/2005    ablation by [facility] of left lateral free wall accessory bypass tract    Type 2 diabetes mellitus with hyperglycemia, without long-term current use of insulin 04/09/2018        Prior to Admission medications    Medication Sig Start Date End Date Taking? Authorizing Provider   allopurinoL (ZYLOPRIM) 300 MG tablet TAKE 1 TABLET ONE TIME DAILY 5/29/23  Yes Lizbet Desai MD   atorvastatin (LIPITOR) 20 MG tablet TAKE 1 TABLET (20 MG TOTAL) BY MOUTH EVERY EVENING. 11/27/23  Yes Jose Rafael Sorto Jr., MD   clobetasol 0.05% (TEMOVATE) 0.05 % Oint AAA  "thumb bid - occlude qhs 11/27/23  Yes Maylin Cerda MD   colchicine, gout, (COLCRYS) 0.6 mg tablet TAKE 1 TABLET ONE TIME DAILY 8/29/23  Yes Alirio Bassett MD   ELIQUIS 5 mg Tab TAKE 1 TABLET TWICE DAILY 3/20/23  Yes Ruben Arriaza MD   latanoprost 0.005 % ophthalmic solution Place 1 drop into both eyes every evening. 3/3/23  Yes Huma Garcia MD   lisinopriL 10 MG tablet TAKE 1 TABLET (10 MG TOTAL) BY MOUTH ONCE DAILY. 9/7/23  Yes Jose Rafael Sorto Jr., MD   metoprolol succinate (TOPROL-XL) 50 MG 24 hr tablet TAKE 1 TABLET EVERY DAY 1/2/24  Yes Bruce Hdez MD   multivitamin (THERAGRAN) per tablet Take 1 tablet by mouth once daily.   Yes Provider, Historical   VIT C/VIT E/LUTEIN/MIN/OMEGA-3 (OCUVITE ORAL) Take 1 tablet by mouth once daily.    Yes Provider, Historical        Past medical history, surgical history, and family medical history reviewed and updated as appropriate.    Medications and allergies reviewed.     Objective:          Vitals:    01/16/24 0845   BP: 124/78   BP Location: Right arm   Patient Position: Sitting   Pulse: 81   SpO2: 99%   Weight: 98.3 kg (216 lb 11.4 oz)   Height: 5' 11" (1.803 m)     Body mass index is 30.23 kg/m².  Physical Exam  Constitutional:       General: He is not in acute distress.     Appearance: He is well-developed.   HENT:      Head: Normocephalic and atraumatic.      Nose: Nose normal.   Eyes:      General: No scleral icterus.     Extraocular Movements: Extraocular movements intact.   Neck:      Thyroid: No thyromegaly.      Vascular: No JVD.      Trachea: No tracheal deviation.   Cardiovascular:      Rate and Rhythm: Normal rate and regular rhythm.      Heart sounds: Normal heart sounds. No murmur heard.     No friction rub. No gallop.   Pulmonary:      Effort: Pulmonary effort is normal. No respiratory distress.      Breath sounds: Normal breath sounds. No wheezing or rales.   Abdominal:      General: Bowel sounds are normal. There is no distension.    "   Palpations: Abdomen is soft. There is no mass.      Tenderness: There is no abdominal tenderness.   Musculoskeletal:         General: No tenderness. Normal range of motion.      Cervical back: Normal range of motion and neck supple.   Lymphadenopathy:      Cervical: No cervical adenopathy.   Skin:     General: Skin is warm and dry.      Findings: No rash.   Neurological:      Mental Status: He is alert and oriented to person, place, and time.      Cranial Nerves: No cranial nerve deficit.      Deep Tendon Reflexes: Reflexes normal.   Psychiatric:         Behavior: Behavior normal.         Lab Results   Component Value Date    WBC 6.25 12/06/2023    HGB 15.7 12/06/2023    HCT 45.6 12/06/2023     (L) 12/06/2023    CHOL 81 (L) 12/06/2023    TRIG 121 12/06/2023    HDL 22 (L) 12/06/2023    ALT 19 12/06/2023    AST 22 12/06/2023     12/06/2023    K 4.5 12/06/2023     12/06/2023    CREATININE 1.1 12/06/2023    BUN 19 12/06/2023    CO2 26 12/06/2023    TSH 2.670 07/06/2023    PSA 0.60 12/06/2023    INR 1.4 (H) 12/30/2018    HGBA1C 5.8 (H) 12/06/2023       Assessment:       1. Encounter for annual physical exam    2. Essential hypertension    3. Mixed hyperlipidemia    4. PAF (paroxysmal atrial fibrillation)    5. S/P ablation of atrial flutter 11/4/15    6. Prediabetes    7. Chronic gout without tophus, unspecified cause, unspecified site    8. Bilateral sensorineural hearing loss    9. Stage 3a chronic kidney disease    10. Venous insufficiency of right leg    11. Senile purpura    12. Ectatic aorta    13. History of Bell's palsy          Plan:     Danial was seen today for follow-up.    Diagnoses and all orders for this visit:    Encounter for annual physical exam    Essential hypertension  Comments:  controlled, no change to current management    Mixed hyperlipidemia  Comments:  controlled, no change to current management    PAF (paroxysmal atrial fibrillation)  Comments:  on eliquis 5 bid, rate  controlled, no change to current management  Orders:  -     apixaban (ELIQUIS) 5 mg Tab; Take 1 tablet (5 mg total) by mouth 2 (two) times daily.  -     TSH; Future    S/P ablation of atrial flutter 11/4/15    Prediabetes  Comments:  lifestyle controlled, no change to current management  Orders:  -     HEMOGLOBIN A1C; Future    Chronic gout without tophus, unspecified cause, unspecified site  Comments:  on allopurinol 300, controlled, no change to current management  Orders:  -     allopurinoL (ZYLOPRIM) 300 MG tablet; Take 1 tablet (300 mg total) by mouth once daily.    Bilateral sensorineural hearing loss  Comments:  saw ENT, cleared for hearing aids by ENT.    Stage 3a chronic kidney disease  Comments:  stable, no change to current management. avoid nephrotoxics and NSAIDs  Orders:  -     BASIC METABOLIC PANEL; Future    Venous insufficiency of right leg    Senile purpura    Ectatic aorta    History of Bell's palsy    Benign physical examination, no issues identified. Will obtain routine labwork and age appropriate health screenings.     Health maintenance reviewed with patient.     Follow up in about 6 months (around 7/16/2024).    Ruben Arriaza MD  Internal Medicine / Primary Care  [facility]  1/16/2024

## 2024-01-16 NOTE — PATIENT INSTRUCTIONS
Recommend getting RSV vaccination and Covid-19 booster.     Labwork in 6 months  Return to clinic in 6 months or sooner if needed.

## 2024-01-22 ENCOUNTER — OFFICE VISIT (OUTPATIENT)
Dept: DERMATOLOGY | Facility: CLINIC | Age: 84
End: 2024-01-22
Payer: MEDICARE

## 2024-01-22 DIAGNOSIS — L30.9 ECZEMA, UNSPECIFIED TYPE: ICD-10-CM

## 2024-01-22 DIAGNOSIS — B07.9 VERRUCA VULGARIS: Primary | ICD-10-CM

## 2024-01-22 PROCEDURE — 3288F FALL RISK ASSESSMENT DOCD: CPT | Mod: HCNC,CPTII,S$GLB, | Performed by: DERMATOLOGY

## 2024-01-22 PROCEDURE — 1126F AMNT PAIN NOTED NONE PRSNT: CPT | Mod: HCNC,CPTII,S$GLB, | Performed by: DERMATOLOGY

## 2024-01-22 PROCEDURE — 99214 OFFICE O/P EST MOD 30 MIN: CPT | Mod: 25,HCNC,S$GLB, | Performed by: DERMATOLOGY

## 2024-01-22 PROCEDURE — 17110 DESTRUCTION B9 LES UP TO 14: CPT | Mod: HCNC,S$GLB,, | Performed by: DERMATOLOGY

## 2024-01-22 PROCEDURE — 99999 PR PBB SHADOW E&M-EST. PATIENT-LVL III: CPT | Mod: PBBFAC,HCNC,, | Performed by: DERMATOLOGY

## 2024-01-22 PROCEDURE — 1159F MED LIST DOCD IN RCRD: CPT | Mod: HCNC,CPTII,S$GLB, | Performed by: DERMATOLOGY

## 2024-01-22 PROCEDURE — 1101F PT FALLS ASSESS-DOCD LE1/YR: CPT | Mod: HCNC,CPTII,S$GLB, | Performed by: DERMATOLOGY

## 2024-01-22 NOTE — PROGRESS NOTES
Subjective:      Patient ID:  Danial Meza is a 83 y.o. male who presents for   Chief Complaint   Patient presents with    Eczema     F/u     History of Present Illness: The patient presents for follow up of skin check.    The patient was last seen on: 11/27/23 for cryosurgery to actinic keratoses which have resolved and treatment of eczema on right thumb with clob 0.05% oint BID x 14 days (pt completed 2 treatments). Pt also uses am lactin daily on hands. Pt reports no improvement.   Reassess wart on right thumb today for treatment with candida.  This is a high risk patient here to check for the development of new lesions.    Other skin complaints:   Patient with new complaint of lesion(s)  Location: R lower leg  Duration: 3+ months  Symptoms: scabs  Relieving factors/Previous treatments: clob oint          Review of Systems   Skin:  Negative for daily sunscreen use, activity-related sunscreen use, recent sunburn and wears hat.   Hematologic/Lymphatic: Bruises/bleeds easily (on eliquis).       Objective:   Physical Exam   Constitutional: He appears well-developed and well-nourished. No distress.   Neurological: He is alert and oriented to person, place, and time. He is not disoriented.   Psychiatric: He has a normal mood and affect.   Skin:   Areas Examined (abnormalities noted in diagram):   RLE Inspected  Nails and Digits Inspection Performed                     Diagram Legend     Erythematous scaling macule/papule c/w actinic keratosis       Vascular papule c/w angioma      Pigmented verrucoid papule/plaque c/w seborrheic keratosis      Yellow umbilicated papule c/w sebaceous hyperplasia      Irregularly shaped tan macule c/w lentigo     1-2 mm smooth white papules consistent with Milia      Movable subcutaneous cyst with punctum c/w epidermal inclusion cyst      Subcutaneous movable cyst c/w pilar cyst      Firm pink to brown papule c/w dermatofibroma      Pedunculated fleshy papule(s) c/w skin tag(s)       Evenly pigmented macule c/w junctional nevus     Mildly variegated pigmented, slightly irregular-bordered macule c/w mildly atypical nevus      Flesh colored to evenly pigmented papule c/w intradermal nevus       Pink pearly papule/plaque c/w basal cell carcinoma      Erythematous hyperkeratotic cursted plaque c/w SCC      Surgical scar with no sign of skin cancer recurrence      Open and closed comedones      Inflammatory papules and pustules      Verrucoid papule consistent consistent with wart     Erythematous eczematous patches and plaques     Dystrophic onycholytic nail with subungual debris c/w onychomycosis     Umbilicated papule    Erythematous-base heme-crusted tan verrucoid plaque consistent with inflamed seborrheic keratosis     Erythematous Silvery Scaling Plaque c/w Psoriasis     See annotation      Assessment / Plan:        Verruca vulgaris - right periungual  Cryosurgery procedure note:    Verbal consent from the patient is obtained including, but not limited to, risk of hypopigmentation/hyperpigmentation, scar, recurrence of lesion. Liquid nitrogen cryosurgery is applied to 1 verruca with prior paring, as detailed in the physical exam, to produce a freeze injury. 1 consecutive freeze thaw cycles are applied to each verruca. The patient is aware that blisters (possibly blood blisters) may form.    Followed by     Procedure note for Candida Antigen injection:    Discussed risks of procedure including local redness, swelling, and lymph node enlargement. Verbal consent obtained. Area cleaned with alcohol. 0.3cc of Candida antigen injected intradermally at the base of the verruca. Patient tolerated well.   This was patient's 1st cycle of Candida Antigen.     NDC for Candida Antigen: 44168-079-88        Eczema, unspecified type - thumbs  Chronic  Encourage application of clob oint bid under occlusion qhs             Follow up in about 3 weeks (around 2/12/2024).

## 2024-01-22 NOTE — PATIENT INSTRUCTIONS

## 2024-02-12 ENCOUNTER — OFFICE VISIT (OUTPATIENT)
Dept: DERMATOLOGY | Facility: CLINIC | Age: 84
End: 2024-02-12
Payer: MEDICARE

## 2024-02-12 DIAGNOSIS — L30.9 ECZEMA, UNSPECIFIED TYPE: ICD-10-CM

## 2024-02-12 DIAGNOSIS — B07.9 VERRUCA VULGARIS: Primary | ICD-10-CM

## 2024-02-12 PROCEDURE — 99999 PR PBB SHADOW E&M-EST. PATIENT-LVL III: CPT | Mod: PBBFAC,HCNC,, | Performed by: DERMATOLOGY

## 2024-02-12 PROCEDURE — 17110 DESTRUCTION B9 LES UP TO 14: CPT | Mod: HCNC,S$GLB,, | Performed by: PHYSICIAN ASSISTANT

## 2024-02-12 PROCEDURE — 99499 UNLISTED E&M SERVICE: CPT | Mod: HCNC,S$GLB,, | Performed by: DERMATOLOGY

## 2024-02-12 NOTE — PROGRESS NOTES
Subjective:      Patient ID:  Danial Meza is a 83 y.o. male who presents for   Chief Complaint   Patient presents with    Follow-up     warts     History of Present Illness: The patient presents for follow up of skin check.    The patient was last seen on: 1/22/24 for cryosurgery to verruca vulgaris on the right periungual followed by candida antigen injection (repeat today). Overall, he feels like the wart is going away and denies any pain with it.  This is a high risk patient here to check for the development of new lesions.    H/o eczema on thumbs - better. Pt uses clob oint bid under occlusion qhs.    Other skin complaints: none        Review of Systems   Skin:  Negative for daily sunscreen use, activity-related sunscreen use, recent sunburn and wears hat.   Hematologic/Lymphatic: Bruises/bleeds easily (on eliquis).       Objective:   Physical Exam   Constitutional: He appears well-developed and well-nourished. No distress.   Neurological: He is alert and oriented to person, place, and time. He is not disoriented.   Psychiatric: He has a normal mood and affect.   Skin:   Areas Examined (abnormalities noted in diagram):   Nails and Digits Inspection Performed                Diagram Legend     Erythematous scaling macule/papule c/w actinic keratosis       Vascular papule c/w angioma      Pigmented verrucoid papule/plaque c/w seborrheic keratosis      Yellow umbilicated papule c/w sebaceous hyperplasia      Irregularly shaped tan macule c/w lentigo     1-2 mm smooth white papules consistent with Milia      Movable subcutaneous cyst with punctum c/w epidermal inclusion cyst      Subcutaneous movable cyst c/w pilar cyst      Firm pink to brown papule c/w dermatofibroma      Pedunculated fleshy papule(s) c/w skin tag(s)      Evenly pigmented macule c/w junctional nevus     Mildly variegated pigmented, slightly irregular-bordered macule c/w mildly atypical nevus      Flesh colored to evenly pigmented papule c/w  intradermal nevus       Pink pearly papule/plaque c/w basal cell carcinoma      Erythematous hyperkeratotic cursted plaque c/w SCC      Surgical scar with no sign of skin cancer recurrence      Open and closed comedones      Inflammatory papules and pustules      Verrucoid papule consistent consistent with wart     Erythematous eczematous patches and plaques     Dystrophic onycholytic nail with subungual debris c/w onychomycosis     Umbilicated papule    Erythematous-base heme-crusted tan verrucoid plaque consistent with inflamed seborrheic keratosis     Erythematous Silvery Scaling Plaque c/w Psoriasis     See annotation      Assessment / Plan:        Verruca vulgaris  Cryosurgery procedure note:    Verbal consent from the patient is obtained including, but not limited to, risk of hypopigmentation/hyperpigmentation, scar, recurrence of lesion. Liquid nitrogen cryosurgery is applied to 1 verruca with prior paring, as detailed in the physical exam, to produce a freeze injury. 1 consecutive freeze thaw cycles are applied to each verruca. The patient is aware that blisters (possibly blood blisters) may form.    Followed by    Procedure note for Candida Antigen injection:    Discussed risks of procedure including local redness, swelling, and lymph node enlargement. Verbal consent obtained. Area cleaned with alcohol. 0.3cc of Candida antigen injected intradermally at the base of the verruca. Patient tolerated well.   This was patient's 2 cycle of Candida Antigen.     NDC for Candida Antigen: 28483-211-11      Eczema, unspecified type   -Recommended only using the clobetasol cream for thumbs as needed         Follow up in about 4 weeks (around 3/11/2024) for wart follow up.

## 2024-02-12 NOTE — PATIENT INSTRUCTIONS

## 2024-03-02 NOTE — PROGRESS NOTES
HPI    DLS: 9/11/2023    Pt here for 6 Month Check;  Pt states no eye pain but eyes do tear a lot especially at night. Pt   states vision seems to be doing good.     Meds;  Latanoprost QHS OU    1) POAG OD   2) Glaucoma Suspect OS   3) NS OU   4) Ptosis   5) FmHx ARMD   6) Hyperopia with Ast igmatism/ Presbyopia     Last edited by Idalia Esteban on 3/5/2024  2:14 PM.              Assessment /Plan     For exam results, see Encounter Report.    Primary open-angle glaucoma, right eye, mild stage    Open angle with borderline findings and high glaucoma risk in left eye    Nuclear sclerosis of both eyes    Brow ptosis    Small pupils    Family history of macular degeneration    Hyperopia of both eyes with astigmatism and presbyopia      1. Open angle with borderline glaucoma findings - Both Eyes (POAG od // suspect os )   Glaucoma (type and duration)   LTG suspect  First HVF 2011  First photos 2011  Glaucoma drops started 2/11/2021 (monitored off gtts 2011 to 2021 - then got new INS od)      Family history   = father-also my patient, + mom- she has passed  Glaucoma meds   lumigan ou - started 2/11/2021  H/O adverse rxn to glaucoma drops  None (? Avoid BB - bradycardia)   LASERS   none  GLAUCOMA SURGERIES   none  OTHER EYE SURGERIES   none  CDR   0.8 with thin sup rim // 0.7  - rim appears intact   Tbase  16-24    Tmax  21/24      Ttarget   16 ou (set 5/9/2022 ) - had VF prog ou withIOP 16-20 ou        HVF  5 test 2011 to 2018 - full od // full os-            2 test 2021 to 2022 - New IAD/NS  od // ? New INS  os   Gono  +3 ou  CCT  [id number]  OCT  4 test 2012 to 2022 - RNFL - bord TS od (+prog 2018 to 2021) // nl os  HRT   5 test 2011 to 2019 - MR -  Dec. IT od,  // dec. IN os, border IT /// CDR 0.72 od // 0.70 os  Disc photos  2011, 2012, 2017  - OIS // 2021 - harmony     Ttoday  18/15  Test done today - IOP check, AR/MR/BAT - cat check      2. Nuclear sclerosis - Both Eyes   - mild BCVA 20/20 ou  BATh 20/60 od // 20/70 os  (10/2013)   3. Ptosis   -patient would like to monitor for now as he is not having any trouble    4. Family history of macular degeneration   -father has adv wet ARMD - gets injections with arend   5. Hyperopia, astigmatism , presbyopia  PC +0.75+1.00x177        +1.00+1.50x180   ADD +2.50  glasses Shelton   6. I use to see his father, and also saw his mother -  - his dad  2015 @ age 89   - his mom passed at age 98  - both had glaucoma, his dad also had  wet ARMD - saw Arend         Plan    New INS od - now has POAG od - mild   + IAD/NS od x 2 - confirmed   OCT od - bord TS now - (+progfrom )   Bord glaucoma os - high risk    Cont  latanoprost     Cont to monitor cataracts     - not vis sign yet - dilates medium only - smallish pupils     Photo file updated 2023    Mild to mod cataracts - BCVA 20/25 od and 20/30 os    BAT h 20/40 ou   Phaco/IOL ou prn  - pt is content with vision for now     Glasses - shelton - last seen 2023     F/U 6  months with IOP / HVF / DFE / OCT

## 2024-03-04 ENCOUNTER — OFFICE VISIT (OUTPATIENT)
Dept: DERMATOLOGY | Facility: CLINIC | Age: 84
End: 2024-03-04
Payer: MEDICARE

## 2024-03-04 DIAGNOSIS — L30.9 ECZEMA, UNSPECIFIED TYPE: ICD-10-CM

## 2024-03-04 DIAGNOSIS — B07.9 VERRUCA VULGARIS: Primary | ICD-10-CM

## 2024-03-04 PROCEDURE — 99499 UNLISTED E&M SERVICE: CPT | Mod: S$GLB,,, | Performed by: DERMATOLOGY

## 2024-03-04 PROCEDURE — 99999 PR PBB SHADOW E&M-EST. PATIENT-LVL III: CPT | Mod: PBBFAC,,, | Performed by: DERMATOLOGY

## 2024-03-04 NOTE — PROGRESS NOTES
Subjective:      Patient ID:  Danial Meza is a 83 y.o. male who presents for   Chief Complaint   Patient presents with    Warts     History of Present Illness: The patient presents for follow up of skin check.    The patient was last seen on: 2/12/2024 for cryosurgery to verruca vulgaris on the right periungual followed by candida antigen injection.He has had a total of two candida injections in this area.  This is a high risk patient here to check for the development of new lesions.    H/o eczema on thumbs - better. Pt uses clob oint bid under occlusion qhs (reports under occlusion qhs only to the right thumb).    Other skin complaints: none          Review of Systems   Skin:  Negative for daily sunscreen use, activity-related sunscreen use, recent sunburn and wears hat.   Hematologic/Lymphatic: Bruises/bleeds easily (on eliquis).       Objective:   Physical Exam   Constitutional: He appears well-developed and well-nourished. No distress.   Neurological: He is alert and oriented to person, place, and time. He is not disoriented.   Psychiatric: He has a normal mood and affect.   Skin:   Areas Examined (abnormalities noted in diagram):   Nails and Digits Inspection Performed                Diagram Legend     Erythematous scaling macule/papule c/w actinic keratosis       Vascular papule c/w angioma      Pigmented verrucoid papule/plaque c/w seborrheic keratosis      Yellow umbilicated papule c/w sebaceous hyperplasia      Irregularly shaped tan macule c/w lentigo     1-2 mm smooth white papules consistent with Milia      Movable subcutaneous cyst with punctum c/w epidermal inclusion cyst      Subcutaneous movable cyst c/w pilar cyst      Firm pink to brown papule c/w dermatofibroma      Pedunculated fleshy papule(s) c/w skin tag(s)      Evenly pigmented macule c/w junctional nevus     Mildly variegated pigmented, slightly irregular-bordered macule c/w mildly atypical nevus      Flesh colored to evenly pigmented  papule c/w intradermal nevus       Pink pearly papule/plaque c/w basal cell carcinoma      Erythematous hyperkeratotic cursted plaque c/w SCC      Surgical scar with no sign of skin cancer recurrence      Open and closed comedones      Inflammatory papules and pustules      Verrucoid papule consistent consistent with wart     Erythematous eczematous patches and plaques     Dystrophic onycholytic nail with subungual debris c/w onychomycosis     Umbilicated papule    Erythematous-base heme-crusted tan verrucoid plaque consistent with inflamed seborrheic keratosis     Erythematous Silvery Scaling Plaque c/w Psoriasis     See annotation      Assessment / Plan:        Verruca vulgaris  Cryosurgery procedure note:    Verbal consent from the patient is obtained including, but not limited to, risk of hypopigmentation/hyperpigmentation, scar, recurrence of lesion. Liquid nitrogen cryosurgery is applied to 1 verruca with prior paring, as detailed in the physical exam, to produce a freeze injury. 1 consecutive freeze thaw cycles are applied to each verruca. The patient is aware that blisters (possibly blood blisters) may form.    Followed by    Procedure note for Candida Antigen injection:    Discussed risks of procedure including local redness, swelling, and lymph node enlargement. Verbal consent obtained. Area cleaned with alcohol. 0.3cc of Candida antigen injected intradermally at the base of the verruca. Patient tolerated well.   This was patient's 3 cycle of Candida Antigen.     NDC for Candida Antigen: [id number]      Eczema, unspecified type  -Recommended clobetasol BID under occlusion qhs (for both thumbs)           Follow up in about 9 months (around 12/4/2024) for UBSE.

## 2024-03-05 ENCOUNTER — OFFICE VISIT (OUTPATIENT)
Dept: OPHTHALMOLOGY | Facility: CLINIC | Age: 84
End: 2024-03-05
Payer: MEDICARE

## 2024-03-05 DIAGNOSIS — H52.03 HYPEROPIA OF BOTH EYES WITH ASTIGMATISM AND PRESBYOPIA: ICD-10-CM

## 2024-03-05 DIAGNOSIS — H57.819 BROW PTOSIS: ICD-10-CM

## 2024-03-05 DIAGNOSIS — H57.03 SMALL PUPILS: ICD-10-CM

## 2024-03-05 DIAGNOSIS — Z83.518 FAMILY HISTORY OF MACULAR DEGENERATION: ICD-10-CM

## 2024-03-05 DIAGNOSIS — H52.203 HYPEROPIA OF BOTH EYES WITH ASTIGMATISM AND PRESBYOPIA: ICD-10-CM

## 2024-03-05 DIAGNOSIS — H25.13 NUCLEAR SCLEROSIS OF BOTH EYES: ICD-10-CM

## 2024-03-05 DIAGNOSIS — H40.022 OPEN ANGLE WITH BORDERLINE FINDINGS AND HIGH GLAUCOMA RISK IN LEFT EYE: ICD-10-CM

## 2024-03-05 DIAGNOSIS — H52.4 HYPEROPIA OF BOTH EYES WITH ASTIGMATISM AND PRESBYOPIA: ICD-10-CM

## 2024-03-05 DIAGNOSIS — H40.1111 PRIMARY OPEN-ANGLE GLAUCOMA, RIGHT EYE, MILD STAGE: Primary | ICD-10-CM

## 2024-03-05 PROCEDURE — 1126F AMNT PAIN NOTED NONE PRSNT: CPT | Mod: CPTII,S$GLB,, | Performed by: OPHTHALMOLOGY

## 2024-03-05 PROCEDURE — 99214 OFFICE O/P EST MOD 30 MIN: CPT | Mod: S$GLB,,, | Performed by: OPHTHALMOLOGY

## 2024-03-05 PROCEDURE — 1159F MED LIST DOCD IN RCRD: CPT | Mod: CPTII,S$GLB,, | Performed by: OPHTHALMOLOGY

## 2024-03-05 PROCEDURE — 99999 PR PBB SHADOW E&M-EST. PATIENT-LVL III: CPT | Mod: PBBFAC,,, | Performed by: OPHTHALMOLOGY

## 2024-03-05 PROCEDURE — 1101F PT FALLS ASSESS-DOCD LE1/YR: CPT | Mod: CPTII,S$GLB,, | Performed by: OPHTHALMOLOGY

## 2024-03-05 PROCEDURE — 1160F RVW MEDS BY RX/DR IN RCRD: CPT | Mod: CPTII,S$GLB,, | Performed by: OPHTHALMOLOGY

## 2024-03-05 PROCEDURE — 3288F FALL RISK ASSESSMENT DOCD: CPT | Mod: CPTII,S$GLB,, | Performed by: OPHTHALMOLOGY

## 2024-03-20 DIAGNOSIS — H40.1111 PRIMARY OPEN-ANGLE GLAUCOMA, RIGHT EYE, MILD STAGE: ICD-10-CM

## 2024-03-20 DIAGNOSIS — H40.022 OPEN ANGLE WITH BORDERLINE FINDINGS AND HIGH GLAUCOMA RISK IN LEFT EYE: ICD-10-CM

## 2024-03-20 RX ORDER — LATANOPROST 50 UG/ML
1 SOLUTION/ DROPS OPHTHALMIC NIGHTLY
Qty: 7.5 ML | Refills: 4 | Status: SHIPPED | OUTPATIENT
Start: 2024-03-20

## 2024-06-04 ENCOUNTER — OFFICE VISIT (OUTPATIENT)
Dept: INTERNAL MEDICINE | Facility: CLINIC | Age: 84
End: 2024-06-04
Payer: MEDICARE

## 2024-06-04 VITALS
OXYGEN SATURATION: 98 % | HEART RATE: 92 BPM | SYSTOLIC BLOOD PRESSURE: 142 MMHG | DIASTOLIC BLOOD PRESSURE: 70 MMHG | WEIGHT: 219.81 LBS | BODY MASS INDEX: 30.77 KG/M2 | HEIGHT: 71 IN

## 2024-06-04 DIAGNOSIS — E78.2 MIXED HYPERLIPIDEMIA: Chronic | ICD-10-CM

## 2024-06-04 DIAGNOSIS — I10 ESSENTIAL HYPERTENSION: Chronic | ICD-10-CM

## 2024-06-04 DIAGNOSIS — M1A.9XX1 GOUT WITH TOPHI: ICD-10-CM

## 2024-06-04 DIAGNOSIS — I77.819 ECTATIC AORTA: ICD-10-CM

## 2024-06-04 DIAGNOSIS — R73.03 PREDIABETES: ICD-10-CM

## 2024-06-04 DIAGNOSIS — H40.003 OPEN ANGLE WITH BORDERLINE INTRAOCULAR PRESSURE OF BOTH EYES: ICD-10-CM

## 2024-06-04 DIAGNOSIS — Z00.00 ENCOUNTER FOR PREVENTIVE HEALTH EXAMINATION: Primary | ICD-10-CM

## 2024-06-04 DIAGNOSIS — I48.0 PAF (PAROXYSMAL ATRIAL FIBRILLATION): Chronic | ICD-10-CM

## 2024-06-04 DIAGNOSIS — D69.2 SENILE PURPURA: ICD-10-CM

## 2024-06-04 PROBLEM — R74.01 ELEVATED TRANSAMINASE LEVEL: Status: RESOLVED | Noted: 2018-10-10 | Resolved: 2024-06-04

## 2024-06-04 PROBLEM — M86.9 OSTEOMYELITIS OF RIGHT FOOT: Status: RESOLVED | Noted: 2019-10-26 | Resolved: 2024-06-04

## 2024-06-04 PROBLEM — J18.9 COMMUNITY ACQUIRED PNEUMONIA OF LEFT LOWER LOBE OF LUNG: Status: RESOLVED | Noted: 2018-01-28 | Resolved: 2024-06-04

## 2024-06-04 PROCEDURE — 99999 PR PBB SHADOW E&M-EST. PATIENT-LVL V: CPT | Mod: PBBFAC,HCNC,, | Performed by: PHYSICIAN ASSISTANT

## 2024-06-04 PROCEDURE — 3077F SYST BP >= 140 MM HG: CPT | Mod: HCNC,CPTII,S$GLB, | Performed by: PHYSICIAN ASSISTANT

## 2024-06-04 PROCEDURE — 1158F ADVNC CARE PLAN TLK DOCD: CPT | Mod: HCNC,CPTII,S$GLB, | Performed by: PHYSICIAN ASSISTANT

## 2024-06-04 PROCEDURE — 3078F DIAST BP <80 MM HG: CPT | Mod: HCNC,CPTII,S$GLB, | Performed by: PHYSICIAN ASSISTANT

## 2024-06-04 PROCEDURE — 1160F RVW MEDS BY RX/DR IN RCRD: CPT | Mod: HCNC,CPTII,S$GLB, | Performed by: PHYSICIAN ASSISTANT

## 2024-06-04 PROCEDURE — 3288F FALL RISK ASSESSMENT DOCD: CPT | Mod: HCNC,CPTII,S$GLB, | Performed by: PHYSICIAN ASSISTANT

## 2024-06-04 PROCEDURE — G0439 PPPS, SUBSEQ VISIT: HCPCS | Mod: HCNC,S$GLB,, | Performed by: PHYSICIAN ASSISTANT

## 2024-06-04 PROCEDURE — 1101F PT FALLS ASSESS-DOCD LE1/YR: CPT | Mod: HCNC,CPTII,S$GLB, | Performed by: PHYSICIAN ASSISTANT

## 2024-06-04 PROCEDURE — 1126F AMNT PAIN NOTED NONE PRSNT: CPT | Mod: HCNC,CPTII,S$GLB, | Performed by: PHYSICIAN ASSISTANT

## 2024-06-04 PROCEDURE — 1159F MED LIST DOCD IN RCRD: CPT | Mod: HCNC,CPTII,S$GLB, | Performed by: PHYSICIAN ASSISTANT

## 2024-06-04 PROCEDURE — 1170F FXNL STATUS ASSESSED: CPT | Mod: HCNC,CPTII,S$GLB, | Performed by: PHYSICIAN ASSISTANT

## 2024-06-04 NOTE — PROGRESS NOTES
"  Danial Meza presented for a follow-up Medicare AWV today. The following components were reviewed and updated:    Medical history  Family History  Social history  Allergies and Current Medications  Health Risk Assessment  Health Maintenance  Care Team    **See Completed Assessments for Annual Wellness visit with in the encounter summary    The following assessments were completed:  Depression Screening  Cognitive function Screening    Timed Get Up Test  Whisper Test        Opioid documentation:      Patient does not have a current opioid prescription.          Vitals:    06/04/24 0853 06/04/24 0936   BP: (!) 152/76 (!) 142/70   BP Location: Right arm    Patient Position: Sitting    BP Method: Medium (Manual)    Pulse: 92    SpO2: 98%    Weight: 99.7 kg (219 lb 12.8 oz)    Height: 5' 11" (1.803 m)      Body mass index is 30.66 kg/m².       Physical Exam  Constitutional:       General: He is not in acute distress.     Appearance: He is not ill-appearing.   HENT:      Right Ear: Tympanic membrane, ear canal and external ear normal.      Left Ear: Tympanic membrane, ear canal and external ear normal.   Cardiovascular:      Rate and Rhythm: Normal rate and regular rhythm.   Pulmonary:      Effort: Pulmonary effort is normal. No respiratory distress.   Musculoskeletal:      Right lower leg: No edema.      Left lower leg: No edema.   Lymphadenopathy:      Cervical: No cervical adenopathy.   Neurological:      Mental Status: He is alert.   Psychiatric:         Mood and Affect: Mood normal.           Diagnoses and health risks identified today and associated recommendations/orders:  1. Encounter for preventive health examination  Exam and Assessments performed  Chart Review Complete  Health Maintenance Reviewed and updated      2. PAF (paroxysmal atrial fibrillation)  Stable on current meds  Followed by Cardiology    3. Gout with tophi  Stable on current meds  Followed by PCP    4. Ectatic aorta  Noted on prior " imaging  Stable  Followed by Cardiology    5. Prediabetes  Lab Results   Component Value Date    HGBA1C 5.8 (H) 12/06/2023   Stable, lifestyle mods discussed  Followed by PCP    6. Mixed hyperlipidemia  Stable on current meds  Followed by PCP    7. Senile purpura  Stable  Followed by PCP    8. Essential hypertension  Slight elevated above goal  BP goals and home monitor discussed  Bring log to f/u visit with PCP  Continue current meds    9. Open angle with borderline intraocular pressure of both eyes  Stable on current eye drops  Followed by Ophthalmology       Provided Danial with a 5-10 year written screening schedule and personal prevention plan. Recommendations were developed using the USPSTF age appropriate recommendations. Education, counseling, and referrals were provided as needed.  After Visit Summary printed and given to patient which includes a list of additional screenings\tests needed.    Follow up in about 6 weeks (around 7/18/2024) for PCP follow up and 1 year for next Medicare AWV.      Jackie Lewis PA-C      Future Appointments   Date Time Provider Department Center   7/16/2024  8:30 AM LAB, APPOINTMENT [facility]   7/18/2024 10:00 AM Ruben Arriaza MD [facility] [address] [facility]

## 2024-06-04 NOTE — PATIENT INSTRUCTIONS
Counseling and Referral of Other Preventative  (Italic type indicates deductible and co-insurance are waived)    Patient Name: Danial Meza  Today's Date: 6/4/2024    Health Maintenance       Date Due Completion Date    RSV Vaccine (Age 60+ and Pregnant patients) (1 - 1-dose 60+ series) 06/04/2024 (Originally 6/2/2000) ---    COVID-19 Vaccine (4 - 2023-24 season) 06/04/2025 (Originally 9/1/2023) 10/28/2021    Hemoglobin A1c (Prediabetes) 12/06/2024 12/6/2023    Lipid Panel 12/06/2028 12/6/2023    TETANUS VACCINE 01/15/2029 1/15/2019    Colonoscopy 12/19/2032 12/19/2022        No orders of the defined types were placed in this encounter.      The following information is provided to all patients.  This information is to help you find resources for any of the problems found today that may be affecting your health:                  Living healthy guide: www.Replaced by [facility].[place].[facility]      Understanding Diabetes: www.diabetes.org      Eating healthy: www.cdc.gov/healthyweight      [facility] home safety checklist: www.cdc.gov/steadi/patient.html      Agency on Aging: www.goea.[place].gov      Alcoholics anonymous (AA): www.aa.org      Physical Activity: www.cynthia.nih.gov/lu0rqzb      Tobacco use: www.quitwithusla.org

## 2024-06-12 NOTE — PLAN OF CARE
Problem: Physical Therapy Goal  Goal: Physical Therapy Goal  Outcome: Met     PT evaluation completed and patient demonstrates safe mobility.  Patient is without further skilled PT needs at this time.      Oscar Miner PT, DPT  10/27/2019  Pager #: [phone]      non-verbal indicators absent (Rating = 0)

## 2024-06-18 ENCOUNTER — OFFICE VISIT (OUTPATIENT)
Dept: CARDIOLOGY | Facility: CLINIC | Age: 84
End: 2024-06-18
Payer: MEDICARE

## 2024-06-18 VITALS
SYSTOLIC BLOOD PRESSURE: 125 MMHG | BODY MASS INDEX: 30.37 KG/M2 | HEART RATE: 61 BPM | DIASTOLIC BLOOD PRESSURE: 68 MMHG | HEIGHT: 71 IN | WEIGHT: 216.94 LBS

## 2024-06-18 DIAGNOSIS — E78.2 MIXED HYPERLIPIDEMIA: Chronic | ICD-10-CM

## 2024-06-18 DIAGNOSIS — I10 ESSENTIAL HYPERTENSION: Chronic | ICD-10-CM

## 2024-06-18 DIAGNOSIS — Z79.01 CURRENT USE OF LONG TERM ANTICOAGULATION: ICD-10-CM

## 2024-06-18 DIAGNOSIS — I48.21 PERMANENT ATRIAL FIBRILLATION: Primary | ICD-10-CM

## 2024-06-18 PROCEDURE — 3078F DIAST BP <80 MM HG: CPT | Mod: CPTII,S$GLB,, | Performed by: INTERNAL MEDICINE

## 2024-06-18 PROCEDURE — 99214 OFFICE O/P EST MOD 30 MIN: CPT | Mod: S$GLB,,, | Performed by: INTERNAL MEDICINE

## 2024-06-18 PROCEDURE — 1159F MED LIST DOCD IN RCRD: CPT | Mod: CPTII,S$GLB,, | Performed by: INTERNAL MEDICINE

## 2024-06-18 PROCEDURE — 99999 PR PBB SHADOW E&M-EST. PATIENT-LVL III: CPT | Mod: PBBFAC,,, | Performed by: INTERNAL MEDICINE

## 2024-06-18 PROCEDURE — 3074F SYST BP LT 130 MM HG: CPT | Mod: CPTII,S$GLB,, | Performed by: INTERNAL MEDICINE

## 2024-06-18 PROCEDURE — 3288F FALL RISK ASSESSMENT DOCD: CPT | Mod: CPTII,S$GLB,, | Performed by: INTERNAL MEDICINE

## 2024-06-18 PROCEDURE — 1126F AMNT PAIN NOTED NONE PRSNT: CPT | Mod: CPTII,S$GLB,, | Performed by: INTERNAL MEDICINE

## 2024-06-18 PROCEDURE — 1101F PT FALLS ASSESS-DOCD LE1/YR: CPT | Mod: CPTII,S$GLB,, | Performed by: INTERNAL MEDICINE

## 2024-06-18 NOTE — PROGRESS NOTES
Subjective:     Patient ID:  Danial Meza is a 84 y.o. male who presents for follow-up of Hyperlipidemia and RBBB    HPI:   82 yo WM with hypertension, lipid disorder, tachy-xu syndrome, prior AFl ablation and PAF.  He has no palpitations, the resolved several years ago.  He is status post SVT ablation by Dr. Gutierrez.  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    He has no known coronary artery disease.  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  He has not had sores or swelling    His LDL cholesterol is currently 35 on low-dose statin therapy.  He also has low HDL.          Prior impression reviewed:  PAF (paroxysmal atrial fibrillation)  Comments:  EKG now shows atrial fibrillation, appears to be permanent, will accept as rhythm of choice, discussed with patient, he is not interested in cardioversion  Orders:  -     EKG 12-lead  -     Echo; Future; Expected date: 06/22/2023    S/P ablation of atrial flutter 11/4/15    Mixed hyperlipidemia  Comments:  Excellent on moderate dose statin    Essential hypertension  Comments:  Blood pressure well controlled          Review of Systems   Constitutional:  Negative for malaise/fatigue.   Cardiovascular:  Positive for palpitations. Negative for chest pain, orthopnea, claudication, leg swelling and PND.       Objective:   Physical Exam  Constitutional:       General: He is not in acute distress.     Appearance: He is well-developed. He is not diaphoretic.   HENT:      Head: Normocephalic and atraumatic.      Mouth/Throat:      Pharynx: No oropharyngeal exudate.   Eyes:      General: No scleral icterus.        Right eye: No discharge.         Left eye: No discharge.      Conjunctiva/sclera: Conjunctivae normal.   Neck:      Thyroid: No thyromegaly.      Vascular: No JVD.   Cardiovascular:      Rate and Rhythm:  Normal rate and regular rhythm.      Heart sounds: Normal heart sounds. No murmur heard.     No gallop.   Pulmonary:      Effort: Pulmonary effort is normal.      Breath sounds: Normal breath sounds.   Abdominal:      General: Bowel sounds are normal. There is no distension.      Palpations: Abdomen is soft. There is no mass.      Tenderness: There is no abdominal tenderness.   Musculoskeletal:         General: No tenderness.   Skin:     General: Skin is warm and dry.   Neurological:      Mental Status: He is alert and oriented to person, place, and time.   Psychiatric:         Behavior: Behavior normal.         Thought Content: Thought content normal.         Judgment: Judgment normal.          Assessment:     1. Permanent atrial fibrillation    2. Mixed hyperlipidemia    3. Essential hypertension    4. Current use of long term anticoagulation        Plan:       Permanent atrial fibrillation  Comments:  Accepted as rhythm of choice    Mixed hyperlipidemia  Comments:  Excellent on moderate dose statin therapy    Essential hypertension  Comments:  Well controlled on recheck    Current use of long term anticoagulation  Comments:  No bleeding problems   Dose appropriate        Return to clinic in 1 year.

## 2024-07-16 ENCOUNTER — LAB VISIT (OUTPATIENT)
Dept: LAB | Facility: HOSPITAL | Age: 84
End: 2024-07-16
Attending: INTERNAL MEDICINE
Payer: MEDICARE

## 2024-07-16 DIAGNOSIS — I48.0 PAF (PAROXYSMAL ATRIAL FIBRILLATION): Chronic | ICD-10-CM

## 2024-07-16 DIAGNOSIS — N18.31 STAGE 3A CHRONIC KIDNEY DISEASE: ICD-10-CM

## 2024-07-16 DIAGNOSIS — R73.03 PREDIABETES: ICD-10-CM

## 2024-07-16 LAB
ANION GAP SERPL CALC-SCNC: 10 MMOL/L (ref 8–16)
BUN SERPL-MCNC: 18 MG/DL (ref 8–23)
CALCIUM SERPL-MCNC: 9.8 MG/DL (ref 8.7–10.5)
CHLORIDE SERPL-SCNC: 110 MMOL/L (ref 95–110)
CO2 SERPL-SCNC: 25 MMOL/L (ref 23–29)
CREAT SERPL-MCNC: 1.2 MG/DL (ref 0.5–1.4)
EST. GFR  (NO RACE VARIABLE): 59.6 ML/MIN/1.73 M^2
ESTIMATED AVG GLUCOSE: 123 MG/DL (ref 68–131)
GLUCOSE SERPL-MCNC: 111 MG/DL (ref 70–110)
HBA1C MFR BLD: 5.9 % (ref 4–5.6)
POTASSIUM SERPL-SCNC: 4.2 MMOL/L (ref 3.5–5.1)
SODIUM SERPL-SCNC: 145 MMOL/L (ref 136–145)
TSH SERPL DL<=0.005 MIU/L-ACNC: 2.53 UIU/ML (ref 0.4–4)

## 2024-07-16 PROCEDURE — 36415 COLL VENOUS BLD VENIPUNCTURE: CPT | Mod: HCNC | Performed by: INTERNAL MEDICINE

## 2024-07-16 PROCEDURE — 84443 ASSAY THYROID STIM HORMONE: CPT | Mod: HCNC | Performed by: INTERNAL MEDICINE

## 2024-07-16 PROCEDURE — 80048 BASIC METABOLIC PNL TOTAL CA: CPT | Mod: HCNC | Performed by: INTERNAL MEDICINE

## 2024-07-16 PROCEDURE — 83036 HEMOGLOBIN GLYCOSYLATED A1C: CPT | Mod: HCNC | Performed by: INTERNAL MEDICINE

## 2024-07-18 ENCOUNTER — OFFICE VISIT (OUTPATIENT)
Dept: INTERNAL MEDICINE | Facility: CLINIC | Age: 84
End: 2024-07-18
Payer: MEDICARE

## 2024-07-18 VITALS
SYSTOLIC BLOOD PRESSURE: 126 MMHG | DIASTOLIC BLOOD PRESSURE: 76 MMHG | HEIGHT: 71 IN | OXYGEN SATURATION: 98 % | WEIGHT: 218.69 LBS | HEART RATE: 85 BPM | BODY MASS INDEX: 30.62 KG/M2

## 2024-07-18 DIAGNOSIS — N18.31 STAGE 3A CHRONIC KIDNEY DISEASE: ICD-10-CM

## 2024-07-18 DIAGNOSIS — E79.0 HYPERURICEMIA: ICD-10-CM

## 2024-07-18 DIAGNOSIS — E78.2 MIXED HYPERLIPIDEMIA: ICD-10-CM

## 2024-07-18 DIAGNOSIS — M1A.9XX0 CHRONIC GOUT WITHOUT TOPHUS, UNSPECIFIED CAUSE, UNSPECIFIED SITE: ICD-10-CM

## 2024-07-18 DIAGNOSIS — Z09 FOLLOW-UP EXAM: Primary | ICD-10-CM

## 2024-07-18 DIAGNOSIS — I48.0 PAF (PAROXYSMAL ATRIAL FIBRILLATION): ICD-10-CM

## 2024-07-18 DIAGNOSIS — I10 ESSENTIAL HYPERTENSION: ICD-10-CM

## 2024-07-18 DIAGNOSIS — R73.03 PREDIABETES: ICD-10-CM

## 2024-07-18 PROCEDURE — 1159F MED LIST DOCD IN RCRD: CPT | Mod: HCNC,CPTII,S$GLB, | Performed by: INTERNAL MEDICINE

## 2024-07-18 PROCEDURE — 3288F FALL RISK ASSESSMENT DOCD: CPT | Mod: HCNC,CPTII,S$GLB, | Performed by: INTERNAL MEDICINE

## 2024-07-18 PROCEDURE — 1160F RVW MEDS BY RX/DR IN RCRD: CPT | Mod: HCNC,CPTII,S$GLB, | Performed by: INTERNAL MEDICINE

## 2024-07-18 PROCEDURE — 3074F SYST BP LT 130 MM HG: CPT | Mod: HCNC,CPTII,S$GLB, | Performed by: INTERNAL MEDICINE

## 2024-07-18 PROCEDURE — 99214 OFFICE O/P EST MOD 30 MIN: CPT | Mod: HCNC,S$GLB,, | Performed by: INTERNAL MEDICINE

## 2024-07-18 PROCEDURE — 3078F DIAST BP <80 MM HG: CPT | Mod: HCNC,CPTII,S$GLB, | Performed by: INTERNAL MEDICINE

## 2024-07-18 PROCEDURE — 1126F AMNT PAIN NOTED NONE PRSNT: CPT | Mod: HCNC,CPTII,S$GLB, | Performed by: INTERNAL MEDICINE

## 2024-07-18 PROCEDURE — 99999 PR PBB SHADOW E&M-EST. PATIENT-LVL III: CPT | Mod: PBBFAC,HCNC,, | Performed by: INTERNAL MEDICINE

## 2024-07-18 PROCEDURE — 1101F PT FALLS ASSESS-DOCD LE1/YR: CPT | Mod: HCNC,CPTII,S$GLB, | Performed by: INTERNAL MEDICINE

## 2024-07-18 NOTE — PROGRESS NOTES
Subjective:       Patient ID: Danial Meza is a 84 y.o. male.    Chief Complaint: Follow-up (6 month f/u)      HPI  Danial Meza is a 84 y.o. year old male with HTN, HLD, Afib on OAC, CKD 3a, prediabetes, gout, osteoarthritis presents for follow up. Doing well with no new complaints.     Review of Systems   Constitutional:  Negative for activity change, appetite change, chills, fatigue, fever and unexpected weight change.   HENT:  Negative for congestion, rhinorrhea and sore throat.    Eyes:  Negative for visual disturbance.   Respiratory:  Negative for shortness of breath.    Cardiovascular:  Negative for chest pain.   Gastrointestinal:  Negative for abdominal pain, diarrhea, nausea and vomiting.   Genitourinary:  Negative for difficulty urinating and dysuria.   Musculoskeletal:  Negative for arthralgias, back pain and myalgias.   Skin:  Negative for color change and rash.   Neurological:  Negative for dizziness, weakness and headaches.         Past Medical History:   Diagnosis Date    Basal cell carcinoma 01/05/2012    right nose    Basal cell carcinoma 10/09/2017    left nasal bridge and left cheek    Cataract     Glaucoma     Gout 07/18/2014    Mixed hyperlipidemia     Osteoarthritis of right foot 10/26/2019    SVT (supraventricular tachycardia) 01/14/2005    ablation by [facility] of left lateral free wall accessory bypass tract    Type 2 diabetes mellitus with hyperglycemia, without long-term current use of insulin 04/09/2018        Prior to Admission medications    Medication Sig Start Date End Date Taking? Authorizing Provider   allopurinoL (ZYLOPRIM) 300 MG tablet Take 1 tablet (300 mg total) by mouth once daily. 1/16/24  Yes Ruben Arriaza MD   apixaban (ELIQUIS) 5 mg Tab Take 1 tablet (5 mg total) by mouth 2 (two) times daily. 1/16/24  Yes Ruben Arriaza MD   atorvastatin (LIPITOR) 20 MG tablet TAKE 1 TABLET (20 MG TOTAL) BY MOUTH EVERY EVENING. 11/27/23  Yes Jose Rafael Sorto Jr., MD   colchicine, gout,  "(COLCRYS) 0.6 mg tablet TAKE 1 TABLET ONE TIME DAILY 8/29/23  Yes Alirio Bassett MD   latanoprost 0.005 % ophthalmic solution Place 1 drop into both eyes every evening. 3/20/24  Yes Huma Garcia MD   lisinopriL 10 MG tablet TAKE 1 TABLET (10 MG TOTAL) BY MOUTH ONCE DAILY. 9/7/23  Yes Jose Rafael Sorto Jr., MD   metoprolol succinate (TOPROL-XL) 50 MG 24 hr tablet TAKE 1 TABLET EVERY DAY 1/2/24  Yes Brcue Hdez MD   multivitamin (THERAGRAN) per tablet Take 1 tablet by mouth once daily.   Yes Provider, Historical   VIT C/VIT E/LUTEIN/MIN/OMEGA-3 (OCUVITE ORAL) Take 1 tablet by mouth once daily.    Yes Provider, Historical        Past medical history, surgical history, and family medical history reviewed and updated as appropriate.    Medications and allergies reviewed.     Objective:          Vitals:    07/18/24 0938   BP: 126/76   BP Location: Right arm   Patient Position: Sitting   BP Method: Large (Manual)   Pulse: 85   SpO2: 98%   Weight: 99.2 kg (218 lb 11.1 oz)   Height: 5' 11" (1.803 m)     Body mass index is 30.5 kg/m².  Physical Exam  Constitutional:       General: He is not in acute distress.     Appearance: He is well-developed.   HENT:      Head: Normocephalic and atraumatic.      Nose: Nose normal.   Eyes:      General: No scleral icterus.     Extraocular Movements: Extraocular movements intact.   Neck:      Thyroid: No thyromegaly.      Vascular: No JVD.      Trachea: No tracheal deviation.   Cardiovascular:      Rate and Rhythm: Normal rate and regular rhythm.      Heart sounds: Normal heart sounds. No murmur heard.     No friction rub. No gallop.   Pulmonary:      Effort: Pulmonary effort is normal. No respiratory distress.      Breath sounds: Normal breath sounds. No wheezing or rales.   Abdominal:      General: Bowel sounds are normal. There is no distension.      Palpations: Abdomen is soft. There is no mass.      Tenderness: There is no abdominal tenderness.   Musculoskeletal:        " " General: No tenderness. Normal range of motion.      Cervical back: Normal range of motion and neck supple.   Lymphadenopathy:      Cervical: No cervical adenopathy.   Skin:     General: Skin is warm and dry.      Findings: No rash.   Neurological:      Mental Status: He is alert and oriented to person, place, and time.      Cranial Nerves: No cranial nerve deficit.      Deep Tendon Reflexes: Reflexes normal.   Psychiatric:         Behavior: Behavior normal.         Lab Results   Component Value Date    WBC 6.25 12/06/2023    HGB 15.7 12/06/2023    HCT 45.6 12/06/2023     (L) 12/06/2023    CHOL 81 (L) 12/06/2023    TRIG 121 12/06/2023    HDL 22 (L) 12/06/2023    ALT 19 12/06/2023    AST 22 12/06/2023     07/16/2024    K 4.2 07/16/2024     07/16/2024    CREATININE 1.2 07/16/2024    BUN 18 07/16/2024    CO2 25 07/16/2024    TSH 2.529 07/16/2024    PSA 0.60 12/06/2023    INR 1.4 (H) 12/30/2018    HGBA1C 5.9 (H) 07/16/2024       Assessment:       1. Follow-up exam    2. Essential hypertension    3. Prediabetes    4. Mixed hyperlipidemia    5. PAF (paroxysmal atrial fibrillation)    6. Hyperuricemia    7. Chronic gout without tophus, unspecified cause, unspecified site    8. Stage 3a chronic kidney disease          Plan:     Danial Candelario" was seen today for follow-up.    Diagnoses and all orders for this visit:    Follow-up exam    Essential hypertension  Comments:  controlled, no changes to current management    Prediabetes  Comments:  controlled, no changes to current management    Mixed hyperlipidemia  Comments:  controlled, no changes to current management    PAF (paroxysmal atrial fibrillation)  Comments:  on eliquis, rate controlled    Hyperuricemia    Chronic gout without tophus, unspecified cause, unspecified site    Stage 3a chronic kidney disease  Comments:  stable, avoid nephrotoxic medications    Benign physical examination, no issues identified. Will obtain routine labwork and age " appropriate health screenings.     Health maintenance reviewed with patient.     Follow up in about 6 months (around 1/18/2025).    Ruben Arriaza MD  Internal Medicine / Primary Care  [facility]  7/18/2024     [facility]

## 2024-08-30 NOTE — TELEPHONE ENCOUNTER
Spoke with pt.  Rescheduled his appointment with Dr Rodriguez from 7/19 to 8/8   Pt verbalized understanding.     
dyslexia/anxiety disorder

## 2024-09-05 DIAGNOSIS — M1A.9XX0 CHRONIC GOUT WITHOUT TOPHUS, UNSPECIFIED CAUSE, UNSPECIFIED SITE: ICD-10-CM

## 2024-09-05 RX ORDER — COLCHICINE 0.6 MG/1
0.6 TABLET ORAL
Qty: 90 TABLET | Refills: 0 | Status: SHIPPED | OUTPATIENT
Start: 2024-09-05

## 2024-09-13 ENCOUNTER — OFFICE VISIT (OUTPATIENT)
Dept: RHEUMATOLOGY | Facility: CLINIC | Age: 84
End: 2024-09-13
Payer: MEDICARE

## 2024-09-13 ENCOUNTER — LAB VISIT (OUTPATIENT)
Dept: LAB | Facility: HOSPITAL | Age: 84
End: 2024-09-13
Attending: INTERNAL MEDICINE
Payer: MEDICARE

## 2024-09-13 VITALS
WEIGHT: 219.56 LBS | SYSTOLIC BLOOD PRESSURE: 126 MMHG | HEART RATE: 71 BPM | DIASTOLIC BLOOD PRESSURE: 72 MMHG | BODY MASS INDEX: 30.62 KG/M2

## 2024-09-13 DIAGNOSIS — M19.071 OSTEOARTHRITIS OF RIGHT FOOT, UNSPECIFIED OSTEOARTHRITIS TYPE: ICD-10-CM

## 2024-09-13 DIAGNOSIS — E66.09 CLASS 1 OBESITY DUE TO EXCESS CALORIES WITH SERIOUS COMORBIDITY AND BODY MASS INDEX (BMI) OF 30.0 TO 30.9 IN ADULT: ICD-10-CM

## 2024-09-13 DIAGNOSIS — M1A.9XX0 CHRONIC GOUT WITHOUT TOPHUS, UNSPECIFIED CAUSE, UNSPECIFIED SITE: ICD-10-CM

## 2024-09-13 DIAGNOSIS — M1A.9XX0 CHRONIC GOUT WITHOUT TOPHUS, UNSPECIFIED CAUSE, UNSPECIFIED SITE: Primary | ICD-10-CM

## 2024-09-13 DIAGNOSIS — N18.31 STAGE 3A CHRONIC KIDNEY DISEASE: ICD-10-CM

## 2024-09-13 LAB — URATE SERPL-MCNC: 5.1 MG/DL (ref 3.4–7)

## 2024-09-13 PROCEDURE — 3078F DIAST BP <80 MM HG: CPT | Mod: HCNC,CPTII,S$GLB, | Performed by: INTERNAL MEDICINE

## 2024-09-13 PROCEDURE — 1159F MED LIST DOCD IN RCRD: CPT | Mod: HCNC,CPTII,S$GLB, | Performed by: INTERNAL MEDICINE

## 2024-09-13 PROCEDURE — 84550 ASSAY OF BLOOD/URIC ACID: CPT | Mod: HCNC | Performed by: INTERNAL MEDICINE

## 2024-09-13 PROCEDURE — 1160F RVW MEDS BY RX/DR IN RCRD: CPT | Mod: HCNC,CPTII,S$GLB, | Performed by: INTERNAL MEDICINE

## 2024-09-13 PROCEDURE — 36415 COLL VENOUS BLD VENIPUNCTURE: CPT | Mod: HCNC | Performed by: INTERNAL MEDICINE

## 2024-09-13 PROCEDURE — 3288F FALL RISK ASSESSMENT DOCD: CPT | Mod: HCNC,CPTII,S$GLB, | Performed by: INTERNAL MEDICINE

## 2024-09-13 PROCEDURE — 3074F SYST BP LT 130 MM HG: CPT | Mod: HCNC,CPTII,S$GLB, | Performed by: INTERNAL MEDICINE

## 2024-09-13 PROCEDURE — 1101F PT FALLS ASSESS-DOCD LE1/YR: CPT | Mod: HCNC,CPTII,S$GLB, | Performed by: INTERNAL MEDICINE

## 2024-09-13 PROCEDURE — 1126F AMNT PAIN NOTED NONE PRSNT: CPT | Mod: HCNC,CPTII,S$GLB, | Performed by: INTERNAL MEDICINE

## 2024-09-13 PROCEDURE — 99999 PR PBB SHADOW E&M-EST. PATIENT-LVL III: CPT | Mod: PBBFAC,HCNC,, | Performed by: INTERNAL MEDICINE

## 2024-09-13 PROCEDURE — 99213 OFFICE O/P EST LOW 20 MIN: CPT | Mod: HCNC,S$GLB,, | Performed by: INTERNAL MEDICINE

## 2024-09-14 NOTE — PROGRESS NOTES
History of present illness: 84-year-old male I saw initially in October, 2022. He had a 50 year history of gout. He had been treated intermittently with allopurinol. He would take colchicine during an acute attack. He had not had a recent gout attack. He had evidence of tophi in the right foot. X-ray confirmed gout damage to the feet. I placed him back on allopurinol 100 mg daily. He is now on 300 mg daily. I also placed him on daily colchicine.  He was last seen 14 months ago.      He remains on same dose of allopurinol.  He had an episode of picking in the left foot 2 weeks ago.  This was his only attack since his last visit.  It resolved look resuming colchicine.  He has had no other recent medical problems.    Physical examination was not performed, the entire time was counseling.    Assessment: Inter critical gout     Plans:   1. Laboratory studies obtained   2. Continue allopurinol as before   3. Return to see me in 12 months unless I have to adjust his allopurinol dosage.

## 2024-09-15 DIAGNOSIS — E78.2 MIXED HYPERLIPIDEMIA: ICD-10-CM

## 2024-09-15 DIAGNOSIS — E11.65 TYPE 2 DIABETES MELLITUS WITH HYPERGLYCEMIA, WITHOUT LONG-TERM CURRENT USE OF INSULIN: ICD-10-CM

## 2024-09-15 DIAGNOSIS — I10 ESSENTIAL HYPERTENSION: ICD-10-CM

## 2024-09-16 RX ORDER — ATORVASTATIN CALCIUM 20 MG/1
20 TABLET, FILM COATED ORAL NIGHTLY
Qty: 90 TABLET | Refills: 3 | Status: SHIPPED | OUTPATIENT
Start: 2024-09-16

## 2024-09-16 RX ORDER — LISINOPRIL 10 MG/1
10 TABLET ORAL
Qty: 90 TABLET | Refills: 3 | Status: SHIPPED | OUTPATIENT
Start: 2024-09-16

## 2024-09-28 NOTE — PROGRESS NOTES
HPI     Glaucoma            Comments: HVF and OCT review today and pt states no changes since last   exam           Comments    DLS: 3/5/24    1) POAG OD   2) Glaucoma Suspect OS   3) NS OU   4) Ptosis   5) FmHx ARMD   6) Hyperopia with Astigmatism and Presbyopia     MEDS:  Latanoprost QHS OU          Last edited by Genny Paulson MA on 9/30/2024  8:35 AM.            Assessment /Plan     For exam results, see Encounter Report.    Primary open-angle glaucoma, right eye, mild stage    Open angle with borderline findings and high glaucoma risk in left eye    Nuclear sclerosis of both eyes    Brow ptosis    Small pupils    Family history of macular degeneration    Hyperopia of both eyes with astigmatism and presbyopia      1. Open angle with borderline glaucoma findings - Both Eyes (POAG od // suspect os )   Glaucoma (type and duration)   LTG suspect  First HVF 2011  First photos 2011  Glaucoma drops started 2/11/2021 (monitored off gtts 2011 to 2021 - then got new INS od)      Family history   = father-also my patient, + mom- she has passed  Glaucoma meds   lumigan ou - started 2/11/2021  H/O adverse rxn to glaucoma drops  None (? Avoid BB - bradycardia)   LASERS   none  GLAUCOMA SURGERIES   none  OTHER EYE SURGERIES   none  CDR   0.8 with thin sup rim // 0.7  - rim appears intact   Tbase  16-24    Tmax  21/24      Ttarget   16 ou (set 5/9/2022 ) - had VF prog ou withIOP 16-20 ou        HVF  5 test 2011 to 2018 - full od // full os-            4 test 2021 to 2024 -  IAD/NS  od //  INS  os   Gono  +3 ou  CCT  567/566  OCT  5 test 2018 to 2024 - RNFL - nl od (+prog 2018 to 2021) // bord G os  HRT   5 test 2011 to 2019 - MR -  Dec. IT od,  // dec. IN os, border IT /// CDR 0.72 od // 0.70 os  Disc photos  2011, 2012, 2017  - OIS // 2021 - [facility]     Ttoday  20/14  Test done today - IOP check,HVF / DFE / OCT      2. Nuclear sclerosis - Both Eyes   - mild BCVA 20/20 ou  BATh 20/60 od // 20/70 os (10/2013)   3. Ptosis    -patient would like to monitor for now as he is not having any trouble    4. Family history of macular degeneration   -father has adv wet ARMD - gets injections with arend   5. Hyperopia, astigmatism , presbyopia  PC +0.75+1.00x177        +1.00+1.50x180   ADD +2.50  glasses Shelton   6. I use to see his father, and also saw his mother -  - his dad  2015 @ age 89   - his mom passed at age 98  - both had glaucoma, his dad also had  wet ARMD - saw Arenclaire         Plan    POAG ou - mild   + IAD/NS od x 2 - confirmed   INS os   OCT od - bord TS now - (+prog from 2018) // now fluctuating   Cont  latanoprost  Add timolol q am ou ( no asthma or copd - + a fib )      Cont to monitor cataracts     - not vis sign yet - dilates medium only - smallish pupils     Photo file updated 2023    Mild to mod cataracts - BCVA 20/25 od and 20/30 os    BAT h 20/40 ou   Phaco/IOL ou prn  - pt is content with vision for now     Glasses - shelton - last seen 2023     F/U 4 months with IOP check with addition of timolol q am ou // gonio

## 2024-09-30 ENCOUNTER — CLINICAL SUPPORT (OUTPATIENT)
Dept: OPHTHALMOLOGY | Facility: CLINIC | Age: 84
End: 2024-09-30
Payer: MEDICARE

## 2024-09-30 ENCOUNTER — OFFICE VISIT (OUTPATIENT)
Dept: OPHTHALMOLOGY | Facility: CLINIC | Age: 84
End: 2024-09-30
Payer: MEDICARE

## 2024-09-30 DIAGNOSIS — H40.1111 PRIMARY OPEN-ANGLE GLAUCOMA, RIGHT EYE, MILD STAGE: Primary | ICD-10-CM

## 2024-09-30 DIAGNOSIS — Z83.518 FAMILY HISTORY OF MACULAR DEGENERATION: ICD-10-CM

## 2024-09-30 DIAGNOSIS — H25.13 NUCLEAR SCLEROSIS OF BOTH EYES: ICD-10-CM

## 2024-09-30 DIAGNOSIS — H40.022 OPEN ANGLE WITH BORDERLINE FINDINGS AND HIGH GLAUCOMA RISK IN LEFT EYE: ICD-10-CM

## 2024-09-30 DIAGNOSIS — H52.203 HYPEROPIA OF BOTH EYES WITH ASTIGMATISM AND PRESBYOPIA: ICD-10-CM

## 2024-09-30 DIAGNOSIS — H52.4 HYPEROPIA OF BOTH EYES WITH ASTIGMATISM AND PRESBYOPIA: ICD-10-CM

## 2024-09-30 DIAGNOSIS — H57.819 BROW PTOSIS: ICD-10-CM

## 2024-09-30 DIAGNOSIS — H52.03 HYPEROPIA OF BOTH EYES WITH ASTIGMATISM AND PRESBYOPIA: ICD-10-CM

## 2024-09-30 DIAGNOSIS — H40.1121 PRIMARY OPEN-ANGLE GLAUCOMA, LEFT EYE, MILD STAGE: ICD-10-CM

## 2024-09-30 DIAGNOSIS — H57.03 SMALL PUPILS: ICD-10-CM

## 2024-09-30 DIAGNOSIS — H40.1111 PRIMARY OPEN-ANGLE GLAUCOMA, RIGHT EYE, MILD STAGE: ICD-10-CM

## 2024-09-30 PROCEDURE — 1159F MED LIST DOCD IN RCRD: CPT | Mod: HCNC,CPTII,S$GLB, | Performed by: OPHTHALMOLOGY

## 2024-09-30 PROCEDURE — 3288F FALL RISK ASSESSMENT DOCD: CPT | Mod: HCNC,CPTII,S$GLB, | Performed by: OPHTHALMOLOGY

## 2024-09-30 PROCEDURE — 99212 OFFICE O/P EST SF 10 MIN: CPT | Mod: HCNC,S$GLB,, | Performed by: OPHTHALMOLOGY

## 2024-09-30 PROCEDURE — 99999 PR PBB SHADOW E&M-EST. PATIENT-LVL III: CPT | Mod: PBBFAC,HCNC,, | Performed by: OPHTHALMOLOGY

## 2024-09-30 PROCEDURE — 1160F RVW MEDS BY RX/DR IN RCRD: CPT | Mod: HCNC,CPTII,S$GLB, | Performed by: OPHTHALMOLOGY

## 2024-09-30 PROCEDURE — 1101F PT FALLS ASSESS-DOCD LE1/YR: CPT | Mod: HCNC,CPTII,S$GLB, | Performed by: OPHTHALMOLOGY

## 2024-09-30 PROCEDURE — 1126F AMNT PAIN NOTED NONE PRSNT: CPT | Mod: HCNC,CPTII,S$GLB, | Performed by: OPHTHALMOLOGY

## 2024-09-30 RX ORDER — TIMOLOL MALEATE 5 MG/ML
1 SOLUTION/ DROPS OPHTHALMIC EVERY MORNING
Qty: 10 ML | Refills: 3 | Status: SHIPPED | OUTPATIENT
Start: 2024-09-30

## 2024-09-30 NOTE — PROGRESS NOTES
HVF/OCT rel/fix por OU coop good OU/chart checked for latex allergy/1.00 + 0.50 x 162 OD 0.75 + 2.00 x 171 OS -BJ

## 2024-10-08 ENCOUNTER — OFFICE VISIT (OUTPATIENT)
Dept: OPTOMETRY | Facility: CLINIC | Age: 84
End: 2024-10-08
Payer: MEDICARE

## 2024-10-08 DIAGNOSIS — H04.123 DRY EYE SYNDROME OF BOTH EYES: Primary | ICD-10-CM

## 2024-10-08 DIAGNOSIS — H52.203 HYPEROPIA OF BOTH EYES WITH ASTIGMATISM AND PRESBYOPIA: ICD-10-CM

## 2024-10-08 DIAGNOSIS — H52.4 HYPEROPIA OF BOTH EYES WITH ASTIGMATISM AND PRESBYOPIA: ICD-10-CM

## 2024-10-08 DIAGNOSIS — H52.03 HYPEROPIA OF BOTH EYES WITH ASTIGMATISM AND PRESBYOPIA: ICD-10-CM

## 2024-10-08 PROCEDURE — 1101F PT FALLS ASSESS-DOCD LE1/YR: CPT | Mod: HCNC,CPTII,S$GLB, | Performed by: OPTOMETRIST

## 2024-10-08 PROCEDURE — 1160F RVW MEDS BY RX/DR IN RCRD: CPT | Mod: HCNC,CPTII,S$GLB, | Performed by: OPTOMETRIST

## 2024-10-08 PROCEDURE — 1159F MED LIST DOCD IN RCRD: CPT | Mod: HCNC,CPTII,S$GLB, | Performed by: OPTOMETRIST

## 2024-10-08 PROCEDURE — 99999 PR PBB SHADOW E&M-EST. PATIENT-LVL III: CPT | Mod: PBBFAC,HCNC,, | Performed by: OPTOMETRIST

## 2024-10-08 PROCEDURE — 99212 OFFICE O/P EST SF 10 MIN: CPT | Mod: HCNC,S$GLB,, | Performed by: OPTOMETRIST

## 2024-10-08 PROCEDURE — 3288F FALL RISK ASSESSMENT DOCD: CPT | Mod: HCNC,CPTII,S$GLB, | Performed by: OPTOMETRIST

## 2024-10-08 PROCEDURE — 1126F AMNT PAIN NOTED NONE PRSNT: CPT | Mod: HCNC,CPTII,S$GLB, | Performed by: OPTOMETRIST

## 2024-10-11 NOTE — PROGRESS NOTES
HPI    AMY: 09/24 with Dr. Garcia  Chief complaint (CC): Patient is here for annual eye exam today.  Eyes   burn at night and water a lot. Patient had glasses made last year but sees   better near with his older glasses.  Glasses? +  Contacts? -  H/o eye surgery, injections or laser: -  H/o eye injury: -  Known eye conditions? See above  Family h/o eye conditions? Mother with glaucoma and father with AMD  Eye gtts? Using Latanoprost OU Q HS and Timolol Q AM OU      (-) Flashes (-)  Floaters (-) Mucous   (+)  Tearing (-) Itching (+) Burning   (-) Headaches (-) Eye Pain/discomfort (-) Irritation   (-)  Redness (-) Double vision (-) Blurry vision    Diabetic? -  A1c? -      Last edited by Criss Burroughs on 10/8/2024 10:54 AM.            Assessment /Plan     For exam results, see Encounter Report.    Dry eye syndrome of both eyes    Hyperopia of both eyes with astigmatism and presbyopia      Recommend Systane Ultra or Refresh Optive TID-QID OU to aid with symptoms of dry eyes.  SRx released to patient. Patient educated on lens options. RTC 1 year for routine exam.       Cont f/u w/Dr Garcia.

## 2024-10-26 DIAGNOSIS — I47.10 SVT (SUPRAVENTRICULAR TACHYCARDIA): ICD-10-CM

## 2024-10-28 RX ORDER — METOPROLOL SUCCINATE 50 MG/1
TABLET, EXTENDED RELEASE ORAL
Qty: 90 TABLET | Refills: 3 | Status: SHIPPED | OUTPATIENT
Start: 2024-10-28

## 2024-10-31 ENCOUNTER — OFFICE VISIT (OUTPATIENT)
Dept: ORTHOPEDICS | Facility: CLINIC | Age: 84
End: 2024-10-31
Payer: MEDICARE

## 2024-10-31 ENCOUNTER — HOSPITAL ENCOUNTER (OUTPATIENT)
Dept: RADIOLOGY | Facility: HOSPITAL | Age: 84
Discharge: HOME | End: 2024-10-31
Attending: ORTHOPAEDIC SURGERY
Payer: MEDICARE

## 2024-10-31 DIAGNOSIS — M79.641 RIGHT HAND PAIN: Primary | ICD-10-CM

## 2024-10-31 DIAGNOSIS — M79.644 FINGER PAIN, RIGHT: Primary | ICD-10-CM

## 2024-10-31 DIAGNOSIS — M79.644 FINGER PAIN, RIGHT: ICD-10-CM

## 2024-10-31 PROCEDURE — 99999 PR PBB SHADOW E&M-EST. PATIENT-LVL II: CPT | Mod: PBBFAC,HCNC,, | Performed by: ORTHOPAEDIC SURGERY

## 2024-10-31 PROCEDURE — 73140 X-RAY EXAM OF FINGER(S): CPT | Mod: TC,HCNC

## 2024-11-01 ENCOUNTER — TELEPHONE (OUTPATIENT)
Dept: DERMATOLOGY | Facility: CLINIC | Age: 84
End: 2024-11-01
Payer: MEDICARE

## 2024-11-09 DIAGNOSIS — I48.0 PAF (PAROXYSMAL ATRIAL FIBRILLATION): Chronic | ICD-10-CM

## 2024-11-09 DIAGNOSIS — M1A.9XX0 CHRONIC GOUT WITHOUT TOPHUS, UNSPECIFIED CAUSE, UNSPECIFIED SITE: ICD-10-CM

## 2024-11-09 NOTE — TELEPHONE ENCOUNTER
Refill Routing Note   Medication(s) are not appropriate for processing by [facility] for the following reason(s):        Outside of protocol  Drug-disease interaction    [facility] action(s):  Defer  Route     Requires labs : Yes      Medication Therapy Plan: Drug-Disease: allopurinoL and Stage 3a chronic kidney disease      Appointments  past 12m or future 3m with PCP    Date Provider   Last Visit   7/18/2024 Ruben Arriaza MD   Next Visit   1/21/2025 Ruben Arriaza MD   ED visits in past 90 days: 0        Note composed:9:53 AM 11/09/2024

## 2024-11-09 NOTE — TELEPHONE ENCOUNTER
Care Due:                  Date            Visit Type   Department     Provider  --------------------------------------------------------------------------------                                EP -                              PRIMARY      [facility] INTERNAL  Last Visit: 07-      CARE ([facility])   DEISY Arriaza                              EP -                              PRIMARY      [facility] INTERNAL  Next Visit: 01-      CARE ([facility])   DEISY Arriaza                                                            Last  Test          Frequency    Reason                     Performed    Due Date  --------------------------------------------------------------------------------    CBC.........  12 months..  allopurinoL..............  12- 11-    CMP.........  12 months..  allopurinoL..............  12- 12-    [facility] Embedded Care Due Messages. Reference number: [id number].   11/09/2024 1:18:40 AM CST

## 2024-11-11 RX ORDER — APIXABAN 5 MG/1
5 TABLET, FILM COATED ORAL 2 TIMES DAILY
Qty: 180 TABLET | Refills: 3 | Status: SHIPPED | OUTPATIENT
Start: 2024-11-11

## 2024-11-11 RX ORDER — ALLOPURINOL 300 MG/1
300 TABLET ORAL
Qty: 90 TABLET | Refills: 3 | Status: SHIPPED | OUTPATIENT
Start: 2024-11-11

## 2024-11-15 ENCOUNTER — TELEPHONE (OUTPATIENT)
Dept: INTERNAL MEDICINE | Facility: CLINIC | Age: 84
End: 2024-11-15
Payer: MEDICARE

## 2024-11-15 NOTE — TELEPHONE ENCOUNTER
----- Message from Michelle sent at 11/15/2024  8:03 AM CST -----  Contact: [phone] Patient  Patient would like to get medical advice.  Symptoms (please be specific):   Pt states his pharmacy advised they still have not received the Rx for ELIQUIS 5 mg Tab; prescription message states Transmission to pharmacy failed (11/11/2024 12:53 PM CST)     Disp Refills Start End MIKA  ELIQUIS 5 mg Tab 180 tablet 3 11/11/2024 - No  Sig - Route: TAKE 1 TABLET TWICE DAILY - Oral  Sent to pharmacy as: ELIQUIS 5 mg Tab  Class: Normal  Order: [id number]  Date/Time Signed: 11/11/2024 12:53      E-Prescribing Status: Transmission to pharmacy failed (11/11/2024 12:53 PM CST)  Message completed by Kay Hidalgo MA (11/11/2024 1:00 PM).      How long have you had these symptoms: N/A  Would you like a call back, or a response through your [facility] portal?:   pt states a call back is not needed  Pharmacy name and phone # (copy from chart):     [facility] - [place], [place] - [address]  [address] [address]  Phone: [phone] Fax: [phone]       Comments:

## 2024-11-17 DIAGNOSIS — M1A.9XX0 CHRONIC GOUT WITHOUT TOPHUS, UNSPECIFIED CAUSE, UNSPECIFIED SITE: ICD-10-CM

## 2024-11-18 RX ORDER — COLCHICINE 0.6 MG/1
0.6 TABLET ORAL
Qty: 90 TABLET | Refills: 3 | Status: SHIPPED | OUTPATIENT
Start: 2024-11-18

## 2024-11-25 ENCOUNTER — OFFICE VISIT (OUTPATIENT)
Dept: DERMATOLOGY | Facility: CLINIC | Age: 84
End: 2024-11-25
Payer: MEDICARE

## 2024-11-25 DIAGNOSIS — D18.01 ANGIOMA OF SKIN: ICD-10-CM

## 2024-11-25 DIAGNOSIS — L57.0 AK (ACTINIC KERATOSIS): Primary | ICD-10-CM

## 2024-11-25 DIAGNOSIS — Z85.828 PERSONAL HISTORY OF MALIGNANT NEOPLASM OF SKIN: ICD-10-CM

## 2024-11-25 DIAGNOSIS — L82.1 SK (SEBORRHEIC KERATOSIS): ICD-10-CM

## 2024-11-25 DIAGNOSIS — D22.9 MULTIPLE BENIGN NEVI: ICD-10-CM

## 2024-11-25 PROCEDURE — 99999 PR PBB SHADOW E&M-EST. PATIENT-LVL III: CPT | Mod: PBBFAC,HCNC,, | Performed by: DERMATOLOGY

## 2024-11-25 NOTE — PATIENT INSTRUCTIONS

## 2024-11-25 NOTE — PROGRESS NOTES
Subjective:      Patient ID:  Danial Meza is a 84 y.o. male who presents for   Chief Complaint   Patient presents with    Skin Check     UBSE      History of Present Illness: The patient presents for follow up of skin check.    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  This is a high risk patient here to check for the development of new lesions.    H/o eczema on thumbs - better. Pt uses clob oint bid under occlusion qhs (reports under occlusion qhs only to the right thumb) - not used since last visit.    Other skin complaints: none          Review of Systems   Skin:  Negative for itching, rash, dry skin, daily sunscreen use, activity-related sunscreen use, recent sunburn and wears hat.   Hematologic/Lymphatic: Bruises/bleeds easily (on eliquis).       Objective:   Physical Exam   Constitutional: He appears well-developed and well-nourished. No distress.   Neurological: He is alert and oriented to person, place, and time. He is not disoriented.   Psychiatric: He has a normal mood and affect.   Skin:   Areas Examined (abnormalities noted in diagram):   Scalp / Hair Palpated and Inspected  Head / Face Inspection Performed  Neck Inspection Performed  Chest / Axilla Inspection Performed  Abdomen Inspection Performed  Back Inspection Performed  RUE Inspected  LUE Inspection Performed  Nails and Digits Inspection Performed                     Diagram Legend     Erythematous scaling macule/papule c/w actinic keratosis       Vascular papule c/w angioma      Pigmented verrucoid papule/plaque c/w seborrheic keratosis      Yellow umbilicated papule c/w sebaceous hyperplasia      Irregularly shaped tan macule c/w lentigo     1-2 mm smooth white papules consistent with Milia      Movable subcutaneous cyst with punctum c/w epidermal inclusion cyst      Subcutaneous  movable cyst c/w pilar cyst      Firm pink to brown papule c/w dermatofibroma      Pedunculated fleshy papule(s) c/w skin tag(s)      Evenly pigmented macule c/w junctional nevus     Mildly variegated pigmented, slightly irregular-bordered macule c/w mildly atypical nevus      Flesh colored to evenly pigmented papule c/w intradermal nevus       Pink pearly papule/plaque c/w basal cell carcinoma      Erythematous hyperkeratotic cursted plaque c/w SCC      Surgical scar with no sign of skin cancer recurrence      Open and closed comedones      Inflammatory papules and pustules      Verrucoid papule consistent consistent with wart     Erythematous eczematous patches and plaques     Dystrophic onycholytic nail with subungual debris c/w onychomycosis     Umbilicated papule    Erythematous-base heme-crusted tan verrucoid plaque consistent with inflamed seborrheic keratosis     Erythematous Silvery Scaling Plaque c/w Psoriasis     See annotation      Assessment / Plan:        AK (actinic keratosis)  Cryosurgery Procedure Note    Verbal consent from the patient is obtained including, but not limited to, risk of hypopigmentation/hyperpigmentation, scar, recurrence of lesion. The patient is aware of the precancerous quality and need for treatment of these lesions. Liquid nitrogen cryosurgery is applied to the 4 actinic keratoses, as detailed in the physical exam, to produce a freeze injury. The patient is aware that blisters may form and is instructed on wound care with gentle cleansing and use of vaseline ointment to keep moist until healed. The patient is supplied a handout on cryosurgery and is instructed to call if lesions do not completely resolve.     Wear hat !    Angioma of skin   - minor problem and chronic.   Reassurance given to patient. No treatment necessary.      Multiple benign nevi   - minor problem and chronic.   Reassurance given to patient. No treatment necessary.     SK (seborrheic keratosis)  These are  benign inherited growths without a malignant potential. Reassurance given to patient. No treatment is necessary.      Personal history of malignant neoplasm of skin  Area(s) of previous NMSC evaluated with no signs of recurrence.    Upper body skin examination performed today including at least 6 points as noted in physical examination. No lesions suspicious for malignancy noted.    Recommend daily sun protection/avoidance and use of at least SPF 30, broad spectrum sunscreen (OTC drug).             Follow up in about 1 year (around 11/25/2025) for UBSE.

## 2025-01-25 ENCOUNTER — OFFICE VISIT (OUTPATIENT)
Dept: INTERNAL MEDICINE | Facility: CLINIC | Age: 85
End: 2025-01-25
Payer: MEDICARE

## 2025-01-25 VITALS
DIASTOLIC BLOOD PRESSURE: 78 MMHG | WEIGHT: 214.94 LBS | SYSTOLIC BLOOD PRESSURE: 120 MMHG | BODY MASS INDEX: 30.09 KG/M2 | HEART RATE: 81 BPM | HEIGHT: 71 IN | OXYGEN SATURATION: 100 %

## 2025-01-25 DIAGNOSIS — I48.0 PAF (PAROXYSMAL ATRIAL FIBRILLATION): ICD-10-CM

## 2025-01-25 DIAGNOSIS — R73.03 PREDIABETES: ICD-10-CM

## 2025-01-25 DIAGNOSIS — M1A.9XX0 CHRONIC GOUT WITHOUT TOPHUS, UNSPECIFIED CAUSE, UNSPECIFIED SITE: ICD-10-CM

## 2025-01-25 DIAGNOSIS — E79.0 HYPERURICEMIA: ICD-10-CM

## 2025-01-25 DIAGNOSIS — N18.31 STAGE 3A CHRONIC KIDNEY DISEASE: ICD-10-CM

## 2025-01-25 DIAGNOSIS — I10 ESSENTIAL HYPERTENSION: ICD-10-CM

## 2025-01-25 DIAGNOSIS — E78.2 MIXED HYPERLIPIDEMIA: ICD-10-CM

## 2025-01-25 DIAGNOSIS — Z00.00 ENCOUNTER FOR ANNUAL PHYSICAL EXAM: Primary | ICD-10-CM

## 2025-01-25 PROCEDURE — 99397 PER PM REEVAL EST PAT 65+ YR: CPT | Mod: S$GLB,,, | Performed by: INTERNAL MEDICINE

## 2025-01-25 PROCEDURE — 1101F PT FALLS ASSESS-DOCD LE1/YR: CPT | Mod: CPTII,S$GLB,, | Performed by: INTERNAL MEDICINE

## 2025-01-25 PROCEDURE — 3078F DIAST BP <80 MM HG: CPT | Mod: CPTII,S$GLB,, | Performed by: INTERNAL MEDICINE

## 2025-01-25 PROCEDURE — 1160F RVW MEDS BY RX/DR IN RCRD: CPT | Mod: CPTII,S$GLB,, | Performed by: INTERNAL MEDICINE

## 2025-01-25 PROCEDURE — 99999 PR PBB SHADOW E&M-EST. PATIENT-LVL IV: CPT | Mod: PBBFAC,,, | Performed by: INTERNAL MEDICINE

## 2025-01-25 PROCEDURE — 1126F AMNT PAIN NOTED NONE PRSNT: CPT | Mod: CPTII,S$GLB,, | Performed by: INTERNAL MEDICINE

## 2025-01-25 PROCEDURE — 1159F MED LIST DOCD IN RCRD: CPT | Mod: CPTII,S$GLB,, | Performed by: INTERNAL MEDICINE

## 2025-01-25 PROCEDURE — 3288F FALL RISK ASSESSMENT DOCD: CPT | Mod: CPTII,S$GLB,, | Performed by: INTERNAL MEDICINE

## 2025-01-25 PROCEDURE — 3074F SYST BP LT 130 MM HG: CPT | Mod: CPTII,S$GLB,, | Performed by: INTERNAL MEDICINE

## 2025-01-25 NOTE — PROGRESS NOTES
HPI    DLS: 9/30/2024    Pt here for 4 Month Check;  Pt states no eye pain but every now and then his eyes would water.    Meds;  Latanoprost QHS OU  Timolol QAM OU    1) POAG OD   2) Glaucoma Suspect OS   3) NS OU   4) Ptosis   5) FmHx ARMD   6) Hyperopia with Astigmatism and Presbyopia       Last edited by Idalia Esteban on 1/27/2025  8:28 AM.            Assessment /Plan     For exam results, see Encounter Report.    Primary open-angle glaucoma, right eye, mild stage    Open angle with borderline findings and high glaucoma risk in left eye    Nuclear sclerosis of both eyes    Brow ptosis    Small pupils    Family history of macular degeneration    Hyperopia of both eyes with astigmatism and presbyopia      1. Open angle with borderline glaucoma findings - Both Eyes (POAG od // suspect os )   Glaucoma (type and duration)   LTG suspect  First HVF 2011  First photos 2011  Glaucoma drops started 2/11/2021 (monitored off gtts 2011 to 2021 - then got new INS od)      Family history   = father-also my patient, + mom- she has passed  Glaucoma meds   lumigan ou - started 2/11/2021  H/O adverse rxn to glaucoma drops  None (? Avoid BB - bradycardia)   LASERS   none  GLAUCOMA SURGERIES   none  OTHER EYE SURGERIES   none  CDR   0.8 with thin sup rim // 0.7  - rim appears intact   Tbase  16-24    Tmax  21/24      Ttarget   16 ou (set 5/9/2022 ) - had VF prog ou withIOP 16-20 ou        HVF  5 test 2011 to 2018 - full od // full os-            4 test 2021 to 2024 -  IAD/NS  od //  INS  os   Gono  +3 ou  CCT  567/566  OCT  5 test 2018 to 2024 - RNFL - nl od (+prog 2018 to 2021) // bord G os  HRT   5 test 2011 to 2019 - MR -  Dec. IT od,  // dec. IN os, border IT /// CDR 0.72 od // 0.70 os  Disc photos  2011, 2012, 2017  - OIS // 2021 - [facility]     Ttoday  16/14 (good resp to add Timolol 20 --> 16 /14-->14)   Test done today - IOP check with addition of timolol // gonio     2. Nuclear sclerosis - Both Eyes   - mild BCVA 20/20  ou  BATh 20/60 od // 20/70 os (10/2013)   3. Ptosis   -patient would like to monitor for now as he is not having any trouble    4. Family history of macular degeneration   -father has adv wet ARMD - gets injections with arend   5. Hyperopia, astigmatism , presbyopia  PC +0.75+1.00x177        +1.00+1.50x180   ADD +2.50  glasses Shelton   6. I use to see his father, and also saw his mother -  - his dad  2015 @ age 89   - his mom passed at age 98  - both had glaucoma, his dad also had  wet ARMD - saw Arend         Plan    POAG ou - mild   + IAD/NS od x 2 - confirmed   INS os   OCT od - bord TS now - (+prog from ) // now fluctuating   Cont  latanoprost  Add timolol q am ou ( no asthma or copd - + a fib )      Cont to monitor cataracts     - not vis sign yet - dilates medium only - smallish pupils     Photo file updated 2023    Mild to mod cataracts - BCVA 20/40 od and 20/20 os    BAT h 20/40 ou   Phaco/IOL od prn  - pt is content with vision for now     Glasses - shelton - last seen 2023     F/U 4 months with IOP check // AR/MR/BAT ou
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
## 2025-01-25 NOTE — PATIENT INSTRUCTIONS
Schedule fasting labwork    Recommended getting RSV and covid-19 vaccinations.     Return to clinic in 6 months for follow up or sooner if needed.

## 2025-01-27 ENCOUNTER — OFFICE VISIT (OUTPATIENT)
Dept: OPHTHALMOLOGY | Facility: CLINIC | Age: 85
End: 2025-01-27
Payer: MEDICARE

## 2025-01-27 ENCOUNTER — LAB VISIT (OUTPATIENT)
Dept: LAB | Facility: HOSPITAL | Age: 85
End: 2025-01-27
Attending: INTERNAL MEDICINE
Payer: MEDICARE

## 2025-01-27 DIAGNOSIS — I10 ESSENTIAL HYPERTENSION: ICD-10-CM

## 2025-01-27 DIAGNOSIS — E78.2 MIXED HYPERLIPIDEMIA: ICD-10-CM

## 2025-01-27 DIAGNOSIS — Z83.518 FAMILY HISTORY OF MACULAR DEGENERATION: ICD-10-CM

## 2025-01-27 DIAGNOSIS — H52.203 HYPEROPIA OF BOTH EYES WITH ASTIGMATISM AND PRESBYOPIA: ICD-10-CM

## 2025-01-27 DIAGNOSIS — N18.31 STAGE 3A CHRONIC KIDNEY DISEASE: ICD-10-CM

## 2025-01-27 DIAGNOSIS — E79.0 HYPERURICEMIA: ICD-10-CM

## 2025-01-27 DIAGNOSIS — R73.03 PREDIABETES: ICD-10-CM

## 2025-01-27 DIAGNOSIS — H57.819 BROW PTOSIS: ICD-10-CM

## 2025-01-27 DIAGNOSIS — H52.4 HYPEROPIA OF BOTH EYES WITH ASTIGMATISM AND PRESBYOPIA: ICD-10-CM

## 2025-01-27 DIAGNOSIS — H40.022 OPEN ANGLE WITH BORDERLINE FINDINGS AND HIGH GLAUCOMA RISK IN LEFT EYE: ICD-10-CM

## 2025-01-27 DIAGNOSIS — H40.1111 PRIMARY OPEN-ANGLE GLAUCOMA, RIGHT EYE, MILD STAGE: Primary | ICD-10-CM

## 2025-01-27 DIAGNOSIS — H25.13 NUCLEAR SCLEROSIS OF BOTH EYES: ICD-10-CM

## 2025-01-27 DIAGNOSIS — H57.03 SMALL PUPILS: ICD-10-CM

## 2025-01-27 DIAGNOSIS — H52.03 HYPEROPIA OF BOTH EYES WITH ASTIGMATISM AND PRESBYOPIA: ICD-10-CM

## 2025-01-27 LAB
ALBUMIN SERPL BCP-MCNC: 4.3 G/DL (ref 3.5–5.2)
ALP SERPL-CCNC: 84 U/L (ref 40–150)
ALT SERPL W/O P-5'-P-CCNC: 23 U/L (ref 10–44)
ANION GAP SERPL CALC-SCNC: 8 MMOL/L (ref 8–16)
AST SERPL-CCNC: 20 U/L (ref 10–40)
BASOPHILS # BLD AUTO: 0.04 K/UL (ref 0–0.2)
BASOPHILS NFR BLD: 0.6 % (ref 0–1.9)
BILIRUB SERPL-MCNC: 1.4 MG/DL (ref 0.1–1)
BUN SERPL-MCNC: 15 MG/DL (ref 8–23)
CALCIUM SERPL-MCNC: 10.1 MG/DL (ref 8.7–10.5)
CHLORIDE SERPL-SCNC: 108 MMOL/L (ref 95–110)
CHOLEST SERPL-MCNC: 71 MG/DL (ref 120–199)
CHOLEST/HDLC SERPL: 3.4 {RATIO} (ref 2–5)
CO2 SERPL-SCNC: 28 MMOL/L (ref 23–29)
CREAT SERPL-MCNC: 1 MG/DL (ref 0.5–1.4)
DIFFERENTIAL METHOD BLD: ABNORMAL
EOSINOPHIL # BLD AUTO: 0.2 K/UL (ref 0–0.5)
EOSINOPHIL NFR BLD: 2.9 % (ref 0–8)
ERYTHROCYTE [DISTWIDTH] IN BLOOD BY AUTOMATED COUNT: 12.8 % (ref 11.5–14.5)
EST. GFR  (NO RACE VARIABLE): >60 ML/MIN/1.73 M^2
ESTIMATED AVG GLUCOSE: 114 MG/DL (ref 68–131)
GLUCOSE SERPL-MCNC: 125 MG/DL (ref 70–110)
HBA1C MFR BLD: 5.6 % (ref 4–5.6)
HCT VFR BLD AUTO: 43.4 % (ref 40–54)
HDLC SERPL-MCNC: 21 MG/DL (ref 40–75)
HDLC SERPL: 29.6 % (ref 20–50)
HGB BLD-MCNC: 15 G/DL (ref 14–18)
IMM GRANULOCYTES # BLD AUTO: 0.02 K/UL (ref 0–0.04)
IMM GRANULOCYTES NFR BLD AUTO: 0.3 % (ref 0–0.5)
LDLC SERPL CALC-MCNC: 31.2 MG/DL (ref 63–159)
LYMPHOCYTES # BLD AUTO: 2 K/UL (ref 1–4.8)
LYMPHOCYTES NFR BLD: 30.6 % (ref 18–48)
MCH RBC QN AUTO: 33.3 PG (ref 27–31)
MCHC RBC AUTO-ENTMCNC: 34.6 G/DL (ref 32–36)
MCV RBC AUTO: 96 FL (ref 82–98)
MONOCYTES # BLD AUTO: 0.4 K/UL (ref 0.3–1)
MONOCYTES NFR BLD: 6.1 % (ref 4–15)
NEUTROPHILS # BLD AUTO: 3.9 K/UL (ref 1.8–7.7)
NEUTROPHILS NFR BLD: 59.5 % (ref 38–73)
NONHDLC SERPL-MCNC: 50 MG/DL
NRBC BLD-RTO: 0 /100 WBC
PLATELET # BLD AUTO: 147 K/UL (ref 150–450)
PMV BLD AUTO: 10.9 FL (ref 9.2–12.9)
POTASSIUM SERPL-SCNC: 4.8 MMOL/L (ref 3.5–5.1)
PROT SERPL-MCNC: 6.7 G/DL (ref 6–8.4)
RBC # BLD AUTO: 4.5 M/UL (ref 4.6–6.2)
SODIUM SERPL-SCNC: 144 MMOL/L (ref 136–145)
TRIGL SERPL-MCNC: 94 MG/DL (ref 30–150)
TSH SERPL DL<=0.005 MIU/L-ACNC: 1.76 UIU/ML (ref 0.4–4)
URATE SERPL-MCNC: 4.4 MG/DL (ref 3.4–7)
WBC # BLD AUTO: 6.56 K/UL (ref 3.9–12.7)

## 2025-01-27 PROCEDURE — 3288F FALL RISK ASSESSMENT DOCD: CPT | Mod: CPTII,S$GLB,, | Performed by: OPHTHALMOLOGY

## 2025-01-27 PROCEDURE — 85025 COMPLETE CBC W/AUTO DIFF WBC: CPT | Performed by: INTERNAL MEDICINE

## 2025-01-27 PROCEDURE — 80053 COMPREHEN METABOLIC PANEL: CPT | Performed by: INTERNAL MEDICINE

## 2025-01-27 PROCEDURE — 36415 COLL VENOUS BLD VENIPUNCTURE: CPT | Performed by: INTERNAL MEDICINE

## 2025-01-27 PROCEDURE — 1126F AMNT PAIN NOTED NONE PRSNT: CPT | Mod: CPTII,S$GLB,, | Performed by: OPHTHALMOLOGY

## 2025-01-27 PROCEDURE — 1101F PT FALLS ASSESS-DOCD LE1/YR: CPT | Mod: CPTII,S$GLB,, | Performed by: OPHTHALMOLOGY

## 2025-01-27 PROCEDURE — 1160F RVW MEDS BY RX/DR IN RCRD: CPT | Mod: CPTII,S$GLB,, | Performed by: OPHTHALMOLOGY

## 2025-01-27 PROCEDURE — 99999 PR PBB SHADOW E&M-EST. PATIENT-LVL III: CPT | Mod: PBBFAC,,, | Performed by: OPHTHALMOLOGY

## 2025-01-27 PROCEDURE — 99214 OFFICE O/P EST MOD 30 MIN: CPT | Mod: S$GLB,,, | Performed by: OPHTHALMOLOGY

## 2025-01-27 PROCEDURE — 83036 HEMOGLOBIN GLYCOSYLATED A1C: CPT | Performed by: INTERNAL MEDICINE

## 2025-01-27 PROCEDURE — 92020 GONIOSCOPY: CPT | Mod: S$GLB,,, | Performed by: OPHTHALMOLOGY

## 2025-01-27 PROCEDURE — 1159F MED LIST DOCD IN RCRD: CPT | Mod: CPTII,S$GLB,, | Performed by: OPHTHALMOLOGY

## 2025-01-27 PROCEDURE — 80061 LIPID PANEL: CPT | Performed by: INTERNAL MEDICINE

## 2025-01-27 PROCEDURE — 84443 ASSAY THYROID STIM HORMONE: CPT | Performed by: INTERNAL MEDICINE

## 2025-01-27 PROCEDURE — 84550 ASSAY OF BLOOD/URIC ACID: CPT | Performed by: INTERNAL MEDICINE

## 2025-02-03 NOTE — TELEPHONE ENCOUNTER
"----- Message from Lizbet Vargas sent at 10/27/2017 12:51 PM CDT -----  Contact: self/[phone]      RX request - refill or new RX.  Is this a refill or new RX:  New  RX name and strength: Aciclovir (preferible generic).  Directions:   Is this a 30 day or 90 day RX:    Pharmacy name and phone # (DON'T enter "on file" or "in chart"): [facility] [place], [place] - [address] [phone] (Phone)  [phone] (Fax)  Comments:  Thanks        " Attempted to call pt to inform her:    We have not received any correspondence from [employer] on their behalf. However, we have printed the patient access support form for [employer] from the [employer] website. We have completed the prescriber portion on lana behalf, however, we are unable to complete the patient portion as it requires the pt's signature. Advised pt that if they have already completed the patient portion to please bring it by the office, send a picture in [employer], or fax it to the office at [phone].    Pt did not answer. Left voicemail and sent [employer] message requesting the above information.

## 2025-02-04 ENCOUNTER — PROCEDURE VISIT (OUTPATIENT)
Dept: NEUROLOGY | Facility: CLINIC | Age: 85
End: 2025-02-04
Payer: MEDICARE

## 2025-02-04 DIAGNOSIS — M79.641 RIGHT HAND PAIN: ICD-10-CM

## 2025-02-04 PROCEDURE — 99203 OFFICE O/P NEW LOW 30 MIN: CPT | Mod: S$GLB,,, | Performed by: PSYCHIATRY & NEUROLOGY

## 2025-02-04 PROCEDURE — 95910 NRV CNDJ TEST 7-8 STUDIES: CPT | Mod: S$GLB,,, | Performed by: PSYCHIATRY & NEUROLOGY

## 2025-02-04 PROCEDURE — 95886 MUSC TEST DONE W/N TEST COMP: CPT | Mod: S$GLB,,, | Performed by: PSYCHIATRY & NEUROLOGY

## 2025-02-04 NOTE — PROCEDURES
EMG W/ ULTRASOUND AND NERVE CONDUCTION TEST 2 Extremities    Date/Time: 2/4/2025 10:00 AM    Performed by: Sai Christy MD  Authorized by: Osmar Manzo MD                                                                 [facility] [address] Neurology    Subjective:       Patient ID: Danial Meza is a 84 y.o. male here for a EMG focused evaluation for right hand pain. Previous visits and diagnostic evaluation reviewed.  Patient has a history of right carpal tunnel release procedure.  He states the procedure was successful for a short time.  Recently however symptoms of right hand pain and numbness have returned.  Symptoms have been progressively worsening and severe at times.   HPI  Review of patient's allergies indicates:   Allergen Reactions    Macadamia nut oil Anaphylaxis     Pt states only had a reaction with macadamia nuts.       There were no vitals filed for this visit.   Chief Complaint: No chief complaint on file.    Past Medical History:   Diagnosis Date    Basal cell carcinoma 01/05/2012    right nose    Basal cell carcinoma 10/09/2017    left nasal bridge and left cheek    Cataract     Glaucoma     Gout 07/18/2014    Mixed hyperlipidemia     Osteoarthritis of right foot 10/26/2019    SVT (supraventricular tachycardia) 01/14/2005    ablation by Marixa of left lateral free wall accessory bypass tract    Type 2 diabetes mellitus with hyperglycemia, without long-term current use of insulin 04/09/2018      Social History     Socioeconomic History    Marital status:    Occupational History    Occupation: Retired   Tobacco Use    Smoking status: Never    Smokeless tobacco: Never   Substance and Sexual Activity    Alcohol use: Yes     Alcohol/week: 2.0 standard drinks of alcohol     Types: 2 Cans of beer per week     Comment: weekly    Drug use: No    Sexual activity: Yes     Partners: Female     Social Drivers of Health     Financial Resource Strain: Low Risk  (6/4/2024)    Overall  Financial Resource Strain (CARDIA)     Difficulty of Paying Living Expenses: Not hard at all   Food Insecurity: No Food Insecurity (6/4/2024)    Hunger Vital Sign     Worried About Running Out of Food in the Last Year: Never true     Ran Out of Food in the Last Year: Never true   Transportation Needs: No Transportation Needs (6/4/2024)    PRAPARE - Transportation     Lack of Transportation (Medical): No     Lack of Transportation (Non-Medical): No   Physical Activity: Inactive (6/4/2024)    Exercise Vital Sign     Days of Exercise per Week: 0 days     Minutes of Exercise per Session: 0 min   Stress: No Stress Concern Present (6/4/2024)    [employer] - Occupational Stress Questionnaire     Feeling of Stress : Not at all   Housing Stability: Low Risk  (6/4/2024)    Housing Stability Vital Sign     Unable to Pay for Housing in the Last Year: No     Homeless in the Last Year: No            Objective:      Physical Exam    Constitutional: Patient appears well-nourished.   Head: Normocephalic and atraumatic.   Mouth/Throat: Oropharynx is clear and moist.   Pulmonary/Chest: Effort normal.   Abdominal: Soft.   Skin: Skin is warm and dry.      General:  Patient is alert and cooperative.  Affect:  Patient is appropriate to surroundings and environment.  Language:  Speech is fluent.  HEENT:  There are no outward signs of trauma to head or face.  Cranial Nerves:  Pupils are equal round and reactive to light. Extra-ocular movements are intact. Face, tongue, and palate are symmetrical.  Motor:  Patient exhibits normal strength testing in bilateral proximal and distal upper extremities.  Reflexes:  Symmetrical in bilateral upper extremities.  Gait:  Ambulation is independent without use of cane or walker without signs of ataxia or circumduction.  Cerebellar:  No evidence of dysmetria.  Sensory:  Intact to sensory modalities tested.  Musculoskeletal:  There is no severe tenderness to palpation and  manipulation of the cervical spine region.   Assessment:       We reviewed and discussed at length results of EMG of the right upper extremity performed today revealing moderate to severe right carpal tunnel syndrome. These findings are available via media section of chart review.   1. Right hand pain              Plan:       We discussed treatment options at length. Recommend patient keep appointment with referring provider.       We specifically discussed the pathophysiology of carpal tunnel syndrome and treatment options including bracing, injections and possible surgical intervention.     I spent a total of 35 minutes on the day of the visit. This includes face to face time and non-face to face time preparing to see the patient (eg, review of tests), obtaining and/or reviewing separately obtained history, documenting clinical information in the electronic or other health record, independently interpreting results and communicating results to the patient/family/caregiver, or care coordinator  .  Sai Christy MD, FAAN   02/04/2025   10:36 AM     A dictation device was used to produce this document. Use of such devices sometimes results in grammatical errors or replacement of words that sound similarly.

## 2025-02-06 ENCOUNTER — OFFICE VISIT (OUTPATIENT)
Dept: ORTHOPEDICS | Facility: CLINIC | Age: 85
End: 2025-02-06
Payer: MEDICARE

## 2025-02-06 DIAGNOSIS — M79.641 RIGHT HAND PAIN: Primary | ICD-10-CM

## 2025-02-06 PROCEDURE — 1126F AMNT PAIN NOTED NONE PRSNT: CPT | Mod: CPTII,S$GLB,, | Performed by: ORTHOPAEDIC SURGERY

## 2025-02-06 PROCEDURE — 99213 OFFICE O/P EST LOW 20 MIN: CPT | Mod: S$GLB,,, | Performed by: ORTHOPAEDIC SURGERY

## 2025-02-06 PROCEDURE — 1159F MED LIST DOCD IN RCRD: CPT | Mod: CPTII,S$GLB,, | Performed by: ORTHOPAEDIC SURGERY

## 2025-02-06 PROCEDURE — 99999 PR PBB SHADOW E&M-EST. PATIENT-LVL II: CPT | Mod: PBBFAC,,, | Performed by: ORTHOPAEDIC SURGERY

## 2025-02-06 NOTE — PROGRESS NOTES
[facility]  History & Physical  Orthopedics     SUBJECTIVE:       History of Present Illness    INTERVAL HISTORY:  - Danial presents with concerns about his right middle finger. He reports having surgery on this finger in June 2022, performed by Dr. Figueroa. Initially, the surgery provided some relief, but symptoms returned about a year ago. He describes the current sensation as tingling and states that the finger feels almost numb. He elaborates that it has a tingling sensation without pain. Danial compares the feeling to a minor burn sensation.     The main issue is numbness and difficulty with fine motor skills. He explains that he struggles with tasks requiring dexterity, such as picking up cards during games. He notes that friction helps temporarily, allowing for improved use of the finger.     Danial admits to delaying seeking medical attention. He last saw Dr. Figueroa a few years ago and has not returned since. He also mentions a previous encounter with Dr. Diamond for nerve testing, though he expressed dissatisfaction with the experience.     Danial's medical history includes a previous diagnosis of severe carpal tunnel syndrome and a trigger finger treated by Dr. Frederic Frausto approximately 20-25 years ago.     Danial denies any numbness, tingling, or shocks in other parts of his hand or arm. Danial denies any pain in the affected finger.      Interval history February 6, 2025: The patient returns today for re-evaluation.  He notes that overall he is doing well.  He is not particularly bothered by his right hand at this point in time and has really no pain.  He is mostly just having some numbness which has returned to the long finger.  He had a recent EMG and returns today for those results and re-evaluation.     ROS:  General: -fever, -chills  Gastrointestinal: -nausea, -vomiting, -constipation, -diarrhea  Neurological: +numbness               Past Medical History:   Diagnosis Date     Basal cell carcinoma 01/05/2012     right nose    Basal cell carcinoma 10/09/2017     left nasal bridge and left cheek    Cataract      Glaucoma      Gout 07/18/2014    Mixed hyperlipidemia      Osteoarthritis of right foot 10/26/2019    SVT (supraventricular tachycardia) 01/14/2005     ablation by Marixa of left lateral free wall accessory bypass tract    Type 2 diabetes mellitus with hyperglycemia, without long-term current use of insulin 04/09/2018            Past Surgical History:   Procedure Laterality Date    CARPAL TUNNEL RELEASE Right 06/07/2022     Procedure: RELEASE, CARPAL TUNNEL;  Surgeon: Alisa Rodriguez MD;  Location: [facility] OR;  Service: Orthopedics;  Laterality: Right;    COLONOSCOPY   12/2015    skin cancer nose and neck        SVT accessory pathway ablation   01/14/2005     Dr. Calvin    TRIGGER FINGER RELEASE Right 06/07/2022     Procedure: RELEASE, TRIGGER FINGER, TENOSYNOVECTOMY;  Surgeon: Alisa Rodrigeuz MD;  Location: [facility] OR;  Service: Orthopedics;  Laterality: Right;            Review of patient's allergies indicates:   Allergen Reactions    Macadamia nut oil Anaphylaxis       Pt states only had a reaction with macadamia nuts.       Social History          Social History Narrative    Not on file             Family History   Problem Relation Name Age of Onset    Glaucoma Mother        Cancer Mother             Breast    Glaucoma Father        Macular degeneration Father        Stroke Father        Melanoma Father        Cancer Brother             Colon    Colon cancer Brother        Cancer Son             lymphoma - remission    No Known Problems Maternal Aunt        No Known Problems Maternal Uncle        No Known Problems Paternal Aunt        No Known Problems Paternal Uncle        No Known Problems Maternal Grandmother        No Known Problems Maternal Grandfather        No Known Problems Paternal Grandmother        No Known Problems Paternal Grandfather        Diabetes Neg Hx         Psoriasis Neg Hx        Lupus Neg Hx        Eczema Neg Hx        Acne Neg Hx        Amblyopia Neg Hx        Blindness Neg Hx        Cataracts Neg Hx        Hypertension Neg Hx        Retinal detachment Neg Hx        Strabismus Neg Hx        Thyroid disease Neg Hx               Current Medications      Current Outpatient Medications:     allopurinoL (ZYLOPRIM) 300 MG tablet, Take 1 tablet (300 mg total) by mouth once daily., Disp: 90 tablet, Rfl: 3    apixaban (ELIQUIS) 5 mg Tab, Take 1 tablet (5 mg total) by mouth 2 (two) times daily., Disp: 180 tablet, Rfl: 3    atorvastatin (LIPITOR) 20 MG tablet, TAKE 1 TABLET EVERY EVENING, Disp: 90 tablet, Rfl: 3    colchicine (COLCRYS) 0.6 mg tablet, TAKE 1 TABLET ONE TIME DAILY, Disp: 90 tablet, Rfl: 0    influenza, adjuvanted, (FLUAD TRIV 2024-25,65Y UP,,PF,) 45 mcg (15 mcg x 3)/0.5 mL IM vaccine (> or = 66 yo), Inject into the muscle., Disp: 0.5 mL, Rfl: 0    latanoprost 0.005 % ophthalmic solution, Place 1 drop into both eyes every evening., Disp: 7.5 mL, Rfl: 4    lisinopriL 10 MG tablet, TAKE 1 TABLET EVERY DAY, Disp: 90 tablet, Rfl: 3    metoprolol succinate (TOPROL-XL) 50 MG 24 hr tablet, TAKE 1 TABLET EVERY DAY, Disp: 90 tablet, Rfl: 3    multivitamin (THERAGRAN) per tablet, Take 1 tablet by mouth once daily., Disp: , Rfl:     timolol maleate 0.5% (TIMOPTIC) 0.5 % Drop, Place 1 drop into both eyes every morning., Disp: 10 mL, Rfl: 3    VIT C/VIT E/LUTEIN/MIN/OMEGA-3 (OCUVITE ORAL), Take 1 tablet by mouth once daily. , Disp: , Rfl:   No current facility-administered medications for this visit.     Facility-Administered Medications Ordered in Other Visits:     LIDOcaine (PF) 10 mg/ml (1%) injection 10 mg, 1 mL, Intradermal, Once, Sai Masterson MD        OBJECTIVE:       Vital Signs (Most Recent):  Vitals   There were no vitals filed for this visit.     There is no height or weight on file to calculate BMI.     Physical Exam    General: No acute distress.  Well-developed. Well-nourished.  Skin: No suspicious lesions.  HENT: Normocephalic. Atraumatic.  Eyes: EOMI. Normal conjunctivae.  Cardiovascular: Regular rate. Regular rhythm.  Respiratory: Normal respiratory effort.  Musculoskeletal: No  obvious deformity.  Neurological: Alert & oriented x3.  Psychiatric: Normal mood. Normal affect.  MSK: Hand/Wrist - Right: Right middle finger: Deviation to the left.          Right Hand/Wrist Examination:     Observation/Inspection:  Swelling                       none                  Deformity                     none  Discoloration               none                  Scars                           Right carpal tunnel, well-healed                       Atrophy                        none     HAND/WRIST EXAMINATION:  Finkelstein's Test                                Neg  WHAT Test                                         Neg  Snuff box tenderness                          Neg  Rivers's Test                                     Neg  Hook of Hamate Tenderness              Neg  CMC grind                                           Neg  Circumduction test                              Neg     Neurovascular Exam:  Digits WWP, brisk CR < 3s throughout  NVI motor/LTS to M/R/U nerves, radial pulse 2+ except for decreased light touch sensation at baseline and constantly in the right long finger  Tinel's Test - Carpal Tunnel                Neg  Tinel's Test - Cubital Tunnel               Neg  Phalen's Test                                      Neg  Median Nerve Compression TestNeg     ROM hand full, painless     ROM wrist full, painless    ROM elbow full, painless     Abdomen not guarded  Respirations nonlabored  Perfusion intact     Diagnostic Results:     Imaging - I independently viewed the patient's imaging as well as the radiology report.  Xrays of the patient's  right long finger  demonstrate no evidence of any acute fractures or dislocations  with some degenerative changes     EMG -   none recently; EMG from 2022 demonstrates severe right carpal tunnel syndrome    Recent EMG -      ASSESSMENT/PLAN:       84 y.o. yo male  status post prior right-sided carpal tunnel release now with recurrent symptoms and constant numbness in the right long finger in particular  Plan: The patient and I had a thorough discussion today.  We discussed the working diagnosis as well as several other potential alternative diagnoses.  Treatment options were discussed, both conservative and surgical.  Conservative treatment options would include things such as activity modifications, workplace modifications, a period of rest, oral vs topical OTC and prescription anti-inflammatory medications, occupational therapy, splinting/bracing, immobilization, corticosteroid injections, and others.  Surgical options were discussed as well.      Assessment & Plan    -   At this time we discussed options.  I can not provoke any symptoms on exam today.  We did discuss that where as he previously had severe carpal tunnel syndrome his EMG now rates it as moderate to severe carpal tunnel syndrome which is an improvement.  I informed him that I certainly do not ever expect EMG is to normalized after a successful carpal tunnel release.   It is certainly somewhat difficult to determine whether he is having recurrent carpal tunnel syndrome or just persistent symptoms from his severe disease.  Regardless, we discussed the nature of revision carpal tunnel surgery and that certainly no guarantee could be made for improvement after another surgery.  As such, he would like to continue observing his symptoms and follow up in 1 year for re-evaluation.  Follow up sooner for any problems or changes.     Should the patient's symptoms worsen, persist, or fail to improve they should return for reevaluation and I would be happy to see them back anytime.          Osmar Manzo M.D.     Please be aware that this note has been generated with the assistance  of M[employer] voice-to-text.  Please excuse any spelling or grammatical errors.     Thank you for choosing Dr. Osmar Manzo for your orthopedic hand and upper extremity care. It is our goal to provide you with exceptional care that will help keep you healthy, active, and get you back in the game.     If you felt that you received exemplary care today, please consider leaving feedback for Dr. Manzo on [employer] at [email or web address].     Please do not hesitate to reach out to us via email, phone, or MyChart with any questions, concerns, or feedback.

## 2025-02-24 ENCOUNTER — TELEPHONE (OUTPATIENT)
Dept: INTERNAL MEDICINE | Facility: CLINIC | Age: 85
End: 2025-02-24
Payer: MEDICARE

## 2025-02-24 DIAGNOSIS — Z00.00 ENCOUNTER FOR MEDICARE ANNUAL WELLNESS EXAM: ICD-10-CM

## 2025-02-24 NOTE — TELEPHONE ENCOUNTER
----- Message from Janet sent at 2/24/2025  3:16 PM CST -----  Contact: [phone]  2TESTRESULTSType: Test ResultsWhat test was performed? Blood test Who ordered the test?  Dr. Arriaza When and where were the test performed?  01/27/2024Would you like a call back and or thru [facility]: Patient does not need a call back  Comments: Patient would like to get his test result  send over thru the mail. Thank you

## 2025-03-03 DIAGNOSIS — H40.1111 PRIMARY OPEN-ANGLE GLAUCOMA, RIGHT EYE, MILD STAGE: ICD-10-CM

## 2025-03-03 DIAGNOSIS — H40.1121 PRIMARY OPEN-ANGLE GLAUCOMA, LEFT EYE, MILD STAGE: ICD-10-CM

## 2025-03-03 RX ORDER — TIMOLOL MALEATE 5 MG/ML
1 SOLUTION/ DROPS OPHTHALMIC EVERY MORNING
Qty: 15 ML | Refills: 3 | Status: SHIPPED | OUTPATIENT
Start: 2025-03-03

## 2025-03-13 ENCOUNTER — TELEPHONE (OUTPATIENT)
Dept: ORTHOPEDICS | Facility: CLINIC | Age: 85
End: 2025-03-13
Payer: MEDICARE

## 2025-03-13 NOTE — TELEPHONE ENCOUNTER
----- Message from Latonia sent at 3/13/2025  3:14 PM CDT -----  Regarding: Appt  Contact: pt [phone]  Pt is requesting call back to schedule a sooner appt inf possible, pt is currently schedule for 4/17, for right thumb pan.  please call pt @ [phone]  
Yes

## 2025-03-27 ENCOUNTER — TELEPHONE (OUTPATIENT)
Dept: ORTHOPEDICS | Facility: CLINIC | Age: 85
End: 2025-03-27
Payer: MEDICARE

## 2025-05-03 DIAGNOSIS — H40.022 OPEN ANGLE WITH BORDERLINE FINDINGS AND HIGH GLAUCOMA RISK IN LEFT EYE: ICD-10-CM

## 2025-05-03 DIAGNOSIS — H40.1111 PRIMARY OPEN-ANGLE GLAUCOMA, RIGHT EYE, MILD STAGE: ICD-10-CM

## 2025-05-05 RX ORDER — LATANOPROST 50 UG/ML
1 SOLUTION/ DROPS OPHTHALMIC NIGHTLY
Qty: 7.5 ML | Refills: 3 | Status: SHIPPED | OUTPATIENT
Start: 2025-05-05

## 2025-05-08 ENCOUNTER — OFFICE VISIT (OUTPATIENT)
Dept: INTERNAL MEDICINE | Facility: CLINIC | Age: 85
End: 2025-05-08
Payer: MEDICARE

## 2025-05-08 VITALS
SYSTOLIC BLOOD PRESSURE: 114 MMHG | DIASTOLIC BLOOD PRESSURE: 78 MMHG | WEIGHT: 217.63 LBS | OXYGEN SATURATION: 99 % | HEIGHT: 71 IN | HEART RATE: 84 BPM | BODY MASS INDEX: 30.47 KG/M2

## 2025-05-08 DIAGNOSIS — H40.003 OPEN ANGLE WITH BORDERLINE INTRAOCULAR PRESSURE OF BOTH EYES: ICD-10-CM

## 2025-05-08 DIAGNOSIS — D69.2 SENILE PURPURA: ICD-10-CM

## 2025-05-08 DIAGNOSIS — Z00.00 ENCOUNTER FOR MEDICARE ANNUAL WELLNESS EXAM: Primary | ICD-10-CM

## 2025-05-08 DIAGNOSIS — E78.2 MIXED HYPERLIPIDEMIA: Chronic | ICD-10-CM

## 2025-05-08 DIAGNOSIS — I48.21 PERMANENT ATRIAL FIBRILLATION: ICD-10-CM

## 2025-05-08 DIAGNOSIS — M1A.9XX1 GOUT WITH TOPHI: ICD-10-CM

## 2025-05-08 DIAGNOSIS — E66.811 CLASS 1 OBESITY DUE TO EXCESS CALORIES WITH SERIOUS COMORBIDITY AND BODY MASS INDEX (BMI) OF 30.0 TO 30.9 IN ADULT: ICD-10-CM

## 2025-05-08 DIAGNOSIS — N18.31 STAGE 3A CHRONIC KIDNEY DISEASE: ICD-10-CM

## 2025-05-08 DIAGNOSIS — I77.819 ECTATIC AORTA: ICD-10-CM

## 2025-05-08 DIAGNOSIS — R73.03 PREDIABETES: ICD-10-CM

## 2025-05-08 DIAGNOSIS — I10 ESSENTIAL HYPERTENSION: Chronic | ICD-10-CM

## 2025-05-08 DIAGNOSIS — E66.09 CLASS 1 OBESITY DUE TO EXCESS CALORIES WITH SERIOUS COMORBIDITY AND BODY MASS INDEX (BMI) OF 30.0 TO 30.9 IN ADULT: ICD-10-CM

## 2025-05-08 PROCEDURE — 99999 PR PBB SHADOW E&M-EST. PATIENT-LVL III: CPT | Mod: PBBFAC,HCNC,, | Performed by: NURSE PRACTITIONER

## 2025-05-08 NOTE — PROGRESS NOTES
"  Danial Meza presented for a follow-up Medicare AWV today. The following components were reviewed and updated:    Medical history  Family History  Social history  Allergies and Current Medications  Health Risk Assessment  Health Maintenance  Care Team    **See Completed Assessments for Annual Wellness visit with in the encounter summary    The following assessments were completed:  Depression Screening  Cognitive function Screening  Timed Get Up Test  Whisper Test      Opioid documentation:      Patient does not have a current opioid prescription.          Vitals:    05/08/25 0836   BP: 114/78   BP Location: Left arm   Patient Position: Sitting   Pulse: 84   SpO2: 99%   Weight: 98.7 kg (217 lb 9.5 oz)   Height: 5' 11" (1.803 m)     Body mass index is 30.35 kg/m².       Physical Exam  Constitutional:       General: He is not in acute distress.     Appearance: Normal appearance. He is not ill-appearing, toxic-appearing or diaphoretic.   HENT:      Head: Normocephalic and atraumatic.   Cardiovascular:      Rate and Rhythm: Normal rate. Rhythm irregular.      Heart sounds: Normal heart sounds.   Pulmonary:      Effort: Pulmonary effort is normal.      Breath sounds: Normal breath sounds.   Abdominal:      General: Abdomen is flat.      Palpations: Abdomen is soft.   Musculoskeletal:      Cervical back: Neck supple.   Skin:     General: Skin is warm and dry.   Neurological:      Mental Status: He is alert and oriented to person, place, and time.   Psychiatric:         Mood and Affect: Mood normal.         Behavior: Behavior normal.         Diagnoses and health risks identified today and associated recommendations/orders:  1. Encounter for Medicare annual wellness exam  Here for Health Risk Assessment/Annual Wellness Visit. Health maintenance reviewed and updated. Follow up in one year.    - Referral to Enhanced Annual Wellness Visit (eAWV) M+4    2. 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 Will continue current management. Follow up with PCP      3. Essential hypertension  Problem is stable. Will continue current management. Follow up with PCP      4. Mixed hyperlipidemia  Problem is stable. Will continue current management. Follow up with PCP      5. Permanent atrial fibrillation  Problem is stable. Will continue current management. Follow up with PCP      6. Ectatic aorta  Problem is stable. Will continue current management. Follow up with PCP      7. Stage 3a chronic kidney disease  Problem is stable. Will continue current management. Follow up with PCP      8. Senile purpura  Problem is stable. Will continue current management. Follow up with PCP      9. Prediabetes  Problem is stable. Will continue current management. Follow up with PCP      10. Class 1 obesity due to excess calories with serious comorbidity and body mass index (BMI) of 30.0 to 30.9 in adult  Problem is stable. Will continue current management. 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 Will continue current management. Follow up with PCP        Provided Danial with a 5-10 year written screening schedule and personal prevention plan. Recommendations were developed using the USPSTF age appropriate recommendations. Education, counseling, and referrals were provided as needed.  After Visit Summary printed and given to patient which includes a list of additional screenings\tests needed.          Miguel Verma NP      I offered to discuss advanced care planning, including how to pick a person who would make decisions for you if you were unable to make them for yourself, called a health care power of , and what kind of decisions you might make such as use of life sustaining treatments such as ventilators and tube feeding when faced with a life limiting illness recorded on a living will that they will need to know. (How you want to be cared for as you near the end of your natural life)     X  Patient is interested in learning more about how to make advanced directives.  I provided them paperwork and offered to discuss this with them.

## 2025-05-08 NOTE — PATIENT INSTRUCTIONS
Counseling and Referral of Other Preventative  (Italic type indicates deductible and co-insurance are waived)    Patient Name: Danial Meza  Today's Date: 5/8/2025    Health Maintenance       Date Due Completion Date    RSV Vaccine (Age 60+ and Pregnant patients) (1 - 1-dose 75+ series) Never done ---    COVID-19 Vaccine (4 - 2024-25 season) 09/01/2024 10/28/2021    Hemoglobin A1c (Prediabetes) 01/27/2026 1/27/2025    TETANUS VACCINE 01/15/2029 1/15/2019    Lipid Panel 01/27/2030 1/27/2025    Colonoscopy 12/19/2032 12/19/2022        No orders of the defined types were placed in this encounter.      The following information is provided to all patients.  This information is to help you find resources for any of the problems found today that may be affecting your health:                  Living healthy guide: www.[facility].[place].[facility]      Understanding Diabetes: www.diabetes.org      Eating healthy: www.cdc.gov/healthyweight      Formerly named [facility] home safety checklist: www.cdc.gov/steadi/patient.html      Agency on Aging: www.goea.[place].[facility]      Alcoholics anonymous (AA): www.aa.org      Physical Activity: www.cynthia.nih.gov/sq7bosk      Tobacco use: www.quitwithusla.org

## 2025-05-17 NOTE — PROGRESS NOTES
HPI     Glaucoma            Comments: 4 month IOP ck and pt states no changes since last exam           Comments    DLS: 1/27/25    1) POAG OD   2) Glaucoma Suspect OS   3) NS OU   4) Ptosis   5) FmHx ARMD   6) Hyperopia with Astigmatism and Presbyopia     MEDS:  Timolol QAM OU (T1/2 0.5% soln and not GFS)  Latanoprost QHS OU          Last edited by Genny Paulson MA on 5/19/2025  8:13 AM.            Assessment /Plan     For exam results, see Encounter Report.    Primary open-angle glaucoma, right eye, mild stage    Primary open-angle glaucoma, left eye, mild stage    Nuclear sclerosis of both eyes    Brow ptosis    Small pupils    Family history of macular degeneration    Hyperopia of both eyes with astigmatism and presbyopia      1. Open angle with borderline glaucoma findings - Both Eyes (POAG od // suspect os )   Glaucoma (type and duration)   LTG suspect  First HVF 2011  First photos 2011  Glaucoma drops started 2/11/2021 (monitored off gtts 2011 to 2021 - then got new INS od)      Family history   = father-also my patient, + mom- she has passed  Glaucoma meds   lumigan ou - started 2/11/2021 / timolol - started 9/30/2024   H/O adverse rxn to glaucoma drops  None   LASERS   none  GLAUCOMA SURGERIES   none  OTHER EYE SURGERIES   none  CDR   0.8 with thin sup rim // 0.7  - rim appears intact   Tbase  16-24    Tmax  21/24      Ttarget   16 - 17 ou (set 5/9/2022 ) - had VF prog ou withIOP 17-20 ou        HVF  5 test 2011 to 2018 - full od // full os-            4 test 2021 to 2024 -  IAD/NS  od //  INS  os   Gono  +3 ou  CCT  [id number]  OCT  5 test 2018 to 2024 - RNFL - nl od (+prog 2018 to 2021) // bord G os  HRT   5 test 2011 to 2019 - MR -  Dec. IT od,  // dec. IN os, border IT /// CDR 0.72 od // 0.70 os  Disc photos  2011, 2012, 2017  - OIS // 2021 - harmony     Ttoday  17/14(good resp to add Timolol 20 --> 16 /14-->14)   Test done today - IOP check      2. Nuclear sclerosis - Both Eyes   - mild BCVA 20/20  ou  BATh 20/60 od // 20/70 os (10/2013)   3. Ptosis   -patient would like to monitor for now as he is not having any trouble    4. Family history of macular degeneration   -father has adv wet ARMD - gets injections with arend   5. Hyperopia, astigmatism , presbyopia  PC +0.75+1.00x177        +1.00+1.50x180   ADD +2.50  glasses Shel[place]   6. I use to see his father, and also saw his mother -  - his dad  2015 @ age 89   - his mom passed at age 98  - both had glaucoma, his dad also had  wet ARMD - saw Arend         Plan    POAG ou - mild   + IAD/NS od x 2 - confirmed   INS os   OCT od - bord TS now - (+prog from ) // now fluctuating   Cont  latanoprost  Add timolol q am ou ( no asthma or copd - + a fib )  (20-->16 / /14)     Cont to monitor cataracts     - not vis sign yet - dilates medium only - smallish pupils     Photo file updated 2023    Mild to mod cataracts - BCVA 20/40 od and 20/20 os    BAT h 20/40 ou   Phaco/IOL od prn  - pt is content with vision for now (2025)    Glasses - shelton - last seen 2023     F/U 4 months with IOP check  AR/MR/BAT - cataract check

## 2025-05-19 ENCOUNTER — OFFICE VISIT (OUTPATIENT)
Dept: OPHTHALMOLOGY | Facility: CLINIC | Age: 85
End: 2025-05-19
Payer: MEDICARE

## 2025-05-19 DIAGNOSIS — H40.1111 PRIMARY OPEN-ANGLE GLAUCOMA, RIGHT EYE, MILD STAGE: Primary | ICD-10-CM

## 2025-05-19 DIAGNOSIS — H57.819 BROW PTOSIS: ICD-10-CM

## 2025-05-19 DIAGNOSIS — H57.03 SMALL PUPILS: ICD-10-CM

## 2025-05-19 DIAGNOSIS — H40.1121 PRIMARY OPEN-ANGLE GLAUCOMA, LEFT EYE, MILD STAGE: ICD-10-CM

## 2025-05-19 DIAGNOSIS — H25.13 NUCLEAR SCLEROSIS OF BOTH EYES: ICD-10-CM

## 2025-05-19 DIAGNOSIS — Z83.518 FAMILY HISTORY OF MACULAR DEGENERATION: ICD-10-CM

## 2025-05-19 DIAGNOSIS — H52.4 HYPEROPIA OF BOTH EYES WITH ASTIGMATISM AND PRESBYOPIA: ICD-10-CM

## 2025-05-19 DIAGNOSIS — H52.203 HYPEROPIA OF BOTH EYES WITH ASTIGMATISM AND PRESBYOPIA: ICD-10-CM

## 2025-05-19 DIAGNOSIS — H52.03 HYPEROPIA OF BOTH EYES WITH ASTIGMATISM AND PRESBYOPIA: ICD-10-CM

## 2025-05-19 PROCEDURE — 1159F MED LIST DOCD IN RCRD: CPT | Mod: CPTII,HCNC,S$GLB, | Performed by: OPHTHALMOLOGY

## 2025-05-19 PROCEDURE — 99214 OFFICE O/P EST MOD 30 MIN: CPT | Mod: HCNC,S$GLB,, | Performed by: OPHTHALMOLOGY

## 2025-05-19 PROCEDURE — 1160F RVW MEDS BY RX/DR IN RCRD: CPT | Mod: CPTII,HCNC,S$GLB, | Performed by: OPHTHALMOLOGY

## 2025-05-19 PROCEDURE — 1126F AMNT PAIN NOTED NONE PRSNT: CPT | Mod: CPTII,HCNC,S$GLB, | Performed by: OPHTHALMOLOGY

## 2025-05-19 PROCEDURE — 99999 PR PBB SHADOW E&M-EST. PATIENT-LVL III: CPT | Mod: PBBFAC,HCNC,, | Performed by: OPHTHALMOLOGY

## 2025-05-19 PROCEDURE — 3288F FALL RISK ASSESSMENT DOCD: CPT | Mod: CPTII,HCNC,S$GLB, | Performed by: OPHTHALMOLOGY

## 2025-05-19 PROCEDURE — 1101F PT FALLS ASSESS-DOCD LE1/YR: CPT | Mod: CPTII,HCNC,S$GLB, | Performed by: OPHTHALMOLOGY

## 2025-07-05 DIAGNOSIS — E78.2 MIXED HYPERLIPIDEMIA: ICD-10-CM

## 2025-07-05 DIAGNOSIS — E11.65 TYPE 2 DIABETES MELLITUS WITH HYPERGLYCEMIA, WITHOUT LONG-TERM CURRENT USE OF INSULIN: ICD-10-CM

## 2025-07-05 DIAGNOSIS — I10 ESSENTIAL HYPERTENSION: ICD-10-CM

## 2025-07-07 RX ORDER — ATORVASTATIN CALCIUM 20 MG/1
20 TABLET, FILM COATED ORAL NIGHTLY
Qty: 90 TABLET | Refills: 3 | Status: SHIPPED | OUTPATIENT
Start: 2025-07-07

## 2025-07-07 RX ORDER — LISINOPRIL 10 MG/1
10 TABLET ORAL
Qty: 90 TABLET | Refills: 3 | Status: SHIPPED | OUTPATIENT
Start: 2025-07-07

## 2025-07-31 ENCOUNTER — TELEPHONE (OUTPATIENT)
Dept: CARDIOLOGY | Facility: CLINIC | Age: 85
End: 2025-07-31
Payer: MEDICARE

## 2025-08-01 ENCOUNTER — OFFICE VISIT (OUTPATIENT)
Dept: CARDIOLOGY | Facility: CLINIC | Age: 85
End: 2025-08-01
Payer: MEDICARE

## 2025-08-01 DIAGNOSIS — I48.21 PERMANENT ATRIAL FIBRILLATION: ICD-10-CM

## 2025-08-01 DIAGNOSIS — I10 ESSENTIAL HYPERTENSION: Primary | Chronic | ICD-10-CM

## 2025-08-01 DIAGNOSIS — I47.10 SVT (SUPRAVENTRICULAR TACHYCARDIA): Chronic | ICD-10-CM

## 2025-08-01 DIAGNOSIS — I45.10 RIGHT BUNDLE BRANCH BLOCK (RBBB): Chronic | ICD-10-CM

## 2025-08-01 DIAGNOSIS — Z98.890 S/P ABLATION OF ATRIAL FLUTTER: Chronic | ICD-10-CM

## 2025-08-01 DIAGNOSIS — E78.2 MIXED HYPERLIPIDEMIA: Chronic | ICD-10-CM

## 2025-08-01 DIAGNOSIS — I87.2 VENOUS INSUFFICIENCY OF RIGHT LEG: ICD-10-CM

## 2025-08-01 DIAGNOSIS — Z86.79 S/P ABLATION OF ATRIAL FLUTTER: Chronic | ICD-10-CM

## 2025-08-01 PROCEDURE — 99999 PR PBB SHADOW E&M-EST. PATIENT-LVL III: CPT | Mod: PBBFAC,,, | Performed by: INTERNAL MEDICINE

## 2025-08-01 NOTE — PROGRESS NOTES
PA FOR RIVASTIGMINE PATCH  (Key: [id number])  BIN:  [id number]  PCN:  [id number]  RX:  [id number]  ID:  [id number]    APPROVED  01/01/2024 TO 01/11/2025   Subjective:   08/01/2025     Patient ID:  Danial Meza is a 85 y.o. male who presents for evaulation of Annual Exam       History of Present Illness    CHIEF COMPLAINT:  Patient presents for a cardiac follow-up visit.    HPI:  Patient reports persistent AFib but states he is asymptomatic. He notes fatigue after 1 hour of yard work, requiring rest, which he attributes to his age of 85 years. He mentions a central abdominal protrusion that becomes more prominent when sitting or instilling eye drops in the morning. He has varicose veins and a history of anal warts treated 20 years ago, which recurred with increased severity, but currently cause no discomfort.    He denies chest pain, bleeding, or any discomfort from his varicose veins.    CARDIAC HISTORY:  EKG ([id number]:40:00.000Z): AFib Echo: normal CAD Tachy xu syndrome  PAF  SVT ablation (Dr. Gutierrez)    MEDICATIONS:  Metoprolol succinate for heart rate control  Apixaban 5 mg twice daily for anticoagulation  Lisinopril for HTN  Atorvastatin 20 mg daily for cholesterol    TEST RESULTS:  Patient's most recent lab results from January 2025 show a total cholesterol of 71, HDL of 21, LDL of 31, and triglycerides of 94. His last recorded creatinine level was 1.0.    MEDICAL HISTORY:  Patient has a history of hypertension (HTN) and hyperlipidemia (HLD).    SURGICAL HISTORY:  Patient has undergone a prior ablation procedure.      ROS:  General: -fever, -chills, +fatigue, -weight gain, -weight loss  Eyes: -vision changes, -redness, -discharge  ENT: -ear pain, -nasal congestion, -sore throat  Cardiovascular: -chest pain, -palpitations, -lower extremity edema  Respiratory: -cough, -shortness of breath  Gastrointestinal: -abdominal pain, -nausea, -vomiting, -diarrhea, -constipation, -blood in stool, +abdominal distention  Genitourinary: -dysuria, -hematuria, -frequency  Musculoskeletal: -joint pain, -muscle pain  Skin: -rash, -lesion  Neurological: -headache,  -dizziness, -numbness, -tingling  Psychiatric: -anxiety, -depression, -sleep difficulty          Problem List[1]     Review of patient's allergies indicates:   Allergen Reactions    Macadamia nut oil Anaphylaxis     Pt states only had a reaction with macadamia nuts.        Current Medications[2]     Objective:   Physical Exam    Vitals: Weight: 98 kilograms. Blood pressure elevated.  General: No acute distress. Well-developed. Well-nourished.  Eyes: EOMI. Sclerae anicteric.  HENT: Normocephalic. Atraumatic. Nares patent. Moist oral mucosa.  Cardiovascular: Regular rate. Regular rhythm. No murmurs. No rubs. No gallops. Normal S1, S2.  Respiratory: Normal respiratory effort. Clear to auscultation bilaterally. No rales. No rhonchi. No wheezing.  Musculoskeletal: No  obvious deformity.  Extremities: No lower extremity edema.  Neurological: Alert & oriented x3. No slurred speech. Normal gait.  Psychiatric: Normal mood. Normal affect. Good insight. Good judgment.  Skin: Warm. Dry. No rash.          Vitals:    08/01/25 1438   BP: (!) (P) 151/74   Pulse: (P) 83     Wt Readings from Last 3 Encounters:   08/01/25 (P) 97.3 kg (214 lb 8.1 oz)   05/08/25 98.7 kg (217 lb 9.5 oz)   01/25/25 97.5 kg (214 lb 15.2 oz)     Temp Readings from Last 3 Encounters:   07/13/22 97 °F (36.1 °C) (Oral)   06/07/22 97.5 °F (36.4 °C) (Temporal)   10/27/19 96.5 °F (35.8 °C)     BP Readings from Last 3 Encounters:   08/01/25 (!) (P) 151/74   05/08/25 114/78   01/25/25 120/78     Pulse Readings from Last 3 Encounters:   08/01/25 (P) 83   05/08/25 84   01/25/25 81               Lab Results   Component Value Date    CHOL 71 (L) 01/27/2025    CHOL 81 (L) 12/06/2023    CHOL 83 (L) 07/06/2023     Lab Results   Component Value Date    HDL 21 (L) 01/27/2025    HDL 22 (L) 12/06/2023    HDL 22 (L) 07/06/2023     Lab Results   Component Value Date    LDLCALC 31.2 (L) 01/27/2025    LDLCALC 34.8 (L) 12/06/2023    LDLCALC 31.6 (L) 07/06/2023     Lab Results    Component Value Date    ALT 23 01/27/2025    AST 20 01/27/2025    AST 22 12/06/2023    AST 18 07/06/2023     Lab Results   Component Value Date    CREATININE 1.0 01/27/2025    BUN 15 01/27/2025     01/27/2025    K 4.8 01/27/2025    CO2 28 01/27/2025    CO2 25 07/16/2024    CO2 26 12/06/2023     Lab Results   Component Value Date    HGB 15.0 01/27/2025    HCT 43.4 01/27/2025    HCT 45.6 12/06/2023    HCT 43.2 07/06/2023       Assessment and Plan:   Assessment & Plan    I48.21 Permanent atrial fibrillation  I10 Essential hypertension  E78.2 Mixed hyperlipidemia  I45.10 Right bundle branch block (RBBB)  Z98.890, Z86.79 S/P ablation of atrial flutter  I47.10 SVT (supraventricular tachycardia)  I87.2 Venous insufficiency of right leg    IMPRESSION:  - A-fib is asymptomatic and well-managed with current medication regimen.  - Blood pressure noted to be elevated during visit, plan to recheck.  - Varicose veins noted but asymptomatic, conservative management appropriate given age and preferences; patient to elevate legs when resting to observe if varicose veins diminish.    PERMANENT ATRIAL FIBRILLATION:  - Educated patient on the nature of a-fib and its current management.  - Explained the purpose of current medications: Eliquis for stroke prevention, Metoprolol for heart rate control.  - Continued Eliquis 5 mg twice daily.  - Continued Metoprolol succinate.    ESSENTIAL HYPERTENSION:  - Explained the purpose of current medications: Lisinopril for blood pressure management.  - Continued Lisinopril.    MIXED HYPERLIPIDEMIA:  - Continued Atorvastatin 20 mg daily.    VENOUS INSUFFICIENCY OF RIGHT LEG:  - Patient to elevate legs when resting to observe if varicose veins diminish.          Follow up in about 1 year (around 8/1/2026).        Future Appointments   Date Time Provider Department Center   9/15/2025  8:30 AM Huma Garcia MD [facility]       This note was generated with the assistance of  ambient listening technology. Verbal consent was obtained by the patient and accompanying visitor(s) for the recording of patient appointment to facilitate this note. I attest to having reviewed and edited the generated note for accuracy, though some syntax or spelling errors may persist. Please contact the author of this note for any clarification.                      [1]   Patient Active Problem List  Diagnosis    Open angle with borderline glaucoma findings - Both Eyes    Nuclear sclerosis - Both Eyes    Family history of macular degeneration    Hyperopia with astigmatism and presbyopia    Gout    Essential hypertension    Mixed hyperlipidemia    Permanent atrial fibrillation    Open angle with borderline findings, low risk    Current use of long term anticoagulation    S/P ablation of atrial flutter 11/4/15    Venous insufficiency of right leg    Right bundle branch block (RBBB)    Brow ptosis    Small pupils    History of Bell's palsy    Prediabetes    Calculus of gallbladder without cholecystitis    Osteoarthritis of right foot    Gouty arthritis of toe of right foot    Stage 3a chronic kidney disease    SVT (supraventricular tachycardia)    Class 1 obesity due to excess calories with serious comorbidity and body mass index (BMI) of 30.0 to 30.9 in adult    Carpal tunnel syndrome on right    Ectatic aorta    Dupuytren's contracture of right hand    Gout with tophi    Senile purpura   [2]   Current Outpatient Medications:     allopurinoL (ZYLOPRIM) 300 MG tablet, TAKE 1 TABLET ONE TIME DAILY, Disp: 90 tablet, Rfl: 3    atorvastatin (LIPITOR) 20 MG tablet, TAKE 1 TABLET EVERY EVENING, Disp: 90 tablet, Rfl: 3    colchicine (COLCRYS) 0.6 mg tablet, TAKE 1 TABLET EVERY DAY, Disp: 90 tablet, Rfl: 3    ELIQUIS 5 mg Tab, TAKE 1 TABLET TWICE DAILY, Disp: 180 tablet, Rfl: 3    latanoprost 0.005 % ophthalmic solution, Place 1 drop into both eyes every evening., Disp: 7.5 mL, Rfl: 3    lisinopriL 10 MG tablet, TAKE 1  TABLET EVERY DAY, Disp: 90 tablet, Rfl: 3    metoprolol succinate (TOPROL-XL) 50 MG 24 hr tablet, TAKE 1 TABLET EVERY DAY, Disp: 90 tablet, Rfl: 3    multivitamin (THERAGRAN) per tablet, Take 1 tablet by mouth once daily., Disp: , Rfl:     timolol maleate 0.5% (TIMOPTIC) 0.5 % Drop, Place 1 drop into both eyes every morning., Disp: 15 mL, Rfl: 3    VIT C/VIT E/LUTEIN/MIN/OMEGA-3 (OCUVITE ORAL), Take 1 tablet by mouth once daily. , Disp: , Rfl:   No current facility-administered medications for this visit.    Facility-Administered Medications Ordered in Other Visits:     LIDOcaine (PF) 10 mg/ml (1%) injection 10 mg, 1 mL, Intradermal, Once, Sai Masterson MD

## 2025-08-17 DIAGNOSIS — I47.10 SVT (SUPRAVENTRICULAR TACHYCARDIA): ICD-10-CM

## 2025-08-18 RX ORDER — METOPROLOL SUCCINATE 50 MG/1
50 TABLET, EXTENDED RELEASE ORAL
Qty: 90 TABLET | Refills: 3 | Status: SHIPPED | OUTPATIENT
Start: 2025-08-18

## 2025-08-22 ENCOUNTER — TELEPHONE (OUTPATIENT)
Dept: RHEUMATOLOGY | Facility: CLINIC | Age: 85
End: 2025-08-22
Payer: MEDICARE

## 2025-08-26 ENCOUNTER — TELEPHONE (OUTPATIENT)
Dept: DERMATOLOGY | Facility: CLINIC | Age: 85
End: 2025-08-26
Payer: MEDICARE

## 2025-08-27 ENCOUNTER — OFFICE VISIT (OUTPATIENT)
Dept: INTERNAL MEDICINE | Facility: CLINIC | Age: 85
End: 2025-08-27
Payer: MEDICARE

## 2025-08-27 VITALS
DIASTOLIC BLOOD PRESSURE: 80 MMHG | OXYGEN SATURATION: 100 % | HEIGHT: 71 IN | HEART RATE: 80 BPM | SYSTOLIC BLOOD PRESSURE: 136 MMHG | TEMPERATURE: 100 F | BODY MASS INDEX: 30.53 KG/M2 | WEIGHT: 218.06 LBS

## 2025-08-27 DIAGNOSIS — E78.2 MIXED HYPERLIPIDEMIA: ICD-10-CM

## 2025-08-27 DIAGNOSIS — M1A.9XX0 CHRONIC GOUT WITHOUT TOPHUS, UNSPECIFIED CAUSE, UNSPECIFIED SITE: ICD-10-CM

## 2025-08-27 DIAGNOSIS — I48.21 PERMANENT ATRIAL FIBRILLATION: ICD-10-CM

## 2025-08-27 DIAGNOSIS — U07.1 COVID-19: Primary | ICD-10-CM

## 2025-08-27 LAB
CTP QC/QA: YES
SARS-COV-2 RDRP RESP QL NAA+PROBE: POSITIVE

## 2025-08-27 PROCEDURE — 99999 PR PBB SHADOW E&M-EST. PATIENT-LVL IV: CPT | Mod: PBBFAC,HCNC,, | Performed by: INTERNAL MEDICINE

## 2025-08-27 RX ORDER — PROMETHAZINE HYDROCHLORIDE AND DEXTROMETHORPHAN HYDROBROMIDE 6.25; 15 MG/5ML; MG/5ML
5 SYRUP ORAL EVERY 6 HOURS PRN
Qty: 118 ML | Refills: 1 | Status: SHIPPED | OUTPATIENT
Start: 2025-08-27 | End: 2025-09-12

## 2025-09-03 DIAGNOSIS — I48.0 PAF (PAROXYSMAL ATRIAL FIBRILLATION): Chronic | ICD-10-CM

## 2025-09-03 DIAGNOSIS — M1A.9XX0 CHRONIC GOUT WITHOUT TOPHUS, UNSPECIFIED CAUSE, UNSPECIFIED SITE: ICD-10-CM

## 2025-09-03 RX ORDER — ALLOPURINOL 300 MG/1
300 TABLET ORAL DAILY
Qty: 90 TABLET | Refills: 1 | Status: SHIPPED | OUTPATIENT
Start: 2025-09-03

## 2025-09-03 RX ORDER — APIXABAN 5 MG/1
5 TABLET, FILM COATED ORAL 2 TIMES DAILY
Qty: 180 TABLET | Refills: 3 | Status: SHIPPED | OUTPATIENT
Start: 2025-09-03

## 2025-09-03 RX ORDER — COLCHICINE 0.6 MG/1
0.6 TABLET ORAL
Qty: 90 TABLET | Refills: 0 | Status: SHIPPED | OUTPATIENT
Start: 2025-09-03

## (undated) DEVICE — SOL 9P NACL IRR PIC IL

## (undated) DEVICE — NDL SAFETY 22G X 1.5 ECLIPSE

## (undated) DEVICE — GAUZE SPONGE 4X4 12PLY

## (undated) DEVICE — PAD CAST SPECIALIST STRL 3

## (undated) DEVICE — SUT PROLENE 3-0 FS-2 18

## (undated) DEVICE — NDL 18GA X1 1/2 REG BEVEL

## (undated) DEVICE — BNDG COFLEX FOAM LF2 ST 3X5YD

## (undated) DEVICE — PAD CAST 2 IN X 4YDS STERILE

## (undated) DEVICE — TOURNIQUET SB QC DP 18X4IN

## (undated) DEVICE — DRESSING N ADH OIL EMUL 3X3

## (undated) DEVICE — Device

## (undated) DEVICE — KNIFE CARPAL TUNNEL

## (undated) DEVICE — GLOVE BIOGEL PI MICRO SZ 7

## (undated) DEVICE — STOCKINETTE DBL PLY ST 4X